# Patient Record
Sex: FEMALE | Race: WHITE | Employment: OTHER | URBAN - METROPOLITAN AREA
[De-identification: names, ages, dates, MRNs, and addresses within clinical notes are randomized per-mention and may not be internally consistent; named-entity substitution may affect disease eponyms.]

---

## 2018-07-20 ENCOUNTER — APPOINTMENT (OUTPATIENT)
Dept: LAB | Facility: HOSPITAL | Age: 73
End: 2018-07-20
Attending: FAMILY MEDICINE
Payer: MEDICARE

## 2018-07-20 ENCOUNTER — TRANSCRIBE ORDERS (OUTPATIENT)
Dept: ADMINISTRATIVE | Facility: HOSPITAL | Age: 73
End: 2018-07-20

## 2018-07-20 DIAGNOSIS — D64.9 ANEMIA, UNSPECIFIED TYPE: ICD-10-CM

## 2018-07-20 DIAGNOSIS — E78.5 HYPERLIPIDEMIA, UNSPECIFIED HYPERLIPIDEMIA TYPE: ICD-10-CM

## 2018-07-20 DIAGNOSIS — D64.9 ANEMIA, UNSPECIFIED TYPE: Primary | ICD-10-CM

## 2018-07-20 LAB
CHOLEST SERPL-MCNC: 148 MG/DL (ref 0–200)
ERYTHROCYTE [DISTWIDTH] IN BLOOD BY AUTOMATED COUNT: 14.6 % (ref 11.5–14.5)
FERRITIN SERPL-MCNC: 38 NG/ML (ref 8–388)
FOLATE SERPL-MCNC: >20 NG/ML (ref 3.1–17.5)
HCT VFR BLD AUTO: 38.4 % (ref 34.8–46.1)
HDLC SERPL-MCNC: 37 MG/DL (ref 40–60)
HGB BLD-MCNC: 12.7 G/DL (ref 12–16)
IRON SATN MFR SERPL: 14 %
IRON SERPL-MCNC: 40 UG/DL (ref 50–170)
LDLC SERPL CALC-MCNC: 77 MG/DL (ref 75–193)
MCH RBC QN AUTO: 27.9 PG (ref 26–34)
MCHC RBC AUTO-ENTMCNC: 33 G/DL (ref 31–37)
MCV RBC AUTO: 85 FL (ref 81–99)
NONHDLC SERPL-MCNC: 111 MG/DL
PLATELET # BLD AUTO: 274 THOUSANDS/UL (ref 149–390)
PMV BLD AUTO: 8 FL (ref 8.6–11.7)
RBC # BLD AUTO: 4.55 MILLION/UL (ref 3.9–5.2)
TIBC SERPL-MCNC: 294 UG/DL (ref 250–450)
TRIGL SERPL-MCNC: 168 MG/DL (ref 44–166)
VIT B12 SERPL-MCNC: 1586 PG/ML (ref 100–900)
WBC # BLD AUTO: 8.9 THOUSAND/UL (ref 4.8–10.8)

## 2018-07-20 PROCEDURE — 82607 VITAMIN B-12: CPT

## 2018-07-20 PROCEDURE — 83540 ASSAY OF IRON: CPT

## 2018-07-20 PROCEDURE — 83550 IRON BINDING TEST: CPT

## 2018-07-20 PROCEDURE — 82728 ASSAY OF FERRITIN: CPT

## 2018-07-20 PROCEDURE — 80061 LIPID PANEL: CPT

## 2018-07-20 PROCEDURE — 36415 COLL VENOUS BLD VENIPUNCTURE: CPT

## 2018-07-20 PROCEDURE — 85027 COMPLETE CBC AUTOMATED: CPT

## 2018-07-20 PROCEDURE — 82746 ASSAY OF FOLIC ACID SERUM: CPT

## 2018-08-08 ENCOUNTER — TRANSCRIBE ORDERS (OUTPATIENT)
Dept: LAB | Facility: MEDICAL CENTER | Age: 73
End: 2018-08-08

## 2018-08-08 ENCOUNTER — APPOINTMENT (OUTPATIENT)
Dept: LAB | Facility: MEDICAL CENTER | Age: 73
End: 2018-08-08
Payer: MEDICARE

## 2018-08-08 DIAGNOSIS — R35.0 URINE FREQUENCY: Primary | ICD-10-CM

## 2018-08-08 DIAGNOSIS — M81.0 OSTEOPOROSIS, UNSPECIFIED OSTEOPOROSIS TYPE, UNSPECIFIED PATHOLOGICAL FRACTURE PRESENCE: ICD-10-CM

## 2018-08-08 DIAGNOSIS — E78.5 HYPERLIPIDEMIA, UNSPECIFIED HYPERLIPIDEMIA TYPE: ICD-10-CM

## 2018-08-08 DIAGNOSIS — D64.9 ANEMIA, UNSPECIFIED TYPE: ICD-10-CM

## 2018-08-08 DIAGNOSIS — R35.0 URINE FREQUENCY: ICD-10-CM

## 2018-08-08 LAB
BACTERIA UR QL AUTO: NORMAL /HPF
BILIRUB UR QL STRIP: NEGATIVE
CLARITY UR: CLEAR
COLOR UR: YELLOW
GLUCOSE UR STRIP-MCNC: NEGATIVE MG/DL
HGB UR QL STRIP.AUTO: NEGATIVE
HYALINE CASTS #/AREA URNS LPF: NORMAL /LPF
KETONES UR STRIP-MCNC: NEGATIVE MG/DL
LEUKOCYTE ESTERASE UR QL STRIP: NEGATIVE
NITRITE UR QL STRIP: NEGATIVE
NON-SQ EPI CELLS URNS QL MICRO: NORMAL /HPF
PH UR STRIP.AUTO: 5.5 [PH] (ref 4.5–8)
PROT UR STRIP-MCNC: NEGATIVE MG/DL
RBC #/AREA URNS AUTO: NORMAL /HPF
SP GR UR STRIP.AUTO: 1.01 (ref 1–1.03)
UROBILINOGEN UR QL STRIP.AUTO: 0.2 E.U./DL
WBC #/AREA URNS AUTO: NORMAL /HPF

## 2018-08-08 PROCEDURE — 87086 URINE CULTURE/COLONY COUNT: CPT

## 2018-08-08 PROCEDURE — 81001 URINALYSIS AUTO W/SCOPE: CPT

## 2018-08-10 LAB — BACTERIA UR CULT: NORMAL

## 2018-10-09 ENCOUNTER — APPOINTMENT (OUTPATIENT)
Dept: LAB | Facility: HOSPITAL | Age: 73
End: 2018-10-09
Attending: INTERNAL MEDICINE
Payer: MEDICARE

## 2018-10-09 ENCOUNTER — TRANSCRIBE ORDERS (OUTPATIENT)
Dept: ADMINISTRATIVE | Facility: HOSPITAL | Age: 73
End: 2018-10-09

## 2018-10-09 DIAGNOSIS — D50.9 IRON DEFICIENCY ANEMIA, UNSPECIFIED IRON DEFICIENCY ANEMIA TYPE: Primary | ICD-10-CM

## 2018-10-09 DIAGNOSIS — D50.9 IRON DEFICIENCY ANEMIA, UNSPECIFIED IRON DEFICIENCY ANEMIA TYPE: ICD-10-CM

## 2018-10-09 LAB
BASOPHILS # BLD AUTO: 0 THOUSANDS/ΜL (ref 0–0.1)
BASOPHILS NFR BLD AUTO: 0 % (ref 0–2)
EOSINOPHIL # BLD AUTO: 0.2 THOUSAND/ΜL (ref 0–0.61)
EOSINOPHIL NFR BLD AUTO: 1 % (ref 0–5)
ERYTHROCYTE [DISTWIDTH] IN BLOOD BY AUTOMATED COUNT: 15 % (ref 11.5–14.5)
HCT VFR BLD AUTO: 38.9 % (ref 34.8–46.1)
HGB BLD-MCNC: 12.9 G/DL (ref 12–16)
IRON SATN MFR SERPL: 13 %
IRON SERPL-MCNC: 38 UG/DL (ref 50–170)
LYMPHOCYTES # BLD AUTO: 1.9 THOUSANDS/ΜL (ref 0.6–4.47)
LYMPHOCYTES NFR BLD AUTO: 19 % (ref 21–51)
MCH RBC QN AUTO: 28.8 PG (ref 26–34)
MCHC RBC AUTO-ENTMCNC: 33.1 G/DL (ref 31–37)
MCV RBC AUTO: 87 FL (ref 81–99)
MONOCYTES # BLD AUTO: 0.7 THOUSAND/ΜL (ref 0.17–1.22)
MONOCYTES NFR BLD AUTO: 7 % (ref 2–12)
NEUTROPHILS # BLD AUTO: 7.7 THOUSANDS/ΜL (ref 1.4–6.5)
NEUTS SEG NFR BLD AUTO: 73 % (ref 42–75)
NRBC BLD AUTO-RTO: 0 /100 WBCS
PLATELET # BLD AUTO: 269 THOUSANDS/UL (ref 149–390)
PMV BLD AUTO: 8.3 FL (ref 8.6–11.7)
RBC # BLD AUTO: 4.47 MILLION/UL (ref 3.9–5.2)
TIBC SERPL-MCNC: 292 UG/DL (ref 250–450)
WBC # BLD AUTO: 10.5 THOUSAND/UL (ref 4.8–10.8)

## 2018-10-09 PROCEDURE — 83540 ASSAY OF IRON: CPT

## 2018-10-09 PROCEDURE — 36415 COLL VENOUS BLD VENIPUNCTURE: CPT

## 2018-10-09 PROCEDURE — 85025 COMPLETE CBC W/AUTO DIFF WBC: CPT

## 2018-10-09 PROCEDURE — 83550 IRON BINDING TEST: CPT

## 2018-11-07 ENCOUNTER — TRANSCRIBE ORDERS (OUTPATIENT)
Dept: ADMINISTRATIVE | Facility: HOSPITAL | Age: 73
End: 2018-11-07

## 2018-11-07 ENCOUNTER — APPOINTMENT (OUTPATIENT)
Dept: LAB | Facility: HOSPITAL | Age: 73
End: 2018-11-07

## 2018-11-07 DIAGNOSIS — Z11.1 SCREENING EXAMINATION FOR PULMONARY TUBERCULOSIS: Primary | ICD-10-CM

## 2018-11-07 DIAGNOSIS — Z11.1 SCREENING EXAMINATION FOR PULMONARY TUBERCULOSIS: ICD-10-CM

## 2018-11-07 PROCEDURE — 86480 TB TEST CELL IMMUN MEASURE: CPT

## 2018-11-07 PROCEDURE — 36415 COLL VENOUS BLD VENIPUNCTURE: CPT

## 2018-11-08 LAB
GAMMA INTERFERON BACKGROUND BLD IA-ACNC: 0.03 IU/ML
M TB IFN-G BLD-IMP: NEGATIVE
M TB IFN-G CD4+ BCKGRND COR BLD-ACNC: 0 IU/ML
M TB IFN-G CD4+ BCKGRND COR BLD-ACNC: 0.02 IU/ML
MITOGEN IGNF BCKGRD COR BLD-ACNC: >10 IU/ML

## 2018-12-21 ENCOUNTER — APPOINTMENT (OUTPATIENT)
Dept: LAB | Facility: HOSPITAL | Age: 73
End: 2018-12-21

## 2018-12-21 ENCOUNTER — TRANSCRIBE ORDERS (OUTPATIENT)
Dept: ADMINISTRATIVE | Facility: HOSPITAL | Age: 73
End: 2018-12-21

## 2018-12-21 DIAGNOSIS — Z01.84 IMMUNITY STATUS TESTING: ICD-10-CM

## 2018-12-21 DIAGNOSIS — Z01.84 IMMUNITY STATUS TESTING: Primary | ICD-10-CM

## 2018-12-21 LAB — RUBV IGG SERPL IA-ACNC: >175 IU/ML

## 2018-12-21 PROCEDURE — 86762 RUBELLA ANTIBODY: CPT

## 2018-12-21 PROCEDURE — 36415 COLL VENOUS BLD VENIPUNCTURE: CPT

## 2018-12-21 PROCEDURE — 86787 VARICELLA-ZOSTER ANTIBODY: CPT

## 2018-12-21 PROCEDURE — 86735 MUMPS ANTIBODY: CPT

## 2018-12-21 PROCEDURE — 86765 RUBEOLA ANTIBODY: CPT

## 2018-12-26 LAB
MEV IGG SER QL: NORMAL
MUV IGG SER QL: NORMAL
VZV IGG SER IA-ACNC: NORMAL

## 2019-01-11 ENCOUNTER — TRANSCRIBE ORDERS (OUTPATIENT)
Dept: ADMINISTRATIVE | Facility: HOSPITAL | Age: 74
End: 2019-01-11

## 2019-01-11 ENCOUNTER — APPOINTMENT (OUTPATIENT)
Dept: LAB | Facility: HOSPITAL | Age: 74
End: 2019-01-11
Attending: FAMILY MEDICINE
Payer: MEDICARE

## 2019-01-11 DIAGNOSIS — D64.9 ANEMIA, UNSPECIFIED TYPE: Primary | ICD-10-CM

## 2019-01-11 DIAGNOSIS — D64.9 ANEMIA, UNSPECIFIED TYPE: ICD-10-CM

## 2019-01-11 LAB
ERYTHROCYTE [DISTWIDTH] IN BLOOD BY AUTOMATED COUNT: 14.2 % (ref 11.5–14.5)
FERRITIN SERPL-MCNC: 36 NG/ML (ref 8–388)
HCT VFR BLD AUTO: 38.7 % (ref 34.8–46.1)
HGB BLD-MCNC: 12.3 G/DL (ref 12–16)
IRON SATN MFR SERPL: 13 %
IRON SERPL-MCNC: 39 UG/DL (ref 50–170)
MCH RBC QN AUTO: 27.8 PG (ref 26–34)
MCHC RBC AUTO-ENTMCNC: 31.9 G/DL (ref 31–37)
MCV RBC AUTO: 87 FL (ref 81–99)
PLATELET # BLD AUTO: 290 THOUSANDS/UL (ref 149–390)
PMV BLD AUTO: 8.2 FL (ref 8.6–11.7)
RBC # BLD AUTO: 4.44 MILLION/UL (ref 3.9–5.2)
TIBC SERPL-MCNC: 301 UG/DL (ref 250–450)
WBC # BLD AUTO: 9.1 THOUSAND/UL (ref 4.8–10.8)

## 2019-01-11 PROCEDURE — 85027 COMPLETE CBC AUTOMATED: CPT

## 2019-01-11 PROCEDURE — 36415 COLL VENOUS BLD VENIPUNCTURE: CPT

## 2019-01-11 PROCEDURE — 82728 ASSAY OF FERRITIN: CPT

## 2019-01-11 PROCEDURE — 83540 ASSAY OF IRON: CPT

## 2019-01-11 PROCEDURE — 83550 IRON BINDING TEST: CPT

## 2019-02-11 ENCOUNTER — TRANSCRIBE ORDERS (OUTPATIENT)
Dept: ADMINISTRATIVE | Facility: HOSPITAL | Age: 74
End: 2019-02-11

## 2019-02-11 DIAGNOSIS — Z12.39 SCREENING BREAST EXAMINATION: Primary | ICD-10-CM

## 2019-02-14 ENCOUNTER — HOSPITAL ENCOUNTER (OUTPATIENT)
Dept: MAMMOGRAPHY | Facility: HOSPITAL | Age: 74
Discharge: HOME | End: 2019-02-14
Attending: FAMILY MEDICINE
Payer: MEDICARE

## 2019-02-14 VITALS — WEIGHT: 135 LBS | HEIGHT: 61 IN | BODY MASS INDEX: 25.49 KG/M2

## 2019-02-14 DIAGNOSIS — Z12.39 SCREENING BREAST EXAMINATION: ICD-10-CM

## 2019-02-14 PROCEDURE — 77067 SCR MAMMO BI INCL CAD: CPT

## 2019-02-14 PROCEDURE — 77063 BREAST TOMOSYNTHESIS BI: CPT

## 2019-03-19 ENCOUNTER — OFFICE VISIT (OUTPATIENT)
Dept: CARDIOLOGY CLINIC | Facility: CLINIC | Age: 74
End: 2019-03-19
Payer: MEDICARE

## 2019-03-19 VITALS
HEART RATE: 78 BPM | BODY MASS INDEX: 26.06 KG/M2 | HEIGHT: 61 IN | DIASTOLIC BLOOD PRESSURE: 70 MMHG | SYSTOLIC BLOOD PRESSURE: 122 MMHG | WEIGHT: 138 LBS

## 2019-03-19 DIAGNOSIS — I25.10 CORONARY ARTERY DISEASE INVOLVING NATIVE CORONARY ARTERY OF NATIVE HEART WITHOUT ANGINA PECTORIS: Primary | ICD-10-CM

## 2019-03-19 DIAGNOSIS — E78.5 DYSLIPIDEMIA: ICD-10-CM

## 2019-03-19 PROCEDURE — 99213 OFFICE O/P EST LOW 20 MIN: CPT | Performed by: INTERNAL MEDICINE

## 2019-03-19 PROCEDURE — 93000 ELECTROCARDIOGRAM COMPLETE: CPT | Performed by: INTERNAL MEDICINE

## 2019-03-19 RX ORDER — ATORVASTATIN CALCIUM 20 MG/1
TABLET, FILM COATED ORAL
COMMUNITY
End: 2019-03-19 | Stop reason: CLARIF

## 2019-03-19 RX ORDER — OMEPRAZOLE 20 MG/1
20 CAPSULE, DELAYED RELEASE ORAL DAILY
COMMUNITY
Start: 2019-02-11

## 2019-03-19 RX ORDER — OXYBUTYNIN CHLORIDE 5 MG/1
5 TABLET, EXTENDED RELEASE ORAL
Refills: 0 | COMMUNITY
Start: 2019-02-11

## 2019-03-19 RX ORDER — ATORVASTATIN CALCIUM 40 MG/1
40 TABLET, FILM COATED ORAL DAILY
Qty: 90 TABLET | Refills: 5
Start: 2019-03-19

## 2019-03-19 RX ORDER — UBIDECARENONE 100 MG
CAPSULE ORAL DAILY
COMMUNITY

## 2019-03-19 NOTE — PROGRESS NOTES
Patient ID: Uriel Samaniego is a 74 y o  female  Plan:      Dyslipidemia  Tolerating statin tx  Coronary artery disease involving native coronary artery of native heart without angina pectoris  Moderate disease by cath 2014  No current symptoms  Follow up Plan:  1 year ecg and f/u    HPI:   The patient disease seen in follow-up today regarding CAD and hyperlipidemia  No recent chest pain or chest pressure  She is about to be fitted for sleep apnea device  She is 5 years post heart catheterization revealing 60% lad stenosis and 70% 2nd diagonal vessel stenosis  Most recent or relevant cardiac/vascular testing:    ECG 3/19/2019: NSR  WNL  Past Surgical History:   Procedure Laterality Date    BREAST BIOPSY Right 02/24/2015    BREAST BIOPSY Left     ? year    CARDIAC CATHETERIZATION  02/07/2014    EF 65%, mid LAD 60% stenosis and D2 70% stenosis  Medical management  CMP: No results found for: NA, K, CL, CO2, BUN, CREATININE, GLUCOSE, EGFR    Lipid Profile:   Lab Results   Component Value Date    TRIG 168 (H) 07/20/2018    HDL 37 (L) 07/20/2018         Review of Systems   10  point ROS  was otherwise non pertinent or negative except as per HPI or as below  Gait: WNL  Objective:     /70   Pulse 78   Ht 5' 1" (1 549 m)   Wt 62 6 kg (138 lb)   BMI 26 07 kg/m²     PHYSICAL EXAM:    General:  Normal appearance in no distress  Eyes:  Anicteric  Oral mucosa:  Moist   Neck:  No JVD  Carotid upstrokes are brisk without bruits  No masses  Chest:  Clear to auscultation and percussion  Cardiac:  Normal PMI  Normal S1 and S2  No murmur gallop or rub  Abdomen:  Soft and nontender  No palpable organomegaly or aortic enlargement  Extremities:  No peripheral edema  Musculoskeletal:  Symmetric  Vascular:  Femoral pulses are brisk without bruits  Popliteal pulses are intact bilaterally  Pedal pulses are intact  Neuro:  Grossly symmetric    Psych:  Alert and oriented x3         Current Outpatient Medications:     atorvastatin (LIPITOR) 40 mg tablet, Take 1 tablet (40 mg total) by mouth daily, Disp: 90 tablet, Rfl: 5    Calcium Carb-Cholecalciferol 600-800 MG-UNIT TABS, Take by mouth daily, Disp: , Rfl:     co-enzyme Q-10 100 mg capsule, Take by mouth daily, Disp: , Rfl:     metoprolol tartrate (LOPRESSOR) 25 mg tablet, Take 1/2 tablet by oral route at bedtime  , Disp: , Rfl:     nefazodone (SERZONE) 100 mg tablet, Take 2 tablets by oral route before bed , Disp: , Rfl:     omeprazole (PriLOSEC) 20 mg delayed release capsule, Take 20 mg by mouth daily, Disp: , Rfl:     oxybutynin (DITROPAN-XL) 5 mg 24 hr tablet, Take 5 mg by mouth daily at bedtime, Disp: , Rfl: 0  Allergies   Allergen Reactions    Advil [Ibuprofen]     Aspirin Other (See Comments)     gi bleed    Caspofungin Other (See Comments)    Hydrocodone     Pravastatin GI Intolerance    Tramadol GI Intolerance     Past Medical History:   Diagnosis Date    Benign hypertension     Coronary artery disease     medical management    History of echocardiogram 02/07/2014    EF 55%, mild MR     Mixed hyperlipidemia     Watermelon stomach            Social History     Tobacco Use   Smoking Status Never Smoker   Smokeless Tobacco Never Used

## 2019-05-22 ENCOUNTER — TRANSCRIBE ORDERS (OUTPATIENT)
Dept: ADMINISTRATIVE | Facility: HOSPITAL | Age: 74
End: 2019-05-22

## 2019-05-22 ENCOUNTER — APPOINTMENT (OUTPATIENT)
Dept: LAB | Facility: HOSPITAL | Age: 74
End: 2019-05-22
Attending: FAMILY MEDICINE
Payer: MEDICARE

## 2019-05-22 DIAGNOSIS — E78.5 HYPERLIPIDEMIA, UNSPECIFIED HYPERLIPIDEMIA TYPE: ICD-10-CM

## 2019-05-22 DIAGNOSIS — M81.0 OSTEOPOROSIS, UNSPECIFIED OSTEOPOROSIS TYPE, UNSPECIFIED PATHOLOGICAL FRACTURE PRESENCE: ICD-10-CM

## 2019-05-22 DIAGNOSIS — D64.9 ANEMIA, UNSPECIFIED TYPE: Primary | ICD-10-CM

## 2019-05-22 DIAGNOSIS — R53.83 FATIGUE, UNSPECIFIED TYPE: ICD-10-CM

## 2019-05-22 DIAGNOSIS — D64.9 ANEMIA, UNSPECIFIED TYPE: ICD-10-CM

## 2019-05-22 DIAGNOSIS — I25.10 ASCVD (ARTERIOSCLEROTIC CARDIOVASCULAR DISEASE): ICD-10-CM

## 2019-05-22 LAB
25(OH)D3 SERPL-MCNC: 41 NG/ML (ref 30–100)
ALBUMIN SERPL BCP-MCNC: 4 G/DL (ref 3.5–5.7)
ALP SERPL-CCNC: 55 U/L (ref 55–165)
ALT SERPL W P-5'-P-CCNC: 14 U/L (ref 7–52)
ANION GAP SERPL CALCULATED.3IONS-SCNC: 6 MMOL/L (ref 4–13)
AST SERPL W P-5'-P-CCNC: 14 U/L (ref 13–39)
BILIRUB SERPL-MCNC: 0.4 MG/DL (ref 0.2–1)
BUN SERPL-MCNC: 23 MG/DL (ref 7–25)
CALCIUM SERPL-MCNC: 10 MG/DL (ref 8.6–10.5)
CHLORIDE SERPL-SCNC: 102 MMOL/L (ref 98–107)
CHOLEST SERPL-MCNC: 160 MG/DL (ref 0–200)
CO2 SERPL-SCNC: 30 MMOL/L (ref 21–31)
CREAT SERPL-MCNC: 1.14 MG/DL (ref 0.6–1.2)
ERYTHROCYTE [DISTWIDTH] IN BLOOD BY AUTOMATED COUNT: 14.4 % (ref 11.5–14.5)
FERRITIN SERPL-MCNC: 32 NG/ML (ref 8–388)
GFR SERPL CREATININE-BSD FRML MDRD: 47 ML/MIN/1.73SQ M
GLUCOSE P FAST SERPL-MCNC: 96 MG/DL (ref 65–99)
HCT VFR BLD AUTO: 39.9 % (ref 42–47)
HDLC SERPL-MCNC: 39 MG/DL (ref 40–60)
HGB BLD-MCNC: 13.6 G/DL (ref 12–16)
IRON SATN MFR SERPL: 12 %
IRON SERPL-MCNC: 38 UG/DL (ref 50–170)
LDLC SERPL CALC-MCNC: 77 MG/DL (ref 0–100)
MCH RBC QN AUTO: 28.8 PG (ref 26–34)
MCHC RBC AUTO-ENTMCNC: 34 G/DL (ref 31–37)
MCV RBC AUTO: 85 FL (ref 81–99)
NONHDLC SERPL-MCNC: 121 MG/DL
PLATELET # BLD AUTO: 258 THOUSANDS/UL (ref 149–390)
PMV BLD AUTO: 8.3 FL (ref 8.6–11.7)
POTASSIUM SERPL-SCNC: 4.2 MMOL/L (ref 3.5–5.5)
PROT SERPL-MCNC: 7 G/DL (ref 6.4–8.9)
RBC # BLD AUTO: 4.7 MILLION/UL (ref 3.9–5.2)
SODIUM SERPL-SCNC: 138 MMOL/L (ref 134–143)
T4 FREE SERPL-MCNC: 1.07 NG/DL (ref 0.76–1.46)
TIBC SERPL-MCNC: 311 UG/DL (ref 250–450)
TRIGL SERPL-MCNC: 221 MG/DL (ref 44–166)
TSH SERPL DL<=0.05 MIU/L-ACNC: 3.57 UIU/ML (ref 0.45–5.33)
WBC # BLD AUTO: 10.4 THOUSAND/UL (ref 4.8–10.8)

## 2019-05-22 PROCEDURE — 80053 COMPREHEN METABOLIC PANEL: CPT

## 2019-05-22 PROCEDURE — 83540 ASSAY OF IRON: CPT

## 2019-05-22 PROCEDURE — 80061 LIPID PANEL: CPT

## 2019-05-22 PROCEDURE — 82728 ASSAY OF FERRITIN: CPT

## 2019-05-22 PROCEDURE — 84439 ASSAY OF FREE THYROXINE: CPT

## 2019-05-22 PROCEDURE — 36415 COLL VENOUS BLD VENIPUNCTURE: CPT

## 2019-05-22 PROCEDURE — 82306 VITAMIN D 25 HYDROXY: CPT

## 2019-05-22 PROCEDURE — 83550 IRON BINDING TEST: CPT

## 2019-05-22 PROCEDURE — 85027 COMPLETE CBC AUTOMATED: CPT

## 2019-05-22 PROCEDURE — 84443 ASSAY THYROID STIM HORMONE: CPT

## 2020-01-01 ENCOUNTER — HOSPITAL ENCOUNTER (EMERGENCY)
Facility: HOSPITAL | Age: 75
Discharge: HOME | End: 2020-01-01
Attending: INTERNAL MEDICINE | Admitting: INTERNAL MEDICINE
Payer: MEDICARE

## 2020-01-01 ENCOUNTER — APPOINTMENT (EMERGENCY)
Dept: RADIOLOGY | Facility: HOSPITAL | Age: 75
End: 2020-01-01
Payer: MEDICARE

## 2020-01-01 VITALS
BODY MASS INDEX: 26.07 KG/M2 | TEMPERATURE: 97.8 F | WEIGHT: 138 LBS | OXYGEN SATURATION: 100 % | HEART RATE: 76 BPM | DIASTOLIC BLOOD PRESSURE: 58 MMHG | SYSTOLIC BLOOD PRESSURE: 118 MMHG | RESPIRATION RATE: 18 BRPM

## 2020-01-01 DIAGNOSIS — R53.1 GENERALIZED WEAKNESS: Primary | ICD-10-CM

## 2020-01-01 DIAGNOSIS — N39.0 UTI (URINARY TRACT INFECTION): ICD-10-CM

## 2020-01-01 LAB
ALBUMIN SERPL BCP-MCNC: 4.1 G/DL (ref 3.5–5.7)
ALP SERPL-CCNC: 58 U/L (ref 55–165)
ALT SERPL W P-5'-P-CCNC: 14 U/L (ref 7–52)
ANION GAP SERPL CALCULATED.3IONS-SCNC: 8 MMOL/L (ref 4–13)
AST SERPL W P-5'-P-CCNC: 16 U/L (ref 13–39)
BACTERIA UR QL AUTO: ABNORMAL /HPF
BASOPHILS # BLD AUTO: 0 THOUSANDS/ΜL (ref 0–0.1)
BASOPHILS NFR BLD AUTO: 0 % (ref 0–2)
BILIRUB SERPL-MCNC: 0.4 MG/DL (ref 0.2–1)
BILIRUB UR QL STRIP: NEGATIVE
BUN SERPL-MCNC: 18 MG/DL (ref 7–25)
CALCIUM SERPL-MCNC: 9.9 MG/DL (ref 8.6–10.5)
CHLORIDE SERPL-SCNC: 104 MMOL/L (ref 98–107)
CLARITY UR: CLEAR
CO2 SERPL-SCNC: 26 MMOL/L (ref 21–31)
COLOR UR: ABNORMAL
CREAT SERPL-MCNC: 1.18 MG/DL (ref 0.6–1.2)
EOSINOPHIL # BLD AUTO: 0.1 THOUSAND/ΜL (ref 0–0.61)
EOSINOPHIL NFR BLD AUTO: 1 % (ref 0–5)
ERYTHROCYTE [DISTWIDTH] IN BLOOD BY AUTOMATED COUNT: 14.3 % (ref 11.5–14.5)
GFR SERPL CREATININE-BSD FRML MDRD: 45 ML/MIN/1.73SQ M
GLUCOSE SERPL-MCNC: 96 MG/DL (ref 65–99)
GLUCOSE UR STRIP-MCNC: NEGATIVE MG/DL
HCT VFR BLD AUTO: 43 % (ref 42–47)
HGB BLD-MCNC: 13.9 G/DL (ref 12–16)
HGB UR QL STRIP.AUTO: NEGATIVE
KETONES UR STRIP-MCNC: NEGATIVE MG/DL
LEUKOCYTE ESTERASE UR QL STRIP: ABNORMAL
LYMPHOCYTES # BLD AUTO: 1.4 THOUSANDS/ΜL (ref 0.6–4.47)
LYMPHOCYTES NFR BLD AUTO: 11 % (ref 21–51)
MCH RBC QN AUTO: 27.6 PG (ref 26–34)
MCHC RBC AUTO-ENTMCNC: 32.2 G/DL (ref 31–37)
MCV RBC AUTO: 86 FL (ref 81–99)
MONOCYTES # BLD AUTO: 0.8 THOUSAND/ΜL (ref 0.17–1.22)
MONOCYTES NFR BLD AUTO: 6 % (ref 2–12)
NEUTROPHILS # BLD AUTO: 11.4 THOUSANDS/ΜL (ref 1.4–6.5)
NEUTS SEG NFR BLD AUTO: 83 % (ref 42–75)
NITRITE UR QL STRIP: NEGATIVE
NON-SQ EPI CELLS URNS QL MICRO: ABNORMAL /HPF
PH UR STRIP.AUTO: 7.5 [PH]
PLATELET # BLD AUTO: 282 THOUSANDS/UL (ref 149–390)
PMV BLD AUTO: 7.8 FL (ref 8.6–11.7)
POTASSIUM SERPL-SCNC: 3.8 MMOL/L (ref 3.5–5.5)
PROT SERPL-MCNC: 7.4 G/DL (ref 6.4–8.9)
PROT UR STRIP-MCNC: NEGATIVE MG/DL
RBC # BLD AUTO: 5.02 MILLION/UL (ref 3.9–5.2)
RBC #/AREA URNS AUTO: ABNORMAL /HPF
SODIUM SERPL-SCNC: 138 MMOL/L (ref 134–143)
SP GR UR STRIP.AUTO: 1.01 (ref 1–1.03)
TROPONIN I SERPL-MCNC: <0.03 NG/ML
TSH SERPL DL<=0.05 MIU/L-ACNC: 4.21 UIU/ML (ref 0.45–5.33)
UROBILINOGEN UR QL STRIP.AUTO: 0.2 E.U./DL
WBC # BLD AUTO: 13.7 THOUSAND/UL (ref 4.8–10.8)
WBC #/AREA URNS AUTO: ABNORMAL /HPF

## 2020-01-01 PROCEDURE — 71045 X-RAY EXAM CHEST 1 VIEW: CPT

## 2020-01-01 PROCEDURE — 80053 COMPREHEN METABOLIC PANEL: CPT | Performed by: INTERNAL MEDICINE

## 2020-01-01 PROCEDURE — 84484 ASSAY OF TROPONIN QUANT: CPT | Performed by: INTERNAL MEDICINE

## 2020-01-01 PROCEDURE — 81001 URINALYSIS AUTO W/SCOPE: CPT | Performed by: INTERNAL MEDICINE

## 2020-01-01 PROCEDURE — 99283 EMERGENCY DEPT VISIT LOW MDM: CPT | Performed by: INTERNAL MEDICINE

## 2020-01-01 PROCEDURE — 99285 EMERGENCY DEPT VISIT HI MDM: CPT

## 2020-01-01 PROCEDURE — 84443 ASSAY THYROID STIM HORMONE: CPT | Performed by: INTERNAL MEDICINE

## 2020-01-01 PROCEDURE — 36415 COLL VENOUS BLD VENIPUNCTURE: CPT | Performed by: INTERNAL MEDICINE

## 2020-01-01 PROCEDURE — 85025 COMPLETE CBC W/AUTO DIFF WBC: CPT | Performed by: INTERNAL MEDICINE

## 2020-01-01 RX ORDER — LANOLIN ALCOHOL/MO/W.PET/CERES
1000 CREAM (GRAM) TOPICAL DAILY
COMMUNITY

## 2020-01-01 RX ORDER — TRAZODONE HYDROCHLORIDE 100 MG/1
50 TABLET ORAL
COMMUNITY

## 2020-01-01 RX ORDER — NITROFURANTOIN 25; 75 MG/1; MG/1
100 CAPSULE ORAL 2 TIMES DAILY
Qty: 14 CAPSULE | Refills: 0 | Status: SHIPPED | OUTPATIENT
Start: 2020-01-01 | End: 2020-01-08

## 2020-01-01 RX ORDER — CEFTRIAXONE 1 G/50ML
1000 INJECTION, SOLUTION INTRAVENOUS ONCE
Status: DISCONTINUED | OUTPATIENT
Start: 2020-01-01 | End: 2020-01-01

## 2020-01-01 RX ORDER — NITROFURANTOIN 25; 75 MG/1; MG/1
100 CAPSULE ORAL ONCE
Status: COMPLETED | OUTPATIENT
Start: 2020-01-01 | End: 2020-01-01

## 2020-01-01 RX ADMIN — NITROFURANTOIN MONOHYDRATE/MACROCRYSTALLINE 100 MG: 25; 75 CAPSULE ORAL at 08:25

## 2020-01-01 NOTE — ED PROVIDER NOTES
History  Chief Complaint   Patient presents with    Weakness - Generalized     57-year-old female accompanied by her  presents with just weakness  The patient states that over the past couple of days she has just felt progressively weak worsening last evening about 6:00 a m  While she was just trying to cook something  She states she gets very anxious and fatigued easily  The patient does have a history of coronary artery disease she status post cardiac catheterization in 2014 with 60% occlusion of 1st diagonal and 70% of the 2nd diagonal   She does feel lightheaded and dizzy kind of weak  There has been no slurred speech unilateral weakness numbness tingling  She has no chest pain pressure heaviness tightness  Prior to Admission Medications   Prescriptions Last Dose Informant Patient Reported? Taking? Calcium Carb-Cholecalciferol 600-800 MG-UNIT TABS   Yes No   Sig: Take by mouth daily   atorvastatin (LIPITOR) 40 mg tablet   No No   Sig: Take 1 tablet (40 mg total) by mouth daily   co-enzyme Q-10 100 mg capsule   Yes No   Sig: Take by mouth daily   metoprolol tartrate (LOPRESSOR) 25 mg tablet   Yes No   Sig: Take 1/2 tablet by oral route at bedtime  omeprazole (PriLOSEC) 20 mg delayed release capsule   Yes No   Sig: Take 20 mg by mouth daily   oxybutynin (DITROPAN-XL) 5 mg 24 hr tablet   Yes No   Sig: Take 5 mg by mouth daily at bedtime      Facility-Administered Medications: None       Past Medical History:   Diagnosis Date    Benign hypertension     Coronary artery disease     medical management    History of echocardiogram 02/07/2014    EF 55%, mild MR     Mixed hyperlipidemia     Watermelon stomach        Past Surgical History:   Procedure Laterality Date    BREAST BIOPSY Right 02/24/2015    BREAST BIOPSY Left     ? year    CARDIAC CATHETERIZATION  02/07/2014    EF 65%, mid LAD 60% stenosis and D2 70% stenosis  Medical management      CATARACT EXTRACTION Left        Family History   Problem Relation Age of Onset    Heart disease Mother     Coronary artery disease Sister         history of CABG     I have reviewed and agree with the history as documented  Social History     Tobacco Use    Smoking status: Never Smoker    Smokeless tobacco: Never Used   Substance Use Topics    Alcohol use: Not Currently    Drug use: Not on file        Review of Systems   Constitutional: Positive for fatigue  HENT: Negative  Respiratory: Negative  Cardiovascular: Negative  Gastrointestinal: Negative  Endocrine: Negative  Genitourinary: Negative  Musculoskeletal: Negative  Skin: Negative  Neurological: Positive for dizziness and weakness  Negative for tremors, seizures, syncope, speech difficulty, light-headedness, numbness and headaches  Hematological: Negative  Psychiatric/Behavioral: Negative  Physical Exam  Physical Exam   Constitutional: She is oriented to person, place, and time  She appears well-developed  Patient is a thin 33-year-old female who appears her stated age  HENT:   Head: Normocephalic and atraumatic  Mouth/Throat: Oropharynx is clear and moist    Eyes: Pupils are equal, round, and reactive to light  EOM are normal    Neck: Normal range of motion  Neck supple  Cardiovascular: Normal rate, regular rhythm, normal heart sounds and intact distal pulses  Pulmonary/Chest: Effort normal and breath sounds normal    Abdominal: Soft  Bowel sounds are normal    Musculoskeletal: Normal range of motion  Neurological: She is alert and oriented to person, place, and time  Skin: Skin is warm and dry  Psychiatric: Her behavior is normal  Judgment and thought content normal    Patient anxious  Nursing note and vitals reviewed        Vital Signs  ED Triage Vitals [01/01/20 0639]   Temperature Pulse Respirations Blood Pressure SpO2   97 8 °F (36 6 °C) 82 18 (!) 173/72 100 %      Temp Source Heart Rate Source Patient Position - Orthostatic VS BP Location FiO2 (%)   Temporal Monitor Lying Left arm --      Pain Score       No Pain           Vitals:    01/01/20 0639   BP: (!) 173/72   Pulse: 82   Patient Position - Orthostatic VS: Lying         Visual Acuity      ED Medications  Medications - No data to display    Diagnostic Studies  Results Reviewed     None                 No orders to display              Procedures  ECG 12 Lead Documentation Only  Date/Time: 1/1/2020 6:53 AM  Performed by: Christal Knight MD  Authorized by: Christal Knight MD     Indications / Diagnosis:  Weakness  ECG reviewed by me, the ED Provider: yes    Patient location:  ED  Previous ECG:     Comparison to cardiac monitor: Yes    Interpretation:     Interpretation: abnormal    Rate:     ECG rate:  75    ECG rate assessment: normal    Rhythm:     Rhythm: sinus rhythm    Ectopy:     Ectopy: none    QRS:     QRS axis:  Normal  Conduction:     Conduction: normal    ST segments:     ST segments:  Normal  T waves:     T waves: normal               ED Course                               MDM      Disposition  Final diagnoses:   None     ED Disposition     None      Follow-up Information    None         Patient's Medications   Discharge Prescriptions    No medications on file     No discharge procedures on file      ED Provider  Electronically Signed by           Christal Knight MD  01/01/20 3906       Christal Knight MD  01/05/20 9889

## 2020-01-01 NOTE — ED CARE HANDOFF
Emergency Department Sign Out Note        Sign out and transfer of care from Dr Terrance Talley  See Separate Emergency Department note  The patient, Rad Manning, was evaluated by the previous provider for weakness  Workup Completed:  Labs/UA/chest xray     ED Course / Workup Pending (followup): This is a 13-year-old female presented ED with the complain generalized weakness patient was seen and examined by bedside sitting comfortably in bed answer questions appropriately  Lab UA chest x-ray result discussed with the patient patient is found to have a mild urinary tract infection will start her on a short course of antibiotics recommending to return to the ED if symptom does not improve or worsen  Patient states that she was making right spitting and for got any ingredient became severely anxious which prompted the ED visit  However states that she is feeling much better at this time        HEART Risk Score      Most Recent Value   History  1 Filed at: 01/01/2020 0657   ECG  1 Filed at: 01/01/2020 3601   Age  2 Filed at: 01/01/2020 2803   Risk Factors  2 Filed at: 01/01/2020 0657   Troponin     Heart Score Risk Calculator   History  1 Filed at: 01/01/2020 0657   ECG  1 Filed at: 01/01/2020 8224   Age  2 Filed at: 01/01/2020 7429   Risk Factors  2 Filed at: 01/01/2020 0657   Troponin     HEART Score     HEART Score                               Procedures  MDM    Disposition  Final diagnoses:   Generalized weakness   UTI (urinary tract infection)     Time reflects when diagnosis was documented in both MDM as applicable and the Disposition within this note     Time User Action Codes Description Comment    1/1/2020  8:26 AM Floretta Caprice Add [R53 1] Generalized weakness     1/1/2020  8:26 AM Floretta Caprice Add [N39 0] UTI (urinary tract infection)       ED Disposition     ED Disposition Condition Date/Time Comment    Discharge Stable Wed Jan 1, 2020  8:26 AM Rad Manning discharge to home/self care             Follow-up Information     Follow up With Specialties Details Why Contact Info    Bryce Carrillo MD Family Medicine Schedule an appointment as soon as possible for a visit in 2 days If symptoms worsen Jean-Claude Greendonnie [address] [place] [address]  [phone]          Patient's Medications   Discharge Prescriptions    NITROFURANTOIN (MACROBID) 100 MG CAPSULE    Take 1 capsule (100 mg total) by mouth 2 (two) times a day for 7 days       Start Date: 1/1/2020  End Date: 1/8/2020       Order Dose: 100 mg       Quantity: 14 capsule    Refills: 0     No discharge procedures on file         ED Provider  Electronically Signed by     Yessica Felton MD  01/01/20 8209

## 2020-02-08 ENCOUNTER — TRANSCRIBE ORDERS (OUTPATIENT)
Dept: LAB | Facility: HOSPITAL | Age: 75
End: 2020-02-08

## 2020-02-08 ENCOUNTER — APPOINTMENT (OUTPATIENT)
Dept: LAB | Facility: HOSPITAL | Age: 75
End: 2020-02-08
Attending: FAMILY MEDICINE
Payer: MEDICARE

## 2020-02-08 DIAGNOSIS — E78.5 HYPERLIPIDEMIA, UNSPECIFIED HYPERLIPIDEMIA TYPE: ICD-10-CM

## 2020-02-08 DIAGNOSIS — D64.9 ANEMIA DUE TO UNKNOWN MECHANISM: ICD-10-CM

## 2020-02-08 DIAGNOSIS — Z78.9 D DIMER VALUE NORMAL: Primary | ICD-10-CM

## 2020-02-08 LAB
CHOLEST SERPL-MCNC: 144 MG/DL (ref 0–200)
ERYTHROCYTE [DISTWIDTH] IN BLOOD BY AUTOMATED COUNT: 14.9 % (ref 11.5–14.5)
FERRITIN SERPL-MCNC: 32 NG/ML (ref 8–388)
HCT VFR BLD AUTO: 40.2 % (ref 42–47)
HDLC SERPL-MCNC: 37 MG/DL
HGB BLD-MCNC: 13.2 G/DL (ref 12–16)
IRON SATN MFR SERPL: 13 %
IRON SERPL-MCNC: 41 UG/DL (ref 50–170)
LDLC SERPL CALC-MCNC: 78 MG/DL (ref 0–100)
MCH RBC QN AUTO: 28.3 PG (ref 26–34)
MCHC RBC AUTO-ENTMCNC: 32.8 G/DL (ref 31–37)
MCV RBC AUTO: 86 FL (ref 81–99)
NONHDLC SERPL-MCNC: 107 MG/DL
PLATELET # BLD AUTO: 286 THOUSANDS/UL (ref 149–390)
PMV BLD AUTO: 8.2 FL (ref 8.6–11.7)
RBC # BLD AUTO: 4.67 MILLION/UL (ref 3.9–5.2)
TIBC SERPL-MCNC: 319 UG/DL (ref 250–450)
TRIGL SERPL-MCNC: 143 MG/DL (ref 44–166)
WBC # BLD AUTO: 9.9 THOUSAND/UL (ref 4.8–10.8)

## 2020-02-08 PROCEDURE — 80061 LIPID PANEL: CPT

## 2020-02-08 PROCEDURE — 83550 IRON BINDING TEST: CPT

## 2020-02-08 PROCEDURE — 83540 ASSAY OF IRON: CPT

## 2020-02-08 PROCEDURE — 82728 ASSAY OF FERRITIN: CPT

## 2020-02-08 PROCEDURE — 36415 COLL VENOUS BLD VENIPUNCTURE: CPT

## 2020-02-08 PROCEDURE — 85027 COMPLETE CBC AUTOMATED: CPT

## 2020-02-10 ENCOUNTER — APPOINTMENT (OUTPATIENT)
Dept: LAB | Facility: MEDICAL CENTER | Age: 75
End: 2020-02-10
Payer: MEDICARE

## 2020-02-10 ENCOUNTER — TRANSCRIBE ORDERS (OUTPATIENT)
Dept: LAB | Facility: MEDICAL CENTER | Age: 75
End: 2020-02-10

## 2020-02-10 DIAGNOSIS — R35.0 URINE FREQUENCY: Primary | ICD-10-CM

## 2020-02-10 DIAGNOSIS — R50.9 FEVER, UNSPECIFIED FEVER CAUSE: ICD-10-CM

## 2020-02-10 PROCEDURE — 87086 URINE CULTURE/COLONY COUNT: CPT

## 2020-02-11 LAB — BACTERIA UR CULT: NORMAL

## 2020-04-03 ENCOUNTER — OFFICE VISIT (OUTPATIENT)
Dept: CARDIOLOGY CLINIC | Facility: CLINIC | Age: 75
End: 2020-04-03
Payer: MEDICARE

## 2020-04-03 VITALS
SYSTOLIC BLOOD PRESSURE: 132 MMHG | BODY MASS INDEX: 25.3 KG/M2 | WEIGHT: 134 LBS | HEART RATE: 78 BPM | DIASTOLIC BLOOD PRESSURE: 80 MMHG | HEIGHT: 61 IN

## 2020-04-03 DIAGNOSIS — I25.10 CORONARY ARTERY DISEASE INVOLVING NATIVE CORONARY ARTERY OF NATIVE HEART WITHOUT ANGINA PECTORIS: Primary | ICD-10-CM

## 2020-04-03 DIAGNOSIS — E78.5 DYSLIPIDEMIA: ICD-10-CM

## 2020-04-03 PROCEDURE — 99213 OFFICE O/P EST LOW 20 MIN: CPT | Performed by: INTERNAL MEDICINE

## 2020-07-27 ENCOUNTER — APPOINTMENT (OUTPATIENT)
Dept: LAB | Facility: MEDICAL CENTER | Age: 75
End: 2020-07-27
Payer: MEDICARE

## 2020-07-27 ENCOUNTER — TRANSCRIBE ORDERS (OUTPATIENT)
Dept: LAB | Facility: MEDICAL CENTER | Age: 75
End: 2020-07-27

## 2020-07-27 DIAGNOSIS — E78.5 HYPERLIPIDEMIA, UNSPECIFIED HYPERLIPIDEMIA TYPE: ICD-10-CM

## 2020-07-27 DIAGNOSIS — D64.9 ANEMIA, UNSPECIFIED TYPE: ICD-10-CM

## 2020-07-27 DIAGNOSIS — M81.0 OSTEOPOROSIS, UNSPECIFIED OSTEOPOROSIS TYPE, UNSPECIFIED PATHOLOGICAL FRACTURE PRESENCE: ICD-10-CM

## 2020-07-27 DIAGNOSIS — G47.30 SLEEP APNEA, UNSPECIFIED TYPE: ICD-10-CM

## 2020-07-27 DIAGNOSIS — G47.30 SLEEP APNEA, UNSPECIFIED TYPE: Primary | ICD-10-CM

## 2020-07-27 DIAGNOSIS — I25.10 ASCVD (ARTERIOSCLEROTIC CARDIOVASCULAR DISEASE): ICD-10-CM

## 2020-07-27 LAB
25(OH)D3 SERPL-MCNC: 49 NG/ML (ref 30–100)
ALBUMIN SERPL BCP-MCNC: 3.7 G/DL (ref 3.5–5)
ALP SERPL-CCNC: 76 U/L (ref 46–116)
ALT SERPL W P-5'-P-CCNC: 24 U/L (ref 12–78)
ANION GAP SERPL CALCULATED.3IONS-SCNC: 6 MMOL/L (ref 4–13)
AST SERPL W P-5'-P-CCNC: 16 U/L (ref 5–45)
BILIRUB SERPL-MCNC: 0.53 MG/DL (ref 0.2–1)
BUN SERPL-MCNC: 17 MG/DL (ref 5–25)
CALCIUM ALBUM COR SERPL-MCNC: 10.6 MG/DL (ref 8.3–10.1)
CALCIUM SERPL-MCNC: 10.4 MG/DL (ref 8.3–10.1)
CHLORIDE SERPL-SCNC: 107 MMOL/L (ref 100–108)
CHOLEST SERPL-MCNC: 158 MG/DL (ref 50–200)
CO2 SERPL-SCNC: 26 MMOL/L (ref 21–32)
CREAT SERPL-MCNC: 1.11 MG/DL (ref 0.6–1.3)
ERYTHROCYTE [DISTWIDTH] IN BLOOD BY AUTOMATED COUNT: 13.4 % (ref 11.6–15.1)
FERRITIN SERPL-MCNC: 48 NG/ML (ref 8–388)
GFR SERPL CREATININE-BSD FRML MDRD: 48 ML/MIN/1.73SQ M
GLUCOSE P FAST SERPL-MCNC: 82 MG/DL (ref 65–99)
HCT VFR BLD AUTO: 43.5 % (ref 34.8–46.1)
HDLC SERPL-MCNC: 41 MG/DL
HGB BLD-MCNC: 14 G/DL (ref 11.5–15.4)
IRON SATN MFR SERPL: 15 %
IRON SERPL-MCNC: 46 UG/DL (ref 50–170)
LDLC SERPL CALC-MCNC: 84 MG/DL (ref 0–100)
MCH RBC QN AUTO: 28.7 PG (ref 26.8–34.3)
MCHC RBC AUTO-ENTMCNC: 32.2 G/DL (ref 31.4–37.4)
MCV RBC AUTO: 89 FL (ref 82–98)
NONHDLC SERPL-MCNC: 117 MG/DL
PLATELET # BLD AUTO: 284 THOUSANDS/UL (ref 149–390)
PMV BLD AUTO: 10.5 FL (ref 8.9–12.7)
POTASSIUM SERPL-SCNC: 4.4 MMOL/L (ref 3.5–5.3)
PROT SERPL-MCNC: 7.4 G/DL (ref 6.4–8.2)
RBC # BLD AUTO: 4.88 MILLION/UL (ref 3.81–5.12)
SODIUM SERPL-SCNC: 139 MMOL/L (ref 136–145)
T4 FREE SERPL-MCNC: 1.13 NG/DL (ref 0.76–1.46)
TIBC SERPL-MCNC: 309 UG/DL (ref 250–450)
TRIGL SERPL-MCNC: 163 MG/DL
TSH SERPL DL<=0.05 MIU/L-ACNC: 2.21 UIU/ML (ref 0.36–3.74)
WBC # BLD AUTO: 12.57 THOUSAND/UL (ref 4.31–10.16)

## 2020-07-27 PROCEDURE — 84443 ASSAY THYROID STIM HORMONE: CPT

## 2020-07-27 PROCEDURE — 80061 LIPID PANEL: CPT

## 2020-07-27 PROCEDURE — 83540 ASSAY OF IRON: CPT

## 2020-07-27 PROCEDURE — 80053 COMPREHEN METABOLIC PANEL: CPT

## 2020-07-27 PROCEDURE — 84439 ASSAY OF FREE THYROXINE: CPT

## 2020-07-27 PROCEDURE — 83550 IRON BINDING TEST: CPT

## 2020-07-27 PROCEDURE — 36415 COLL VENOUS BLD VENIPUNCTURE: CPT

## 2020-07-27 PROCEDURE — 85027 COMPLETE CBC AUTOMATED: CPT

## 2020-07-27 PROCEDURE — 82728 ASSAY OF FERRITIN: CPT

## 2020-07-27 PROCEDURE — 82306 VITAMIN D 25 HYDROXY: CPT

## 2020-10-26 ENCOUNTER — LAB (OUTPATIENT)
Dept: LAB | Facility: MEDICAL CENTER | Age: 75
End: 2020-10-26
Payer: MEDICARE

## 2020-10-26 ENCOUNTER — TRANSCRIBE ORDERS (OUTPATIENT)
Dept: LAB | Facility: MEDICAL CENTER | Age: 75
End: 2020-10-26

## 2020-10-26 DIAGNOSIS — R10.9 ABDOMINAL PAIN, UNSPECIFIED ABDOMINAL LOCATION: Primary | ICD-10-CM

## 2020-10-26 DIAGNOSIS — R42 DIZZINESS: ICD-10-CM

## 2020-10-26 PROCEDURE — 87086 URINE CULTURE/COLONY COUNT: CPT

## 2020-10-27 LAB — BACTERIA UR CULT: NORMAL

## 2020-11-03 ENCOUNTER — TRANSCRIBE ORDERS (OUTPATIENT)
Dept: ADMINISTRATIVE | Facility: HOSPITAL | Age: 75
End: 2020-11-03

## 2020-11-03 DIAGNOSIS — R22.0 FACIAL SWELLING: ICD-10-CM

## 2020-11-03 DIAGNOSIS — Z12.31 ENCOUNTER FOR SCREENING MAMMOGRAM FOR MALIGNANT NEOPLASM OF BREAST: ICD-10-CM

## 2020-11-03 DIAGNOSIS — J34.89 SINUS PRESSURE: ICD-10-CM

## 2020-11-03 DIAGNOSIS — E04.1 THYROID NODULE: Primary | ICD-10-CM

## 2020-11-06 ENCOUNTER — LAB (OUTPATIENT)
Dept: LAB | Facility: HOSPITAL | Age: 75
End: 2020-11-06
Attending: FAMILY MEDICINE
Payer: MEDICARE

## 2020-11-06 ENCOUNTER — TRANSCRIBE ORDERS (OUTPATIENT)
Dept: LAB | Facility: HOSPITAL | Age: 75
End: 2020-11-06

## 2020-11-06 DIAGNOSIS — D64.9 ANEMIA, UNSPECIFIED TYPE: Primary | ICD-10-CM

## 2020-11-06 DIAGNOSIS — D64.9 ANEMIA, UNSPECIFIED TYPE: ICD-10-CM

## 2020-11-06 LAB
ANION GAP SERPL CALCULATED.3IONS-SCNC: 7 MMOL/L (ref 4–13)
BUN SERPL-MCNC: 18 MG/DL (ref 7–25)
CALCIUM SERPL-MCNC: 9.8 MG/DL (ref 8.6–10.5)
CHLORIDE SERPL-SCNC: 105 MMOL/L (ref 98–107)
CO2 SERPL-SCNC: 27 MMOL/L (ref 21–31)
CREAT SERPL-MCNC: 1 MG/DL (ref 0.6–1.2)
ERYTHROCYTE [DISTWIDTH] IN BLOOD BY AUTOMATED COUNT: 14.1 % (ref 11.5–14.5)
FERRITIN SERPL-MCNC: 52 NG/ML (ref 8–388)
GFR SERPL CREATININE-BSD FRML MDRD: 54 ML/MIN/1.73SQ M
GLUCOSE P FAST SERPL-MCNC: 86 MG/DL (ref 65–99)
HCT VFR BLD AUTO: 42.4 % (ref 42–47)
HGB BLD-MCNC: 14.4 G/DL (ref 12–16)
IRON SATN MFR SERPL: 23 %
IRON SERPL-MCNC: 70 UG/DL (ref 50–170)
MCH RBC QN AUTO: 29.1 PG (ref 26–34)
MCHC RBC AUTO-ENTMCNC: 34 G/DL (ref 31–37)
MCV RBC AUTO: 86 FL (ref 81–99)
PLATELET # BLD AUTO: 270 THOUSANDS/UL (ref 149–390)
PMV BLD AUTO: 8.4 FL (ref 8.6–11.7)
POTASSIUM SERPL-SCNC: 4.3 MMOL/L (ref 3.5–5.5)
RBC # BLD AUTO: 4.95 MILLION/UL (ref 3.9–5.2)
SODIUM SERPL-SCNC: 139 MMOL/L (ref 134–143)
TIBC SERPL-MCNC: 304 UG/DL (ref 250–450)
WBC # BLD AUTO: 10.1 THOUSAND/UL (ref 4.8–10.8)

## 2020-11-06 PROCEDURE — 85027 COMPLETE CBC AUTOMATED: CPT

## 2020-11-06 PROCEDURE — 83550 IRON BINDING TEST: CPT

## 2020-11-06 PROCEDURE — 83540 ASSAY OF IRON: CPT

## 2020-11-06 PROCEDURE — 80048 BASIC METABOLIC PNL TOTAL CA: CPT

## 2020-11-06 PROCEDURE — 82728 ASSAY OF FERRITIN: CPT

## 2020-11-06 PROCEDURE — 36415 COLL VENOUS BLD VENIPUNCTURE: CPT

## 2020-11-07 ENCOUNTER — HOSPITAL ENCOUNTER (EMERGENCY)
Facility: HOSPITAL | Age: 75
Discharge: HOME | End: 2020-11-07
Attending: INTERNAL MEDICINE | Admitting: INTERNAL MEDICINE
Payer: MEDICARE

## 2020-11-07 ENCOUNTER — APPOINTMENT (EMERGENCY)
Dept: CT IMAGING | Facility: HOSPITAL | Age: 75
End: 2020-11-07
Payer: MEDICARE

## 2020-11-07 ENCOUNTER — HOSPITAL ENCOUNTER (OUTPATIENT)
Dept: CT IMAGING | Facility: HOSPITAL | Age: 75
Discharge: HOME | End: 2020-11-07
Attending: FAMILY MEDICINE
Payer: MEDICARE

## 2020-11-07 ENCOUNTER — APPOINTMENT (EMERGENCY)
Dept: RADIOLOGY | Facility: HOSPITAL | Age: 75
End: 2020-11-07
Payer: MEDICARE

## 2020-11-07 ENCOUNTER — HOSPITAL ENCOUNTER (OUTPATIENT)
Dept: MAMMOGRAPHY | Facility: HOSPITAL | Age: 75
Discharge: HOME | End: 2020-11-07
Attending: FAMILY MEDICINE
Payer: MEDICARE

## 2020-11-07 VITALS — HEIGHT: 61 IN | WEIGHT: 137 LBS | BODY MASS INDEX: 25.86 KG/M2

## 2020-11-07 DIAGNOSIS — J34.89 SINUS PRESSURE: ICD-10-CM

## 2020-11-07 DIAGNOSIS — Z12.31 ENCOUNTER FOR SCREENING MAMMOGRAM FOR MALIGNANT NEOPLASM OF BREAST: ICD-10-CM

## 2020-11-07 DIAGNOSIS — R22.0 FACIAL SWELLING: ICD-10-CM

## 2020-11-07 DIAGNOSIS — R42 DIZZINESS: Primary | ICD-10-CM

## 2020-11-07 LAB
ALBUMIN SERPL BCP-MCNC: 4.1 G/DL (ref 3.5–5.7)
ALP SERPL-CCNC: 57 U/L (ref 55–165)
ALT SERPL W P-5'-P-CCNC: 14 U/L (ref 7–52)
ANION GAP SERPL CALCULATED.3IONS-SCNC: 7 MMOL/L (ref 4–13)
APTT PPP: 33 SECONDS (ref 23–37)
AST SERPL W P-5'-P-CCNC: 17 U/L (ref 13–39)
BASOPHILS # BLD AUTO: 0 THOUSANDS/ΜL (ref 0–0.1)
BASOPHILS NFR BLD AUTO: 0 % (ref 0–2)
BILIRUB SERPL-MCNC: 0.4 MG/DL (ref 0.2–1)
BILIRUB UR QL STRIP: NEGATIVE
BNP SERPL-MCNC: 45 PG/ML (ref 1–100)
BUN SERPL-MCNC: 21 MG/DL (ref 7–25)
CALCIUM SERPL-MCNC: 9.7 MG/DL (ref 8.6–10.5)
CHLORIDE SERPL-SCNC: 104 MMOL/L (ref 98–107)
CLARITY UR: CLEAR
CO2 SERPL-SCNC: 26 MMOL/L (ref 21–31)
COLOR UR: YELLOW
CREAT SERPL-MCNC: 1.02 MG/DL (ref 0.6–1.2)
CRP SERPL HS-MCNC: 1.25 MG/L
D DIMER PPP FEU-MCNC: 0.39 UG/ML FEU
EOSINOPHIL # BLD AUTO: 0.2 THOUSAND/ΜL (ref 0–0.61)
EOSINOPHIL NFR BLD AUTO: 2 % (ref 0–5)
ERYTHROCYTE [DISTWIDTH] IN BLOOD BY AUTOMATED COUNT: 13.7 % (ref 11.5–14.5)
GFR SERPL CREATININE-BSD FRML MDRD: 53 ML/MIN/1.73SQ M
GLUCOSE SERPL-MCNC: 104 MG/DL (ref 65–99)
GLUCOSE UR STRIP-MCNC: NEGATIVE MG/DL
HCT VFR BLD AUTO: 41.6 % (ref 42–47)
HGB BLD-MCNC: 13.9 G/DL (ref 12–16)
HGB UR QL STRIP.AUTO: NEGATIVE
INR PPP: 1.13 (ref 0.84–1.19)
KETONES UR STRIP-MCNC: NEGATIVE MG/DL
LEUKOCYTE ESTERASE UR QL STRIP: NEGATIVE
LYMPHOCYTES # BLD AUTO: 1.8 THOUSANDS/ΜL (ref 0.6–4.47)
LYMPHOCYTES NFR BLD AUTO: 16 % (ref 21–51)
MAGNESIUM SERPL-MCNC: 2.1 MG/DL (ref 1.9–2.7)
MCH RBC QN AUTO: 28.5 PG (ref 26–34)
MCHC RBC AUTO-ENTMCNC: 33.4 G/DL (ref 31–37)
MCV RBC AUTO: 85 FL (ref 81–99)
MONOCYTES # BLD AUTO: 0.8 THOUSAND/ΜL (ref 0.17–1.22)
MONOCYTES NFR BLD AUTO: 7 % (ref 2–12)
NEUTROPHILS # BLD AUTO: 8.4 THOUSANDS/ΜL (ref 1.4–6.5)
NEUTS SEG NFR BLD AUTO: 75 % (ref 42–75)
NITRITE UR QL STRIP: NEGATIVE
PH UR STRIP.AUTO: 6.5 [PH]
PLATELET # BLD AUTO: 258 THOUSANDS/UL (ref 149–390)
PMV BLD AUTO: 8.4 FL (ref 8.6–11.7)
POTASSIUM SERPL-SCNC: 3.8 MMOL/L (ref 3.5–5.5)
PROT SERPL-MCNC: 6.9 G/DL (ref 6.4–8.9)
PROT UR STRIP-MCNC: NEGATIVE MG/DL
PROTHROMBIN TIME: 14.4 SECONDS (ref 11.6–14.5)
RBC # BLD AUTO: 4.88 MILLION/UL (ref 3.9–5.2)
SODIUM SERPL-SCNC: 137 MMOL/L (ref 134–143)
SP GR UR STRIP.AUTO: <=1.005 (ref 1–1.03)
TROPONIN I SERPL-MCNC: <0.03 NG/ML
UROBILINOGEN UR QL STRIP.AUTO: 0.2 E.U./DL
WBC # BLD AUTO: 11.2 THOUSAND/UL (ref 4.8–10.8)

## 2020-11-07 PROCEDURE — 83880 ASSAY OF NATRIURETIC PEPTIDE: CPT | Performed by: PHYSICIAN ASSISTANT

## 2020-11-07 PROCEDURE — 93005 ELECTROCARDIOGRAM TRACING: CPT

## 2020-11-07 PROCEDURE — 85610 PROTHROMBIN TIME: CPT | Performed by: INTERNAL MEDICINE

## 2020-11-07 PROCEDURE — 71046 X-RAY EXAM CHEST 2 VIEWS: CPT

## 2020-11-07 PROCEDURE — 70486 CT MAXILLOFACIAL W/O DYE: CPT

## 2020-11-07 PROCEDURE — 80053 COMPREHEN METABOLIC PANEL: CPT | Performed by: INTERNAL MEDICINE

## 2020-11-07 PROCEDURE — 85730 THROMBOPLASTIN TIME PARTIAL: CPT | Performed by: INTERNAL MEDICINE

## 2020-11-07 PROCEDURE — 99285 EMERGENCY DEPT VISIT HI MDM: CPT

## 2020-11-07 PROCEDURE — 77063 BREAST TOMOSYNTHESIS BI: CPT

## 2020-11-07 PROCEDURE — G1004 CDSM NDSC: HCPCS

## 2020-11-07 PROCEDURE — 84484 ASSAY OF TROPONIN QUANT: CPT | Performed by: INTERNAL MEDICINE

## 2020-11-07 PROCEDURE — 85379 FIBRIN DEGRADATION QUANT: CPT | Performed by: INTERNAL MEDICINE

## 2020-11-07 PROCEDURE — 36415 COLL VENOUS BLD VENIPUNCTURE: CPT | Performed by: INTERNAL MEDICINE

## 2020-11-07 PROCEDURE — 77067 SCR MAMMO BI INCL CAD: CPT

## 2020-11-07 PROCEDURE — 86141 C-REACTIVE PROTEIN HS: CPT | Performed by: INTERNAL MEDICINE

## 2020-11-07 PROCEDURE — 70498 CT ANGIOGRAPHY NECK: CPT

## 2020-11-07 PROCEDURE — 70496 CT ANGIOGRAPHY HEAD: CPT

## 2020-11-07 PROCEDURE — 85025 COMPLETE CBC W/AUTO DIFF WBC: CPT | Performed by: INTERNAL MEDICINE

## 2020-11-07 PROCEDURE — 99285 EMERGENCY DEPT VISIT HI MDM: CPT | Performed by: INTERNAL MEDICINE

## 2020-11-07 PROCEDURE — 96360 HYDRATION IV INFUSION INIT: CPT

## 2020-11-07 PROCEDURE — 83735 ASSAY OF MAGNESIUM: CPT | Performed by: INTERNAL MEDICINE

## 2020-11-07 PROCEDURE — 81003 URINALYSIS AUTO W/O SCOPE: CPT | Performed by: INTERNAL MEDICINE

## 2020-11-07 RX ORDER — MECLIZINE HYDROCHLORIDE 25 MG/1
25 TABLET ORAL 3 TIMES DAILY PRN
Qty: 30 TABLET | Refills: 0 | Status: SHIPPED | OUTPATIENT
Start: 2020-11-07

## 2020-11-07 RX ORDER — MECLIZINE HCL 12.5 MG/1
12.5 TABLET ORAL ONCE
Status: COMPLETED | OUTPATIENT
Start: 2020-11-07 | End: 2020-11-07

## 2020-11-07 RX ADMIN — SODIUM CHLORIDE 1000 ML: 0.9 INJECTION, SOLUTION INTRAVENOUS at 17:00

## 2020-11-07 RX ADMIN — MECLIZINE 12.5 MG: 12.5 TABLET ORAL at 17:31

## 2020-11-07 RX ADMIN — IOHEXOL 85 ML: 350 INJECTION, SOLUTION INTRAVENOUS at 16:55

## 2020-11-09 LAB
ATRIAL RATE: 82 BPM
P AXIS: 55 DEGREES
PR INTERVAL: 164 MS
QRS AXIS: 27 DEGREES
QRSD INTERVAL: 86 MS
QT INTERVAL: 382 MS
QTC INTERVAL: 446 MS
T WAVE AXIS: 50 DEGREES
VENTRICULAR RATE: 82 BPM

## 2020-11-11 VITALS
TEMPERATURE: 99.2 F | RESPIRATION RATE: 20 BRPM | WEIGHT: 137 LBS | HEART RATE: 84 BPM | SYSTOLIC BLOOD PRESSURE: 177 MMHG | OXYGEN SATURATION: 99 % | DIASTOLIC BLOOD PRESSURE: 72 MMHG | BODY MASS INDEX: 25.89 KG/M2

## 2020-11-12 ENCOUNTER — TRANSCRIBE ORDERS (OUTPATIENT)
Dept: ADMINISTRATIVE | Facility: HOSPITAL | Age: 75
End: 2020-11-12

## 2020-11-16 ENCOUNTER — HOSPITAL ENCOUNTER (OUTPATIENT)
Dept: ULTRASOUND IMAGING | Facility: HOSPITAL | Age: 75
Discharge: HOME | End: 2020-11-16
Attending: FAMILY MEDICINE
Payer: MEDICARE

## 2020-11-16 DIAGNOSIS — E04.1 THYROID NODULE: ICD-10-CM

## 2020-11-16 PROCEDURE — 76536 US EXAM OF HEAD AND NECK: CPT

## 2020-12-01 ENCOUNTER — EVALUATION (OUTPATIENT)
Dept: PHYSICAL THERAPY | Facility: CLINIC | Age: 75
End: 2020-12-01
Payer: MEDICARE

## 2020-12-01 DIAGNOSIS — R42 DIZZINESS AND GIDDINESS: ICD-10-CM

## 2020-12-01 DIAGNOSIS — H81.11 BPPV (BENIGN PAROXYSMAL POSITIONAL VERTIGO), RIGHT: Primary | ICD-10-CM

## 2020-12-01 PROCEDURE — 97162 PT EVAL MOD COMPLEX 30 MIN: CPT | Performed by: PHYSICAL THERAPIST

## 2020-12-01 PROCEDURE — 95992 CANALITH REPOSITIONING PROC: CPT | Performed by: PHYSICAL THERAPIST

## 2020-12-02 ENCOUNTER — OFFICE VISIT (OUTPATIENT)
Dept: PHYSICAL THERAPY | Facility: CLINIC | Age: 75
End: 2020-12-02
Payer: MEDICARE

## 2020-12-02 DIAGNOSIS — R42 DIZZINESS AND GIDDINESS: ICD-10-CM

## 2020-12-02 DIAGNOSIS — H81.11 BPPV (BENIGN PAROXYSMAL POSITIONAL VERTIGO), RIGHT: Primary | ICD-10-CM

## 2020-12-02 PROCEDURE — 95992 CANALITH REPOSITIONING PROC: CPT | Performed by: PHYSICAL THERAPIST

## 2020-12-02 PROCEDURE — 97112 NEUROMUSCULAR REEDUCATION: CPT | Performed by: PHYSICAL THERAPIST

## 2020-12-02 PROCEDURE — 97110 THERAPEUTIC EXERCISES: CPT | Performed by: PHYSICAL THERAPIST

## 2020-12-04 ENCOUNTER — HOSPITAL ENCOUNTER (OUTPATIENT)
Dept: ULTRASOUND IMAGING | Facility: HOSPITAL | Age: 75
Discharge: HOME | End: 2020-12-04
Attending: OTOLARYNGOLOGY | Admitting: RADIOLOGY
Payer: MEDICARE

## 2020-12-04 DIAGNOSIS — D44.0 NEOPLASM OF UNCERTAIN BEHAVIOR OF THYROID GLAND: ICD-10-CM

## 2020-12-04 PROCEDURE — 10005 FNA BX W/US GDN 1ST LES: CPT

## 2020-12-04 PROCEDURE — 88173 CYTOPATH EVAL FNA REPORT: CPT | Performed by: PATHOLOGY

## 2020-12-04 RX ORDER — LIDOCAINE HYDROCHLORIDE 10 MG/ML
5 INJECTION, SOLUTION EPIDURAL; INFILTRATION; INTRACAUDAL; PERINEURAL ONCE
Status: COMPLETED | OUTPATIENT
Start: 2020-12-04 | End: 2020-12-04

## 2020-12-04 RX ADMIN — LIDOCAINE HYDROCHLORIDE 5 ML: 10 INJECTION, SOLUTION EPIDURAL; INFILTRATION; INTRACAUDAL; PERINEURAL at 12:43

## 2020-12-09 ENCOUNTER — OFFICE VISIT (OUTPATIENT)
Dept: PHYSICAL THERAPY | Facility: CLINIC | Age: 75
End: 2020-12-09
Payer: MEDICARE

## 2020-12-09 DIAGNOSIS — H81.11 BPPV (BENIGN PAROXYSMAL POSITIONAL VERTIGO), RIGHT: Primary | ICD-10-CM

## 2020-12-09 DIAGNOSIS — R42 DIZZINESS AND GIDDINESS: ICD-10-CM

## 2020-12-09 DIAGNOSIS — H81.12 BPPV (BENIGN PAROXYSMAL POSITIONAL VERTIGO), LEFT: ICD-10-CM

## 2020-12-09 PROCEDURE — 95992 CANALITH REPOSITIONING PROC: CPT | Performed by: PHYSICAL THERAPIST

## 2020-12-09 PROCEDURE — 97110 THERAPEUTIC EXERCISES: CPT | Performed by: PHYSICAL THERAPIST

## 2020-12-11 ENCOUNTER — OFFICE VISIT (OUTPATIENT)
Dept: PHYSICAL THERAPY | Facility: CLINIC | Age: 75
End: 2020-12-11
Payer: MEDICARE

## 2020-12-11 DIAGNOSIS — R42 DIZZINESS AND GIDDINESS: ICD-10-CM

## 2020-12-11 DIAGNOSIS — H81.12 BPPV (BENIGN PAROXYSMAL POSITIONAL VERTIGO), LEFT: Primary | ICD-10-CM

## 2020-12-11 DIAGNOSIS — H81.11 BPPV (BENIGN PAROXYSMAL POSITIONAL VERTIGO), RIGHT: ICD-10-CM

## 2020-12-11 PROCEDURE — 95992 CANALITH REPOSITIONING PROC: CPT | Performed by: PHYSICAL THERAPIST

## 2020-12-11 PROCEDURE — 97110 THERAPEUTIC EXERCISES: CPT | Performed by: PHYSICAL THERAPIST

## 2020-12-11 PROCEDURE — 97112 NEUROMUSCULAR REEDUCATION: CPT | Performed by: PHYSICAL THERAPIST

## 2020-12-14 ENCOUNTER — APPOINTMENT (OUTPATIENT)
Dept: PHYSICAL THERAPY | Facility: CLINIC | Age: 75
End: 2020-12-14
Payer: MEDICARE

## 2020-12-16 ENCOUNTER — OFFICE VISIT (OUTPATIENT)
Dept: PHYSICAL THERAPY | Facility: CLINIC | Age: 75
End: 2020-12-16
Payer: MEDICARE

## 2020-12-16 DIAGNOSIS — H81.11 BPPV (BENIGN PAROXYSMAL POSITIONAL VERTIGO), RIGHT: ICD-10-CM

## 2020-12-16 DIAGNOSIS — R42 DIZZINESS AND GIDDINESS: ICD-10-CM

## 2020-12-16 DIAGNOSIS — H81.12 BPPV (BENIGN PAROXYSMAL POSITIONAL VERTIGO), LEFT: Primary | ICD-10-CM

## 2020-12-16 PROCEDURE — 95992 CANALITH REPOSITIONING PROC: CPT | Performed by: PHYSICAL THERAPIST

## 2020-12-17 ENCOUNTER — APPOINTMENT (OUTPATIENT)
Dept: PHYSICAL THERAPY | Facility: CLINIC | Age: 75
End: 2020-12-17
Payer: MEDICARE

## 2020-12-22 ENCOUNTER — OFFICE VISIT (OUTPATIENT)
Dept: PHYSICAL THERAPY | Facility: CLINIC | Age: 75
End: 2020-12-22
Payer: MEDICARE

## 2020-12-22 DIAGNOSIS — R42 DIZZINESS AND GIDDINESS: ICD-10-CM

## 2020-12-22 DIAGNOSIS — H81.12 BPPV (BENIGN PAROXYSMAL POSITIONAL VERTIGO), LEFT: Primary | ICD-10-CM

## 2020-12-22 DIAGNOSIS — H81.11 BPPV (BENIGN PAROXYSMAL POSITIONAL VERTIGO), RIGHT: ICD-10-CM

## 2020-12-22 PROCEDURE — 95992 CANALITH REPOSITIONING PROC: CPT | Performed by: PHYSICAL THERAPIST

## 2021-01-18 ENCOUNTER — IMMUNIZATIONS (OUTPATIENT)
Dept: FAMILY MEDICINE CLINIC | Facility: HOSPITAL | Age: 76
End: 2021-01-18

## 2021-01-18 DIAGNOSIS — Z23 ENCOUNTER FOR IMMUNIZATION: Primary | ICD-10-CM

## 2021-01-18 PROCEDURE — 91301 SARS-COV-2 / COVID-19 MRNA VACCINE (MODERNA) 100 MCG: CPT

## 2021-01-18 PROCEDURE — 0011A SARS-COV-2 / COVID-19 MRNA VACCINE (MODERNA) 100 MCG: CPT

## 2021-03-01 ENCOUNTER — IMMUNIZATIONS (OUTPATIENT)
Dept: FAMILY MEDICINE CLINIC | Facility: HOSPITAL | Age: 76
End: 2021-03-01

## 2021-03-01 DIAGNOSIS — Z23 ENCOUNTER FOR IMMUNIZATION: Primary | ICD-10-CM

## 2021-03-01 PROCEDURE — 0012A SARS-COV-2 / COVID-19 MRNA VACCINE (MODERNA) 100 MCG: CPT

## 2021-03-01 PROCEDURE — 91301 SARS-COV-2 / COVID-19 MRNA VACCINE (MODERNA) 100 MCG: CPT

## 2021-07-21 ENCOUNTER — APPOINTMENT (OUTPATIENT)
Dept: LAB | Facility: HOSPITAL | Age: 76
End: 2021-07-21
Attending: FAMILY MEDICINE
Payer: MEDICARE

## 2021-07-21 DIAGNOSIS — D64.9 ANEMIA, UNSPECIFIED TYPE: ICD-10-CM

## 2021-07-21 DIAGNOSIS — E04.1 THYROID NODULE: ICD-10-CM

## 2021-07-21 DIAGNOSIS — R51.9 INTRACTABLE HEADACHE, UNSPECIFIED CHRONICITY PATTERN, UNSPECIFIED HEADACHE TYPE: ICD-10-CM

## 2021-07-21 DIAGNOSIS — R53.83 FATIGUE, UNSPECIFIED TYPE: ICD-10-CM

## 2021-07-21 DIAGNOSIS — E78.5 HYPERLIPIDEMIA, UNSPECIFIED HYPERLIPIDEMIA TYPE: ICD-10-CM

## 2021-07-21 DIAGNOSIS — I25.10 ASCVD (ARTERIOSCLEROTIC CARDIOVASCULAR DISEASE): ICD-10-CM

## 2021-07-21 LAB
25(OH)D3 SERPL-MCNC: 67.6 NG/ML (ref 30–100)
ALBUMIN SERPL BCP-MCNC: 3.9 G/DL (ref 3.5–5.7)
ALP SERPL-CCNC: 61 U/L (ref 55–165)
ALT SERPL W P-5'-P-CCNC: 15 U/L (ref 7–52)
ANION GAP SERPL CALCULATED.3IONS-SCNC: 8 MMOL/L (ref 4–13)
AST SERPL W P-5'-P-CCNC: 16 U/L (ref 13–39)
BILIRUB SERPL-MCNC: 0.5 MG/DL (ref 0.2–1)
BUN SERPL-MCNC: 24 MG/DL (ref 7–25)
CALCIUM SERPL-MCNC: 9.3 MG/DL (ref 8.6–10.5)
CHLORIDE SERPL-SCNC: 105 MMOL/L (ref 98–107)
CHOLEST SERPL-MCNC: 135 MG/DL (ref 0–200)
CO2 SERPL-SCNC: 27 MMOL/L (ref 21–31)
CREAT SERPL-MCNC: 1.03 MG/DL (ref 0.6–1.2)
ERYTHROCYTE [DISTWIDTH] IN BLOOD BY AUTOMATED COUNT: 13.7 % (ref 11.5–14.5)
ERYTHROCYTE [SEDIMENTATION RATE] IN BLOOD: 16 MM/HOUR (ref 0–29)
FERRITIN SERPL-MCNC: 39 NG/ML (ref 8–388)
GFR SERPL CREATININE-BSD FRML MDRD: 52 ML/MIN/1.73SQ M
GLUCOSE P FAST SERPL-MCNC: 84 MG/DL (ref 65–99)
HCT VFR BLD AUTO: 40.9 % (ref 42–47)
HDLC SERPL-MCNC: 38 MG/DL
HGB BLD-MCNC: 13.4 G/DL (ref 12–16)
IRON SATN MFR SERPL: 14 %
IRON SERPL-MCNC: 51 UG/DL (ref 50–170)
LDLC SERPL CALC-MCNC: 75 MG/DL (ref 0–100)
MCH RBC QN AUTO: 28.6 PG (ref 26–34)
MCHC RBC AUTO-ENTMCNC: 32.8 G/DL (ref 31–37)
MCV RBC AUTO: 87 FL (ref 81–99)
NONHDLC SERPL-MCNC: 97 MG/DL
PLATELET # BLD AUTO: 261 THOUSANDS/UL (ref 149–390)
PMV BLD AUTO: 8.3 FL (ref 8.6–11.7)
POTASSIUM SERPL-SCNC: 4.3 MMOL/L (ref 3.5–5.5)
PROT SERPL-MCNC: 7 G/DL (ref 6.4–8.9)
RBC # BLD AUTO: 4.7 MILLION/UL (ref 3.9–5.2)
SODIUM SERPL-SCNC: 140 MMOL/L (ref 134–143)
T4 FREE SERPL-MCNC: 1.09 NG/DL (ref 0.76–1.46)
TIBC SERPL-MCNC: 376 UG/DL (ref 250–450)
TRIGL SERPL-MCNC: 112 MG/DL (ref 44–166)
TSH SERPL DL<=0.05 MIU/L-ACNC: 2.74 UIU/ML (ref 0.45–5.33)
WBC # BLD AUTO: 9.8 THOUSAND/UL (ref 4.8–10.8)

## 2021-07-21 PROCEDURE — 80053 COMPREHEN METABOLIC PANEL: CPT

## 2021-07-21 PROCEDURE — 82728 ASSAY OF FERRITIN: CPT

## 2021-07-21 PROCEDURE — 85027 COMPLETE CBC AUTOMATED: CPT

## 2021-07-21 PROCEDURE — 83550 IRON BINDING TEST: CPT

## 2021-07-21 PROCEDURE — 80061 LIPID PANEL: CPT

## 2021-07-21 PROCEDURE — 82306 VITAMIN D 25 HYDROXY: CPT

## 2021-07-21 PROCEDURE — 36415 COLL VENOUS BLD VENIPUNCTURE: CPT

## 2021-07-21 PROCEDURE — 85652 RBC SED RATE AUTOMATED: CPT

## 2021-07-21 PROCEDURE — 83540 ASSAY OF IRON: CPT

## 2021-07-21 PROCEDURE — 84439 ASSAY OF FREE THYROXINE: CPT

## 2021-07-21 PROCEDURE — 84443 ASSAY THYROID STIM HORMONE: CPT

## 2021-07-26 ENCOUNTER — OFFICE VISIT (OUTPATIENT)
Dept: URGENT CARE | Facility: CLINIC | Age: 76
End: 2021-07-26
Payer: MEDICARE

## 2021-07-26 VITALS
OXYGEN SATURATION: 98 % | BODY MASS INDEX: 24.92 KG/M2 | WEIGHT: 132 LBS | TEMPERATURE: 98 F | HEIGHT: 61 IN | DIASTOLIC BLOOD PRESSURE: 80 MMHG | SYSTOLIC BLOOD PRESSURE: 130 MMHG | HEART RATE: 89 BPM | RESPIRATION RATE: 18 BRPM
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
## 2021-07-26 DIAGNOSIS — S70.361A INSECT BITE OF RIGHT THIGH, INITIAL ENCOUNTER: ICD-10-CM

## 2021-07-26 DIAGNOSIS — L03.115 CELLULITIS OF RIGHT LOWER EXTREMITY: Primary | ICD-10-CM

## 2021-07-26 PROCEDURE — 99204 OFFICE O/P NEW MOD 45 MIN: CPT | Performed by: NURSE PRACTITIONER

## 2021-07-26 PROCEDURE — G0463 HOSPITAL OUTPT CLINIC VISIT: HCPCS | Performed by: NURSE PRACTITIONER

## 2021-07-26 RX ORDER — CEPHALEXIN 500 MG/1
500 CAPSULE ORAL EVERY 12 HOURS SCHEDULED
Qty: 14 CAPSULE | Refills: 0 | Status: SHIPPED | OUTPATIENT
Start: 2021-07-26 | End: 2021-08-02

## 2021-07-26 NOTE — PATIENT INSTRUCTIONS
You have been prescribed cephalexin for infection  You have been prescribed hydrocortisone cream for itching and redness  Use as prescribed  You are to follow up with your PCP  Go to the ED if symptoms worsen    Insect Bite or Sting   WHAT YOU NEED TO KNOW:   Most insect bites and stings are not dangerous and go away without treatment  Your symptoms may be mild, or you may develop anaphylaxis  Anaphylaxis is a sudden, life-threatening reaction that needs immediate treatment  Common examples of insects that bite or sting are bees, ticks, mosquitoes, spiders, and ants  Insect bites or stings can lead to diseases such as malaria, West Nile virus, Lyme disease, or [place] Spotted Fever  DISCHARGE INSTRUCTIONS:   Call your local emergency number (911 in the [address] [facility]) for signs or symptoms of anaphylaxis,  such as trouble breathing, swelling in your mouth or throat, or wheezing  You may also have itching, a rash, hives, or feel like you are going to faint  Return to the emergency department if:   · You are stung on your tongue or in your throat  · A white area forms around the bite  · You are sweating badly or have body pain  · You think you were bitten or stung by a poisonous insect  Call your doctor if:   · You have a fever  · The area becomes red, warm, tender, and swollen beyond the area of the bite or sting  · You have questions or concerns about your condition or care  Medicines: You may need any of the following:  · Antihistamines  decrease itching and rash  · Epinephrine  is used to treat severe allergic reactions such as anaphylaxis  · Take your medicine as directed  Contact your healthcare provider if you think your medicine is not helping or if you have side effects  Tell him of her if you are allergic to any medicine  Keep a list of the medicines, vitamins, and herbs you take  Include the amounts, and when and why you take them   Bring the list or the pill bottles to follow-up visits  Carry your medicine list with you in case of an emergency  Steps to take for signs or symptoms of anaphylaxis:   · Immediately  give 1 shot of epinephrine only into the outer thigh muscle  · Leave the shot in place  as directed  Your healthcare provider may recommend you leave it in place for up to 10 seconds before you remove it  This helps make sure all of the epinephrine is delivered  · Call 911 and go to the emergency department,  even if the shot improved symptoms  Do not drive yourself  Bring the used epinephrine shot with you  Safety precautions to take if you are at risk for anaphylaxis:   · Keep 2 shots of epinephrine with you at all times  You may need a second shot, because epinephrine only works for about 20 minutes and symptoms may return  Your healthcare provider can show you and family members how to give the shot  Check the expiration date every month and replace it before it expires  · Create an action plan  Your healthcare provider can help you create a written plan that explains the allergy and an emergency plan to treat a reaction  The plan explains when to give a second epinephrine shot if symptoms return or do not improve after the first  Give copies of the action plan and emergency instructions to family members, work and school staff, and  providers  Show them how to give a shot of epinephrine  · Carry medical alert identification  Wear medical alert jewelry or carry a card that says you have an insect allergy  Ask your healthcare provider where to get these items  If an insect bites or stings you:   · Remove the stinger  Scrape the stinger out with your fingernail, edge of a credit card, or a knife blade  Do not squeeze the wound  Gently wash the area with soap and water  · Remove the tick  Ticks must be removed as soon as possible so you do not get diseases passed through tick bites   Ask your healthcare provider for more information on tick bites and how to remove ticks  Care for a bite or sting wound:   · Elevate (raise) the area above the level of your heart, if possible  Prop the area on pillows to keep it raised comfortably  Elevate the area for 10 to 20 minutes each hour or as directed by your healthcare provider  · Use compresses  Soak a clean washcloth in cold water, wring it out, and put it on the bite or sting  Use the compress for 10 to 20 minutes each hour or as directed by your healthcare provider  After 24 to 48 hours, change to warm compresses  · Apply a paste  Add water to baking soda to make a thick paste  Put the paste on the area for 5 minutes  Rinse gently to remove the paste  Prevent another insect bite or sting:   · Do not wear bright-colored or flower-print clothing when you plan to spend time outdoors  Do not use hairspray, perfumes, or aftershave  · Do not leave food out  · Empty any standing water and wash container with soap and water every 2 days  · Put screens on all open windows and doors  · Put insect repellent that contains DEET on skin that is showing when you go outside  Put insect repellent at the top of your boots, bottom of pant legs, and sleeve cuffs  Wear long sleeves, pants, and shoes  · Use citronella candles outdoors to help keep mosquitoes away  Put a tick and flea collar on pets  Follow up with your doctor as directed:  Write down your questions so you remember to ask them during your visits  © Copyright [employer] 2021 Information is for End User's use only and may not be sold, redistributed or otherwise used for commercial purposes  All illustrations and images included in CareNotes® are the copyrighted property of [employer]  or [facility] Branden Girard   The above information is an  only  It is not intended as medical advice for individual conditions or treatments   Talk to your doctor, nurse or pharmacist before following any medical regimen to see if it is safe and effective for you  Cellulitis   WHAT YOU NEED TO KNOW:   Cellulitis is a skin infection caused by bacteria  Cellulitis is common and can become severe  Cellulitis usually appears on the lower legs  It can also appear on the arms, face, and other areas  Cellulitis develops when bacteria enter a crack or break in your skin, such as a scratch, bite, or cut  DISCHARGE INSTRUCTIONS:   Return to the emergency department if:   · Your wound gets larger and more painful  · You feel a crackling under your skin when you touch it  · You have purple dots or bumps on your skin, or you see bleeding under your skin  · You see red streaks coming from the infected area  Call your doctor if:   · The red, warm, swollen area gets larger  · Your fever or pain does not go away or gets worse  · The area does not get smaller after 3 days of antibiotics  · You have questions or concerns about your condition or care  Medicines: You should start to see improvement in 3 days  If cellulitis is not treated, the infection can spread through your body and become life-threatening  You may need any of the following medicines:  · Antibiotics  help treat the bacterial infection  · Acetaminophen  decreases pain and fever  It is available without a doctor's order  Ask how much to take and how often to take it  Follow directions  Read the labels of all other medicines you are using to see if they also contain acetaminophen, or ask your doctor or pharmacist  Acetaminophen can cause liver damage if not taken correctly  Do not use more than 4 grams (4,000 milligrams) total of acetaminophen in one day  · NSAIDs , such as ibuprofen, help decrease swelling, pain, and fever  This medicine is available with or without a doctor's order  NSAIDs can cause stomach bleeding or kidney problems in certain people  If you take blood thinner medicine, always ask your healthcare provider if NSAIDs are safe for you  Always read the medicine label and follow directions  · Take your medicine as directed  Contact your healthcare provider if you think your medicine is not helping or if you have side effects  Tell him or her if you are allergic to any medicine  Keep a list of the medicines, vitamins, and herbs you take  Include the amounts, and when and why you take them  Bring the list or the pill bottles to follow-up visits  Carry your medicine list with you in case of an emergency  Self-care:   · Wash the area with soap and water every day  Gently pat dry  Use bandages if directed by your healthcare provider  · Elevate the area above the level of your heart  as often as you can  This will help decrease swelling and pain  Prop the area on pillows or blankets to keep it elevated comfortably  · Place a cool, damp cloth on the area  Use clean cloths and clean water  You can do this as often as you need to  Cool, damp cloths may help decrease pain  · Apply cream or ointment as directed  These help protect the area  Most over-the-counter products, such as petroleum jelly, are good to use  Ask your healthcare provider about specific creams or ointments you should use  Prevent cellulitis:   · Do not scratch bug bites or areas of injury  You increase your risk for cellulitis by scratching these areas  · Do not share personal items, such as towels, clothing, and razors  · Clean exercise equipment  with germ-killing  before and after you use it  · Treat athlete's foot  This can help prevent the spread of a bacterial skin infection  · Wash your hands often  Use soap and water  Wash your hands after you use the bathroom, change a child's diapers, or sneeze  Wash your hands before you prepare or eat food  Use lotion to prevent dry, cracked skin  Follow up with your doctor within 3 days, or as directed:  He or she will check if your cellulitis is getting better   Write down your questions so you remember to ask them during your visits  © Copyright [employer] 2021 Information is for End User's use only and may not be sold, redistributed or otherwise used for commercial purposes  All illustrations and images included in CareNotes® are the copyrighted property of [employer]  or [employer]  The above information is an  only  It is not intended as medical advice for individual conditions or treatments  Talk to your doctor, nurse or pharmacist before following any medical regimen to see if it is safe and effective for you

## 2021-07-26 NOTE — PROGRESS NOTES
[facility]        NAME: Beena Koch is a 64 y o  female  : 1945    MRN: [id number]  DATE: 2021  TIME: 11:39 AM    Assessment and Plan   Cellulitis of right lower extremity [L03 115]  1  Cellulitis of right lower extremity  cephalexin (KEFLEX) 500 mg capsule    hydrocortisone 2 5 % cream   2  Insect bite of right thigh, initial encounter  cephalexin (KEFLEX) 500 mg capsule    hydrocortisone 2 5 % cream         Patient Instructions       Follow up with PCP in 3-5 days  Proceed to  ER if symptoms worsen  You have been prescribed cephalexin for infection  You have been prescribed hydrocortisone cream for itching and redness  Use as prescribed  You are to follow up with your PCP  Go to the ED if symptoms worsen      Chief Complaint     Chief Complaint   Patient presents with   Michelle Marleny 81     several bug bites on the back of both legs for 2 days         History of Present Illness       This is a 64year old female who states was out picking mint tea leaves and noted 3 but bits on the back of her right thigh  She states they are very itchy  She has used OTC products w/o relief  Denies fevers  Insect Bite  Associated symptoms include a rash  Review of Systems   Review of Systems   Constitutional: Negative  HENT: Negative  Eyes: Negative  Respiratory: Negative  Cardiovascular: Negative  Gastrointestinal: Negative  Endocrine: Negative  Genitourinary: Negative  Musculoskeletal: Negative  Skin: Positive for rash and wound  Allergic/Immunologic: Negative  Neurological: Negative  Hematological: Negative  Psychiatric/Behavioral: Negative            Current Medications       Current Outpatient Medications:     Ascorbic Acid (vitamin C) 100 MG tablet, Take 100 mg by mouth daily, Disp: , Rfl:     atorvastatin (LIPITOR) 40 mg tablet, Take 1 tablet (40 mg total) by mouth daily, Disp: 90 tablet, Rfl: 5    Calcium Carb-Cholecalciferol 600-800 MG-UNIT TABS, Take by mouth daily, Disp: , Rfl:     co-enzyme Q-10 100 mg capsule, Take by mouth daily, Disp: , Rfl:     ferrous sulfate 325 (65 Fe) mg tablet, 1 pill daily, Disp: , Rfl:     meclizine (ANTIVERT) 25 mg tablet, Take 1 tablet (25 mg total) by mouth 3 (three) times a day as needed for dizziness, Disp: 30 tablet, Rfl: 0    metoprolol tartrate (LOPRESSOR) 25 mg tablet, Take 1/2 tablet by oral route at bedtime  , Disp: , Rfl:     mometasone (NASONEX) 50 mcg/act nasal spray, 2 sprays daily, Disp: , Rfl:     omeprazole (PriLOSEC) 20 mg delayed release capsule, Take 20 mg by mouth daily, Disp: , Rfl:     oxybutynin (DITROPAN-XL) 5 mg 24 hr tablet, Take 5 mg by mouth daily at bedtime, Disp: , Rfl: 0    traZODone (DESYREL) 100 mg tablet, Take 50 mg by mouth daily at bedtime, Disp: , Rfl:     vitamin B-12 (VITAMIN B-12) 1,000 mcg tablet, Take 1,000 mcg by mouth daily, Disp: , Rfl:     cephalexin (KEFLEX) 500 mg capsule, Take 1 capsule (500 mg total) by mouth every 12 (twelve) hours for 7 days, Disp: 14 capsule, Rfl: 0    hydrocortisone 2 5 % cream, Apply topically 2 (two) times a day, Disp: 20 g, Rfl: 0    Current Allergies     Allergies as of 07/26/2021 - Reviewed 07/26/2021   Allergen Reaction Noted    Advil [ibuprofen]  02/14/2019    Aspirin Other (See Comments) 02/07/2014    Caspofungin Other (See Comments) 02/07/2014    Hydrocodone  02/14/2019    Pravastatin GI Intolerance 02/07/2014    Tramadol GI Intolerance 02/07/2014            The following portions of the patient's history were reviewed and updated as appropriate: allergies, current medications, past family history, past medical history, past social history, past surgical history and problem list      Past Medical History:   Diagnosis Date    Asthma     Benign hypertension     [facility]     Coronary artery disease     medical management    [facility]     Epilepsy ([facility]     History of echocardiogram 02/07/2014    EF 55%, mild MR     Mixed hyperlipidemia     DAVID on CPAP     Watermelon stomach        Past Surgical History:   Procedure Laterality Date    BREAST BIOPSY Right 02/24/2015    BREAST BIOPSY Left     ? year    CARDIAC CATHETERIZATION  02/07/2014    EF 65%, mid LAD 60% stenosis and D2 70% stenosis  Medical management   CATARACT EXTRACTION Left     US GUIDED THYROID BIOPSY  12/4/2020       Family History   Problem Relation Age of Onset    Heart disease Mother     Coronary artery disease Sister         history of CABG    No Known Problems Father     No Known Problems Daughter     No Known Problems Sister     No Known Problems Sister     No Known Problems Sister     No Known Problems Daughter          Medications have been verified  Objective   /80   Pulse 89   Temp 98 °F (36 7 °C) (Temporal)   Resp 18   Ht 5' 1" (1 549 m)   Wt 59 9 kg (132 lb)   SpO2 98%   BMI 24 94 kg/m²   No LMP recorded  Patient is postmenopausal        Physical Exam     Physical Exam  Vitals and nursing note reviewed  Constitutional:       General: She is not in acute distress  Appearance: Normal appearance  She is normal weight  She is not ill-appearing, toxic-appearing or diaphoretic  HENT:      Head: Normocephalic and atraumatic  Eyes:      Extraocular Movements: Extraocular movements intact  Cardiovascular:      Rate and Rhythm: Normal rate  Pulmonary:      Effort: Pulmonary effort is normal    Musculoskeletal:         General: Normal range of motion  Cervical back: Normal range of motion  Skin:     General: Skin is warm  Capillary Refill: Capillary refill takes less than 2 seconds  Findings: Erythema present  Neurological:      General: No focal deficit present  Mental Status: She is alert  Psychiatric:         Mood and Affect: Mood normal          Behavior: Behavior normal          Thought Content:  Thought content normal  Judgment: Judgment normal

## 2021-09-22 ENCOUNTER — HOSPITAL ENCOUNTER (OUTPATIENT)
Dept: NUCLEAR MEDICINE | Facility: HOSPITAL | Age: 76
Discharge: HOME | End: 2021-09-22
Attending: FAMILY MEDICINE

## 2021-09-22 DIAGNOSIS — M87.08: ICD-10-CM

## 2021-11-15 ENCOUNTER — IMMUNIZATIONS (OUTPATIENT)
Dept: FAMILY MEDICINE CLINIC | Facility: HOSPITAL | Age: 76
End: 2021-11-15

## 2021-11-15 DIAGNOSIS — Z23 ENCOUNTER FOR IMMUNIZATION: Primary | ICD-10-CM

## 2021-11-15 PROCEDURE — 91306 COVID-19 MODERNA VACC 0.25 ML BOOSTER: CPT

## 2021-11-15 PROCEDURE — 0013A COVID-19 MODERNA VACC 0.25 ML BOOSTER: CPT

## 2021-12-13 ENCOUNTER — HOSPITAL ENCOUNTER (OUTPATIENT)
Dept: ULTRASOUND IMAGING | Facility: HOSPITAL | Age: 76
Discharge: HOME | End: 2021-12-13
Payer: MEDICARE

## 2021-12-13 DIAGNOSIS — D44.0 NEOPLASM OF UNCERTAIN BEHAVIOR OF THYROID GLAND: ICD-10-CM

## 2021-12-13 PROCEDURE — 76536 US EXAM OF HEAD AND NECK: CPT

## 2022-05-04 ENCOUNTER — HOSPITAL ENCOUNTER (OUTPATIENT)
Dept: MAMMOGRAPHY | Facility: HOSPITAL | Age: 77
Discharge: HOME | End: 2022-05-04
Attending: FAMILY MEDICINE
Payer: MEDICARE

## 2022-05-04 VITALS — WEIGHT: 118 LBS | BODY MASS INDEX: 22.28 KG/M2 | HEIGHT: 61 IN

## 2022-05-04 DIAGNOSIS — Z12.31 SCREENING MAMMOGRAM FOR HIGH-RISK PATIENT: ICD-10-CM

## 2022-05-04 PROCEDURE — 77063 BREAST TOMOSYNTHESIS BI: CPT

## 2022-05-04 PROCEDURE — 77067 SCR MAMMO BI INCL CAD: CPT

## 2022-08-02 ENCOUNTER — APPOINTMENT (OUTPATIENT)
Dept: LAB | Facility: HOSPITAL | Age: 77
End: 2022-08-02
Attending: FAMILY MEDICINE
Payer: MEDICARE

## 2022-08-02 DIAGNOSIS — E55.9 VITAMIN D DEFICIENCY: ICD-10-CM

## 2022-08-02 DIAGNOSIS — D64.9 ANEMIA, UNSPECIFIED TYPE: ICD-10-CM

## 2022-08-02 DIAGNOSIS — I25.10 ATHEROSCLEROSIS OF NATIVE CORONARY ARTERY, UNSPECIFIED WHETHER ANGINA PRESENT, UNSPECIFIED WHETHER NATIVE OR TRANSPLANTED HEART: ICD-10-CM

## 2022-08-02 DIAGNOSIS — E78.5 HYPERLIPIDEMIA, UNSPECIFIED HYPERLIPIDEMIA TYPE: ICD-10-CM

## 2022-08-02 LAB
25(OH)D3 SERPL-MCNC: 63.1 NG/ML (ref 30–100)
ALBUMIN SERPL BCP-MCNC: 4 G/DL (ref 3.5–5)
ALP SERPL-CCNC: 68 U/L (ref 34–104)
ALT SERPL W P-5'-P-CCNC: 52 U/L (ref 7–52)
ANION GAP SERPL CALCULATED.3IONS-SCNC: 5 MMOL/L (ref 4–13)
AST SERPL W P-5'-P-CCNC: 41 U/L (ref 13–39)
BASOPHILS # BLD AUTO: 0.03 THOUSANDS/ΜL (ref 0–0.1)
BASOPHILS NFR BLD AUTO: 0 % (ref 0–1)
BILIRUB SERPL-MCNC: 0.6 MG/DL (ref 0.2–1)
BUN SERPL-MCNC: 20 MG/DL (ref 5–25)
CALCIUM SERPL-MCNC: 9.3 MG/DL (ref 8.4–10.2)
CHLORIDE SERPL-SCNC: 105 MMOL/L (ref 96–108)
CHOLEST SERPL-MCNC: 131 MG/DL
CO2 SERPL-SCNC: 30 MMOL/L (ref 21–32)
CREAT SERPL-MCNC: 1.04 MG/DL (ref 0.6–1.3)
EOSINOPHIL # BLD AUTO: 0.13 THOUSAND/ΜL (ref 0–0.61)
EOSINOPHIL NFR BLD AUTO: 1 % (ref 0–6)
ERYTHROCYTE [DISTWIDTH] IN BLOOD BY AUTOMATED COUNT: 13.3 % (ref 11.6–15.1)
FERRITIN SERPL-MCNC: 36 NG/ML (ref 8–388)
GFR SERPL CREATININE-BSD FRML MDRD: 51 ML/MIN/1.73SQ M
GLUCOSE P FAST SERPL-MCNC: 83 MG/DL (ref 65–99)
HCT VFR BLD AUTO: 41.4 % (ref 34.8–46.1)
HDLC SERPL-MCNC: 37 MG/DL
HGB BLD-MCNC: 12.9 G/DL (ref 11.5–15.4)
IMM GRANULOCYTES # BLD AUTO: 0.04 THOUSAND/UL (ref 0–0.2)
IMM GRANULOCYTES NFR BLD AUTO: 0 % (ref 0–2)
IRON SATN MFR SERPL: 14 % (ref 15–50)
IRON SERPL-MCNC: 48 UG/DL (ref 50–170)
LDLC SERPL CALC-MCNC: 77 MG/DL (ref 0–100)
LYMPHOCYTES # BLD AUTO: 1.58 THOUSANDS/ΜL (ref 0.6–4.47)
LYMPHOCYTES NFR BLD AUTO: 17 % (ref 14–44)
MCH RBC QN AUTO: 28.5 PG (ref 26.8–34.3)
MCHC RBC AUTO-ENTMCNC: 31.2 G/DL (ref 31.4–37.4)
MCV RBC AUTO: 91 FL (ref 82–98)
MONOCYTES # BLD AUTO: 0.62 THOUSAND/ΜL (ref 0.17–1.22)
MONOCYTES NFR BLD AUTO: 7 % (ref 4–12)
NEUTROPHILS # BLD AUTO: 7.06 THOUSANDS/ΜL (ref 1.85–7.62)
NEUTS SEG NFR BLD AUTO: 75 % (ref 43–75)
NONHDLC SERPL-MCNC: 94 MG/DL
NRBC BLD AUTO-RTO: 0 /100 WBCS
PLATELET # BLD AUTO: 247 THOUSANDS/UL (ref 149–390)
PMV BLD AUTO: 10.5 FL (ref 8.9–12.7)
POTASSIUM SERPL-SCNC: 4.4 MMOL/L (ref 3.5–5.3)
PROT SERPL-MCNC: 6.9 G/DL (ref 6.4–8.4)
RBC # BLD AUTO: 4.53 MILLION/UL (ref 3.81–5.12)
SODIUM SERPL-SCNC: 140 MMOL/L (ref 135–147)
T4 FREE SERPL-MCNC: 1.23 NG/DL (ref 0.76–1.46)
TIBC SERPL-MCNC: 336 UG/DL (ref 250–450)
TRIGL SERPL-MCNC: 85 MG/DL
TSH SERPL DL<=0.05 MIU/L-ACNC: 2.23 UIU/ML (ref 0.45–4.5)
WBC # BLD AUTO: 9.46 THOUSAND/UL (ref 4.31–10.16)

## 2022-08-02 PROCEDURE — 82306 VITAMIN D 25 HYDROXY: CPT

## 2022-08-02 PROCEDURE — 83540 ASSAY OF IRON: CPT

## 2022-08-02 PROCEDURE — 80061 LIPID PANEL: CPT

## 2022-08-02 PROCEDURE — 80053 COMPREHEN METABOLIC PANEL: CPT

## 2022-08-02 PROCEDURE — 36415 COLL VENOUS BLD VENIPUNCTURE: CPT

## 2022-08-02 PROCEDURE — 85025 COMPLETE CBC W/AUTO DIFF WBC: CPT

## 2022-08-02 PROCEDURE — 83550 IRON BINDING TEST: CPT

## 2022-08-02 PROCEDURE — 84439 ASSAY OF FREE THYROXINE: CPT

## 2022-08-02 PROCEDURE — 84443 ASSAY THYROID STIM HORMONE: CPT

## 2022-08-02 PROCEDURE — 82728 ASSAY OF FERRITIN: CPT

## 2022-10-03 ENCOUNTER — APPOINTMENT (OUTPATIENT)
Dept: LAB | Facility: MEDICAL CENTER | Age: 77
End: 2022-10-03
Payer: MEDICARE

## 2022-10-03 DIAGNOSIS — D64.9 ANEMIA, UNSPECIFIED TYPE: ICD-10-CM

## 2022-10-03 LAB
BASOPHILS # BLD AUTO: 0.04 THOUSANDS/ΜL (ref 0–0.1)
BASOPHILS NFR BLD AUTO: 0 % (ref 0–1)
EOSINOPHIL # BLD AUTO: 0.1 THOUSAND/ΜL (ref 0–0.61)
EOSINOPHIL NFR BLD AUTO: 1 % (ref 0–6)
ERYTHROCYTE [DISTWIDTH] IN BLOOD BY AUTOMATED COUNT: 13.5 % (ref 11.6–15.1)
FERRITIN SERPL-MCNC: 24 NG/ML (ref 8–388)
HCT VFR BLD AUTO: 39.8 % (ref 34.8–46.1)
HGB BLD-MCNC: 12.1 G/DL (ref 11.5–15.4)
IMM GRANULOCYTES # BLD AUTO: 0.03 THOUSAND/UL (ref 0–0.2)
IMM GRANULOCYTES NFR BLD AUTO: 0 % (ref 0–2)
IRON SATN MFR SERPL: 11 % (ref 15–50)
IRON SERPL-MCNC: 37 UG/DL (ref 50–170)
LYMPHOCYTES # BLD AUTO: 1.53 THOUSANDS/ΜL (ref 0.6–4.47)
LYMPHOCYTES NFR BLD AUTO: 15 % (ref 14–44)
MCH RBC QN AUTO: 28.1 PG (ref 26.8–34.3)
MCHC RBC AUTO-ENTMCNC: 30.4 G/DL (ref 31.4–37.4)
MCV RBC AUTO: 92 FL (ref 82–98)
MONOCYTES # BLD AUTO: 0.7 THOUSAND/ΜL (ref 0.17–1.22)
MONOCYTES NFR BLD AUTO: 7 % (ref 4–12)
NEUTROPHILS # BLD AUTO: 7.64 THOUSANDS/ΜL (ref 1.85–7.62)
NEUTS SEG NFR BLD AUTO: 77 % (ref 43–75)
NRBC BLD AUTO-RTO: 0 /100 WBCS
PLATELET # BLD AUTO: 238 THOUSANDS/UL (ref 149–390)
PMV BLD AUTO: 11.5 FL (ref 8.9–12.7)
RBC # BLD AUTO: 4.31 MILLION/UL (ref 3.81–5.12)
TIBC SERPL-MCNC: 322 UG/DL (ref 250–450)
WBC # BLD AUTO: 10.04 THOUSAND/UL (ref 4.31–10.16)

## 2022-10-03 PROCEDURE — 36415 COLL VENOUS BLD VENIPUNCTURE: CPT

## 2022-10-03 PROCEDURE — 83550 IRON BINDING TEST: CPT

## 2022-10-03 PROCEDURE — 85025 COMPLETE CBC W/AUTO DIFF WBC: CPT

## 2022-10-03 PROCEDURE — 83540 ASSAY OF IRON: CPT

## 2022-10-03 PROCEDURE — 82728 ASSAY OF FERRITIN: CPT

## 2023-01-09 ENCOUNTER — APPOINTMENT (OUTPATIENT)
Dept: LAB | Facility: HOSPITAL | Age: 78
End: 2023-01-09

## 2023-01-09 DIAGNOSIS — D64.9 ANEMIA, UNSPECIFIED TYPE: ICD-10-CM

## 2023-01-09 LAB
BASOPHILS # BLD AUTO: 0.04 THOUSANDS/ÂΜL (ref 0–0.1)
BASOPHILS NFR BLD AUTO: 0 % (ref 0–1)
EOSINOPHIL # BLD AUTO: 0.11 THOUSAND/ÂΜL (ref 0–0.61)
EOSINOPHIL NFR BLD AUTO: 1 % (ref 0–6)
ERYTHROCYTE [DISTWIDTH] IN BLOOD BY AUTOMATED COUNT: 14 % (ref 11.6–15.1)
FERRITIN SERPL-MCNC: 43 NG/ML (ref 8–388)
HCT VFR BLD AUTO: 42.3 % (ref 34.8–46.1)
HGB BLD-MCNC: 13.4 G/DL (ref 11.5–15.4)
IMM GRANULOCYTES # BLD AUTO: 0.05 THOUSAND/UL (ref 0–0.2)
IMM GRANULOCYTES NFR BLD AUTO: 0 % (ref 0–2)
IRON SATN MFR SERPL: 20 % (ref 15–50)
IRON SERPL-MCNC: 64 UG/DL (ref 50–170)
LYMPHOCYTES # BLD AUTO: 1.73 THOUSANDS/ÂΜL (ref 0.6–4.47)
LYMPHOCYTES NFR BLD AUTO: 15 % (ref 14–44)
MCH RBC QN AUTO: 28.3 PG (ref 26.8–34.3)
MCHC RBC AUTO-ENTMCNC: 31.7 G/DL (ref 31.4–37.4)
MCV RBC AUTO: 89 FL (ref 82–98)
MONOCYTES # BLD AUTO: 0.49 THOUSAND/ÂΜL (ref 0.17–1.22)
MONOCYTES NFR BLD AUTO: 4 % (ref 4–12)
NEUTROPHILS # BLD AUTO: 8.79 THOUSANDS/ÂΜL (ref 1.85–7.62)
NEUTS SEG NFR BLD AUTO: 80 % (ref 43–75)
NRBC BLD AUTO-RTO: 0 /100 WBCS
PLATELET # BLD AUTO: 255 THOUSANDS/UL (ref 149–390)
PMV BLD AUTO: 10 FL (ref 8.9–12.7)
RBC # BLD AUTO: 4.73 MILLION/UL (ref 3.81–5.12)
TIBC SERPL-MCNC: 313 UG/DL (ref 250–450)
WBC # BLD AUTO: 11.21 THOUSAND/UL (ref 4.31–10.16)

## 2023-04-24 ENCOUNTER — EVALUATION (OUTPATIENT)
Dept: PHYSICAL THERAPY | Facility: CLINIC | Age: 78
End: 2023-04-24

## 2023-04-24 DIAGNOSIS — G89.29 ACUTE EXACERBATION OF CHRONIC LOW BACK PAIN: Primary | ICD-10-CM

## 2023-04-24 DIAGNOSIS — M54.50 ACUTE EXACERBATION OF CHRONIC LOW BACK PAIN: Primary | ICD-10-CM

## 2023-04-24 DIAGNOSIS — R29.898 WEAKNESS OF LEFT HIP: ICD-10-CM

## 2023-04-24 DIAGNOSIS — M25.552 LEFT HIP PAIN: ICD-10-CM

## 2023-04-24 NOTE — PROGRESS NOTES
PT Evaluation     Today's date: 2023  Patient name: Fina Cook  : 1945  MRN: [id number]  Referring provider: Refugio Gibbs MD  Dx:   Encounter Diagnosis     ICD-10-CM    1  Acute exacerbation of chronic low back pain  M54 50 Ambulatory referral to PT spine    G89 29       2  Left hip pain  M25 552       3  Weakness of left hip  R29 89                      Assessment  Assessment details: Fina Cook is a pleasant 76 y o  female who presents with acute exacerbation of chronic LBP  Signs/sx consistent with acute L/S stenosis affecting L LE  Overall sx are resolving/improving  She demonstrates pain and ttp in region of insertion of L gluteal mms into L GT  She demonstrates reduced L hip strength with discomfort in same region  She demonstrates hypomobility of L hip and L/S  Overall she has poor posture with flexed/rounded posture; n/t in L leg increased with slouched sit; n/t resolved with sitting erect however increase in L LBP noted ; will monitor  Increased thoracic kyphosis noted  The patient's greatest concerns are the pain she is experiencing, worry over not knowing what's wrong and future ill health (and wanting to prevent it)  No further referral appears necessary at this time based upon examination results  Primary movement impairment diagnosis of lumbar anterior derangement resulting in pathoanatomical symptoms of Acute exacerbation of chronic low back pain  (primary encounter diagnosis) and limiting her ability to lift, perform household chores, squat to  objects from the floor and turn to look over shoulder      Impairments include:  1) repeated extension - addressing with mobs and repeated flexion mobility exercises  2) poor lifting mechanics - addressing with functional retraining  3) poor lumbopelvic movement coordination - addressing with neuromotor retraining  4) poor walking tolerance - addressing with progressive conditioning exercises &body weight supported "treadmill training  5) poor hip mobility and reduced hip strength    Etiologic factors include repetitive poor body mechanics, poor lower extremity strength and poor hip mobility  Impairments: abnormal coordination, abnormal gait, abnormal muscle firing, abnormal muscle tone, abnormal or restricted ROM, abnormal movement, activity intolerance, difficulty understanding, impaired physical strength, lacks appropriate home exercise program and pain with function    Symptom irritability: lowUnderstanding of Dx/Px/POC: good   Prognosis: good  Prognosis details: Positive prognostic indicators include positive attitude toward recovery, good understanding of diagnosis and treatment plan options and absence of observed red flags  Negative prognostic indicators include chronicity of symptoms  Goals  STGs to be achieved in 4-6 weeks:    1  Pt will report reduced L hip pain levels \"at worst\" by at least 2 points (0-10 scale) in order to allow improved tolerance for functional mobility and reduced reliance on medication or modalities for pain relief  2  Pt will demonstrate improved AROM of L hip IR/ER by at least 5-10* in order to reduce pt difficulty with functional mobility and transfers  3  Pt will demonstrate improved proximal hip and core strength by at least 1/2-1 MMT in order to improve lumbo-pelvic stability to reduce pain and improve function  5  Pt will report overall improved tolerance for sustained mobility/activity by at least 25-50% since [facility] with overall reduced pain levels and reduced fear avoidance  LTGs to be achieved in 8-12 weeks:    Patient will be independent with home exercise program    Patient will be able to manage symptoms independently  Patient will be able to stand without limitation due to pain  Patient will be able to walk without limitation due to pain  Patient will be able to squat to  objects from the floor without limitation due to pain      Plan  Plan details: " "Prognosis above is given PT services 2x/week tapering to 1x/week over the next 2 months and home program adherence  Patient would benefit from: skilled physical therapy  Planned modality interventions: thermotherapy: hydrocollator packs  Planned therapy interventions: abdominal trunk stabilization, activity modification, joint mobilization, manual therapy, motor coordination training, neuromuscular re-education, patient education, self care, therapeutic activities, therapeutic exercise, graded activity, home exercise program, behavior modification, gait training, transfer training and postural training  Frequency: 2x week  Duration in visits: 8  Duration in weeks: 4  Plan of Care beginning date: 4/24/2023  Plan of Care expiration date: 5/24/2023  Treatment plan discussed with: patient        Subjective Evaluation    History of Present Illness  Mechanism of injury: Onset of pain 2-3 weeks ago; feels it started when she was sitting in her chair with her one leg crossed under her other; woke up the next day with pain in L upper hip/posterior thigh; mild tingling into L leg per pt intermittently  Pain was worse with standing/walking/WB on LLE; had to walk on her tip toes initially  Struggled to walk/perform ADLs etc  F/u with PCP; no initial tx per pt  Notes sx seemed worse a few days later, so she went to the ER ;see note for details  Per ER note 312 \"76year-old female presenting for left SI/hip pain  Symptoms since yesterday  Occurred after standing up from chair  Burning in nature  No trauma to the back  No T or L-spine tenderness  Has tenderness in the SI area  Believe symptoms are secondary to sciatica  Given age will obtain x-ray of left hip to rule out bony abnormality  Will treat with Tylenol, Lidoderm patch  Will give prescription for muscle relaxer  Will place referral for comprehensive spine program\"    PT IE 4/24: Pt notes overall her sx are improving   Pain 1* posterior lateral L hip/upper " "thigh only  No back pain  Some pain in L glute  No sx into L knee/leg  No n/t as of late per pt  Intermittent pain  Intermittent sharp brief pains per pt  Seems to come on with activity  Better with rest  Pain lasts just a few seconds; does not linger like it used to  Seems to occur when she \"forgets\" about the hip and moves too quickly  Also can have the pain if she \"lifts too heavy\"  Sometimes pain with twisting/moving the wrong way  Sometimes pain with sitting too long; relieved with changing position  Denies stiffness  No LE buckling/giving way  No night pain  + weight loss; f/u with MD  NO hx CA  No change in bowel/bladder  No new sx/complaints  RTD upcoming; having more testing this week per pt for her PCP  No trauma/falls  Quality of life: good    Pain  Current pain ratin  At best pain ratin  At worst pain ratin  Location: L glute/hip   Quality: sharp (lasts a few seconds )  Alleviating factors: Tylenol prn  Progression: improved    Social Support  Lives with: spouse    Employment status: not working    Diagnostic Tests  Abnormal x-ray: degenerative changes  calcific tendinitis L hip  Treatments  Previous treatment: medication  Current treatment: physical therapy  Patient Goals  Patient goals for therapy: decreased pain, increased motion and increased strength          Objective     Postural Observations  Seated posture: poor  Standing posture: fair    Additional Postural Observation Details  Seated:  Mod-severe increase in thoracic kyphosis and PPT  Forward head/rounded shoulders    Standing:  Flexed/rounded posture/crouched   Reduced lumbar lordosis; increased thoracic kyphosis    Increased \"tingling\" L lower leg with sitting slouched on tx table unsupported; resolved sx in LE however discomfort L L/S/hip with sitting with erect posture       Tenderness     Lumbar Spine  No tenderness in the spinous process  Right Hip   No tenderness in the PSIS       Additional Tenderness Details  NO ttp " "over L/S or posterior sacrum at IE    Ttp just posterior to GT of the L hip into proximal gluteal region    No ttp ITB, thigh, leg etc  Will monitor       Neurological Testing     Sensation     Lumbar   Left   Intact: light touch    Right   Intact: light touch    Reflexes   Left   Patellar (L4): brisk (3+)  Achilles (S1): normal (2+)  Clonus sign: negative    Right   Patellar (L4): brisk (3+)  Achilles (S1): normal (2+)  Clonus sign: negative    Active Range of Motion     Lumbar   Flexion: Active lumbar flexion: pain L L/S approaching end range  with pain Restriction level: moderate  Extension: Active lumbar extension: Pain L L/S mid to end range  with pain Restriction level: moderate  Left lateral flexion:  Restriction level: moderate  Right lateral flexion:  Restriction level: moderate  Left rotation:  Restriction level: moderate  Right rotation: Active right lumbar rotation: Pain L L/S at end range    with pain Restriction level: moderate    Additional Active Range of Motion Details  Local discomfort L L/S with AROM in standing as noted  No radicular pain/sx  No pain/sx after AROM assessment       L hip flexion AROM /AAROM WFL ; no pain/sx ; stiffness at end range- mild-mod limitation into end range   L hip IR/ER AAROM/PROM severely limited; no pain/sx, \"tight\" only per pt; stiffness at end ranges IR/ER L hip  L hip ABD/ER - moderate limitation/stiffness with discomfort L L/S at end range   L knee ROM WFL   Will cont to assess       Strength/Myotome Testing     Left Hip   Planes of Motion   Flexion: 4  Abduction: 4 (discomfort L hip/L/S -min)  Adduction: 4    Right Hip   Planes of Motion   Flexion: 4-  Abduction: 4  Adduction: 4    Left Knee   Flexion: 4  Extension: 4- (min discomfort L lateral hip/thigh )    Right Knee   Flexion: 4  Extension: 4-    Left Ankle/Foot   Dorsiflexion: 4  Plantar flexion: 4    Right Ankle/Foot   Dorsiflexion: 4-  Plantar flexion: 4    Muscle Activation   Patient able to activate " "left transverse abdominals and right transverse abdominals  Additional Muscle Activation Details  Poor core activation/stabilization with bed mobility/transfers  Core weakness noted with supine to sit transfer; utilizes rolling to side with push/pulling from UEs to come to sitting with difficulty   Will cont to assess       Tests     Lumbar     Left   Negative crossed SLR, passive SLR and slump test      Right   Negative crossed SLR, passive SLR and slump test      Left Hip   Positive JESSEE  Additional Tests Details  Will cont to assess upcoming               Precautions: HTN, ? Low BP per pt, ? Low blood sugar per pt, CAD, asthma, COPD, ? Weight loss- is f/u with MD  * vertigo       Re-eval Date: 5/24    Date 4/24/2023        Visit Count 1       FOTO 4/24/2023        Pain In See IE       Pain Out See IE           Manuals 4/24/2023        Gentle stretch L hip with gentle end range stretch to tolerance all planes, Improve IR/ER mobility L hip , L glute/piriformis stretching                                 Neuro Re-Ed        TA training         TA with BKFO      TA with heel slides        TA with B ER    TA with MTP/LTP          Reviewed seated postural awareness with cues/feedback for upright trunk and neutral LS, issued scap depression/retractions for HEP for postural correction in sitting                                Ther Ex        Nustep - L hip AAROM and conditioning         L piriformis stretch      L SLRs flex, abd 4x20\" supine with pillows        Hip abd/add supine      Clamshells         Step ups      Mini squats         Side stepping           Bridges                         Ther Activity                        Gait Training                        Modalities                              4/24/2023  - HEP was issued and reviewed this date for above noted exercises  Pt demonstrated understanding without incident and without questions/concerns  Will continue to update upcoming            "

## 2023-04-24 NOTE — LETTER
2023    [place] [address]    Patient: Halima Lopez  YOB: 1945   Date of Visit: 2023      Dear Dr Murry Yazmin:    One of your patients, Halima Lopez, was referred to me by the [facility] program   Please review the attached evaluation summary from [address] recent visit  Please verify that you agree with the plan of care by signing the attached document and sending it back to our office  If you have any questions or concerns, please don't hesitate to call  Sincerely,    Tanvi Rowland, PT      Primary Care Provider:      I certify that I have read the below Plan of Care and certify the need for these services furnished under this plan of treatment while under my care  Alisson Weaver MD  [address] [address]  Via Fax: [phone]           PT Evaluation     Today's date: 2023  Patient name: Halima Lopez  : 1945  MRN: [id number]  Referring provider: Christian Theodore MD  Dx:   Encounter Diagnosis     ICD-10-CM    1  Acute exacerbation of chronic low back pain  M54 50 Ambulatory referral to PT spine    G89 29       2  Left hip pain  M25 552       3  Weakness of left hip  R29 898                      Assessment  Assessment details: Halima Lopez is a pleasant 76 y o  female who presents with acute exacerbation of chronic LBP  Signs/sx consistent with acute L/S stenosis affecting L LE  Overall sx are resolving/improving  She demonstrates pain and ttp in region of insertion of L gluteal mms into L GT  She demonstrates reduced L hip strength with discomfort in same region  She demonstrates hypomobility of L hip and L/S  Overall she has poor posture with flexed/rounded posture; n/t in L leg increased with slouched sit; n/t resolved with sitting erect however increase in L LBP noted ; will monitor  Increased thoracic kyphosis noted   The "patient's greatest concerns are the pain she is experiencing, worry over not knowing what's wrong and future ill health (and wanting to prevent it)  No further referral appears necessary at this time based upon examination results  Primary movement impairment diagnosis of lumbar anterior derangement resulting in pathoanatomical symptoms of Acute exacerbation of chronic low back pain  (primary encounter diagnosis) and limiting her ability to lift, perform household chores, squat to  objects from the floor and turn to look over shoulder  Impairments include:  1) repeated extension - addressing with mobs and repeated flexion mobility exercises  2) poor lifting mechanics - addressing with functional retraining  3) poor lumbopelvic movement coordination - addressing with neuromotor retraining  4) poor walking tolerance - addressing with progressive conditioning exercises &body weight supported treadmill training  5) poor hip mobility and reduced hip strength    Etiologic factors include repetitive poor body mechanics, poor lower extremity strength and poor hip mobility  Impairments: abnormal coordination, abnormal gait, abnormal muscle firing, abnormal muscle tone, abnormal or restricted ROM, abnormal movement, activity intolerance, difficulty understanding, impaired physical strength, lacks appropriate home exercise program and pain with function    Symptom irritability: lowUnderstanding of Dx/Px/POC: good   Prognosis: good  Prognosis details: Positive prognostic indicators include positive attitude toward recovery, good understanding of diagnosis and treatment plan options and absence of observed red flags  Negative prognostic indicators include chronicity of symptoms  Goals  STGs to be achieved in 4-6 weeks:    1   Pt will report reduced L hip pain levels \"at worst\" by at least 2 points (0-10 scale) in order to allow improved tolerance for functional mobility and reduced reliance on medication " or modalities for pain relief  2  Pt will demonstrate improved AROM of L hip IR/ER by at least 5-10* in order to reduce pt difficulty with functional mobility and transfers  3  Pt will demonstrate improved proximal hip and core strength by at least 1/2-1 MMT in order to improve lumbo-pelvic stability to reduce pain and improve function  5  Pt will report overall improved tolerance for sustained mobility/activity by at least 25-50% since [facility] with overall reduced pain levels and reduced fear avoidance  LTGs to be achieved in 8-12 weeks:    Patient will be independent with home exercise program    Patient will be able to manage symptoms independently  Patient will be able to stand without limitation due to pain  Patient will be able to walk without limitation due to pain  Patient will be able to squat to  objects from the floor without limitation due to pain  Plan  Plan details: Prognosis above is given PT services 2x/week tapering to 1x/week over the next 2 months and home program adherence    Patient would benefit from: skilled physical therapy  Planned modality interventions: thermotherapy: hydrocollator packs  Planned therapy interventions: abdominal trunk stabilization, activity modification, joint mobilization, manual therapy, motor coordination training, neuromuscular re-education, patient education, self care, therapeutic activities, therapeutic exercise, graded activity, home exercise program, behavior modification, gait training, transfer training and postural training  Frequency: 2x week  Duration in visits: 8  Duration in weeks: 4  Plan of Care beginning date: 4/24/2023  Plan of Care expiration date: 5/24/2023  Treatment plan discussed with: patient        Subjective Evaluation    History of Present Illness  Mechanism of injury: Onset of pain 2-3 weeks ago; feels it started when she was sitting in her chair with her one leg crossed under her other; woke up the next day with pain in "L upper hip/posterior thigh; mild tingling into L leg per pt intermittently  Pain was worse with standing/walking/WB on LLE; had to walk on her tip toes initially  Struggled to walk/perform ADLs etc  F/u with PCP; no initial tx per pt  Notes sx seemed worse a few days later, so she went to the ER ;see note for details  Per ER note 312 \"76year-old female presenting for left SI/hip pain  Symptoms since yesterday  Occurred after standing up from chair  Burning in nature  No trauma to the back  No T or L-spine tenderness  Has tenderness in the SI area  Believe symptoms are secondary to sciatica  Given age will obtain x-ray of left hip to rule out bony abnormality  Will treat with Tylenol, Lidoderm patch  Will give prescription for muscle relaxer  Will place referral for comprehensive spine program\"    PT IE : Pt notes overall her sx are improving  Pain 1* posterior lateral L hip/upper thigh only  No back pain  Some pain in L glute  No sx into L knee/leg  No n/t as of late per pt  Intermittent pain  Intermittent sharp brief pains per pt  Seems to come on with activity  Better with rest  Pain lasts just a few seconds; does not linger like it used to  Seems to occur when she \"forgets\" about the hip and moves too quickly  Also can have the pain if she \"lifts too heavy\"  Sometimes pain with twisting/moving the wrong way  Sometimes pain with sitting too long; relieved with changing position  Denies stiffness  No LE buckling/giving way  No night pain  + weight loss; f/u with MD  NO hx CA  No change in bowel/bladder  No new sx/complaints  RTD upcoming; having more testing this week per pt for her PCP  No trauma/falls  Quality of life: good    Pain  Current pain ratin  At best pain ratin  At worst pain ratin  Location: L glute/hip   Quality: sharp (lasts a few seconds )  Alleviating factors: Tylenol prn    Progression: improved    Social Support  Lives with: spouse    Employment status: not " "working    Diagnostic Tests  Abnormal x-ray: degenerative changes  calcific tendinitis L hip  Treatments  Previous treatment: medication  Current treatment: physical therapy  Patient Goals  Patient goals for therapy: decreased pain, increased motion and increased strength          Objective     Postural Observations  Seated posture: poor  Standing posture: fair    Additional Postural Observation Details  Seated:  Mod-severe increase in thoracic kyphosis and PPT  Forward head/rounded shoulders    Standing:  Flexed/rounded posture/crouched   Reduced lumbar lordosis; increased thoracic kyphosis    Increased \"tingling\" L lower leg with sitting slouched on tx table unsupported; resolved sx in LE however discomfort L L/S/hip with sitting with erect posture       Tenderness     Lumbar Spine  No tenderness in the spinous process  Right Hip   No tenderness in the PSIS  Additional Tenderness Details  NO ttp over L/S or posterior sacrum at IE    Ttp just posterior to GT of the L hip into proximal gluteal region    No ttp ITB, thigh, leg etc  Will monitor       Neurological Testing     Sensation     Lumbar   Left   Intact: light touch    Right   Intact: light touch    Reflexes   Left   Patellar (L4): brisk (3+)  Achilles (S1): normal (2+)  Clonus sign: negative    Right   Patellar (L4): brisk (3+)  Achilles (S1): normal (2+)  Clonus sign: negative    Active Range of Motion     Lumbar   Flexion: Active lumbar flexion: pain L L/S approaching end range  with pain Restriction level: moderate  Extension: Active lumbar extension: Pain L L/S mid to end range  with pain Restriction level: moderate  Left lateral flexion:  Restriction level: moderate  Right lateral flexion:  Restriction level: moderate  Left rotation:  Restriction level: moderate  Right rotation: Active right lumbar rotation: Pain L L/S at end range    with pain Restriction level: moderate    Additional Active Range of Motion Details  Local discomfort L L/S " "with AROM in standing as noted  No radicular pain/sx  No pain/sx after AROM assessment       L hip flexion AROM /AAROM WFL ; no pain/sx ; stiffness at end range- mild-mod limitation into end range   L hip IR/ER AAROM/PROM severely limited; no pain/sx, \"tight\" only per pt; stiffness at end ranges IR/ER L hip  L hip ABD/ER - moderate limitation/stiffness with discomfort L L/S at end range   L knee ROM WFL   Will cont to assess       Strength/Myotome Testing     Left Hip   Planes of Motion   Flexion: 4  Abduction: 4 (discomfort L hip/L/S -min)  Adduction: 4    Right Hip   Planes of Motion   Flexion: 4-  Abduction: 4  Adduction: 4    Left Knee   Flexion: 4  Extension: 4- (min discomfort L lateral hip/thigh )    Right Knee   Flexion: 4  Extension: 4-    Left Ankle/Foot   Dorsiflexion: 4  Plantar flexion: 4    Right Ankle/Foot   Dorsiflexion: 4-  Plantar flexion: 4    Muscle Activation   Patient able to activate left transverse abdominals and right transverse abdominals  Additional Muscle Activation Details  Poor core activation/stabilization with bed mobility/transfers  Core weakness noted with supine to sit transfer; utilizes rolling to side with push/pulling from UEs to come to sitting with difficulty   Will cont to assess       Tests     Lumbar     Left   Negative crossed SLR, passive SLR and slump test      Right   Negative crossed SLR, passive SLR and slump test      Left Hip   Positive JESSEE  Additional Tests Details  Will cont to assess upcoming              Precautions: HTN, ? Low BP per pt, ? Low blood sugar per pt, CAD, asthma, COPD, ?  Weight loss- is f/u with MD  * vertigo       Re-eval Date: 5/24    Date 4/24/2023        Visit Count 1       FOTO 4/24/2023        Pain In See IE       Pain Out See IE           Manuals 4/24/2023        Gentle stretch L hip with gentle end range stretch to tolerance all planes, Improve IR/ER mobility L hip , L glute/piriformis stretching                               " "  Neuro Re-Ed        TA training         TA with BKFO      TA with heel slides        TA with B ER    TA with MTP/LTP          Reviewed seated postural awareness with cues/feedback for upright trunk and neutral LS, issued scap depression/retractions for HEP for postural correction in sitting                                Ther Ex        Nustep - L hip AAROM and conditioning         L piriformis stretch      L SLRs flex, abd 4x20\" supine with pillows        Hip abd/add supine      Clamshells         Step ups      Mini squats         Side stepping           Bridges                         Ther Activity                        Gait Training                        Modalities                              4/24/2023  - HEP was issued and reviewed this date for above noted exercises  Pt demonstrated understanding without incident and without questions/concerns  Will continue to update upcoming                 "

## 2023-04-25 ENCOUNTER — HOSPITAL ENCOUNTER (OUTPATIENT)
Dept: ULTRASOUND IMAGING | Facility: HOSPITAL | Age: 78
Discharge: HOME | End: 2023-04-25
Attending: FAMILY MEDICINE

## 2023-04-25 DIAGNOSIS — R31.9 HEMATURIA, UNSPECIFIED TYPE: ICD-10-CM

## 2023-04-26 ENCOUNTER — APPOINTMENT (OUTPATIENT)
Dept: PHYSICAL THERAPY | Facility: CLINIC | Age: 78
End: 2023-04-26

## 2023-05-03 ENCOUNTER — OFFICE VISIT (OUTPATIENT)
Dept: PHYSICAL THERAPY | Facility: CLINIC | Age: 78
End: 2023-05-03

## 2023-05-03 DIAGNOSIS — M25.552 LEFT HIP PAIN: ICD-10-CM

## 2023-05-03 DIAGNOSIS — G89.29 ACUTE EXACERBATION OF CHRONIC LOW BACK PAIN: Primary | ICD-10-CM

## 2023-05-03 DIAGNOSIS — R29.898 WEAKNESS OF LEFT HIP: ICD-10-CM

## 2023-05-03 DIAGNOSIS — M54.50 ACUTE EXACERBATION OF CHRONIC LOW BACK PAIN: Primary | ICD-10-CM

## 2023-05-03 NOTE — PROGRESS NOTES
"Daily Note     Today's date: 5/3/2023  Patient name: Ana Gomez  : 1945  MRN: [id number]  Referring provider: Andry Guido, PT  Dx:   Encounter Diagnosis     ICD-10-CM    1  Acute exacerbation of chronic low back pain  M54 50     G89 29       2  Left hip pain  M25 552       3  Weakness of left hip  R29 898                        Subjective: Pt notes no new sx/complaints  Feels her back is getting better  Doing more at home  Did a lot more the other day and her L hip/Low back was a little sore; better now  Objective: See treatment diary below      Assessment: Tolerated treatment well  Initiated tx without incident  Tolerated well  Noted \"stretching\" L hip/L/S with self ROm/stretching; not painful per pt  Noted fatigue L hip with SLR L > R with R SLR; not painful per pt  No change in sx after tx  Cont with flexed/rounded posture  Cont with core and hip/thigh strength deficits and mm atrophy  Hypomobility note L hip/L/S  No complaints after session  Patient demonstrated fatigue post treatment and would benefit from continued PT to address ROM and strength deficits impacting sx and function  Plan: Continue per plan of care  Progress treatment as tolerated  Precautions: HTN, ? Low BP per pt, ? Low blood sugar per pt, CAD, asthma, COPD, ?  Weight loss- is f/u with MD  * vertigo       Re-eval Date:     Date 2023  5/3      Visit Count 1 2      FOTO 2023        Pain In See IE 0/10      Pain Out See IE 0/10          Manuals 2023  5/3      Gentle stretch L hip with gentle end range stretch to tolerance all planes, Improve IR/ER mobility L hip , L glute/piriformis stretching   NV                              Neuro Re-Ed        TA training         TA with BKFO      TA with heel slides        TA with B ER    TA with MTP/LTP          Reviewed seated postural awareness with cues/feedback for upright trunk and neutral LS, issued scap depression/retractions for HEP for postural " "correction in sitting  Reviewed                                 Ther Ex        Nustep - L hip AAROM and conditioning   L2 10'         L piriformis stretch      L SLRs flex, abd 4x20\" supine with pillows  4x20\" supine with pillows       Flex supine  R/L 10-15x ea 0#cues       Hip abd/add supine      Clamshells   Ball/green  20x ea 5\" cues       Step ups      Mini squats         Side stepping           Bridges           LTR  4x20\" ea R/L        SKTC   4x20\" ea R/L       Ther Activity                        Gait Training                        Modalities                              4/24/2023  - HEP was issued and reviewed this date for above noted exercises  Pt demonstrated understanding without incident and without questions/concerns  Will continue to update upcoming              "

## 2023-05-05 ENCOUNTER — TELEPHONE (OUTPATIENT)
Dept: PHYSICAL THERAPY | Facility: OTHER | Age: 78
End: 2023-05-05

## 2023-05-08 ENCOUNTER — OFFICE VISIT (OUTPATIENT)
Dept: PHYSICAL THERAPY | Facility: CLINIC | Age: 78
End: 2023-05-08

## 2023-05-08 DIAGNOSIS — M25.552 LEFT HIP PAIN: ICD-10-CM

## 2023-05-08 DIAGNOSIS — G89.29 ACUTE EXACERBATION OF CHRONIC LOW BACK PAIN: Primary | ICD-10-CM

## 2023-05-08 DIAGNOSIS — M54.50 ACUTE LEFT-SIDED LOW BACK PAIN WITHOUT SCIATICA: ICD-10-CM

## 2023-05-08 DIAGNOSIS — R29.898 WEAKNESS OF LEFT HIP: ICD-10-CM

## 2023-05-08 DIAGNOSIS — M54.50 ACUTE EXACERBATION OF CHRONIC LOW BACK PAIN: Primary | ICD-10-CM

## 2023-05-08 NOTE — PROGRESS NOTES
Daily Note     Today's date: 2023  Patient name: Edy Yoder  : 1945  MRN: [id number]  Referring provider: Shawna Dukes, PT  Dx:   Encounter Diagnosis     ICD-10-CM    1  Acute exacerbation of chronic low back pain  M54 50     G89 29       2  Left hip pain  M25 552       3  Weakness of left hip  R29 898       4  Acute left-sided low back pain without sciatica  M54 50                      Subjective: Pt notes no new sx/complaints  Feels she is doing better overall since [facility] and even better since [facility] with PT  Notes she feels the pain patches are making her tired, using them less  Going on walks again at home but not walking as much/as far yet  No complaints after first PT session  Would like copies of her exercises to do at home       Objective: See treatment diary below      Assessment: Tolerated treatment well  NO c/o concordant pain or sx with or following session  Stiffness of L hip noted with MT; reduced ROM into L hip IR and abd; no pain/sx, just tightness per pt  L HS tightness noted with mT  L hip IR and HS flexibility/ROM improved with MT  Fatigued quickly with SLRs, hip abd with band, and TA activation; demos general mm weakness/fatigue  Cont with flexed/rounded posture  Patient demonstrated fatigue post treatment and would benefit from continued PT to address cont'd ROM/flexibility and mm strength/endurance deficits contributing to ongoing pain/sx  Plan: Continue per plan of care  Progress treatment as tolerated  Precautions: HTN, ? Low BP per pt, ? Low blood sugar per pt, CAD, asthma, COPD, ?  Weight loss- is f/u with MD  * vertigo       Re-eval Date:     Date 2023  5/3 5/8     Visit Count 1 2 3     FOTO 2023        Pain In See IE 0/10 0/10     Pain Out See IE 0/10 0/10         Manuals 2023  5/3 5/8     Gentle stretch L hip with gentle end range stretch to tolerance all planes, Improve IR/ER mobility L hip , L glute/piriformis stretching   NV 15' pt "supine tolerance   , L hip IR/ER with end range stretch, L hip add stretch, L piriformis and HS stretch with end range hold, gentle LAD L hip  As davon                              Neuro Re-Ed        TA training    2x10/5\" cues/feedback      TA with BKFO      TA with heel slides        TA with B ER    TA with MTP/LTP          Reviewed seated postural awareness with cues/feedback for upright trunk and neutral LS, issued scap depression/retractions for HEP for postural correction in sitting  Reviewed                                 Ther Ex        Nustep - L hip AAROM and conditioning   L2 10'    L2 10'        L piriformis stretch      L SLRs flex, abd 4x20\" supine with pillows  4x20\" supine with pillows       Flex supine  R/L 10-15x ea 0#cues  4x20\" supine with pillows       Flex supine  R/L 2x10 ea 0#cues      Hip abd/add supine      Clamshells   Ball/green  20x ea 5\" cues  Ball/green  20x ea 5\" cues      Step ups      Mini squats         Side stepping           Bridges           LTR  4x20\" ea R/L LTR  5x20\" ea R/L       SKTC   4x20\" ea R/L  SKTC   5x20\" ea R/L      Ther Activity                        Gait Training                        Modalities                              5/82023  - HEP was issued and reviewed this date for above noted exercises  Issued green TB for HEP  Pt demonstrated understanding without incident and without questions/concerns  Will continue to update upcoming                "

## 2023-05-10 ENCOUNTER — OFFICE VISIT (OUTPATIENT)
Dept: PHYSICAL THERAPY | Facility: CLINIC | Age: 78
End: 2023-05-10

## 2023-05-10 DIAGNOSIS — M25.552 LEFT HIP PAIN: ICD-10-CM

## 2023-05-10 DIAGNOSIS — M54.50 ACUTE EXACERBATION OF CHRONIC LOW BACK PAIN: Primary | ICD-10-CM

## 2023-05-10 DIAGNOSIS — G89.29 ACUTE EXACERBATION OF CHRONIC LOW BACK PAIN: Primary | ICD-10-CM

## 2023-05-10 DIAGNOSIS — R29.898 WEAKNESS OF LEFT HIP: ICD-10-CM

## 2023-05-10 DIAGNOSIS — M54.50 ACUTE LEFT-SIDED LOW BACK PAIN WITHOUT SCIATICA: ICD-10-CM

## 2023-05-10 NOTE — PROGRESS NOTES
Daily Note     Today's date: 5/10/2023  Patient name: Nina Silvestre  : 1945  MRN: [id number]  Referring provider: Juliana Sullivan, PT  Dx:   Encounter Diagnosis     ICD-10-CM    1  Acute exacerbation of chronic low back pain  M54 50     G89 29       2  Left hip pain  M25 552       3  Weakness of left hip  R29 898       4  Acute left-sided low back pain without sciatica  M54 50                      Subjective: Pt notes no new sx/complaints  No complaints after last tx  Notes she continues to be more active at home  Notes mild intermittent discomfort L lowe back region ; comes/goes  Overall feeling much better  Notes her pain levels are returning to baseline  Objective: See treatment diary below      Assessment: Tolerated treatment well  Added standing/WB exercises this date without incident; tolerated well without pain/sx  No concordant pain/sx with or following tx  She continues with postural dysfynction with flexed/rounded posture; encouraged trunk extension with standing exercises this date; tolerated well  Cont with generalized weakness B hips/LEs and trunk/core; challenged with exercises with mm fatigue only  Improved L hip mobility with PROM/MT; no pain/sx  No sx after session  Pt appears to be returning to baseline LOF; discussed potential d/c to HEP next week; pt in agreement  No questions/concerns after session  Patient demonstrated fatigue post treatment and would benefit from continued PT      Plan: Continue per plan of care  Progress treatment as tolerated  Precautions: HTN, ? Low BP per pt, ? Low blood sugar per pt, CAD, asthma, COPD, ?  Weight loss- is f/u with MD  * vertigo       Re-eval Date:     Date 2023  5/3 5/8 5/10    Visit Count 1 2 3 4    FOTO 2023        Pain In See IE 0/10 0/10 0/10    Pain Out See IE 0/10 0/10 0/10        Manuals 2023  5/3 5/8 5/10    Gentle stretch L hip with gentle end range stretch to tolerance all planes, Improve IR/ER mobility "L hip , L glute/piriformis stretching   NV 15' pt supine tolerance   , L hip IR/ER with end range stretch, L hip add stretch, L piriformis and HS stretch with end range hold, gentle LAD L hip  As davon  10' pt supine tolerance   , L hip IR/ER with end range stretch, L hip add stretch, L piriformis and HS stretch with end range hold, gentle LAD L hip  As davon                             Neuro Re-Ed        TA training    2x10/5\" cues/feedback  2x10/5\" cues/feedback     TA with BKFO      TA with heel slides        TA with B ER    TA with MTP/LTP          Reviewed seated postural awareness with cues/feedback for upright trunk and neutral LS, issued scap depression/retractions for HEP for postural correction in sitting  Reviewed                                 Ther Ex        Nustep - L hip AAROM and conditioning   L2 10'    L2 10'    L3 10'     L piriformis stretch      L SLRs flex, abd 4x20\" supine with pillows  4x20\" supine with pillows       Flex supine  R/L 10-15x ea 0#cues  4x20\" supine with pillows       Flex supine  R/L 2x10 ea 0#cues  4x20\" supine with pillows       Flex supine  R/L 2x10 ea 0#cues     Hip abd/add supine      Clamshells   Ball/green  20x ea 5\" cues  Ball/green  20x ea 5\" cues  Ball/green  30x ea 5\" cues     Step ups      Mini squats     Bottom step 2x10 ea R/L    2x10/5\" cues     Hrs (trunk/back ext)   2x10     Side stepping           Bridges           LTR  4x20\" ea R/L LTR  5x20\" ea R/L LTR  5x20\" ea R/L      SKTC   4x20\" ea R/L  SKTC   5x20\" ea R/L  SKTC   5x20\" ea R/L     Ther Activity                        Gait Training                        Modalities                              5/82023  - HEP was issued and reviewed this date for above noted exercises  Issued green TB for HEP  Pt demonstrated understanding without incident and without questions/concerns  Will continue to update upcoming                  "

## 2023-05-15 ENCOUNTER — OFFICE VISIT (OUTPATIENT)
Dept: PHYSICAL THERAPY | Facility: CLINIC | Age: 78
End: 2023-05-15

## 2023-05-15 DIAGNOSIS — M25.552 LEFT HIP PAIN: ICD-10-CM

## 2023-05-15 DIAGNOSIS — R29.898 WEAKNESS OF LEFT HIP: ICD-10-CM

## 2023-05-15 DIAGNOSIS — G89.29 ACUTE EXACERBATION OF CHRONIC LOW BACK PAIN: Primary | ICD-10-CM

## 2023-05-15 DIAGNOSIS — M54.50 ACUTE EXACERBATION OF CHRONIC LOW BACK PAIN: Primary | ICD-10-CM

## 2023-05-15 NOTE — PROGRESS NOTES
"Daily Note     Today's date: 5/15/2023  Patient name: Alexa Hargrove  : 1945  MRN: [id number]  Referring provider: Ghada Jerez, PT  Dx:   Encounter Diagnosis     ICD-10-CM    1  Acute exacerbation of chronic low back pain  M54 50     G89 29       2  Left hip pain  M25 552       3  Weakness of left hip  R29 [id number]                      Subjective: Pt notes no new sx/complaints  Overall feels her sx have improved  No complaints with exercise program to date  Intermittent LBP but only if she \"does too much\" and goes away quickly with rest  Complaint with HEP without questions/concerns  Objective: See treatment diary below      Assessment: Tolerated treatment well  Pt appears to note resolution of concordant pain/sx  She notes compliance with HEP  Notes return to PLOF with ADLs/IADls without limitations due to her back  Walking more with her  without pain/sx  FOTO score demonstrates significant improvement  Improved B LE strength and improved AROM of hips/L/S with gait /mobility without pain/sx  Pt in agreement to trial transition to HEP  HEP updated and reviewed  Pt noted understanding with no questions/concerns after session  Will trial d/c to HEP  Pt to contact PT with any questions/concerns  Plan: Continue per plan of care  Progress treatment as tolerated  Precautions: HTN, ? Low BP per pt, ? Low blood sugar per pt, CAD, asthma, COPD, ?  Weight loss- is f/u with MD  * vertigo       Re-eval Date:     Date 2023  5/3 5/8 5/10 5/15   Visit Count 1 2 3 4 5   FOTO 2023        Pain In See IE 0/10 0/10 0/10 0/10   Pain Out See IE 0/10 0/10 0/10 0/10       Manuals 2023  5/3 5/8 5/10 5/15   Gentle stretch L hip with gentle end range stretch to tolerance all planes, Improve IR/ER mobility L hip , L glute/piriformis stretching   NV 15' pt supine tolerance   , L hip IR/ER with end range stretch, L hip add stretch, L piriformis and HS stretch with end range hold, gentle LAD L " "hip  As davon  10' pt supine tolerance   , L hip IR/ER with end range stretch, L hip add stretch, L piriformis and HS stretch with end range hold, gentle LAD L hip  As davon                             Neuro Re-Ed        TA training    2x10/5\" cues/feedback  2x10/5\" cues/feedback     TA with BKFO      TA with heel slides        TA with B ER    TA with MTP/LTP          Reviewed seated postural awareness with cues/feedback for upright trunk and neutral LS, issued scap depression/retractions for HEP for postural correction in sitting  Reviewed                                 Ther Ex        Nustep - L hip AAROM and conditioning   L2 10'    L2 10'    L3 10'  L 3 12'    L piriformis stretch      L SLRs flex, abd 4x20\" supine with pillows  4x20\" supine with pillows       Flex supine  R/L 10-15x ea 0#cues  4x20\" supine with pillows       Flex supine  R/L 2x10 ea 0#cues  4x20\" supine with pillows       Flex supine  R/L 2x10 ea 0#cues  4x20\" supine with pillows       Flex supine  R/L 2x10 ea 0#cues   Hip abd/add supine      Clamshells   Ball/green  20x ea 5\" cues  Ball/green  20x ea 5\" cues  Ball/green  30x ea 5\" cues  Ball/green  30x ea 5\" cues   Step ups      Mini squats     Bottom step 2x10 ea R/L    2x10/5\" cues     Hrs (trunk/back ext)   2x10  Bottom step 2x10 ea R/L    2x10/5\" cues     Hrs (trunk/back ext)   3x10    Side stepping           Bridges           LTR  4x20\" ea R/L LTR  5x20\" ea R/L LTR  5x20\" ea R/L LTR  5x20\" ea R/L     SKTC   4x20\" ea R/L  SKTC   5x20\" ea R/L  SKTC   5x20\" ea R/L  SKTC   5x20\" ea R/L   Ther Activity                        Gait Training                        Modalities                              5/15  - HEP was issued and reviewed this date for above noted exercises  Issued green TB for HEP  Pt demonstrated understanding without incident and without questions/concerns  Will continue to update upcoming                    "

## 2023-05-17 ENCOUNTER — APPOINTMENT (OUTPATIENT)
Dept: PHYSICAL THERAPY | Facility: CLINIC | Age: 78
End: 2023-05-17
Payer: MEDICARE

## 2023-05-23 ENCOUNTER — OFFICE VISIT (OUTPATIENT)
Dept: OBGYN CLINIC | Facility: CLINIC | Age: 78
End: 2023-05-23

## 2023-05-23 VITALS
BODY MASS INDEX: 22.51 KG/M2 | WEIGHT: 119.2 LBS | DIASTOLIC BLOOD PRESSURE: 74 MMHG | HEIGHT: 61 IN | SYSTOLIC BLOOD PRESSURE: 126 MMHG

## 2023-05-23 DIAGNOSIS — N95.0 POSTMENOPAUSAL BLEEDING: Primary | ICD-10-CM

## 2023-05-23 DIAGNOSIS — R93.89 THICKENED ENDOMETRIUM: ICD-10-CM

## 2023-05-23 NOTE — PROGRESS NOTES
"[facility] [address] [place] [address]    Assessment/Plan:  Bruno Fields is a [de-identified] y o  [id number] who presents with postmenopausal bleeding and thickened endometrium on ultrasound  Postmenopausal bleeding  - We reviewed her US findings in detail which showed 7 mm endometrial lining  She notes a gyn surgical history significant for unilateral oophorectomy in her 25s  - We reviewed the differential diagnosis of benign endometrial polyp, hyperplasia, or malignancy  - EMB performed in the office today  Diagnoses and all orders for this visit:    Postmenopausal bleeding  -     Tissue Exam    Thickened endometrium          Subjective:   Bruno Fields is a [de-identified] y o   female  CC: postmenopausal bleeding    HPI: Oly Cui presents with several months of light infrequent postmenopausal bleeding  Pelvic US ordered by PCP shows thickened endometrium  ROS: Review of Systems   Constitutional: Negative for chills and fever  Respiratory: Negative for cough and shortness of breath  Cardiovascular: Negative for chest pain and leg swelling  Gastrointestinal: Negative for abdominal pain, nausea and vomiting  Genitourinary: Negative for dysuria, frequency and urgency  Neurological: Negative for dizziness, light-headedness and headaches  PFSH: The following portions of the patient's history were reviewed and updated as appropriate: allergies, current medications, past family history, past medical history, obstetric history, gynecologic history, past social history, past surgical history and problem list        Objective:  /74   Ht 5' 1\" (1 549 m)   Wt 54 1 kg (119 lb 3 2 oz)   BMI 22 52 kg/m²      Physical Exam  Constitutional:       Appearance: Normal appearance  Cardiovascular:      Rate and Rhythm: Normal rate  Pulmonary:      Effort: Pulmonary effort is normal    Genitourinary:     Comments: Normal external female genitalia   Normal Bartholin's and " "[place]'s glands  Normal urethral meatus and no evidence of urethral discharge or masses  No visible vulvar lesions  Bladder without fullness mass or tenderness  Vagina without lesion or discharge  No vaginal bleeding  No significant pelvic organ prolapse noted  Cervix grossly normal appearing without visible lesions  Bimanual exam reveals mobile uterus of expected size  Anus without fissure of lesion  Neurological:      Mental Status: She is alert  Endometrial biopsy    Date/Time: 5/23/2023 1:20 PM  Performed by: Jarek Hicks MD  Authorized by: Cedric Hicks MD   Universal Protocol:  Consent: Written consent obtained  Risks and benefits: risks, benefits and alternatives were discussed  Consent given by: patient  Time out: Immediately prior to procedure a \"time out\" was called to verify the correct patient, procedure, equipment, support staff and site/side marked as required    Timeout called at: 5/23/2023 1:20 PM   Patient understanding: patient states understanding of the procedure being performed  Patient consent: the patient's understanding of the procedure matches consent given  Procedure consent: procedure consent matches procedure scheduled  Relevant documents: relevant documents present and verified  Test results: test results available and properly labeled  Site marked: the operative site was marked  Required items: required blood products, implants, devices, and special equipment available  Patient identity confirmed: verbally with patient      Indication:     Indications: Post-menopausal bleeding      Chronicity of post-menopausal bleeding:  New    Progression of post-menopausal bleeding:  Unchanged  Procedure:     Procedure: endometrial biopsy with Pipelle      A bivalve speculum was placed in the vagina: yes      Cervix cleaned and prepped: yes      A paracervical block was performed: yes      An intracervical block was performed: yes      The cervix was dilated: yes      " Uterus sounded: yes      Uterus sound depth (cm):  5    Specimen collected: specimen collected and sent to pathology    Findings:     Uterus size:  Non-gravid    Cervix: normal      Adnexa: normal          Anthony Ramos MD  [address] [place] [address]  5/23/2023 4:30 PM

## 2023-05-23 NOTE — ASSESSMENT & PLAN NOTE
- We reviewed her US findings in detail which showed 7 mm endometrial lining  She notes a gyn surgical history significant for unilateral oophorectomy in her 25s  - We reviewed the differential diagnosis of benign endometrial polyp, hyperplasia, or malignancy  - EMB performed in the office today

## 2023-06-01 ENCOUNTER — TELEPHONE (OUTPATIENT)
Dept: OBGYN CLINIC | Facility: MEDICAL CENTER | Age: 78
End: 2023-06-01

## 2023-06-01 DIAGNOSIS — N85.02 EIN (ENDOMETRIAL INTRAEPITHELIAL NEOPLASIA): Primary | ICD-10-CM

## 2023-06-01 PROCEDURE — 88341 IMHCHEM/IMCYTCHM EA ADD ANTB: CPT | Performed by: PATHOLOGY

## 2023-06-01 PROCEDURE — 88305 TISSUE EXAM BY PATHOLOGIST: CPT | Performed by: PATHOLOGY

## 2023-06-01 PROCEDURE — 88342 IMHCHEM/IMCYTCHM 1ST ANTB: CPT | Performed by: PATHOLOGY

## 2023-06-01 NOTE — TELEPHONE ENCOUNTER
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

## 2023-06-02 ENCOUNTER — TELEPHONE (OUTPATIENT)
Dept: HEMATOLOGY ONCOLOGY | Facility: CLINIC | Age: 78
End: 2023-06-02

## 2023-06-02 NOTE — TELEPHONE ENCOUNTER
I called Anthony in response to a referral that was received for patient to establish care with Gynecologic Oncology  Outreach was made to complete patient's intake questionnaire   I left a voicemail explaining the reason for my call and advised patient to call [facility] at [phone]  Another attempt will be made to contact patient

## 2023-06-05 ENCOUNTER — TELEPHONE (OUTPATIENT)
Dept: HEMATOLOGY ONCOLOGY | Facility: CLINIC | Age: 78
End: 2023-06-05

## 2023-06-05 NOTE — TELEPHONE ENCOUNTER
I called Anthony in response to a referral that was received for patient to establish care with Gynecologic Oncology  Outreach was made to complete patient's intake questionnaire   Patient's intake questionnaire was reviewed and complete  Patient's intake has been sent to the team for clinical review

## 2023-06-06 ENCOUNTER — TELEPHONE (OUTPATIENT)
Dept: GYNECOLOGIC ONCOLOGY | Facility: CLINIC | Age: 78
End: 2023-06-06

## 2023-06-06 NOTE — TELEPHONE ENCOUNTER
Appointment Change  Cancel, Reschedule, Change to Virtual      Who are you speaking with? Patient   If it is not the patient, are they listed on an active communication consent form? N/A   Which provider is the appointment scheduled with? Dr Flash Serrato   When is the appointment scheduled? Please list date and time  06/27/2023 @1:15PM    At which location is the appointment scheduled to take place? hospitals   Was the appointment rescheduled or changed from an in person visit to a virtual visit? If so, please list the details of the change  Yes, 06/15/2023 @2:45PM    What is the reason for the appointment change? Patient was offered a sooner appointment    Was [facility]AR transport scheduled for this visit? No   Does STAR transport need to be scheduled for the new visit (if applicable) No   Does the patient need an infusion appointment rescheduled? No   Does the patient have an infusion appointment scheduled? If so, when? No   Is the patient undergoing chemotherapy? No   Was the no-show policy reviewed for appointments being changed with less then 24 hours of notice?  No

## 2023-06-12 ENCOUNTER — TELEPHONE (OUTPATIENT)
Dept: HEMATOLOGY ONCOLOGY | Facility: CLINIC | Age: 78
End: 2023-06-12

## 2023-06-12 NOTE — TELEPHONE ENCOUNTER
Appointment Confirmation   Who are you speaking with? Patient   If it is not the patient, are they listed on an active communication consent form? N/A   Which provider is the appointment scheduled with? Dr Alexus Dunn   When is the appointment scheduled? Please list date and time 6/15/23 2:45PM   At which location is the appointment scheduled to take place? [place]   Did caller verbalize understanding of appointment details?  Yes

## 2023-06-15 ENCOUNTER — CONSULT (OUTPATIENT)
Dept: GYNECOLOGIC ONCOLOGY | Facility: CLINIC | Age: 78
End: 2023-06-15
Payer: MEDICARE

## 2023-06-15 VITALS
HEIGHT: 61 IN | BODY MASS INDEX: 22.43 KG/M2 | WEIGHT: 118.8 LBS | DIASTOLIC BLOOD PRESSURE: 68 MMHG | SYSTOLIC BLOOD PRESSURE: 140 MMHG | TEMPERATURE: 98.5 F | OXYGEN SATURATION: 99 % | HEART RATE: 87 BPM

## 2023-06-15 DIAGNOSIS — R79.9 ABNORMAL FINDING OF BLOOD CHEMISTRY, UNSPECIFIED: ICD-10-CM

## 2023-06-15 DIAGNOSIS — N85.02 EIN (ENDOMETRIAL INTRAEPITHELIAL NEOPLASIA): Primary | ICD-10-CM

## 2023-06-15 PROBLEM — K55.20 ANGIODYSPLASIA OF GASTROINTESTINAL TRACT: Status: ACTIVE | Noted: 2023-06-15

## 2023-06-15 PROBLEM — I25.10 CORONARY ARTERY DISEASE INVOLVING NATIVE CORONARY ARTERY OF NATIVE HEART WITHOUT ANGINA PECTORIS: Status: RESOLVED | Noted: 2019-03-19 | Resolved: 2023-06-15

## 2023-06-15 PROCEDURE — 99205 OFFICE O/P NEW HI 60 MIN: CPT | Performed by: OBSTETRICS & GYNECOLOGY

## 2023-06-15 RX ORDER — CEFAZOLIN SODIUM 1 G/50ML
1000 SOLUTION INTRAVENOUS ONCE
OUTPATIENT
Start: 2023-06-15 | End: 2023-06-15

## 2023-06-15 RX ORDER — ACETAMINOPHEN 325 MG/1
975 TABLET ORAL ONCE
OUTPATIENT
Start: 2023-06-15 | End: 2023-06-15

## 2023-06-15 RX ORDER — MEGESTROL ACETATE 40 MG/1
40 TABLET ORAL 2 TIMES DAILY
Qty: 120 TABLET | Refills: 1 | Status: SHIPPED | OUTPATIENT
Start: 2023-06-15 | End: 2023-06-22 | Stop reason: SDUPTHER

## 2023-06-15 RX ORDER — HEPARIN SODIUM 5000 [USP'U]/ML
5000 INJECTION, SOLUTION INTRAVENOUS; SUBCUTANEOUS
OUTPATIENT
Start: 2023-06-16 | End: 2023-06-17

## 2023-06-15 RX ORDER — GABAPENTIN 100 MG/1
100 CAPSULE ORAL ONCE
OUTPATIENT
Start: 2023-06-15 | End: 2023-06-15

## 2023-06-15 NOTE — ASSESSMENT & PLAN NOTE
I discussed with patient implications of this diagnosis  I explained a greater than 40% risk of a coexisting typically low-grade early stage endometrial cancer  I have recommended and patient has agreed to proceed with robotic hysterectomy, bilateral salpingo-oophorectomy, pelvic sentinel lymph node biopsies versus lymphadenectomy and all other indicated procedures  Patient has good exercise tolerance (>4 METS) and no additional cardiovascular work-up is indicated at this time  Reoperative testing as per procedure pass and instructions per ERAS  As patient has had some reportedly excessive bleeding in the past, I will send in a prescription for Megace 40 mg tablets and instruct her to take 80 mg p o  twice daily if/as needed for bleeding control until surgery  I discussed with patient indication, risks, benefits and alternatives of surgical exploration  We discussed possibility of bleeding requiring blood transfusion, life-threatening infections requiring additional procedures, injuries to surrounding organs such as bladder, ureters, gastrointestinal tract and or neurovascular structures  Additionally, we discussed general risks associated to stress of surgery such as venous thromboembolism, acute myocardial events and stroke  All questions answered to patient's satisfaction  Consents for blood transfusions if those are deemed necessary during the course of care  She understands risks include but are not limited to hypersensitivity reactions and infection with blood-borne pathogens  She agrees and wants to proceed  Informed consent form signed

## 2023-06-15 NOTE — LETTER
Nina 15, 2023     [address] [place], [address] [address] [place] [address]    Patient: Pooja aMldonado   YOB: 1945   Date of Visit: 6/15/2023       Dear Dr Maryan Coe: Thank you for referring Deborah Waddington to me for evaluation  Below are my notes for this consultation  If you have questions, please do not hesitate to call me  I look forward to following your patient along with you  Sincerely,        Misha Wheatley MD        CC: No Recipients    Misha Wheatley MD  6/15/2023  3:35 PM  Incomplete  Assessment/Plan:    Problem List Items Addressed This Visit          Genitourinary    EIN (endometrial intraepithelial neoplasia) - Primary     I discussed with patient implications of this diagnosis  I explained a greater than 40% risk of a coexisting typically low-grade early stage endometrial cancer  I have recommended and patient has agreed to proceed with robotic hysterectomy, bilateral salpingo-oophorectomy, pelvic sentinel lymph node biopsies versus lymphadenectomy and all other indicated procedures  Patient has good exercise tolerance (>4 METS) and no additional cardiovascular work-up is indicated at this time  Reoperative testing as per procedure pass and instructions per ERAS  As patient has had some reportedly excessive bleeding in the past, I will send in a prescription for Megace 40 mg tablets and instruct her to take 80 mg p o  twice daily if/as needed for bleeding control until surgery  I discussed with patient indication, risks, benefits and alternatives of surgical exploration  We discussed possibility of bleeding requiring blood transfusion, life-threatening infections requiring additional procedures, injuries to surrounding organs such as bladder, ureters, gastrointestinal tract and or neurovascular structures    Additionally, we discussed general risks associated to stress of surgery such as venous thromboembolism, acute myocardial events and stroke  All questions answered to patient's satisfaction  Consents for blood transfusions if those are deemed necessary during the course of care  She understands risks include but are not limited to hypersensitivity reactions and infection with blood-borne pathogens  She agrees and wants to proceed  Informed consent form signed  Relevant Orders    Case request operating room: Robotic hysterectomy, bilateral salpingo-oophorectomy, pelvic sentinel lymph node biopsies versus lymphadenectomy and all other indicated procedures  , DISSECTION LYMPH NODES ABDOMEN PELVIS,LAPAROSCOPIC WITH ROBOTICS, L  (Completed)    CBC and differential    EKG 12 lead    Type and screen    HEMOGLOBIN A1C W/ EAG ESTIMATION     Other Visit Diagnoses       Abnormal finding of blood chemistry, unspecified                CHIEF COMPLAINT:   Consultation management for newly diagnosed endometrial intraepithelial neoplasia  Patient ID: Richard Serrato is a [de-identified] y o  female  HPI  68-year-old postmenopausal female with idiopathic/positional benign vertigo, hypertension, hyperlipidemia, GERD and iron deficiency anemia  She has remote history of angiodysplasia of the foregut  No recent bleeding  Recent episodes of postmenopausal bleeding that regard endometrial biopsy which confirmed the presence of endometrial epithelial neoplasia  Patient reports prior history of ovarian cystectomy versus oophorectomy for benign indication and early adulthood  She was referred for consultation and management recommendations  Mammogram and colonoscopy reportedly up-to-date  Review of Systems  As per HPI  12 point review of systems is otherwise unremarkable    Current Outpatient Medications   Medication Sig Dispense Refill   • Ascorbic Acid (vitamin C) 100 MG tablet Take 500 mg by mouth 2 (two) times a day     • atorvastatin (LIPITOR) 40 mg tablet Take 1 tablet (40 mg total) by mouth daily 90 tablet 5   • Calcium Carb-Cholecalciferol 600-800 MG-UNIT TABS Take by mouth daily     • co-enzyme Q-10 100 mg capsule Take by mouth daily     • ferrous sulfate 325 (65 Fe) mg tablet 1 pill daily     • hydrocortisone 2 5 % cream Apply topically 2 (two) times a day 20 g 0   • Iron Heme Polypeptide (Proferrin ES) 12 MG TABS one tablet b i d      • meclizine (ANTIVERT) 25 mg tablet Take 1 tablet (25 mg total) by mouth 3 (three) times a day as needed for dizziness 30 tablet 0   • methocarbamol (ROBAXIN) 500 mg tablet Take 1 tablet (500 mg total) by mouth 3 (three) times a day as needed for muscle spasms 20 tablet 0   • metoprolol tartrate (LOPRESSOR) 25 mg tablet Take 1/2 tablet by oral route at bedtime  • mometasone (NASONEX) 50 mcg/act nasal spray 2 sprays daily     • omeprazole (PriLOSEC) 20 mg delayed release capsule Take 20 mg by mouth daily     • oxybutynin (DITROPAN-XL) 5 mg 24 hr tablet Take 5 mg by mouth daily at bedtime  0   • traZODone (DESYREL) 100 mg tablet Take 25 mg by mouth daily at bedtime     • vitamin B-12 (VITAMIN B-12) 1,000 mcg tablet Take 1,000 mcg by mouth daily     • lidocaine (Lidoderm) 5 % Apply 1 patch topically over 12 hours daily for 7 days Remove & Discard patch within 12 hours or as directed by MD Brothers patch 0     No current facility-administered medications for this visit         Allergies   Allergen Reactions   • Advil [Ibuprofen]    • Aspirin Other (See Comments)     gi bleed   • Caspofungin Other (See Comments)   • Hydrocodone    • Pravastatin GI Intolerance   • Tramadol GI Intolerance       Past Medical History:   Diagnosis Date   • Asthma    • Benign hypertension    • COPD (chronic obstructive pulmonary disease) (HCC)    • Coronary artery disease     medical management   • Diabetes ([facility] [address] )    • Epilepsy ([facility] [address] )    • History of echocardiogram 02/07/2014    EF 55%, mild MR    • Mixed hyperlipidemia    • DAVID on CPAP    • Watermelon stomach        Past Surgical History:   Procedure Laterality Date   • BREAST BIOPSY Right 02/24/2015 "  • BREAST BIOPSY Left     ? year   • CARDIAC CATHETERIZATION  2014    EF 65%, mid LAD 60% stenosis and D2 70% stenosis  Medical management  • CATARACT EXTRACTION Left    • US GUIDED THYROID BIOPSY  2020       OB History          2    Para   2    Term   2            AB        Living             SAB        IAB        Ectopic        Multiple        Live Births   2                 Family History   Problem Relation Age of Onset   • Heart disease Mother    • Coronary artery disease Sister         history of CABG   • No Known Problems Father    • No Known Problems Daughter    • No Known Problems Sister    • No Known Problems Sister    • No Known Problems Sister    • No Known Problems Daughter        The following portions of the patient's history were reviewed and updated as appropriate: allergies, current medications, past family history, past medical history, past social history, past surgical history and problem list       Objective:    Blood pressure 140/68, pulse 87, temperature 98 5 °F (36 9 °C), temperature source Temporal, height 5' 1\" (1 549 m), weight 53 9 kg (118 lb 12 8 oz), SpO2 99 %  Body mass index is 22 45 kg/m²  Physical Exam  Vitals reviewed  Exam conducted with a chaperone present  Constitutional:       General: She is not in acute distress  Appearance: She is not ill-appearing or toxic-appearing  Eyes:      General: No scleral icterus  Right eye: No discharge  Left eye: No discharge  Conjunctiva/sclera: Conjunctivae normal    Cardiovascular:      Rate and Rhythm: Normal rate and regular rhythm  Heart sounds: Normal heart sounds  No murmur heard  Pulmonary:      Effort: Pulmonary effort is normal  No respiratory distress  Breath sounds: Normal breath sounds  No wheezing  Abdominal:      General: There is no distension  Palpations: Abdomen is soft  There is no mass  Tenderness: There is no abdominal tenderness   There is no " "guarding  Hernia: No hernia is present  Comments: Line infraumbilical scar well-healed  Musculoskeletal:      Right lower leg: No edema  Left lower leg: No edema  Neurological:      Mental Status: She is alert  Component    Case Report   Surgical Pathology Report                         Case: [id number]                                    Authorizing Provider:  Rosa John MD    Collected:           05/23/2023 1426               Ordering Location:     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      Electronically signed by Luba Staley MD on 6/1/2023 at 10:31 AM   Note    Immunohistochemistry was performed on block A1 with adequate controls  Cells are positive for PAX 8 and patchy positive for p16  P53 shows wildtype staining pattern  Intradepartmental consultation in agreement (AM)  Dr Sarai John notified by Dr Estephania Han via [facility] on 6/1/202 at 10:25am             Additional Information    All reported additional testing was performed with appropriately reactive controls  These tests were developed and their performance characteristics determined by [facility] or appropriate performing facility, though some tests may be performed on tissues which have not been validated for performance characteristics (such as staining performed on alcohol exposed cell blocks and decalcified tissues)    Results should be interpreted with " "caution and in the context of the patients’ clinical condition  These tests may not be cleared or approved by the [place]  Food and Drug Administration, though the FDA has determined that such clearance or approval is not necessary  These tests are used for clinical purposes and they should not be regarded as investigational or for research  This laboratory has been approved by CLIA 88, designated as a high-complexity laboratory and is qualified to perform these tests   Interpretation performed at [facility], [address] [place] [address], [address]      Gross Description    A  The specimen is received in formalin, labeled with the patient's name and hospital number, and is designated \" endometrium\"  The specimen consists of multiple tan to brown to colorless mucinous, hemorrhagic and possible soft tissue fragments measuring in aggregate 2 x 1 8 x 0 2 cm  Entirely submitted  One cassette  In an embedding bag  Note: The estimated total formalin fixation time based upon information provided by the submitting clinician and the standard processing schedule is under 72 hours       Loren Trejo MD, [place], [place]  6/15/2023  3:35 PM        "

## 2023-06-15 NOTE — PROGRESS NOTES
Assessment/Plan:    Problem List Items Addressed This Visit        Genitourinary    EIN (endometrial intraepithelial neoplasia) - Primary     I discussed with patient implications of this diagnosis  I explained a greater than 40% risk of a coexisting typically low-grade early stage endometrial cancer  I have recommended and patient has agreed to proceed with robotic hysterectomy, bilateral salpingo-oophorectomy, pelvic sentinel lymph node biopsies versus lymphadenectomy and all other indicated procedures  Patient has good exercise tolerance (>4 METS) and no additional cardiovascular work-up is indicated at this time  Reoperative testing as per procedure pass and instructions per ERAS  As patient has had some reportedly excessive bleeding in the past, I will send in a prescription for Megace 40 mg tablets and instruct her to take 80 mg p o  twice daily if/as needed for bleeding control until surgery  I discussed with patient indication, risks, benefits and alternatives of surgical exploration  We discussed possibility of bleeding requiring blood transfusion, life-threatening infections requiring additional procedures, injuries to surrounding organs such as bladder, ureters, gastrointestinal tract and or neurovascular structures  Additionally, we discussed general risks associated to stress of surgery such as venous thromboembolism, acute myocardial events and stroke  All questions answered to patient's satisfaction  Consents for blood transfusions if those are deemed necessary during the course of care  She understands risks include but are not limited to hypersensitivity reactions and infection with blood-borne pathogens  She agrees and wants to proceed  Informed consent form signed              Relevant Medications    megestrol (MEGACE) 40 mg tablet    Other Relevant Orders    Case request operating room: Robotic hysterectomy, bilateral salpingo-oophorectomy, pelvic sentinel lymph node biopsies versus lymphadenectomy and all other indicated procedures  , DISSECTION LYMPH NODES ABDOMEN PELVIS,LAPAROSCOPIC WITH ROBOTICS, L  (Completed)    CBC and differential    EKG 12 lead    Type and screen    HEMOGLOBIN A1C W/ EAG ESTIMATION   Other Visit Diagnoses     Abnormal finding of blood chemistry, unspecified              CHIEF COMPLAINT:   Consultation management for newly diagnosed endometrial intraepithelial neoplasia  Patient ID: Suresh Miller is a [de-identified] y o  female  HPI  78-year-old postmenopausal female with idiopathic/positional benign vertigo, hypertension, hyperlipidemia, GERD and iron deficiency anemia  She has remote history of angiodysplasia of the foregut  No recent bleeding  Recent episodes of postmenopausal bleeding that regard endometrial biopsy which confirmed the presence of endometrial epithelial neoplasia  Patient reports prior history of ovarian cystectomy versus oophorectomy for benign indication and early adulthood  She was referred for consultation and management recommendations  Mammogram and colonoscopy reportedly up-to-date  Review of Systems  As per HPI  12 point review of systems is otherwise unremarkable    Current Outpatient Medications   Medication Sig Dispense Refill   • Ascorbic Acid (vitamin C) 100 MG tablet Take 500 mg by mouth 2 (two) times a day     • atorvastatin (LIPITOR) 40 mg tablet Take 1 tablet (40 mg total) by mouth daily 90 tablet 5   • Calcium Carb-Cholecalciferol 600-800 MG-UNIT TABS Take by mouth daily     • co-enzyme Q-10 100 mg capsule Take by mouth daily     • ferrous sulfate 325 (65 Fe) mg tablet 1 pill daily     • hydrocortisone 2 5 % cream Apply topically 2 (two) times a day 20 g 0   • Iron Heme Polypeptide (Proferrin ES) 12 MG TABS one tablet b i d      • meclizine (ANTIVERT) 25 mg tablet Take 1 tablet (25 mg total) by mouth 3 (three) times a day as needed for dizziness 30 tablet 0   • megestrol (MEGACE) 40 mg tablet Take 1 tablet (40 mg total) by mouth 2 (two) times a day Take 2 tablets by mouth twice a day if/as needed for vaginal bleeding until surgery  120 tablet 1   • methocarbamol (ROBAXIN) 500 mg tablet Take 1 tablet (500 mg total) by mouth 3 (three) times a day as needed for muscle spasms 20 tablet 0   • metoprolol tartrate (LOPRESSOR) 25 mg tablet Take 1/2 tablet by oral route at bedtime  • mometasone (NASONEX) 50 mcg/act nasal spray 2 sprays daily     • omeprazole (PriLOSEC) 20 mg delayed release capsule Take 20 mg by mouth daily     • oxybutynin (DITROPAN-XL) 5 mg 24 hr tablet Take 5 mg by mouth daily at bedtime  0   • traZODone (DESYREL) 100 mg tablet Take 25 mg by mouth daily at bedtime     • vitamin B-12 (VITAMIN B-12) 1,000 mcg tablet Take 1,000 mcg by mouth daily     • lidocaine (Lidoderm) 5 % Apply 1 patch topically over 12 hours daily for 7 days Remove & Discard patch within 12 hours or as directed by MD 7 patch 0     No current facility-administered medications for this visit  Allergies   Allergen Reactions   • Advil [Ibuprofen]    • Aspirin Other (See Comments)     gi bleed   • Caspofungin Other (See Comments)   • Hydrocodone    • Pravastatin GI Intolerance   • Tramadol GI Intolerance       Past Medical History:   Diagnosis Date   • Asthma    • Benign hypertension    • COPD (chronic obstructive pulmonary disease) (HCC)    • Coronary artery disease     medical management   • Diabetes ([facility] [address] )    • Epilepsy ([facility] [address] )    • History of echocardiogram 2014    EF 55%, mild MR    • Mixed hyperlipidemia    • DAVID on CPAP    • Watermelon stomach        Past Surgical History:   Procedure Laterality Date   • BREAST BIOPSY Right 2015   • BREAST BIOPSY Left     ? year   • CARDIAC CATHETERIZATION  2014    EF 65%, mid LAD 60% stenosis and D2 70% stenosis  Medical management     • CATARACT EXTRACTION Left    • US GUIDED THYROID BIOPSY  2020       OB History        2    Para   2    Term   2            AB "   Living           SAB        IAB        Ectopic        Multiple        Live Births   2                 Family History   Problem Relation Age of Onset   • Heart disease Mother    • Coronary artery disease Sister         history of CABG   • No Known Problems Father    • No Known Problems Daughter    • No Known Problems Sister    • No Known Problems Sister    • No Known Problems Sister    • No Known Problems Daughter        The following portions of the patient's history were reviewed and updated as appropriate: allergies, current medications, past family history, past medical history, past social history, past surgical history and problem list       Objective:    Blood pressure 140/68, pulse 87, temperature 98 5 °F (36 9 °C), temperature source Temporal, height 5' 1\" (1 549 m), weight 53 9 kg (118 lb 12 8 oz), SpO2 99 %  Body mass index is 22 45 kg/m²  Physical Exam  Vitals reviewed  Exam conducted with a chaperone present  Constitutional:       General: She is not in acute distress  Appearance: She is not ill-appearing or toxic-appearing  Eyes:      General: No scleral icterus  Right eye: No discharge  Left eye: No discharge  Conjunctiva/sclera: Conjunctivae normal    Cardiovascular:      Rate and Rhythm: Normal rate and regular rhythm  Heart sounds: Normal heart sounds  No murmur heard  Pulmonary:      Effort: Pulmonary effort is normal  No respiratory distress  Breath sounds: Normal breath sounds  No wheezing  Abdominal:      General: There is no distension  Palpations: Abdomen is soft  There is no mass  Tenderness: There is no abdominal tenderness  There is no guarding  Hernia: No hernia is present  Comments: Line infraumbilical scar well-healed  Musculoskeletal:      Right lower leg: No edema  Left lower leg: No edema  Neurological:      Mental Status: She is alert         Component    Case Report   Surgical Pathology Report               " "          Case: [id number]                                    Authorizing Provider: Castro Orellana MD    Collected:           05/23/2023 1426               Ordering Location:     [facility]      Received:            05/23/2023 1426                                      [facility]                                                          Pathologist:           Lara Vu MD                                                            Specimen:    Endometrium, EMB                                                                           Final Diagnosis   A  Uterine contents, \"Endometrium,\" Endometrial curettage:  - Atypical endometrial hyperplasia / Endometrial intraepithelial neoplasia bordering on endometrial adenocarcinoma      Electronically signed by Lara Vu MD on 6/1/2023 at 10:31 AM   Note    Immunohistochemistry was performed on block A1 with adequate controls  Cells are positive for PAX 8 and patchy positive for p16  P53 shows wildtype staining pattern      Intradepartmental consultation in agreement (AM)  Dr Luis Angel Orellana notified by Dr Arcelia Arciniega via [address] [place] [address] on 6/1/202 at 10:25am             Additional Information    All reported additional testing was performed with appropriately reactive controls   These tests were developed and their performance characteristics determined by [facility] or appropriate performing facility, though some tests may be performed on tissues which have not been validated for performance characteristics (such as staining performed on alcohol exposed cell blocks and decalcified tissues)   Results should be interpreted with caution and in the context of the patients’ clinical condition  These tests may not be cleared or approved by the [place]  Food and Drug Administration, though the FDA has determined that such clearance or approval is not necessary   These tests are used for clinical purposes and they should not be regarded as " "investigational or for research  This laboratory has been approved by CLIA 88, designated as a high-complexity laboratory and is qualified to perform these tests   Interpretation performed at [facility], [address] [place] [address], [facility]      Gross Description    A  The specimen is received in formalin, labeled with the patient's name and hospital number, and is designated \" endometrium\"  The specimen consists of multiple tan to brown to colorless mucinous, hemorrhagic and possible soft tissue fragments measuring in aggregate 2 x 1 8 x 0 2 cm  Entirely submitted  One cassette    In an embedding bag      Note: The estimated total formalin fixation time based upon information provided by the submitting clinician and the standard processing schedule is under 72 hours      [facility] MD, [facility]  6/15/2023  3:38 PM        "

## 2023-06-22 ENCOUNTER — TELEPHONE (OUTPATIENT)
Dept: HEMATOLOGY ONCOLOGY | Facility: CLINIC | Age: 78
End: 2023-06-22

## 2023-06-22 DIAGNOSIS — N85.02 EIN (ENDOMETRIAL INTRAEPITHELIAL NEOPLASIA): ICD-10-CM

## 2023-06-22 RX ORDER — MEGESTROL ACETATE 40 MG/1
TABLET ORAL
Qty: 120 TABLET | Refills: 1 | Status: SHIPPED | OUTPATIENT
Start: 2023-06-22 | End: 2023-08-29

## 2023-06-22 NOTE — TELEPHONE ENCOUNTER
Patient Call    Who are you speaking with? Pharmacy    If it is not the patient, are they listed on an active communication consent form? N/A   What is the reason for this call? Renu Dang calling in needing clarification on the patient's megestrol  Does this require a call back? Yes   If a call back is required, please list [facility] call back number [phone]    If a call back is required, advise that a message will be forwarded to their care team and someone will return their call as soon as possible  Did you relay this information to the patient?  Yes

## 2023-06-26 ENCOUNTER — TELEPHONE (OUTPATIENT)
Dept: HEMATOLOGY ONCOLOGY | Facility: CLINIC | Age: 78
End: 2023-06-26

## 2023-06-26 NOTE — TELEPHONE ENCOUNTER
Spoke with Anselmo ambrose and her   She has not yet started on Megace as she is not having any vaginal bleeding  I informed her that per Dr Lexi Weinberg note that she need only take Megace if she is having bleeding  If she should start to have heavy bleeding again she can then start the Megace  Also informed that she would not need to take Megace post operatively

## 2023-06-26 NOTE — TELEPHONE ENCOUNTER
Patient Call    Who are you speaking with? Patient    If it is not the patient, are they listed on an active communication consent form? N/A   What is the reason for this call? Patient calling in wanting to know if she is to take the megestrol before or after her surgery  The patient states she has not had any bleeding in 2 weeks  Does this require a call back? Yes   If a call back is required, please list best call back number [phone]    If a call back is required, advise that a message will be forwarded to their care team and someone will return their call as soon as possible  Did you relay this information to the patient?  Yes

## 2023-06-29 DIAGNOSIS — Z01.89 ENCOUNTER FOR GERIATRIC ASSESSMENT: Primary | ICD-10-CM

## 2023-07-05 ENCOUNTER — HOSPITAL ENCOUNTER (OUTPATIENT)
Dept: MAMMOGRAPHY | Facility: HOSPITAL | Age: 78
Discharge: HOME | End: 2023-07-05
Attending: FAMILY MEDICINE
Payer: MEDICARE

## 2023-07-05 VITALS — BODY MASS INDEX: 21.71 KG/M2 | HEIGHT: 61 IN | WEIGHT: 115 LBS

## 2023-07-05 DIAGNOSIS — Z12.31 ENCOUNTER FOR SCREENING MAMMOGRAM FOR MALIGNANT NEOPLASM OF BREAST: ICD-10-CM

## 2023-07-05 PROCEDURE — 77067 SCR MAMMO BI INCL CAD: CPT

## 2023-07-05 PROCEDURE — 77063 BREAST TOMOSYNTHESIS BI: CPT

## 2023-07-06 ENCOUNTER — TELEMEDICINE (OUTPATIENT)
Dept: ANESTHESIOLOGY | Facility: CLINIC | Age: 78
End: 2023-07-06
Payer: MEDICARE

## 2023-07-06 DIAGNOSIS — N85.02 EIN (ENDOMETRIAL INTRAEPITHELIAL NEOPLASIA): ICD-10-CM

## 2023-07-06 DIAGNOSIS — I25.10 CORONARY ARTERIOSCLEROSIS: ICD-10-CM

## 2023-07-06 DIAGNOSIS — G47.33 OBSTRUCTIVE SLEEP APNEA SYNDROME: ICD-10-CM

## 2023-07-06 DIAGNOSIS — E78.5 DYSLIPIDEMIA: ICD-10-CM

## 2023-07-06 DIAGNOSIS — Z01.89 ENCOUNTER FOR GERIATRIC ASSESSMENT: Primary | ICD-10-CM

## 2023-07-06 DIAGNOSIS — F41.1 ANXIETY STATE: ICD-10-CM

## 2023-07-06 PROCEDURE — 99442 PR PHYS/QHP TELEPHONE EVALUATION 11-20 MIN: CPT | Performed by: NURSE PRACTITIONER

## 2023-07-06 NOTE — PROGRESS NOTES
[facility])  CONSULT: GERIATRIC SURGERY  TELEMEDICINE Brief Visit    This Visit is being completed by telephone. The Patient is located at Home and in the following state in which I hold an active license [place]    The patient was identified by name and date of birth. Tiffanie Anders was informed that this is a telemedicine visit and that the visit is being conducted through Telephone. My office door was closed. No one else was in the room. She acknowledged consent and understanding of privacy and security of the video platform. The patient has agreed to participate and understands they can discontinue the visit at any time. Patient is aware this is a billable service. Assessment/Plan:  · 13-year-old female referred to [facility] for presurgery geriatric screening secondary to advanced age. · Consult concerns: age and receiving anesthesia  · She is scheduled on    Case: [id number] Date/Time: 07/31/23 1030   Procedure: ROBOTIC HYSTERECTOMY, BILATERAL SALPINGO-OOPHORECTOMY, PELVIC SENTINEL NODE BIOPSIES VS. LYMPHADENECTOMY, AND ALL INDICATED PROCEDURES (Abdomen)   Anesthesia type: General   Diagnosis: EIN (endometrial intraepithelial neoplasia) [N85.02]   Pre-op diagnosis: EIN (endometrial intraepithelial neoplasia) [N85.02]   Location: [facility] OR ROOM  / [address]   Surgeons: Lashonda Castaneda MD       [address] [place] [address] in the past      Problem List Items Addressed This Visit        Respiratory    Obstructive sleep apnea syndrome  · Stable  · Diagnosed years ago  · Wears CPAP  · Does sleep apnea affect YOUR everyday life? Yes. Cleaning the equipment and tubing is a hassle.        Cardiovascular and Mediastinum    Coronary arteriosclerosis  · Stable  · Denies chest pain  · Has chronic shortness of breath secondary to anxiety  · Will have EKG done- pending   · will have PATs done- Pending   · Await results for any further needs  · METS 6 Genitourinary    EIN (endometrial intraepithelial neoplasia)  · Seen for surgical optimization  · Seen for geriatric assessment  · Needs to complete PAT's  · Needs to complete EKG  · Await results for further needs  · Will have surgical nurse phone call on 7/10/2023       Other    Dyslipidemia  · Stable  · Denies chest pain, METS 6  · Will have PAT's/EKG - PENDING         Encounter for geriatric assessment - Primary  · Recommend inpatient geriatric consult after surgery due to advanced age  · This consult should not hold up discharge  · Date of Surgery: 7/31   Geriatric Assessment:   · Falls (last 6 months): 0   · Roger Total Score: 20   · PHQ- 9 Depression Scale: 1   · Nutrition Assessment Score: 13   · METS: 6.61   3 meals a day:   Breakfast: eggs toast coffee   Lunch: sandwich salad    Dinner: chicken pork beef fish   Veggies   Potatoes   Snacks: peanut butter yogurt cottage cheese   Sleep: 6-8 hours  CPAP   Health goals:  -What are your overall health goals? Feel better over all, walk more   -What brings you strength? Friends family   -What activities are important to you?   Reading, crocheting, knitting     High risk for post-op delirium RT age, inpatient surgery  · Inpatient geriatric consult ordered for post-op   · Delirium precautions on admit    High risk for post- op pneumonia RT age, inpatient surgery  · Non smoker     High fall risk RT age, inpatient surgery  · Fall precautions on admit  · Standard PT eval after surgery for safe discharge    Pre- frail RT age, inpatient surgery  · Inpatient geriatric consult ordered for postop  · SHOULD NOT HOLD UP DISCHARGE   · Nutritional supplements  · On admission aspiration precautions, skin precautions, delirium precautions, fall precautions    Be your BEST pathway   Breath, eat, sleep/stress relief, and tracking exercise   P        Anxiety state  · Hx   · STABLE   · causes SOB   · Anxiety surrounding health and surgery        Recent Visits  No visits were found meeting these conditions. Showing recent visits within past 7 days and meeting all other requirements  Today's Visits  Date Type Provider Dept   07/06/23 [address] [facility]   Showing today's visits and meeting all other requirements  Future Appointments  No visits were found meeting these conditions. Showing future appointments within next 150 days and meeting all other requirements     ROS   Denies fevers and denies chills  Denies congestion and denies sore throat  Denies chest pain  Denies palpitations  Denies abdominal pain  Denies nausea, vomiting, and diarrhea  Denies any issues with their skin. .. example NO open wounds or sores  Denies rashes  Denies difficulty urinating  Denies any issues with their urine. .. example a dark color or an odor  Denies dizziness  Denies headaches  Denies confusion and denies hallucinations    Admits to being in well health today  ADMITS  Chronic shortness of breath ( 2.2 anxiety)  Admits to vertigo >1 year       Physical Assessment   • We did not connect via video  • Patient was alert and orientated times 3  • Mood appropriate  • Thought appropriate    • I heard no respiratory distress over the phone today     SUBJECTIVE   23-year-old female referred [facility] for presurgery geriatric screening secondary to advanced age. I met patient over the telephone. She was accompanied by her daughter Keo Sandoval and her . I did offer a live or video visit. Patient request to do the appointment over the phone. Patient and family were pleasant and a pleasure to care for her. I did not assess any immediate cognitive concerns. Geriatric concerns for frailty due to advanced age, having surgery, and receiving anesthesia. I did recommend inpatient geriatric consult due to her age of 78. This consult should not hold up discharge. She lives at home with her . She is independent with all ADLs. She is active. METS is 6.   Denies chest pain. Has a chronic shortness of breath that gets worse with anxiety. She will be having a EKG and some PAT's done await results for further needs. Diagnosis of endometrial Endo epithelial neoplasia is electing to undergo a hysterectomy seen today for surgical optimization and geriatric assessment  Went over presurgery insurance card drink  Visit Time  Total Visit Duration: 20 minutes  Daughter Muna Santiago on: 7/6   Date of Surgery: 7/31   Geriatric Assessment:   · Falls (last 6 months): 0   · Roger Total Score: 20   · PHQ- 9 Depression Scale: 1   · Nutrition Assessment Score: 13   · METS: 6.61   3 meals a day:   Breakfast: eggs toast coffee   Lunch: sandwich salad    Dinner: chicken pork beef fish   Veggies   Potatoes   Snacks: peanut butter yogurt cottage cheese   Sleep: 6-8 hours  CPAP   Health goals:  -What are your overall health goals? Feel better over all, walk more   -What brings you strength? Friends family   -What activities are important to you? Reading, crocheting, knitting     Patient & I talked about B.E.S.T. Surgery  Breathing exercises :   Patient was encouraged to begin lung exercises today. Eating/nutrition :   Encouraged patient to increase oral protein intake prior to surgery. This can be accomplished by consuming chicken, fish, tuna fish, cottage cheese, cheese, eggs, [place] yogurt, and protein shakes as needed. Sleep/Stress management :   Patient was encouraged to rest their body prior to surgery. Encouraged attempting to get 8 hours of sleep at night. Training exercises  Patient was encouraged to remain active as possible.

## 2023-07-08 ENCOUNTER — OFFICE VISIT (OUTPATIENT)
Dept: LAB | Facility: HOSPITAL | Age: 78
End: 2023-07-08
Payer: MEDICARE

## 2023-07-08 ENCOUNTER — APPOINTMENT (OUTPATIENT)
Dept: LAB | Facility: HOSPITAL | Age: 78
End: 2023-07-08
Payer: MEDICARE

## 2023-07-08 DIAGNOSIS — N85.02 EIN (ENDOMETRIAL INTRAEPITHELIAL NEOPLASIA): ICD-10-CM

## 2023-07-08 LAB
ATRIAL RATE: 75 BPM
BASOPHILS # BLD AUTO: 0.04 THOUSANDS/ÂΜL (ref 0–0.1)
BASOPHILS NFR BLD AUTO: 0 % (ref 0–1)
EOSINOPHIL # BLD AUTO: 0.13 THOUSAND/ÂΜL (ref 0–0.61)
EOSINOPHIL NFR BLD AUTO: 1 % (ref 0–6)
ERYTHROCYTE [DISTWIDTH] IN BLOOD BY AUTOMATED COUNT: 13.4 % (ref 11.6–15.1)
HCT VFR BLD AUTO: 39 % (ref 34.8–46.1)
HGB BLD-MCNC: 11.9 G/DL (ref 11.5–15.4)
IMM GRANULOCYTES # BLD AUTO: 0.04 THOUSAND/UL (ref 0–0.2)
IMM GRANULOCYTES NFR BLD AUTO: 0 % (ref 0–2)
LYMPHOCYTES # BLD AUTO: 1.71 THOUSANDS/ÂΜL (ref 0.6–4.47)
LYMPHOCYTES NFR BLD AUTO: 18 % (ref 14–44)
MCH RBC QN AUTO: 27.2 PG (ref 26.8–34.3)
MCHC RBC AUTO-ENTMCNC: 30.5 G/DL (ref 31.4–37.4)
MCV RBC AUTO: 89 FL (ref 82–98)
MONOCYTES # BLD AUTO: 0.7 THOUSAND/ÂΜL (ref 0.17–1.22)
MONOCYTES NFR BLD AUTO: 7 % (ref 4–12)
NEUTROPHILS # BLD AUTO: 7.06 THOUSANDS/ÂΜL (ref 1.85–7.62)
NEUTS SEG NFR BLD AUTO: 74 % (ref 43–75)
NRBC BLD AUTO-RTO: 0 /100 WBCS
P AXIS: 53 DEGREES
PLATELET # BLD AUTO: 234 THOUSANDS/UL (ref 149–390)
PMV BLD AUTO: 10.5 FL (ref 8.9–12.7)
PR INTERVAL: 164 MS
QRS AXIS: 23 DEGREES
QRSD INTERVAL: 88 MS
QT INTERVAL: 384 MS
QTC INTERVAL: 428 MS
RBC # BLD AUTO: 4.37 MILLION/UL (ref 3.81–5.12)
T WAVE AXIS: 38 DEGREES
VENTRICULAR RATE: 75 BPM
WBC # BLD AUTO: 9.68 THOUSAND/UL (ref 4.31–10.16)

## 2023-07-08 PROCEDURE — 86850 RBC ANTIBODY SCREEN: CPT | Performed by: OBSTETRICS & GYNECOLOGY

## 2023-07-08 PROCEDURE — 86901 BLOOD TYPING SEROLOGIC RH(D): CPT | Performed by: OBSTETRICS & GYNECOLOGY

## 2023-07-08 PROCEDURE — 85025 COMPLETE CBC W/AUTO DIFF WBC: CPT

## 2023-07-08 PROCEDURE — 86900 BLOOD TYPING SEROLOGIC ABO: CPT | Performed by: OBSTETRICS & GYNECOLOGY

## 2023-07-08 PROCEDURE — 93010 ELECTROCARDIOGRAM REPORT: CPT | Performed by: INTERNAL MEDICINE

## 2023-07-08 PROCEDURE — 93005 ELECTROCARDIOGRAM TRACING: CPT

## 2023-07-08 PROCEDURE — 36415 COLL VENOUS BLD VENIPUNCTURE: CPT

## 2023-07-08 PROCEDURE — 83036 HEMOGLOBIN GLYCOSYLATED A1C: CPT

## 2023-07-09 ENCOUNTER — LAB REQUISITION (OUTPATIENT)
Dept: LAB | Facility: HOSPITAL | Age: 78
End: 2023-07-09
Payer: MEDICARE

## 2023-07-09 DIAGNOSIS — N85.02 ENDOMETRIAL INTRAEPITHELIAL NEOPLASIA (EIN): ICD-10-CM

## 2023-07-09 LAB
ABO GROUP BLD: NORMAL
BLD GP AB SCN SERPL QL: NEGATIVE
RH BLD: NEGATIVE
SPECIMEN EXPIRATION DATE: NORMAL

## 2023-07-10 ENCOUNTER — ANESTHESIA EVENT (OUTPATIENT)
Dept: PERIOP | Facility: HOSPITAL | Age: 78
End: 2023-07-10
Payer: MEDICARE

## 2023-07-10 RX ORDER — CHOLECALCIFEROL (VITAMIN D3) 1250 MCG
CAPSULE ORAL
COMMUNITY

## 2023-07-10 RX ORDER — ALPRAZOLAM 0.25 MG/1
TABLET ORAL
COMMUNITY

## 2023-07-10 NOTE — PRE-PROCEDURE INSTRUCTIONS
Pre-Surgery Instructions:   Medication Instructions   • ALPRAZolam (XANAX) 0.25 mg tablet Uses PRN- OK to take day of surgery   • Ascorbic Acid (vitamin C) 100 MG tablet Stop taking 7 days prior to surgery. • atorvastatin (LIPITOR) 40 mg tablet Take night before surgery   • Cholecalciferol (Vitamin D3) 1.25 MG (63719 UT) CAPS Stop taking 7 days prior to surgery. • co-enzyme Q-10 100 mg capsule Stop taking 7 days prior to surgery. • Iron Heme Polypeptide (Proferrin ES) 12 MG TABS Stop taking 7 days prior to surgery. • meclizine (ANTIVERT) 25 mg tablet Uses PRN- OK to take day of surgery   • metoprolol tartrate (LOPRESSOR) 25 mg tablet Take night before surgery   • mometasone (NASONEX) 50 mcg/act nasal spray Uses PRN- OK to take day of surgery   • omeprazole (PriLOSEC) 20 mg delayed release capsule Take day of surgery. • traZODone (DESYREL) 100 mg tablet Take night before surgery   • vitamin B-12 (VITAMIN B-12) 1,000 mcg tablet Stop taking 7 days prior to surgery. Pt verbalizes understanding of the following:    - Bathing instructions, has chg, neck down, no genitals  - No lotions, powders, sprays, deodorant, jewelry or make-up  - No shaving within 24hrs    - DO NOT EAT OR DRINK ANYTHING after midnight on the evening before your procedure including coffee, tea, gum or hard candy.    - ONLY SIPS OF WATER with your medications are allowed on the morning of your procedure.   - Avoid OTC non-directed Vit/ Suppl/ Herbals 7 days prior to surgery to ensure no drug interactions with perioperative surgical/ anesthetic meds  - Avoid NSAIDs 3 days prior  - Avoid ASA containing products 5 days prior  - Continue statin medication up through and including the day of surgery  - Bring a list of meds you take at home with your last dose noted      - Arrange for someone to drive you home after the procedure & stay with you until the next morning  - Bring insurance cards & photo id    - Bring a container for your dentures  - Leave all valuables such as credit cards, money & jewelry at home    - Notify surgeon if you develop any cold symptoms, change in your health history or develop a rash/ open wounds     - Did the surgeon's office give you any other special instructions? ERAS  - Did surgeon require any clearances?   None

## 2023-07-11 LAB
EST. AVERAGE GLUCOSE BLD GHB EST-MCNC: 97 MG/DL
HBA1C MFR BLD: 5 %

## 2023-07-27 ENCOUNTER — TELEPHONE (OUTPATIENT)
Dept: HEMATOLOGY ONCOLOGY | Facility: CLINIC | Age: 78
End: 2023-07-27

## 2023-07-27 NOTE — TELEPHONE ENCOUNTER
Return call placed to patient. Confirmed she should stop any blood thinning medication, and that Dr. Benjamin Deluca wants her to take metoprolol day of her surgery.

## 2023-07-27 NOTE — TELEPHONE ENCOUNTER
Patient Call    Who are you speaking with? Child    If it is not the patient, are they listed on an active communication consent form? Yes   What is the reason for this call? Pt wants to confirm what meds she should and shouldn't take prior to surgery. Does this require a call back? Yes   If a call back is required, please list best call back number [phone]   If a call back is required, advise that a message will be forwarded to their care team and someone will return their call as soon as possible. Did you relay this information to the patient?  Yes

## 2023-07-31 ENCOUNTER — HOSPITAL ENCOUNTER (OUTPATIENT)
Facility: HOSPITAL | Age: 78
Setting detail: OUTPATIENT SURGERY
Discharge: HOME | End: 2023-07-31
Attending: OBSTETRICS & GYNECOLOGY | Admitting: OBSTETRICS & GYNECOLOGY
Payer: MEDICARE

## 2023-07-31 ENCOUNTER — ANESTHESIA (OUTPATIENT)
Dept: PERIOP | Facility: HOSPITAL | Age: 78
End: 2023-07-31
Payer: MEDICARE

## 2023-07-31 VITALS
OXYGEN SATURATION: 99 % | SYSTOLIC BLOOD PRESSURE: 164 MMHG | RESPIRATION RATE: 16 BRPM | DIASTOLIC BLOOD PRESSURE: 76 MMHG | HEIGHT: 61 IN | HEART RATE: 61 BPM | WEIGHT: 119.05 LBS | TEMPERATURE: 97.5 F | BODY MASS INDEX: 22.48 KG/M2

## 2023-07-31 DIAGNOSIS — N28.9 RENAL INSUFFICIENCY: Primary | ICD-10-CM

## 2023-07-31 DIAGNOSIS — N85.02 EIN (ENDOMETRIAL INTRAEPITHELIAL NEOPLASIA): ICD-10-CM

## 2023-07-31 PROBLEM — Z90.721 S/P HYSTERECTOMY WITH OOPHORECTOMY: Status: ACTIVE | Noted: 2023-07-31

## 2023-07-31 PROBLEM — Z90.710 S/P HYSTERECTOMY WITH OOPHORECTOMY: Status: ACTIVE | Noted: 2023-07-31

## 2023-07-31 LAB
ABO GROUP BLD: NORMAL
ANION GAP SERPL CALCULATED.3IONS-SCNC: 6 MMOL/L
APTT PPP: 31 SECONDS (ref 23–37)
BUN SERPL-MCNC: 21 MG/DL (ref 5–25)
CALCIUM SERPL-MCNC: 9.3 MG/DL (ref 8.4–10.2)
CHLORIDE SERPL-SCNC: 105 MMOL/L (ref 96–108)
CO2 SERPL-SCNC: 27 MMOL/L (ref 21–32)
CREAT SERPL-MCNC: 1.07 MG/DL (ref 0.6–1.3)
GFR SERPL CREATININE-BSD FRML MDRD: 49 ML/MIN/1.73SQ M
GLUCOSE P FAST SERPL-MCNC: 113 MG/DL (ref 65–99)
GLUCOSE SERPL-MCNC: 108 MG/DL (ref 65–140)
GLUCOSE SERPL-MCNC: 113 MG/DL (ref 65–140)
INR PPP: 1.03 (ref 0.84–1.19)
POTASSIUM SERPL-SCNC: 3.4 MMOL/L (ref 3.5–5.3)
PROTHROMBIN TIME: 13.5 SECONDS (ref 11.6–14.5)
RH BLD: NEGATIVE
SODIUM SERPL-SCNC: 138 MMOL/L (ref 135–147)

## 2023-07-31 PROCEDURE — 80048 BASIC METABOLIC PNL TOTAL CA: CPT | Performed by: OBSTETRICS & GYNECOLOGY

## 2023-07-31 PROCEDURE — 85610 PROTHROMBIN TIME: CPT | Performed by: OBSTETRICS & GYNECOLOGY

## 2023-07-31 PROCEDURE — 38570 LAPAROSCOPY LYMPH NODE BIOP: CPT | Performed by: OBSTETRICS & GYNECOLOGY

## 2023-07-31 PROCEDURE — 88307 TISSUE EXAM BY PATHOLOGIST: CPT | Performed by: PATHOLOGY

## 2023-07-31 PROCEDURE — NC001 PR NO CHARGE: Performed by: PHYSICIAN ASSISTANT

## 2023-07-31 PROCEDURE — 85730 THROMBOPLASTIN TIME PARTIAL: CPT | Performed by: OBSTETRICS & GYNECOLOGY

## 2023-07-31 PROCEDURE — S2900 ROBOTIC SURGICAL SYSTEM: HCPCS | Performed by: OBSTETRICS & GYNECOLOGY

## 2023-07-31 PROCEDURE — 88112 CYTOPATH CELL ENHANCE TECH: CPT | Performed by: PATHOLOGY

## 2023-07-31 PROCEDURE — 88309 TISSUE EXAM BY PATHOLOGIST: CPT | Performed by: PATHOLOGY

## 2023-07-31 PROCEDURE — 88342 IMHCHEM/IMCYTCHM 1ST ANTB: CPT | Performed by: PATHOLOGY

## 2023-07-31 PROCEDURE — 88305 TISSUE EXAM BY PATHOLOGIST: CPT | Performed by: PATHOLOGY

## 2023-07-31 PROCEDURE — 88341 IMHCHEM/IMCYTCHM EA ADD ANTB: CPT | Performed by: PATHOLOGY

## 2023-07-31 PROCEDURE — NC001 PR NO CHARGE: Performed by: OBSTETRICS & GYNECOLOGY

## 2023-07-31 PROCEDURE — 58571 TLH W/T/O 250 G OR LESS: CPT | Performed by: OBSTETRICS & GYNECOLOGY

## 2023-07-31 PROCEDURE — 38900 IO MAP OF SENT LYMPH NODE: CPT | Performed by: OBSTETRICS & GYNECOLOGY

## 2023-07-31 PROCEDURE — 82948 REAGENT STRIP/BLOOD GLUCOSE: CPT

## 2023-07-31 RX ORDER — ACETAMINOPHEN 325 MG/1
975 TABLET ORAL ONCE
Status: COMPLETED | OUTPATIENT
Start: 2023-07-31 | End: 2023-07-31

## 2023-07-31 RX ORDER — MAGNESIUM HYDROXIDE 1200 MG/15ML
LIQUID ORAL AS NEEDED
Status: DISCONTINUED | OUTPATIENT
Start: 2023-07-31 | End: 2023-07-31 | Stop reason: HOSPADM

## 2023-07-31 RX ORDER — CEFAZOLIN SODIUM 1 G/50ML
1000 SOLUTION INTRAVENOUS ONCE
Status: COMPLETED | OUTPATIENT
Start: 2023-07-31 | End: 2023-07-31

## 2023-07-31 RX ORDER — ONDANSETRON 2 MG/ML
4 INJECTION INTRAMUSCULAR; INTRAVENOUS ONCE AS NEEDED
Status: DISCONTINUED | OUTPATIENT
Start: 2023-07-31 | End: 2023-07-31 | Stop reason: HOSPADM

## 2023-07-31 RX ORDER — DEXAMETHASONE SODIUM PHOSPHATE 10 MG/ML
INJECTION, SOLUTION INTRAMUSCULAR; INTRAVENOUS AS NEEDED
Status: DISCONTINUED | OUTPATIENT
Start: 2023-07-31 | End: 2023-07-31

## 2023-07-31 RX ORDER — INDOCYANINE GREEN AND WATER 25 MG
KIT INJECTION AS NEEDED
Status: DISCONTINUED | OUTPATIENT
Start: 2023-07-31 | End: 2023-07-31 | Stop reason: HOSPADM

## 2023-07-31 RX ORDER — ROCURONIUM BROMIDE 10 MG/ML
INJECTION, SOLUTION INTRAVENOUS AS NEEDED
Status: DISCONTINUED | OUTPATIENT
Start: 2023-07-31 | End: 2023-07-31

## 2023-07-31 RX ORDER — SODIUM CHLORIDE 9 MG/ML
125 INJECTION, SOLUTION INTRAVENOUS CONTINUOUS
Status: DISCONTINUED | OUTPATIENT
Start: 2023-07-31 | End: 2023-07-31 | Stop reason: HOSPADM

## 2023-07-31 RX ORDER — ONDANSETRON 2 MG/ML
INJECTION INTRAMUSCULAR; INTRAVENOUS AS NEEDED
Status: DISCONTINUED | OUTPATIENT
Start: 2023-07-31 | End: 2023-07-31

## 2023-07-31 RX ORDER — FENTANYL CITRATE/PF 50 MCG/ML
25 SYRINGE (ML) INJECTION
Status: DISCONTINUED | OUTPATIENT
Start: 2023-07-31 | End: 2023-07-31 | Stop reason: HOSPADM

## 2023-07-31 RX ORDER — HEPARIN SODIUM 5000 [USP'U]/ML
5000 INJECTION, SOLUTION INTRAVENOUS; SUBCUTANEOUS
Status: COMPLETED | OUTPATIENT
Start: 2023-07-31 | End: 2023-07-31

## 2023-07-31 RX ORDER — OXYCODONE HYDROCHLORIDE 5 MG/1
5 TABLET ORAL EVERY 6 HOURS PRN
Status: DISCONTINUED | OUTPATIENT
Start: 2023-07-31 | End: 2023-07-31 | Stop reason: HOSPADM

## 2023-07-31 RX ORDER — FENTANYL CITRATE 50 UG/ML
INJECTION, SOLUTION INTRAMUSCULAR; INTRAVENOUS AS NEEDED
Status: DISCONTINUED | OUTPATIENT
Start: 2023-07-31 | End: 2023-07-31

## 2023-07-31 RX ORDER — PROPOFOL 10 MG/ML
INJECTION, EMULSION INTRAVENOUS AS NEEDED
Status: DISCONTINUED | OUTPATIENT
Start: 2023-07-31 | End: 2023-07-31

## 2023-07-31 RX ORDER — BUPIVACAINE HYDROCHLORIDE 2.5 MG/ML
INJECTION, SOLUTION EPIDURAL; INFILTRATION; INTRACAUDAL AS NEEDED
Status: DISCONTINUED | OUTPATIENT
Start: 2023-07-31 | End: 2023-07-31 | Stop reason: HOSPADM

## 2023-07-31 RX ORDER — SODIUM CHLORIDE 9 MG/ML
INJECTION, SOLUTION INTRAVENOUS CONTINUOUS PRN
Status: DISCONTINUED | OUTPATIENT
Start: 2023-07-31 | End: 2023-07-31

## 2023-07-31 RX ORDER — METOPROLOL TARTRATE 5 MG/5ML
INJECTION INTRAVENOUS AS NEEDED
Status: DISCONTINUED | OUTPATIENT
Start: 2023-07-31 | End: 2023-07-31

## 2023-07-31 RX ORDER — GABAPENTIN 100 MG/1
100 CAPSULE ORAL ONCE
Status: COMPLETED | OUTPATIENT
Start: 2023-07-31 | End: 2023-07-31

## 2023-07-31 RX ADMIN — SUGAMMADEX 110 MG: 100 INJECTION, SOLUTION INTRAVENOUS at 11:25

## 2023-07-31 RX ADMIN — FENTANYL CITRATE 50 MCG: 50 INJECTION INTRAMUSCULAR; INTRAVENOUS at 10:21

## 2023-07-31 RX ADMIN — PROPOFOL 100 MG: 10 INJECTION, EMULSION INTRAVENOUS at 09:48

## 2023-07-31 RX ADMIN — ONDANSETRON 4 MG: 2 INJECTION INTRAMUSCULAR; INTRAVENOUS at 11:25

## 2023-07-31 RX ADMIN — FENTANYL CITRATE 25 MCG: 50 INJECTION INTRAMUSCULAR; INTRAVENOUS at 09:48

## 2023-07-31 RX ADMIN — ACETAMINOPHEN 325MG 975 MG: 325 TABLET ORAL at 08:27

## 2023-07-31 RX ADMIN — OXYCODONE HYDROCHLORIDE 5 MG: 5 TABLET ORAL at 13:23

## 2023-07-31 RX ADMIN — ROCURONIUM BROMIDE 10 MG: 10 INJECTION, SOLUTION INTRAVENOUS at 10:07

## 2023-07-31 RX ADMIN — FENTANYL CITRATE 25 MCG: 50 INJECTION INTRAMUSCULAR; INTRAVENOUS at 11:11

## 2023-07-31 RX ADMIN — SODIUM CHLORIDE 125 ML/HR: 0.9 INJECTION, SOLUTION INTRAVENOUS at 12:31

## 2023-07-31 RX ADMIN — METOROPROLOL TARTRATE 2.5 MG: 5 INJECTION, SOLUTION INTRAVENOUS at 10:26

## 2023-07-31 RX ADMIN — SODIUM CHLORIDE: 0.9 INJECTION, SOLUTION INTRAVENOUS at 10:00

## 2023-07-31 RX ADMIN — FENTANYL CITRATE 25 MCG: 50 INJECTION INTRAMUSCULAR; INTRAVENOUS at 10:13

## 2023-07-31 RX ADMIN — DEXAMETHASONE SODIUM PHOSPHATE 5 MG: 10 INJECTION INTRAMUSCULAR; INTRAVENOUS at 10:10

## 2023-07-31 RX ADMIN — SODIUM CHLORIDE 125 ML/HR: 0.9 INJECTION, SOLUTION INTRAVENOUS at 08:45

## 2023-07-31 RX ADMIN — CEFAZOLIN SODIUM 1000 MG: 1 SOLUTION INTRAVENOUS at 09:39

## 2023-07-31 RX ADMIN — HEPARIN SODIUM 5000 UNITS: 5000 INJECTION INTRAVENOUS; SUBCUTANEOUS at 08:27

## 2023-07-31 RX ADMIN — ROCURONIUM BROMIDE 30 MG: 10 INJECTION, SOLUTION INTRAVENOUS at 09:48

## 2023-07-31 RX ADMIN — GABAPENTIN 100 MG: 100 CAPSULE ORAL at 08:27

## 2023-07-31 NOTE — ANESTHESIA PREPROCEDURE EVALUATION
Procedure:  ROBOTHYSTERECTOMY, BILATERAL SALPINGO-OOPHORECTOMY, PELVIC SENTINEL NODE BIOPSIES VS. LYMPHADENECTOMY, (Abdomen)    Relevant Problems   ANESTHESIA (within normal limits)      CARDIO   (+) Coronary arteriosclerosis      ENDO (within normal limits)      GI/HEPATIC (within normal limits)      /RENAL (within normal limits)      GYN (within normal limits)      HEMATOLOGY   (+) Anemia      MUSCULOSKELETAL (within normal limits)      NEURO/PSYCH   (+) Anxiety state      PULMONARY   (+) Obstructive sleep apnea syndrome        Physical Exam    Airway    Mallampati score: III  TM Distance: <3 FB  Neck ROM: limited     Dental   upper dentures and lower dentures,     Cardiovascular  Rhythm: regular, Rate: normal, Cardiovascular exam normal    Pulmonary  Pulmonary exam normal Breath sounds clear to auscultation,     Other Findings        Anesthesia Plan  ASA Score- 2     Anesthesia Type- general with ASA Monitors. Additional Monitors:   Airway Plan: ETT. Plan Factors-Exercise tolerance (METS): >4 METS. Chart reviewed. Patient summary reviewed. Patient is not a current smoker. Induction- intravenous. Postoperative Plan- Plan for postoperative opioid use. Informed Consent- Anesthetic plan and risks discussed with patient.

## 2023-07-31 NOTE — ANESTHESIA POSTPROCEDURE EVALUATION
Post-Op Assessment Note    CV Status:  Stable    Pain management: adequate     Mental Status:  Alert and awake   Hydration Status:  Euvolemic   PONV Controlled:  Controlled   Airway Patency:  Patent      Post Op Vitals Reviewed: Yes      Staff: Anesthesiologist, CRNA         No notable events documented.     BP      Temp      Pulse     Resp      SpO2      /76   Pulse 61   Temp 97.5 °F (36.4 °C) (Temporal)   Resp 16   Ht 5' 1" (1.549 m)   Wt 54 kg (119 lb 0.8 oz)   SpO2 99%   BMI 22.49 kg/m²

## 2023-07-31 NOTE — DISCHARGE INSTR - AVS FIRST PAGE
Robotic hysterectomy, bilateral salpingo-oophorectomy and sentinel lymph node biopsies, cystoscopy. Discharge Instructions    WHAT YOU NEED TO KNOW:   Today we removed your uterus, cervix, bilateral fallopian tubes and ovaries and samples of lymph nodes from the pelvis. At the completion of the procedure we placed the camera in the bladder and there was no evidence of injuries or other abnormalities. Your procedures were uncomplicated. DISCHARGE INSTRUCTIONS:     Apply ice pack to abdominal incisions to manage discomfort/pain during the first 24 to 48 hours after the surgery. Pain should be mild and well controlled with Tylenol/Advil as indicated below. If your pain is not controlled contact Dr. Lawson Dale directly on his cell phone at [phone]. For other questions, problems or concerns call [phone] and Dr. Renny Armendariz or physician on-call will return your call immediately. Contact your doctor at the number above if:   You have a fever over 101o. You have nausea or are vomiting that does not improve after a light meal.   Your pain is getting worse, even after you take medicine. You feel pain or burning when you urinate, or you have trouble urinating. You have pus or a foul-smelling odor coming from your vagina and/or wound. Your wound is red, swollen, or draining pus. You see new or an increased amount of bright red blood coming from your vagina or your incisions. You have questions or concerns about your condition or care. Seek care immediately:   Your arm or leg feels warm, tender, and painful. It may look swollen and red. You have increasing abdominal or pelvic pain. You have heavy vaginal bleeding that fills 1 or more sanitary pads in 1 hour. Call 911 for any of the following: You feel lightheaded, short of breath, and have chest pain. You cough up blood. Medicines: You may need any of the following:  Prescription medicine will not be given for this procedure. Resume your previous medications as directed. Extra-strength Tylenol: This medications over-the-counter. Take 2 tablets every 6 hours as needed for mild-to-moderate pain. Ibuprofen/Advil/Motrin 200 mg tablets: This medication is over-the-counter. Take 3 tablets every 8 hours as needed for moderate to severe pain. Take this medication with food. The drink plenty of fluids while using this medication to prevent kidney injury. Metamucil or MiraLax: This product is over-the-counter. Distal 1 large tbsp in a large glass of water. Use once or twice a day for 1-2 weeks prevent and or treat constipation. Take your medicines as directed. Contact your healthcare provider if you think your medicine is not helping or if you have side effects. Tell him or her if you are allergic to any medicine. Keep a list of the medicines, vitamins, and herbs you take. Include the amounts, and when and why you take them. Bring the list or the pill bottles to follow-up visits. Carry your medicine list with you in case of an emergency. Activity:   Rest as needed. Get up and move around as directed to help prevent blood clots. Start with short walks and slowly increase the distance every day. Limit the number of times you climb stairs to 2 times each day for the first week. Plan most of your daily activities on one level of your home. Do not lift objects heavier than 10 pounds until seen in the office for your follow-up appointment. Do not strain during bowel movements. High-fiber foods and extra liquids can help you prevent constipation. Examples of high-fiber foods are fruit and bran. Prune juice and water are good liquids to drink. Do not have sex, use tampons, or douche and do not do tub baths/swimming until cleared by your physician at your postoperative appointment. You may shower as soon as the day after surgery. Do not go into pools or hot tubs until cleared by your doctor.       Ask when it is safe for you to drive. It is generally safe to drive as soon as your legs are strong, you are off pain medicines and you feel like you will have the appropriate reflexes to step on the breaks in case of an emergency. Ask when you may return to work and to other regular activities. Wound care: Care for your abdominal incisions as directed. Carefully wash around the wound with soap and water. If you have Hibiclens or medicated soap that you were instructed to use before surgery, you may use that to wash with for up to 2 days after surgery. If not, any mild non-scented, non-abrasive soap is safe. It is okay to let the soap and water run over your incision. Do not scrub your incision. Dry the area and put on new, clean bandages as directed. Change your bandages when they get wet or dirty. If you have strips of medical tape, let them fall off on their own. It may take 7 to 14 days for them to fall off. Check your incision every day for redness, swelling, or pus. Deep breathing: Take deep breaths and cough 10 times each hour. This will decrease your risk for a lung infection. Take a deep breath and hold it for as long as you can. Let the air out and then cough strongly. Deep breaths help open your airway. You may be given an incentive spirometer to help you take deep breaths. Put the plastic piece in your mouth and take a slow, deep breath, then let the air out and cough. Repeat these steps 10 times every hour. Get support: This surgery may be life-changing for you and your family. You will no longer be able to get pregnant. Sudden changes in the levels of your hormones may occur and cause mood swings and depression. You may feel angry, sad, or frightened, or cry frequently and unexpectedly. These feelings are normal. Talk to your healthcare provider about where you can get support. You can also ask if hormone replacement medicine is right for you.    Follow up with your healthcare provider or gynecologist as directed: You may need to return to have stitches removed, and for other tests. Write down your questions so you remember to ask them during your visits. © 2017 [facility] Information is for End User's use only and may not be sold, redistributed or otherwise used for commercial purposes. All illustrations and images included in CareNotes® are the copyrighted property of [employer], [employer] or [employer]. The above information is an  only. It is not intended as medical advice for individual conditions or treatments. Talk to your doctor, nurse or pharmacist before following any medical regimen to see if it is safe and effective for you.

## 2023-07-31 NOTE — OP NOTE
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 BILATERAL SALPINGO-OOPHORECTOMY. PELVIC SENTINEL NODE BIOPSIES  CYSTOSCOPY    Specimen(s):  ID Type Source Tests Collected by Time Destination   1 : Pelvic washings Washing Pelvic Washing NON-GYNECOLOGIC CYTOLOGY Enriqueta Benítez MD 2023 1038    2 : Bilateral pelvic sentinel lymph nodes Tissue Lymph Node, [place] TISSUE EXAM Enriqueta Benítez MD 2023 1047    3 : Uterus, cervix,  bilateral ovaries and fallopian tubes Tissue Uterus w/Bilateral Ovaries and Fallopian Tubes TISSUE EXAM Enriqueta Benítez MD 2023 1105        Estimated Blood Loss:   Minimal    Drains:  NG/OG/Enteral Tube Orogastric 18 Fr (Active)   Number of days: 0       Urethral Catheter Double-lumen;Non-latex 16 Fr. (Active)   Number of days: 0       Anesthesia Type:   General    Operative Indications:  EIN (endometrial intraepithelial neoplasia) [N85.02]    Operative Findings:  #1. Approximately 6 to 8 weeks size uterus. No evidence of extrauterine disease. #2.  Streak/atrophic bilateral ovaries, no adnexal lesions. #3.  After ICG cervical injection right and left external iliac and small right distal common iliac sentinel lymph nodes were identified and excised. #4.  Cystoscopy demonstrated normal bladder mucosa and bilateral blood and ureteral urine jets.     Complications:   None    Procedure and Technique:  The patient was taken to the operating room where general endotracheal anesthesia was induced without complications. The patient received antibiotic prophylaxis per hospital protocol. Sequential compression devices were applied to the lower extremities and activated prior to induction of anesthesia. A single dose of preoperative heparin was administered. The patient was placed in the dorsolithotomy position in [address] [place] [address] and her lower abdomen, perineum and vagina were prepped and draped in the usual sterile fashion. Under direct visualization the uterus was sounded to approximately 7 cm. The endocervix was dilated. The cervix was injected with a total of 4 mL of ICG for sentinel lymph node mapping. A Me-Mover uterine manipulator was secured in place with a stitch of 0 Vicryl. Narvaez catheter was placed and the intravaginal occluder balloon was inflated. Attention was turned to the abdomen. All port sites were infiltrated with bupivacaine at the beginning of completion of the procedure. An 8 mm skin incision was made approximately 5 cm above the umbilicus near the midline. Then, using a 5 mm Endopath Excel trocar and the 5 mm laparoscope, the peritoneal cavity was entered under directvisualization. Good intraperitoneal location was confirmed and pneumoperitoneum was created to maximal pressure 15 mm of mercury. A total of four 8 mm robotic trocars were placed (2 on the left, 1 in the midline replacing the 5 mm entry port and 1 on the right) and the 5 mm entry trocar was reinserted in the right flank for assistant's use. The patient was placed in steep Trendelenburg and the [employer] system was docked. The above-mentioned findings were noted. We also noted a pinpoint fundal inherent perforation likely cause during dilation of the uterus. This was hemostatic and there was no significant extrusion of intrauterine contents. This was inherent to atrophy and small nature of the uterus. At this point the peritoneum lateral to the ovarian vessels was divided on both sides.   The pararectal and paravesical spaces were developed. [place] lymph nodes were identified with firefly camera and excised. Those were temporarily placed in a 5 mm Endo Catch bag which was later retrieved through the vagina. The round ligaments were cauterized and divided bilaterally and the bladder was mobilized anteriorly without difficulty. The ureters were clearly identified transperitoneally. The ovarian vessels were skeletonized,cauterized and divided bilaterally. The fallopian tubes and ovaries were mobilized without difficulties. The uterine vessels were skeletonized cauterized and divided bilaterally and then the cardinal ligaments were serially cauterized and divided . As a Caroline manipulator had to be placed without a cup due to narrow introitus, at this point manipulator was removed and a small EEA sizer was placed in the vagina to delineate the fornices. A circumferential colpotomy was made. The specimen and the Endo Catch bag, containing sentinel lymph nodes were removed through the vagina. The vaginal cuff was closed using a running stitch of 2-0 PDO Stratafix. Airtight closure an excellent hemostasis was obtained. Copious irrigation was used and excellent hemostasis was confirmed at low intraperitoneal pressures. At this point the robot was undocked. Pneumoperitoneum was completely released with the assistance of Valsalva maneuvers. All trocars were removed and the skin was closed using a subcuticular stitch of 4-0 Monocryl and Exofin was applied to all incisions. The Narvaez was removed. Using the [place]hat, the bladder was retrograde filled with approximately 150 mL of NS. We examined the bladder then using the 5 mm laparoscope. The above mentioned findings were noted. The Narvaez catheter was then used to drain the bladder and the catheter was then removed. The patient tolerated the procedure well. Sponge, lap, needle and instrument counts were reported as correct x2.   At the time of this dictation the patient remains stable in the operating room awaiting extubation.        I was present for the entire procedure    Patient Disposition:  PACU     Mona Wilhelm MD, [place], TING  7/31/2023  11:30 AM

## 2023-07-31 NOTE — H&P
H&P Exam - Gynecologic Oncology   Sachin Graham 78 y.o. female MRN: [id number]  Unit/Bed#: OR [place] Encounter: [id number]    Assessment/Plan     1. EIN: Here for planned robotic hysterectomy, BSO, sentinel LN biopsies and all indicated procedures. The patient was counseled regarding indications, risks, benefits and alternatives to surgical management. We discussed risks including but not limited to bleeding and potential need for blood transfusions, infection, pain, injury to surrounding organs such as bladder, intestines, ureters and neurovascular structures. Patient understands potential risks associated with stress of surgery and general anesthesia including but not limited to acute cardiac events, venous thromboembolism, etc.  Patient consents for blood transfusions if those are deemed necessary during the course of care. She understands risks include but are not limited to hypersensitivity reactions and infection with blood-borne pathogens. Patient verbalizes understanding, agrees and wants to proceed. Informed consent form signed. All questions answered to patient's satisfaction. Consents signed in the office and confirmed verbally today. History of Present Illness   HX and PE limited by: -  HPI:  Sachin Graham is a 78 y.o. female with idiopathic/positional benign vertigo, hypertension, hyperlipidemia, GERD and iron deficiency anemia. She has remote history of angiodysplasia of the foregut. No recent bleeding. Recent episodes of postmenopausal bleeding that regard endometrial biopsy which confirmed the presence of endometrial epithelial neoplasia. Patient reports prior history of ovarian cystectomy versus oophorectomy for benign indication and early adulthood. Here for definitive surgery. Review of Systems   All other systems reviewed and are negative.       Historical Information     Past Medical History:   Diagnosis Date   • Anxiety    • Arthritis    • Benign hypertension    • Chronic pain disorder     back   • COPD (chronic obstructive pulmonary disease) ([facility])    • Coronary artery disease     medical management   • CPAP (continuous positive airway pressure) dependence    • Diabetes ([facility])    • Epilepsy ([facility])    • GERD (gastroesophageal reflux disease)    • History of echocardiogram 2014    EF 55%, mild MR.   • History of transfusion    • Hypertension    • Mixed hyperlipidemia    • DAVID on CPAP    • Shortness of breath     triggered by anxiety   • Watermelon stomach    • Wears dentures      Past Surgical History:   Procedure Laterality Date   • BREAST BIOPSY Right 2015   • BREAST BIOPSY Left     ? year   • CARDIAC CATHETERIZATION  2014    EF 65%, mid LAD 60% stenosis and D2 70% stenosis. Medical management. • CATARACT EXTRACTION Left    • COLONOSCOPY     • EGD     • OOPHORECTOMY      not sure which one was removed   • [address]  2020     OB/GYN History: . 2x SVDs.   Family History   Problem Relation Age of Onset   • Heart disease Mother    • Coronary artery disease Sister         history of CABG   • No Known Problems Father    • No Known Problems Daughter    • No Known Problems Sister    • No Known Problems Sister    • No Known Problems Sister    • No Known Problems Daughter      Social History   Social History     Substance and Sexual Activity   Alcohol Use Not Currently     Social History     Substance and Sexual Activity   Drug Use Never     Social History     Tobacco Use   Smoking Status Never   Smokeless Tobacco Never     E-Cigarette/Vaping   • E-Cigarette Use Never User      E-Cigarette/Vaping Substances   • Nicotine No    • THC No    • CBD No    • Flavoring No    • Other No    • Unknown No        Meds/Allergies   all current active meds have been reviewed  Allergies   Allergen Reactions   • Advil [Ibuprofen]    • Aspirin Other (See Comments)     gi bleed   • Caspofungin Other (See Comments)   • Hydrocodone    • Pravastatin GI Intolerance • Tramadol GI Intolerance       Objective   Vitals: Blood pressure 157/70, pulse 90, temperature 97.9 °F (36.6 °C), temperature source Temporal, resp. rate 16, height 5' 1" (1.549 m), weight 54 kg (119 lb 0.8 oz), SpO2 99 %. Intake/Output Summary (Last 24 hours) at 7/31/2023 0906  Last data filed at 7/31/2023 0855  Gross per 24 hour   Intake 100 ml   Output --   Net 100 ml       Invasive Devices:   Peripheral IV 07/31/23 Left Hand (Active)   Site Assessment WDL; Clean;Dry; Intact 07/31/23 0830   Dressing Type Transparent 07/31/23 0830   Line Status Blood return noted; Flushed; Infusing 07/31/23 0830   Dressing Status Clean;Dry; Intact 07/31/23 0830     /70 (BP Location: Left arm)   Pulse 90   Temp 97.9 °F (36.6 °C) (Temporal)   Resp 16   Ht 5' 1" (1.549 m)   Wt 54 kg (119 lb 0.8 oz)   SpO2 99%   BMI 22.49 kg/m²       Physical Exam  Vitals reviewed. Cardiovascular:      Rate and Rhythm: Normal rate and regular rhythm. Pulmonary:      Effort: Pulmonary effort is normal. No respiratory distress. Breath sounds: No stridor. Abdominal:      General: There is no distension. Palpations: Abdomen is soft. There is no mass. Tenderness: There is no abdominal tenderness. There is no guarding. Hernia: No hernia is present. Genitourinary:     Comments: Deferred to OR. Musculoskeletal:      Right lower leg: No edema. Left lower leg: No edema. Neurological:      Mental Status: She is alert. Lab Results: I have personally reviewed pertinent reports. Imaging: I have personally reviewed pertinent reports. EKG, Pathology, and Other Studies: I have personally reviewed pertinent reports.         Jennyfer Jones MD, [place], [address]  7/31/2023  9:10 AM

## 2023-08-04 ENCOUNTER — DOCUMENTATION (OUTPATIENT)
Dept: HEMATOLOGY ONCOLOGY | Facility: CLINIC | Age: 78
End: 2023-08-04

## 2023-08-04 PROCEDURE — 88309 TISSUE EXAM BY PATHOLOGIST: CPT | Performed by: PATHOLOGY

## 2023-08-04 PROCEDURE — 88305 TISSUE EXAM BY PATHOLOGIST: CPT | Performed by: PATHOLOGY

## 2023-08-04 PROCEDURE — 88342 IMHCHEM/IMCYTCHM 1ST ANTB: CPT | Performed by: PATHOLOGY

## 2023-08-04 PROCEDURE — 88112 CYTOPATH CELL ENHANCE TECH: CPT | Performed by: PATHOLOGY

## 2023-08-04 PROCEDURE — 88341 IMHCHEM/IMCYTCHM EA ADD ANTB: CPT | Performed by: PATHOLOGY

## 2023-08-04 PROCEDURE — 88307 TISSUE EXAM BY PATHOLOGIST: CPT | Performed by: PATHOLOGY

## 2023-08-04 NOTE — PROGRESS NOTES
In-basket message received from Dr. Mary Patton to add patient to [facility] on 8/14/2023. Chart reviewed and prep completed.

## 2023-08-14 ENCOUNTER — DOCUMENTATION (OUTPATIENT)
Dept: GYNECOLOGIC ONCOLOGY | Facility: CLINIC | Age: 78
End: 2023-08-14

## 2023-08-14 NOTE — PROGRESS NOTES
Multidisciplinary Gynecologic Oncology Tumor Case Review       Physician Recommended Plan     Radha Min is a 78 y.o. 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 The group recommended to consider treatment with vaginal brachytherapy. Follow-up appointment with Dr Gilmar Peace on 8/29/2023. NCCN guidelines were available for review. The final treatment plan will be left to the discretion of the patient and the treating physician. DISCLAIMERS:    TO THE TREATING PHYSICIAN:  This conference is a meeting of clinicians from various specialty areas who evaluate and discuss patients for whom a multidisciplinary treatment approach is being considered. Please note that the above opinion was a consensus of the conference attendees and is intended only to assist in quality care of the discussed patient. The responsibility for follow up on the input given during the conference, along with any final decisions regarding plan of care, is that of the patient and the patient's provider. Accordingly, appointments have only been recommended based on this information and have NOT been scheduled unless otherwise noted. TO THE PATIENT:  This summary is a brief record of major aspects of your cancer treatment. You may choose to share a copy with any of your doctors or nurses. However, this is not a detailed or comprehensive record of your care.

## 2023-08-29 ENCOUNTER — OFFICE VISIT (OUTPATIENT)
Dept: GYNECOLOGIC ONCOLOGY | Facility: CLINIC | Age: 78
End: 2023-08-29
Payer: MEDICARE

## 2023-08-29 VITALS
HEIGHT: 61 IN | DIASTOLIC BLOOD PRESSURE: 72 MMHG | TEMPERATURE: 97.1 F | HEART RATE: 89 BPM | WEIGHT: 118.8 LBS | BODY MASS INDEX: 22.43 KG/M2 | OXYGEN SATURATION: 99 % | SYSTOLIC BLOOD PRESSURE: 160 MMHG

## 2023-08-29 DIAGNOSIS — Z90.710 S/P HYSTERECTOMY WITH OOPHORECTOMY: ICD-10-CM

## 2023-08-29 DIAGNOSIS — Z09 POSTOP CHECK: Primary | ICD-10-CM

## 2023-08-29 DIAGNOSIS — C54.1 ENDOMETRIAL CANCER (HCC): ICD-10-CM

## 2023-08-29 DIAGNOSIS — Z90.721 S/P HYSTERECTOMY WITH OOPHORECTOMY: ICD-10-CM

## 2023-08-29 PROBLEM — N85.02 EIN (ENDOMETRIAL INTRAEPITHELIAL NEOPLASIA): Status: RESOLVED | Noted: 2023-05-23 | Resolved: 2023-08-29

## 2023-08-29 PROCEDURE — 99024 POSTOP FOLLOW-UP VISIT: CPT | Performed by: NURSE PRACTITIONER

## 2023-08-29 PROCEDURE — 99214 OFFICE O/P EST MOD 30 MIN: CPT | Performed by: NURSE PRACTITIONER

## 2023-08-29 RX ORDER — TRAZODONE HYDROCHLORIDE 50 MG/1
50 TABLET ORAL
COMMUNITY
Start: 2023-06-29

## 2023-08-29 NOTE — PROGRESS NOTES
Assessment/Plan:    Problem List Items Addressed This Visit        Genitourinary    Endometrial cancer [facility])     49-year-old with a history of biopsy-proven EIN, now s/p TLH/BSO on 7/31/23. Pathology was consistent with stage IB grade 1 endometrial adenocarcinoma, with 83% myometrial invasion. She is recovering well from surgery and has no concerns. Her vaginal cuff is intact. Her surgical trocar sites are well-healed. Her PS is 1. Patient's pathology results were reviewed in-depth with the patient, her , and her daughter. Discussed that tumor board conference recommendation was adjuvant treatment with vaginal brachytherapy. She is interested in moving forward with this. Referral placed. Patient will RTO in 3 months for first surveillance visit. She will call the office in the interim with any new concerns or questions. She was asked to maintain pelvic rest for an additional 2 weeks. Relevant Orders    Ambulatory referral to Radiation Oncology       Other    S/P hysterectomy with oophorectomy    Postop check - Primary         CHIEF COMPLAINT: Postop check, pathology review       Problem:  Cancer Staging   No matching staging information was found for the patient. Previous therapy:  7/31/21: TLH/BSO. Pathology consistent with stage IB1 endometrial adenocarcinoma. Patient ID: Manish Diaz is a 78 y.o. female     This is a 78 y.o. female who presents to the office for post-operative evaluation. She is recovering uneventfully from surgery and is without acute complaints. She has been afebrile. She denies nausea or vomiting. Her appetite is appropriate. She reports normal bowel and bladder function. She denies vaginal bleeding or discharge. She is without abdominal or pelvic pain. She is ambulatory. 30 minutes were spent in the care of the patient and her family.  Greater than 50% of the visit was dedication to review of patient's pathology report, discussion of treatment planning, and counseling in regards to surveillance. The following portions of the patient's history were reviewed and updated as appropriate: allergies, current medications, past family history, past medical history, past social history, past surgical history and problem list.    Review of Systems   Constitutional: Negative for chills, fatigue, fever and unexpected weight change. Respiratory: Negative for cough, chest tightness, shortness of breath and wheezing. Cardiovascular: Negative for chest pain, palpitations and leg swelling. Gastrointestinal: Negative for abdominal pain, diarrhea, nausea and vomiting. Genitourinary: Negative for difficulty urinating, dysuria, frequency, hematuria, pelvic pain, urgency, vaginal bleeding and vaginal discharge. Skin: Negative for color change, pallor and rash. Neurological: Negative for dizziness, weakness, light-headedness and numbness. Psychiatric/Behavioral: Negative. Current Outpatient Medications:   •  ALPRAZolam (XANAX) 0.25 mg tablet, Take by mouth daily at bedtime as needed for anxiety, Disp: , Rfl:   •  Ascorbic Acid (vitamin C) 100 MG tablet, Take 500 mg by mouth 2 (two) times a day, Disp: , Rfl:   •  atorvastatin (LIPITOR) 40 mg tablet, Take 1 tablet (40 mg total) by mouth daily, Disp: 90 tablet, Rfl: 5  •  Cholecalciferol (Vitamin D3) 1.25 MG (43235 UT) CAPS, Take by mouth, Disp: , Rfl:   •  co-enzyme Q-10 100 mg capsule, Take by mouth daily, Disp: , Rfl:   •  Iron Heme Polypeptide (Proferrin ES) 12 MG TABS, one tablet b.i.d., Disp: , Rfl:   •  meclizine (ANTIVERT) 25 mg tablet, Take 1 tablet (25 mg total) by mouth 3 (three) times a day as needed for dizziness, Disp: 30 tablet, Rfl: 0  •  metoprolol tartrate (LOPRESSOR) 25 mg tablet, Take 1/2 tablet by oral route at bedtime. , Disp: , Rfl:   •  mometasone (NASONEX) 50 mcg/act nasal spray, 2 sprays into each nostril if needed, Disp: , Rfl:   •  omeprazole (PriLOSEC) 20 mg delayed release capsule, Take 20 mg by mouth daily, Disp: , Rfl:   •  traZODone (DESYREL) 50 mg tablet, Take 50 mg by mouth daily at bedtime 1/2 tablet, Disp: , Rfl:   •  vitamin B-12 (VITAMIN B-12) 1,000 mcg tablet, Take 1,000 mcg by mouth daily, Disp: , Rfl:     Objective:    Blood pressure 160/72, pulse 89, temperature (!) 97.1 °F (36.2 °C), temperature source Temporal, height 5' 1" (1.549 m), weight 53.9 kg (118 lb 12.8 oz), SpO2 99 %. Body mass index is 22.45 kg/m². Body surface area is 1.51 meters squared. Physical Exam  Vitals reviewed. Exam conducted with a chaperone present. Constitutional:       General: She is not in acute distress. Appearance: Normal appearance. HENT:      Head: Normocephalic and atraumatic. Mouth/Throat:      Mouth: Mucous membranes are moist.   Abdominal:      Palpations: Abdomen is soft. There is no mass. Tenderness: There is no abdominal tenderness. Genitourinary:     Comments: The external female genitalia is normal. The bartholin's, uretheral and skenes glands are normal. The urethral meatus is normal (midline with no lesions). Anus without fissure or lesion. Speculum exam reveals a grossly normal vagina. Vagina is intact, without dehiscense. No masses, lesions, discharge or bleeding. No significant cystocele or rectocele noted. Bimanual exam notes a surgical absent cervix, uterus and adnexal structures. No masses or fullness. Bladder is without fullness, mass or tenderness. Skin:     General: Skin is warm and dry. Comments: Surgical trocar sites are well healed. Neurological:      Mental Status: She is alert and oriented to person, place, and time. Psychiatric:         Mood and Affect: Mood normal.         Behavior: Behavior normal.         Thought Content:  Thought content normal.         Judgment: Judgment normal.         Pathology:  Case Report   Surgical Pathology Report                         Case: [id number]                                    Authorizing Provider: Hilario Bunch MD      Collected:           07/31/2023 1047               Ordering Location:     [address] [place] [address]        Received:            07/31/2023 1359                                      [facility]                                                      Pathologist:           Vijaya Dickens MD                                                            Specimens:   A) - Lymph Node, [place], Bilateral pelvic sentinel lymph nodes                                     B) - Uterus w/Bilateral Ovaries and Fallopian Tubes, Uterus, cervix,  bilateral                      ovaries and fallopian tubes                                                                Addendum   MISMATCH REPAIR (MMR)     Interpretation: No loss of nuclear expression of MMR proteins: Low probability of MSI-H     Results:  Antibody            Clone                 Description                                  Results  MLH1                 M1                     Mismatch repair protein               Intact nuclear expression              MS2                   [id number]        Mismatch repair protein               Intact nuclear expression  MSH6                40                      Mismatch repair protein               Intact nuclear expression  PMS2                 [id number]           Mismatch repair protein               Intact nuclear expression     Comment: background non-neoplastic tissue and/or internal control with intact nuclear expression. A positive control for each antibody has been reviewed and accepted.      These immunohistochemical tests were performed at [facility] in [facility] and interpreted by Eileen Hogan. An electronic copy of this report will be kept on file in the Medical Records Department at [address] [place] [address]. The final diagnosis remains unchanged.      Addendum electronically signed by Vijaya Dickens MD on 8/7/2023 at 10:12 AM   Final Diagnosis   A.  Lymph node, "Bilateral pelvic sentinel lymph nodes," Resection:  - Two lymph nodes, negative for carcinoma, supported by AE1/AE3 (0/2)     A. 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   -- Myometrium:   - Leiomyomata   -- Cervix:   - Atrophic cervical squamous and glandular mucosa without evidence of dysplasia  - Mild cervicitis  -- Serosa:   - No pathologic abnormality  -- Ovary, Left               - Benign ovary, negative for carcinoma  -- Ovary, Right               - Benign ovary, negative for carcinoma  -- Fallopian tube, Left  - Complete cross section of fallopian tube with no diagnostic abnormality  - Negative for malignancy  -- Fallopian tube, Right               - Complete cross section of fallopian tube with no diagnostic abnormality               - Negative for malignancy      Electronically signed by Thao Ceballos MD on 8/4/2023 at  8:50 AM   Note    BEST BLOCK FOR ADDITIONAL TESTING, IF INDICATED: B10     Immunohistochemistry was performed on block B10 with adequate controls. Cells are positive for PAX 8 and patchy positive for p16. P53 shows wildtype staining pattern. Napsin-A is negative. MMR studies will be performed and results issued in an addendum     See also [id number]     Intradepartmental consultation in agreement ([facility], AM).       Additional Information    All reported additional testing was performed with appropriately reactive controls.  These tests were developed and their performance characteristics determined by [facility] or appropriate performing facility, though some tests may be performed on tissues which have not been validated for performance characteristics (such as staining performed on alcohol exposed cell blocks and decalcified tissues).  Results should be interpreted with caution and in the context of the patients’ clinical condition. These tests may not be cleared or approved by the U.S. Food and Drug Administration, though the FDA has determined that such clearance or approval is not necessary. These tests are used for clinical purposes and they should not be regarded as investigational or for research. This laboratory has been approved by [place] 88, designated as a high-complexity laboratory and is qualified to perform these tests.   .Interpretation performed at [facility], [address] [place] [address]      Synoptic Checklist   ENDOMETRIUM  8th Edition - Protocol posted: 6/22/2022  ENDOMETRIUM: HYSTERECTOMY - All Specimens  SPECIMEN   Procedure  Total hysterectomy and bilateral salpingo-oophorectomy    Hysterectomy Type  Laparoscopic, robotic-assisted    Specimen Integrity  Intact    TUMOR   Tumor Site  Endometrium    Tumor Size  Greatest Dimension (Centimeters): 2.6 cm   Histologic Type  Endometrioid carcinoma, NOS    Histologic Grade  FIGO grade 1    Two-Tier Grading System  Low grade (encompassing FIGO 1 and 2)    Myometrial Invasion  Present    Depth of Myometrial Invasion  10 mm   Myometrial Thickness  12 mm   Percentage of Myometrial Invasion  83 %   Adenomyosis  Not identified    Uterine Serosa Involvement  Not identified    Lower Uterine Segment Involvement  Not identified    Cervical Stromal Involvement  Not identified    Other Tissue / Organ Involvement  Not applicable    Peritoneal / Ascitic Fluid  Malignant cells not identified    Lymphovascular Invasion (LVI)  Not identified    MARGINS   Margin Status  All margins negative for invasive carcinoma    REGIONAL LYMPH NODES   Regional Lymph Node Status  All regional lymph nodes negative for tumor cells    Lymph Nodes Examined     Total Number of Pelvic Nodes Examined  2    Number of Pelvic [place] Nodes Examined  2    Total Number of Para-aortic Nodes Examined  0    PATHOLOGIC STAGE CLASSIFICATION (pTNM, AJCC 8th Edition)   Reporting of pT, pN, and (when applicable) pM categories is based on information available to the pathologist at the time the report is issued. As per the AJCC (Chapter 1, 8th Ed.) it is the managing physician's responsibility to establish the final pathologic stage based upon all pertinent information, including but potentially not limited to this pathology report. pT Category  pT1b    Regional Lymph Nodes Modifier  (sn)    pN Category  pN0    FIGO STAGE   FIGO Stage  IB    ADDITIONAL FINDINGS   Additional Findings  Atypical hyperplasia / endometrial intraepithelial neoplasia (EIN)    . Gross Description    A. The specimen is received in formalin, labeled with the patient's name and hospital number, and is designated " bilateral pelvic sentinel lymph nodes". It consists of 3 fragments of adipose tissue ranging from 1.6 cm to 2.8 cm with 2 underlying tan-pink potential lymph nodes measuring 0.7 x 0.5 x 0.3 cm and 1.5 x 0.8 x 0.4 cm. Entirely submitted as follows:     A1: 1 potential lymph node, intact  A2: 1 potential lymph node, bisected  A3: Remainder of adipose tissue  B. The specimen is received in formalin, labeled with the patient's name and hospital number, and is designated " uterus, cervix, bilateral ovaries and fallopian tubes". It consists of a 51.8 g total hysterectomy with attached left and right fallopian tubes and left ovary. No right ovary is grossly appreciated. The uterus measures 6.3 cm from fundus to cervix, 4.5 cm from cornu to cornu, and 2.7 cm from anterior to posterior. The serosa is tan-pink, smooth and glistening. The 2.6 x 2.1 cm tan focally hemorrhagic glistening ectocervix displays a 0.5 x 0.3 cm ovoid os.   The anterior soft tissue and serosa is previously inked blue and the posterior soft tissue and serosa is previously inked black. Bivalving reveals a 2.0 cm in length tan-pink corrugated endocervical canal with an ill-defined squamocolumnar junction and multiple 0.1 cm- 0.5 cm translucent cysts. The 3.0 x 2.6 cm endometrial cavity displays a 2.6 x 2.6 cm tan lush lesion that measures 1.0 cm to the right parametrial margin and  3.0 cm to the cervical cuff margin. It does not grossly involve the lower uterine segment. Sectioning reveals the lesion occupies 0.9 cm of a 1.2 cm overall thickness. The tan-pink myometrium displays 3 0.9 cm well-circumscribed tan-white whorled intramural nodules. The right fimbriated fallopian tube measures 4.2 x 0.5 cm and the left fimbriated fallopian tube measures 5.5 x 0.4 cm. The serosal surfaces and tan-pink and sectioning reveals tan stellate lumens. The left ovary measures 2.3 x 1.6 x 1.3 cm. The outer surface is tan and smooth and sectioning reveals tan-white cut surfaces. There is 2.5 x 2.0 x 0.5 cm of attached  right adnexal soft tissue. Digital images are acquired.  Representative section are submitted as follows:      B1: Right anterior parametrial margin, en face  B2: Left anterior parametrial margin, en face  B3: Right posterior parametrial margin, en face  B4: Left posterior parametrial margin, en face  B5: Anterior ecto-endocervix to lower uterine segment (Red ink designates upper aspect, not a margin)  B6: Posterior ecto-endocervix to lower uterine segment (Red ink designates upper aspect, not a margin)  B7-B9: Anterior endomyometrium with mass, full-thickness  B10-B12: Posterior endomyometrium with mass to include deepest extent in B10  B13: Intramural nodules   B14: Right fallopian tube and the fimbriated end, bisected  B15-B16: Right adnexal soft tissue, entirely submitted  B17: Left fallopian tube and the fimbriated end, bisected  B18: Left ovary     Case Report   Non-gynecologic Cytology                          Case: [id number]                                 Authorizing Provider: Trisha Weir MD      Collected:           07/31/2023 1038               Ordering Location:     [facility]        Received:            07/31/2023 [address] [place] [address] Operating Room                                                      Pathologist:           Thao Ceballos MD                                                            Specimens:   A) - Pelvic Washing, Pelvic washings                                                                 B) - Pelvic Washing, Cell Block                                                            Final Diagnosis   A.-B. Pelvic Washing (Thin-prep and cell block preparations):    Negative for malignancy. Rare benign mesothelial cells and histiocytes.   Few lymphocytes.     Satisfactory for evaluation.      Electronically signed by Thao Ceballos MD on 8/4/2023 at  8:50 AM

## 2023-08-29 NOTE — ASSESSMENT & PLAN NOTE
64-year-old with a history of biopsy-proven EIN, now s/p TLH/BSO on 7/31/23. Pathology was consistent with stage IB grade 1 endometrial adenocarcinoma, with 83% myometrial invasion. She is recovering well from surgery and has no concerns. Her vaginal cuff is intact. Her surgical trocar sites are well-healed. Her PS is 1. Patient's pathology results were reviewed in-depth with the patient, her , and her daughter. Discussed that tumor board conference recommendation was adjuvant treatment with vaginal brachytherapy. She is interested in moving forward with this. Referral placed. Patient will RTO in 3 months for first surveillance visit. She will call the office in the interim with any new concerns or questions. She was asked to maintain pelvic rest for an additional 2 weeks.

## 2023-08-29 NOTE — PATIENT INSTRUCTIONS
1. Return to the office in 3 months for continued surveillance.    2. Contact the office in the interim if you develop any any new or concerning symptoms, including vaginal bleeding, discharge, pain, etc.

## 2023-08-31 ENCOUNTER — TELEPHONE (OUTPATIENT)
Dept: ADMINISTRATIVE | Facility: OTHER | Age: 78
End: 2023-08-31

## 2023-08-31 NOTE — TELEPHONE ENCOUNTER
Upon review of the In Basket request we were able to locate, review, and update the patient chart as requested for Medicare AW. Any additional questions or concerns should be emailed to the Practice Liaisons via the appropriate education email address, please do not reply via In Basket.     Thank you  Iris Rosenberg

## 2023-08-31 NOTE — TELEPHONE ENCOUNTER
----- Message from Jeanette Luque MA sent at 8/31/2023  9:23 AM EDT -----  Regarding: AWV  08/31/23 9:23 AM    Shu, our patient Constantino Montiel has had Medicare AWV completed/performed. Please assist in updating the patient chart by pulling the document from the Media Tab. The date of service is 04/11/2023.      Thank you,  WILLIE Tucker PG [facility]

## 2023-09-08 ENCOUNTER — RADIATION ONCOLOGY CONSULT (OUTPATIENT)
Dept: RADIATION ONCOLOGY | Facility: CLINIC | Age: 78
End: 2023-09-08
Attending: INTERNAL MEDICINE
Payer: MEDICARE

## 2023-09-08 VITALS
HEIGHT: 61 IN | OXYGEN SATURATION: 100 % | WEIGHT: 117.28 LBS | SYSTOLIC BLOOD PRESSURE: 127 MMHG | BODY MASS INDEX: 22.14 KG/M2 | RESPIRATION RATE: 16 BRPM | HEART RATE: 85 BPM | TEMPERATURE: 99.2 F | DIASTOLIC BLOOD PRESSURE: 73 MMHG

## 2023-09-08 DIAGNOSIS — C54.1 ENDOMETRIAL CANCER (HCC): Primary | ICD-10-CM

## 2023-09-08 DIAGNOSIS — C54.1 ENDOMETRIAL CANCER (HCC): ICD-10-CM

## 2023-09-08 PROCEDURE — 99205 OFFICE O/P NEW HI 60 MIN: CPT | Performed by: INTERNAL MEDICINE

## 2023-09-08 PROCEDURE — 99211 OFF/OP EST MAY X REQ PHY/QHP: CPT | Performed by: INTERNAL MEDICINE

## 2023-09-08 NOTE — PROGRESS NOTES
Cruz Denney 1945 is a 78 y.o. female Presents for discussion of  vaginal brachytherapy for recently diagnosed FIGO Stage IB endometrial cancer. Referred by Dr. Eula Cool. Presented to gynecology for h/o postmenopausal bleeding and thickened endometrium on pelvic US. EMB completed with results showing atypical endometrial hyperplasia/endometria; intraepithelial neoplasia bordering on endometrial carcinoma.     6/15/23  Dr. Eula Cool  Recommend robotic hysterectomy /BSO with pelvic sentinel node biopsy    7/31/23  Tissue Exam  ENDOMETRIUM  8th Edition - Protocol posted: 6/22/2022  ENDOMETRIUM: HYSTERECTOMY - All Specimens  SPECIMEN   Procedure  Total hysterectomy and bilateral salpingo-oophorectomy    Hysterectomy Type  Laparoscopic, robotic-assisted    Specimen Integrity  Intact    TUMOR   Tumor Site  Endometrium    Tumor Size  Greatest Dimension (Centimeters): 2.6 cm   Histologic Type  Endometrioid carcinoma, NOS    Histologic Grade  FIGO grade 1    Two-Tier Grading System  Low grade (encompassing FIGO 1 and 2)    Myometrial Invasion  Present    Depth of Myometrial Invasion  10 mm   Myometrial Thickness  12 mm   Percentage of Myometrial Invasion  83 %   Adenomyosis  Not identified    Uterine Serosa Involvement  Not identified    Lower Uterine Segment Involvement  Not identified    Cervical Stromal Involvement  Not identified    Other Tissue / Organ Involvement  Not applicable    Peritoneal / Ascitic Fluid  Malignant cells not identified    Lymphovascular Invasion (LVI)  Not identified    MARGINS   Margin Status  All margins negative for invasive carcinoma    REGIONAL LYMPH NODES   Regional Lymph Node Status  All regional lymph nodes negative for tumor cells    Lymph Nodes Examined     Total Number of Pelvic Nodes Examined  2    Number of Pelvic [place] Nodes Examined  2    Total Number of Para-aortic Nodes Examined  0    PATHOLOGIC STAGE CLASSIFICATION (pTNM, AJCC 8th Edition)   Reporting of pT, pN, and (when applicable) pM categories is based on information available to the pathologist at the time the report is issued. As per the AJCC (Chapter 1, 8th Ed.) it is the managing physician's responsibility to establish the final pathologic stage based upon all pertinent information, including but potentially not limited to this pathology report. pT Category  pT1b    Regional Lymph Nodes Modifier  (sn)    pN Category  pN0    FIGO STAGE   FIGO Stage  IB    ADDITIONAL FINDINGS   Additional Findings  Atypical hyperplasia / endometrial intraepithelial neoplasia (EIN)    . 23  Multidisciplinary Gynecologic Oncology Tumor Case Review  The group recommended to consider treatment with vaginal brachytherapy. 29  [facility] Gyn/Onc  Pathology result and tumor board recommendations discussed in detail. Referred to radiation oncology. Will f/u in 3 months for surveillance visit. Upcomin23  Dr. Ajay Kim      Oncology History   Endometrial cancer [facility])   2023 -  Cancer Staged    Staging form: Corpus Uteri - Carcinoma, AJCC 8th Edition  - Clinical stage from 2023: FIGO Stage IB (cT1b, cN0(sn), cM0) - Signed by Aruna Holland on 2023  Stage prefix: Initial diagnosis  Method of lymph node assessment: [place] lymph node biopsy  Histologic grade (G): G1  Histologic grading system: 3 grade system       2023 Initial Diagnosis    Endometrial cancer ([address])         Review of Systems:  Review of Systems   Constitutional: Positive for appetite change (decreased appetite), fatigue and unexpected weight change (slow fluctuations in weight). Negative for chills and fever. HENT: Positive for hearing loss. Has seasonal allergies and runny noses/sinus pressure at times   Eyes: Negative. H/o bilateral cataract surgery, uses reading glasses   Respiratory: Positive for cough (occasional dry cough) and shortness of breath (with exertion).          Has CPAP for DAVID Cardiovascular: Negative. Negative for chest pain and leg swelling. Gastrointestinal: Positive for abdominal distention. Negative for abdominal pain, constipation, diarrhea, nausea and vomiting. Feels bloated   Endocrine: Positive for cold intolerance ("always cold"). Genitourinary: Positive for vaginal bleeding (slight after surgery but has resolved). Negative for difficulty urinating, dysuria, hematuria, pelvic pain and vaginal discharge. Musculoskeletal: Positive for arthralgias (Generalized, especially shoulders). Skin: Positive for wound (incisions healing after surgery). Reports healing well after surgery, still has some surgical glue. Allergic/Immunologic: Positive for environmental allergies. Neurological: Positive for dizziness and weakness (generalized with fatigue). Negative for light-headedness, numbness and headaches. H/o vertigo   Hematological: Does not bruise/bleed easily. Very sensitive stomach, has to be careful with medication because of bleeding in the stomach "watermelon stomach"   Psychiatric/Behavioral: Negative for sleep disturbance. The patient is nervous/anxious. Clinical Trial: no    OB/GYN History:  The patient underwent menarche at 13 years  Menopause Status Post  No LMP recorded. Patient is postmenopausal.  Menopause at 48 years. Menopause Reason NATURAL  Hormone replacement therapy: no.  Years used n/a   3   Para 2   Age at first delivery being 19 years.    Nursing: no.   Birth control pills: no.  Years used n/a    Pregnancy test needed:  no    ONCOTYPE/MAMMOPRINT results n/a    PFT n/a    Prior Radiation no    Teaching NCI packet reviewed and given    MST completed    Implantable Devices (Port, Pacemaker, pain stimulator) no    Hip Replacement no      [unfilled]  Health Maintenance   Topic Date Due   • Pneumococcal Vaccine: 65+ Years (1 - PCV) Never done   • Osteoporosis Screening  Never done   • Urinary Incontinence Screening  Never done   • COVID-19 Vaccine (3 - Moderna risk series) 12/29/2022   • PT PLAN OF CARE  06/03/2023   • Influenza Vaccine (1) 09/01/2023   • Medicare Annual Wellness Visit (AWV)  04/11/2024   • Fall Risk  04/24/2024   • BMI: Adult  08/29/2024   • Depression Screening  09/08/2024   • HIB Vaccine  Aged Out   • IPV Vaccine  Aged Out   • Hepatitis A Vaccine  Aged Out   • Meningococcal ACWY Vaccine  Aged Out   • HPV Vaccine  Aged Out     Past Medical History:   Diagnosis Date   • Anxiety    • Arthritis    • Benign hypertension    • Chronic pain disorder     back   • COPD (chronic obstructive pulmonary disease) ([address])    • Coronary artery disease     medical management   • CPAP (continuous positive airway pressure) dependence    • Diabetes ([address])    • Endometrial adenocarcinoma (HCC)    • Epilepsy ([address])    • GERD (gastroesophageal reflux disease)    • History of echocardiogram 02/07/2014    EF 55%, mild MR.   • History of transfusion    • Hypertension    • Mixed hyperlipidemia    • DAVID on CPAP    • Shortness of breath     triggered by anxiety   • Watermelon stomach    • Wears dentures      Past Surgical History:   Procedure Laterality Date   • BREAST BIOPSY Right 02/24/2015   • BREAST BIOPSY Left     ? year   • CARDIAC CATHETERIZATION  02/07/2014    EF 65%, mid LAD 60% stenosis and D2 70% stenosis. Medical management.    • CATARACT EXTRACTION Bilateral    • COLONOSCOPY     • CYSTOSCOPY N/A 07/31/2023    Procedure: Eulis Gitelman;  Surgeon: Lizzy Montana MD;  Location: [facility];  Service: Gynecology Oncology   • EGD     • JOINT REPLACEMENT Right     RIGHT ELBOW   • OOPHORECTOMY      not sure which one was removed   • [place] LAPS TOTAL HYSTERECT 250 GM/< W/RMVL TUBE/OVARY N/A 07/31/2023    Procedure: ROBOTHYSTERECTOMY, BILATERAL SALPINGO-OOPHORECTOMY, PELVIC SENTINEL NODE BIOPSIES;  Surgeon: Lizzy Montana MD;  Location: [facility];  Service: Gynecology Oncology   • ROTATOR CUFF REPAIR Right    • US GUIDED THYROID BIOPSY  12/04/2020     Family History   Problem Relation Age of Onset   • Heart disease Mother    • Coronary artery disease Sister         history of CABG   • No Known Problems Father    • No Known Problems Daughter    • No Known Problems Sister    • No Known Problems Sister    • No Known Problems Sister    • No Known Problems Daughter      Social History     Tobacco Use   • Smoking status: Never   • Smokeless tobacco: Never   Vaping Use   • Vaping Use: Never used   Substance Use Topics   • Alcohol use: Not Currently   • Drug use: Never        Current Outpatient Medications:   •  Ascorbic Acid (vitamin C) 100 MG tablet, Take 500 mg by mouth 2 (two) times a day With rosehips, Disp: , Rfl:   •  atorvastatin (LIPITOR) 40 mg tablet, Take 1 tablet (40 mg total) by mouth daily, Disp: 90 tablet, Rfl: 5  •  Cholecalciferol (Vitamin D3) 1.25 MG (51593 UT) CAPS, Take 1,000 Units by mouth daily, Disp: , Rfl:   •  co-enzyme Q-10 100 mg capsule, Take by mouth daily, Disp: , Rfl:   •  Iron Heme Polypeptide (Proferrin ES) 12 MG TABS, one tablet b.i.d., Disp: , Rfl:   •  meclizine (ANTIVERT) 25 mg tablet, Take 1 tablet (25 mg total) by mouth 3 (three) times a day as needed for dizziness, Disp: 30 tablet, Rfl: 0  •  metoprolol tartrate (LOPRESSOR) 25 mg tablet, Take 1/2 tablet by oral route at bedtime. , Disp: , Rfl:   •  mometasone (NASONEX) 50 mcg/act nasal spray, 2 sprays into each nostril if needed, Disp: , Rfl:   •  omeprazole (PriLOSEC) 20 mg delayed release capsule, Take 20 mg by mouth daily, Disp: , Rfl:   •  traZODone (DESYREL) 50 mg tablet, Take 50 mg by mouth daily at bedtime 1/2 tablet, Disp: , Rfl:   •  vitamin B-12 (VITAMIN B-12) 1,000 mcg tablet, Take 1,000 mcg by mouth daily, Disp: , Rfl:   •  ALPRAZolam (XANAX) 0.25 mg tablet, Take by mouth daily at bedtime as needed for anxiety (Patient not taking: Reported on 9/8/2023), Disp: , Rfl:   Allergies   Allergen Reactions   • Advil [Ibuprofen]    • Aspirin Other (See Comments)     gi bleed   • Caspofungin Other (See Comments)   • Hydrocodone    • Pravastatin GI Intolerance   • Tramadol GI Intolerance      Vitals:    09/08/23 1220   BP: 127/73   BP Location: Left arm   Patient Position: Sitting   Cuff Size: Standard   Pulse: 85   Resp: 16   Temp: 99.2 °F (37.3 °C)   TempSrc: Temporal   SpO2: 100%   Weight: 53.2 kg (117 lb 4.6 oz)   Height: 5' 1" (1.549 m)     Pain Score: 0-No pain

## 2023-09-08 NOTE — LETTER
2023     Dixie Kirby, [address] [facility]    Patient: Cruz Denney   YOB: 1945   Date of Visit: 2023       Dear Dr. Eula Cool,    Thank you for referring Nancy Eid to me for evaluation. Below are my notes for this consultation. If you have questions, please do not hesitate to call me. I look forward to following your patient along with you. Sincerely,        Rachael Mukherjee MD        CC: No Recipients    Rachael Mukherjee MD  2023  3:42 PM  Sign when Signing Visit  Consultation - Radiation Oncology      Patient Name: Cruz Denney MMS:[id number] : 1945  Encounter: [id number]  Referring Provider: DOTTIE Machado    ASSESSMENT  Cancer Staging   Endometrial cancer [facility])  Staging form: Corpus Uteri - Carcinoma, AJCC 8th Edition  - Clinical stage from 2023: FIGO Stage IB (cT1b, cN0(sn), cM0) - Signed by Leda Mercado on 2023  Stage prefix: Initial diagnosis  Method of lymph node assessment: [facility] lymph node biopsy  Histologic grade (G): G1  Histologic grading system: 3 grade system    PLAN  Cruz Denney is a 78 y.o. female with recently diagnosed FIGO 1B (pT1bN0) endometrial adenocarcinoma, status post KIARA/BSO on 2023 notable for 2.6 cm of FIGO grade 1 endometrial adenocarcinoma, with 83% myometrial invasion, no lymphovascular invasion, lower uterine segment involvement, or cervical stromal involvement, with negative margins and 0 out of 2 sentinel nodes involved. Her case was reviewed at multidisciplinary tumor board and she was offered radiation consultation to discuss vaginal brachytherapy. We discussed treatment options which include brachytherapy to 21 Gy in 3 fractions to the vaginal cuff (preferred) vs. continued surveillance. We discussed the indications, logistics and timing of vaginal cylinder brachytherapy. Side effects were discussed.  These include but are not limited to fatigue, vaginal discomfort, irritation, or spotting, urinary frequency/urgency, dysuria, and changes in bowel habits, vaginal stenosis, low risk of perforation, fistulas/adhesions, and secondary malignancy. The patient agreed to proceed with radiation therapy, and informed consent was signed. CT simulation to be scheduled at [facility]. Thank you for the opportunity to participate in the care of this patient. Shahida Armstrong MD  Department of [address] [place] [address]    No orders of the defined types were placed in this encounter. 1. Endometrial cancer [facility])  Ambulatory referral to Radiation Oncology          Total Time Spent  56 minutes spent reviewing EMR in preparation for visit, with the patient, coordination with other providers, review of pathology, and documentation. CHIEF COMPLAINT  Chief Complaint   Patient presents with   • Consult     Radiation Oncology       History of Present Illness  Tatyana Fields 1945 is a 78 y.o. female Presents for discussion of  vaginal brachytherapy for recently diagnosed FIGO Stage IB endometrial cancer. Referred by Dr. Matthias Alexander. Presented to gynecology for h/o postmenopausal bleeding and thickened endometrium on pelvic US. EMB completed with results showing atypical endometrial hyperplasia/endometria; intraepithelial neoplasia bordering on endometrial carcinoma.      6/15/23  Dr. Matthias Alexander  Recommend robotic hysterectomy /BSO with pelvic sentinel node biopsy     7/31/23  Tissue Exam  ENDOMETRIUM  8th Edition - Protocol posted: 6/22/2022  ENDOMETRIUM: HYSTERECTOMY - All Specimens       SPECIMEN   Procedure   Total hysterectomy and bilateral salpingo-oophorectomy    Hysterectomy Type   Laparoscopic, robotic-assisted    Specimen Integrity   Intact    TUMOR   Tumor Site   Endometrium    Tumor Size   Greatest Dimension (Centimeters): 2.6 cm   Histologic Type   Endometrioid carcinoma, NOS    Histologic Grade   FIGO grade 1    Two-Tier Grading System   Low grade (encompassing FIGO 1 and 2)    Myometrial Invasion   Present    Depth of Myometrial Invasion   10 mm   Myometrial Thickness   12 mm   Percentage of Myometrial Invasion   83 %   Adenomyosis   Not identified    Uterine Serosa Involvement   Not identified    Lower Uterine Segment Involvement   Not identified    Cervical Stromal Involvement   Not identified    Other Tissue / Organ Involvement   Not applicable    Peritoneal / Ascitic Fluid   Malignant cells not identified    Lymphovascular Invasion (LVI)   Not identified    MARGINS   Margin Status   All margins negative for invasive carcinoma    REGIONAL LYMPH NODES   Regional Lymph Node Status   All regional lymph nodes negative for tumor cells    Lymph Nodes Examined       Total Number of Pelvic Nodes Examined   2    Number of Pelvic [place] Nodes Examined   2    Total Number of Para-aortic Nodes Examined   0    PATHOLOGIC STAGE CLASSIFICATION (pTNM, AJCC 8th Edition)   Reporting of pT, pN, and (when applicable) pM categories is based on information available to the pathologist at the time the report is issued. As per the AJCC (Chapter 1, 8th Ed.) it is the managing physician's responsibility to establish the final pathologic stage based upon all pertinent information, including but potentially not limited to this pathology report. pT Category   pT1b    Regional Lymph Nodes Modifier   (sn)    pN Category   pN0    FIGO STAGE   FIGO Stage   IB    ADDITIONAL FINDINGS   Additional Findings   Atypical hyperplasia / endometrial intraepithelial neoplasia (EIN)    . 23  Multidisciplinary Gynecologic Oncology Tumor Case Review  The group recommended to consider treatment with vaginal brachytherapy. 29  [facility] Gyn/Onc  Pathology result and tumor board recommendations discussed in detail. Referred to radiation oncology. Will f/u in 3 months for surveillance visit.      Upcomin23   Nilsa    Today, the patient reports healing well after her surgery. She notes some abdominal distention but otherwise feels well. Oncology History   Endometrial cancer ([address])   7/31/2023 -  Cancer Staged    Staging form: Corpus Uteri - Carcinoma, AJCC 8th Edition  - Clinical stage from 7/31/2023: FIGO Stage IB (cT1b, cN0(sn), cM0) - Signed by Jair Pierson on 8/29/2023  Stage prefix: Initial diagnosis  Method of lymph node assessment: [place] lymph node biopsy  Histologic grade (G): G1  Histologic grading system: 3 grade system       8/29/2023 Initial Diagnosis    Endometrial cancer ([address])       Historical Information   Past Medical History:   Diagnosis Date   • Anxiety    • Arthritis    • Benign hypertension    • Chronic pain disorder     back   • COPD (chronic obstructive pulmonary disease) (HCC)    • Coronary artery disease     medical management   • CPAP (continuous positive airway pressure) dependence    • Diabetes ([address])    • Endometrial adenocarcinoma (HCC)    • Epilepsy ([address])    • GERD (gastroesophageal reflux disease)    • History of echocardiogram 02/07/2014    EF 55%, mild MR.   • History of transfusion    • Hypertension    • Mixed hyperlipidemia    • DAVID on CPAP    • Shortness of breath     triggered by anxiety   • Watermelon stomach    • Wears dentures      Past Surgical History:   Procedure Laterality Date   • BREAST BIOPSY Right 02/24/2015   • BREAST BIOPSY Left     ? year   • CARDIAC CATHETERIZATION  02/07/2014    EF 65%, mid LAD 60% stenosis and D2 70% stenosis. Medical management.    • CATARACT EXTRACTION Bilateral    • COLONOSCOPY     • CYSTOSCOPY N/A 07/31/2023    Procedure: Isa Medrano;  Surgeon: Bienvenido Lofton MD;  Location: [facility];  Service: Gynecology Oncology   • EGD     • JOINT REPLACEMENT Right     RIGHT ELBOW   • OOPHORECTOMY      not sure which one was removed   • [place] LAPS TOTAL HYSTERECT 250 GM/< W/RMVL TUBE/OVARY N/A 07/31/2023    Procedure: Giovanni Mantilla BILATERAL SALPINGO-OOPHORECTOMY, PELVIC SENTINEL NODE BIOPSIES;  Surgeon: Tal Ansari MD;  Location[facility];  Service: Gynecology Oncology   • ROTATOR CUFF REPAIR Right    • US GUIDED THYROID BIOPSY  12/04/2020     Family History   Problem Relation Age of Onset   • Heart disease Mother    • Coronary artery disease Sister         history of CABG   • No Known Problems Father    • No Known Problems Daughter    • No Known Problems Sister    • No Known Problems Sister    • No Known Problems Sister    • No Known Problems Daughter      Social History   Social History     Substance and Sexual Activity   Alcohol Use Not Currently     Social History     Substance and Sexual Activity   Drug Use Never     Social History     Tobacco Use   Smoking Status Never   Smokeless Tobacco Never     Meds/Allergies     Current Outpatient Medications:   •  Ascorbic Acid (vitamin C) 100 MG tablet, Take 500 mg by mouth 2 (two) times a day With rosehips, Disp: , Rfl:   •  atorvastatin (LIPITOR) 40 mg tablet, Take 1 tablet (40 mg total) by mouth daily, Disp: 90 tablet, Rfl: 5  •  Cholecalciferol (Vitamin D3) 1.25 MG (70188 UT) CAPS, Take 1,000 Units by mouth daily, Disp: , Rfl:   •  co-enzyme Q-10 100 mg capsule, Take by mouth daily, Disp: , Rfl:   •  Iron Heme Polypeptide (Proferrin ES) 12 MG TABS, one tablet b.i.d., Disp: , Rfl:   •  meclizine (ANTIVERT) 25 mg tablet, Take 1 tablet (25 mg total) by mouth 3 (three) times a day as needed for dizziness, Disp: 30 tablet, Rfl: 0  •  metoprolol tartrate (LOPRESSOR) 25 mg tablet, Take 1/2 tablet by oral route at bedtime. , Disp: , Rfl:   •  mometasone (NASONEX) 50 mcg/act nasal spray, 2 sprays into each nostril if needed, Disp: , Rfl:   •  omeprazole (PriLOSEC) 20 mg delayed release capsule, Take 20 mg by mouth daily, Disp: , Rfl:   •  traZODone (DESYREL) 50 mg tablet, Take 50 mg by mouth daily at bedtime 1/2 tablet, Disp: , Rfl:   •  vitamin B-12 (VITAMIN B-12) 1,000 mcg tablet, Take 1,000 mcg by mouth daily, Disp: , Rfl:   •  ALPRAZolam (XANAX) 0.25 mg tablet, Take by mouth daily at bedtime as needed for anxiety (Patient not taking: Reported on 2023), Disp: , Rfl:   Allergies   Allergen Reactions   • Advil [Ibuprofen]    • Aspirin Other (See Comments)     gi bleed   • Caspofungin Other (See Comments)   • Hydrocodone    • Pravastatin GI Intolerance   • Tramadol GI Intolerance      Pathology:  See above. Review of Systems  Refer to nursing note    OBJECTIVE:   /73 (BP Location: Left arm, Patient Position: Sitting, Cuff Size: Standard)   Pulse 85   Temp 99.2 °F (37.3 °C) (Temporal)   Resp 16   Ht 5' 1" (1.549 m)   Wt 53.2 kg (117 lb 4.6 oz)   SpO2 100%   BMI 22.16 kg/m²   Pain Assessment:  0  Performance Status: ECO - Asymptomatic    Physical Exam  General Appearance:  Alert, cooperative, no distress, appears stated age  Cardiovascular:  Extremities warm and well perfused  Lungs: Respirations unlabored, no cyanosis, able to speak in complete sentences without dyspnea. Gyn: Will be performed at time of CT simulation. Skin: No generalized rash or dermatitis  Neurologic: ANOx3, speech and cognition intact. Portions of the record may have been created with voice recognition software. Occasional wrong word or "sound a like" substitutions may have occurred due to the inherent limitations of voice recognition software. Read the chart carefully and recognize, using context, where substitutions have occurred.

## 2023-09-08 NOTE — PROGRESS NOTES
Consultation - Radiation Oncology      Patient Name: Cristela Matthew VCA:[id number] : 1945  Encounter: [id number]  Referring Provider: DOTTIE Jacome    ASSESSMENT  Cancer Staging   Endometrial cancer [facility])  Staging form: Corpus Uteri - Carcinoma, AJCC 8th Edition  - Clinical stage from 2023: FIGO Stage IB (cT1b, cN0(sn), cM0) - Signed by Jair Pierson on 2023  Stage prefix: Initial diagnosis  Method of lymph node assessment: [place] lymph node biopsy  Histologic grade (G): G1  Histologic grading system: 3 grade system    PLAN  Cristela Matthew is a 78 y.o. female with recently diagnosed FIGO 1B (pT1bN0) endometrial adenocarcinoma, status post KIARA/BSO on 2023 notable for 2.6 cm of FIGO grade 1 endometrial adenocarcinoma, with 83% myometrial invasion, no lymphovascular invasion, lower uterine segment involvement, or cervical stromal involvement, with negative margins and 0 out of 2 sentinel nodes involved. Her case was reviewed at multidisciplinary tumor board and she was offered radiation consultation to discuss vaginal brachytherapy. We discussed treatment options which include brachytherapy to 21 Gy in 3 fractions to the vaginal cuff (preferred) vs. continued surveillance. We discussed the indications, logistics and timing of vaginal cylinder brachytherapy. Side effects were discussed. These include but are not limited to fatigue, vaginal discomfort, irritation, or spotting, urinary frequency/urgency, dysuria, and changes in bowel habits, vaginal stenosis, low risk of perforation, fistulas/adhesions, and secondary malignancy. The patient agreed to proceed with radiation therapy, and informed consent was signed. CT simulation to be scheduled at [facility]. Thank you for the opportunity to participate in the care of this patient.     Miesha Ansari MD  Department of [address] [place] [address]    No orders of the defined types were placed in this encounter. 1. Endometrial cancer [facility])  Ambulatory referral to Radiation Oncology          Total Time Spent  56 minutes spent reviewing EMR in preparation for visit, with the patient, coordination with other providers, review of pathology, and documentation. CHIEF COMPLAINT  Chief Complaint   Patient presents with   • Consult     Radiation Oncology       History of Present Illness  Cristela Matthew 1945 is a 78 y.o. female Presents for discussion of  vaginal brachytherapy for recently diagnosed FIGO Stage IB endometrial cancer. Referred by Dr. Lamberto Berger.       Presented to gynecology for h/o postmenopausal bleeding and thickened endometrium on pelvic US.   EMB completed with results showing atypical endometrial hyperplasia/endometria; intraepithelial neoplasia bordering on endometrial carcinoma.     6/15/23  Dr. Nhi Cheema robotic hysterectomy /BSO with pelvic sentinel node biopsy     7/31/23  Tissue Exam  ENDOMETRIUM  8th Edition - Protocol posted: 6/22/2022  ENDOMETRIUM: HYSTERECTOMY - All Specimens       SPECIMEN   Procedure   Total hysterectomy and bilateral salpingo-oophorectomy    Hysterectomy Type   Laparoscopic, robotic-assisted    Specimen Integrity   Intact    TUMOR   Tumor Site   Endometrium    Tumor Size   Greatest Dimension (Centimeters): 2.6 cm   Histologic Type   Endometrioid carcinoma, NOS    Histologic Grade   FIGO grade 1    Two-Tier Grading System   Low grade (encompassing FIGO 1 and 2)    Myometrial Invasion   Present    Depth of Myometrial Invasion   10 mm   Myometrial Thickness   12 mm   Percentage of Myometrial Invasion   83 %   Adenomyosis   Not identified    Uterine Serosa Involvement   Not identified    Lower Uterine Segment Involvement   Not identified    Cervical Stromal Involvement   Not identified    Other Tissue / Organ Involvement   Not applicable    Peritoneal / Ascitic Fluid   Malignant cells not identified    Lymphovascular Invasion (LVI)   Not identified    MARGINS   Margin Status   All margins negative for invasive carcinoma    REGIONAL LYMPH NODES   Regional Lymph Node Status   All regional lymph nodes negative for tumor cells    Lymph Nodes Examined       Total Number of Pelvic Nodes Examined   2    Number of Pelvic [place] Nodes Examined   2    Total Number of Para-aortic Nodes Examined   0    PATHOLOGIC STAGE CLASSIFICATION (pTNM, AJCC 8th Edition)   Reporting of pT, pN, and (when applicable) pM categories is based on information available to the pathologist at the time the report is issued. As per the AJCC (Chapter 1, 8th Ed.) it is the managing physician's responsibility to establish the final pathologic stage based upon all pertinent information, including but potentially not limited to this pathology report. pT Category   pT1b    Regional Lymph Nodes Modifier   (sn)    pN Category   pN0    FIGO STAGE   FIGO Stage   IB    ADDITIONAL FINDINGS   Additional Findings   Atypical hyperplasia / endometrial intraepithelial neoplasia (EIN)    .       23  Multidisciplinary Gynecologic Oncology Tumor Case Review  The group recommended to consider treatment with vaginal brachytherapy.     29  Jasbir, Gyn/Onc  Pathology result and tumor board recommendations discussed in detail. Referred to radiation oncology. Will f/u in 3 months for surveillance visit.     Upcomin23  Dr. Liss Ibrahim    Today, the patient reports healing well after her surgery. She notes some abdominal distention but otherwise feels well.     Oncology History   Endometrial cancer ([address])   2023 -  Cancer Staged    Staging form: Corpus Uteri - Carcinoma, AJCC 8th Edition  - Clinical stage from 2023: FIGO Stage IB (cT1b, cN0(sn), cM0) - Signed by Javid Cristina on 2023  Stage prefix: Initial diagnosis  Method of lymph node assessment: [place] lymph node biopsy  Histologic grade (G): G1  Histologic grading system: 3 grade system       2023 Initial Diagnosis    Endometrial cancer ([address])       Historical Information   Past Medical History:   Diagnosis Date   • Anxiety    • Arthritis    • Benign hypertension    • Chronic pain disorder     back   • COPD (chronic obstructive pulmonary disease) (HCC)    • Coronary artery disease     medical management   • CPAP (continuous positive airway pressure) dependence    • Diabetes (HCC)    • Endometrial adenocarcinoma (HCC)    • Epilepsy ([address])    • GERD (gastroesophageal reflux disease)    • History of echocardiogram 02/07/2014    EF 55%, mild MR.   • History of transfusion    • Hypertension    • Mixed hyperlipidemia    • DAVID on CPAP    • Shortness of breath     triggered by anxiety   • Watermelon stomach    • Wears dentures      Past Surgical History:   Procedure Laterality Date   • BREAST BIOPSY Right 02/24/2015   • BREAST BIOPSY Left     ? year   • CARDIAC CATHETERIZATION  02/07/2014    EF 65%, mid LAD 60% stenosis and D2 70% stenosis. Medical management.    • CATARACT EXTRACTION Bilateral    • COLONOSCOPY     • CYSTOSCOPY N/A 07/31/2023    Procedure: Donal Zamorano;  Surgeon: Parul Kaur MD;  Location: [facility];  Service: Gynecology Oncology   • EGD     • JOINT REPLACEMENT Right     RIGHT ELBOW   • OOPHORECTOMY      not sure which one was removed   • AR LAPS TOTAL HYSTERECT 250 GM/< W/RMVL TUBE/OVARY N/A 07/31/2023    Procedure: ROBOTHYSTERECTOMY, BILATERAL SALPINGO-OOPHORECTOMY, PELVIC SENTINEL NODE BIOPSIES;  Surgeon: Parul Kaur MD;  Location: [facility];  Service: Gynecology Oncology   • ROTATOR CUFF REPAIR Right    • US GUIDED THYROID BIOPSY  12/04/2020     Family History   Problem Relation Age of Onset   • Heart disease Mother    • Coronary artery disease Sister         history of CABG   • No Known Problems Father    • No Known Problems Daughter    • No Known Problems Sister    • No Known Problems Sister    • No Known Problems Sister    • No Known Problems Daughter      Social History   Social History Substance and Sexual Activity   Alcohol Use Not Currently     Social History     Substance and Sexual Activity   Drug Use Never     Social History     Tobacco Use   Smoking Status Never   Smokeless Tobacco Never     Meds/Allergies     Current Outpatient Medications:   •  Ascorbic Acid (vitamin C) 100 MG tablet, Take 500 mg by mouth 2 (two) times a day With rosehips, Disp: , Rfl:   •  atorvastatin (LIPITOR) 40 mg tablet, Take 1 tablet (40 mg total) by mouth daily, Disp: 90 tablet, Rfl: 5  •  Cholecalciferol (Vitamin D3) 1.25 MG (33914 UT) CAPS, Take 1,000 Units by mouth daily, Disp: , Rfl:   •  co-enzyme Q-10 100 mg capsule, Take by mouth daily, Disp: , Rfl:   •  Iron Heme Polypeptide (Proferrin ES) 12 MG TABS, one tablet b.i.d., Disp: , Rfl:   •  meclizine (ANTIVERT) 25 mg tablet, Take 1 tablet (25 mg total) by mouth 3 (three) times a day as needed for dizziness, Disp: 30 tablet, Rfl: 0  •  metoprolol tartrate (LOPRESSOR) 25 mg tablet, Take 1/2 tablet by oral route at bedtime. , Disp: , Rfl:   •  mometasone (NASONEX) 50 mcg/act nasal spray, 2 sprays into each nostril if needed, Disp: , Rfl:   •  omeprazole (PriLOSEC) 20 mg delayed release capsule, Take 20 mg by mouth daily, Disp: , Rfl:   •  traZODone (DESYREL) 50 mg tablet, Take 50 mg by mouth daily at bedtime 1/2 tablet, Disp: , Rfl:   •  vitamin B-12 (VITAMIN B-12) 1,000 mcg tablet, Take 1,000 mcg by mouth daily, Disp: , Rfl:   •  ALPRAZolam (XANAX) 0.25 mg tablet, Take by mouth daily at bedtime as needed for anxiety (Patient not taking: Reported on 9/8/2023), Disp: , Rfl:   Allergies   Allergen Reactions   • Advil [Ibuprofen]    • Aspirin Other (See Comments)     gi bleed   • Caspofungin Other (See Comments)   • Hydrocodone    • Pravastatin GI Intolerance   • Tramadol GI Intolerance       Pathology:  See above.     Review of Systems  Refer to nursing note    OBJECTIVE:   /73 (BP Location: Left arm, Patient Position: Sitting, Cuff Size: Standard)   Pulse 85   Temp 99.2 °F (37.3 °C) (Temporal)   Resp 16   Ht 5' 1" (1.549 m)   Wt 53.2 kg (117 lb 4.6 oz)   SpO2 100%   BMI 22.16 kg/m²   Pain Assessment:  0  Performance Status: ECO - Asymptomatic    Physical Exam  General Appearance:  Alert, cooperative, no distress, appears stated age  Cardiovascular:  Extremities warm and well perfused  Lungs: Respirations unlabored, no cyanosis, able to speak in complete sentences without dyspnea. Gyn: Will be performed at time of CT simulation. Skin: No generalized rash or dermatitis  Neurologic: ANOx3, speech and cognition intact. Portions of the record may have been created with voice recognition software. Occasional wrong word or "sound a like" substitutions may have occurred due to the inherent limitations of voice recognition software. Read the chart carefully and recognize, using context, where substitutions have occurred.

## 2023-09-11 PROCEDURE — 77263 THER RADIOLOGY TX PLNG CPLX: CPT | Performed by: INTERNAL MEDICINE

## 2023-09-18 ENCOUNTER — APPOINTMENT (OUTPATIENT)
Dept: RADIATION ONCOLOGY | Facility: HOSPITAL | Age: 78
End: 2023-09-18
Attending: INTERNAL MEDICINE
Payer: MEDICARE

## 2023-09-18 PROCEDURE — 77290 THER RAD SIMULAJ FIELD CPLX: CPT | Performed by: INTERNAL MEDICINE

## 2023-09-18 PROCEDURE — 77470 SPECIAL RADIATION TREATMENT: CPT | Performed by: INTERNAL MEDICINE

## 2023-09-18 PROCEDURE — 77332 RADIATION TREATMENT AID(S): CPT | Performed by: INTERNAL MEDICINE

## 2023-09-22 ENCOUNTER — OFFICE VISIT (OUTPATIENT)
Dept: FAMILY MEDICINE CLINIC | Facility: CLINIC | Age: 78
End: 2023-09-22
Payer: MEDICARE

## 2023-09-22 VITALS
BODY MASS INDEX: 22.09 KG/M2 | TEMPERATURE: 97.6 F | HEIGHT: 61 IN | HEART RATE: 77 BPM | WEIGHT: 117 LBS | DIASTOLIC BLOOD PRESSURE: 78 MMHG | OXYGEN SATURATION: 99 % | SYSTOLIC BLOOD PRESSURE: 138 MMHG

## 2023-09-22 DIAGNOSIS — J06.9 VIRAL UPPER RESPIRATORY TRACT INFECTION: Primary | ICD-10-CM

## 2023-09-22 LAB
SARS-COV-2 AG UPPER RESP QL IA: NEGATIVE
VALID CONTROL: NORMAL

## 2023-09-22 PROCEDURE — 87811 SARS-COV-2 COVID19 W/OPTIC: CPT | Performed by: NURSE PRACTITIONER

## 2023-09-22 PROCEDURE — 99213 OFFICE O/P EST LOW 20 MIN: CPT | Performed by: NURSE PRACTITIONER

## 2023-09-22 NOTE — PROGRESS NOTES
Name: Cathy Sample      : 1945      MRN: [id number]  Encounter Provider: DOTTIE Arriaza  Encounter Date: 2023   Encounter department: [address] PRIMARY CARE    Assessment & Plan     1. Viral upper respiratory tract infection  Comments:  sx tx, f/u prn  Orders:  -     POCT Rapid Covid Ag           Subjective      Here for day 5 of mild URI symptoms. With . Also a runny drippy nose and a little dry cough. No chest pain no new shortness of breath no fever day 1 and 2 she did have chills, is about to undergo radiation for endometrial cancer. She has a routine appointment with Dr. Maciej Alicia the day after that begins and she is wondering if she can move the appointment in case she does not feel up to coming. No nausea vomiting or diarrhea. Mild chronic fatigue. Review of Systems    Current Outpatient Medications on File Prior to Visit   Medication Sig   • Ascorbic Acid (vitamin C) 100 MG tablet Take 500 mg by mouth 2 (two) times a day With rosehips   • atorvastatin (LIPITOR) 40 mg tablet Take 1 tablet (40 mg total) by mouth daily   • Cholecalciferol (Vitamin D3) 1.25 MG (81858 UT) CAPS Take 1,000 Units by mouth daily   • co-enzyme Q-10 100 mg capsule Take by mouth daily   • Iron Heme Polypeptide (Proferrin ES) 12 MG TABS one tablet b.i.d.   • metoprolol tartrate (LOPRESSOR) 25 mg tablet Take 1/2 tablet by oral route at bedtime.    • omeprazole (PriLOSEC) 20 mg delayed release capsule Take 20 mg by mouth daily   • traZODone (DESYREL) 50 mg tablet Take 50 mg by mouth daily at bedtime 1/2 tablet   • vitamin B-12 (VITAMIN B-12) 1,000 mcg tablet Take 1,000 mcg by mouth daily   • ALPRAZolam (XANAX) 0.25 mg tablet Take by mouth daily at bedtime as needed for anxiety (Patient not taking: Reported on 2023)   • meclizine (ANTIVERT) 25 mg tablet Take 1 tablet (25 mg total) by mouth 3 (three) times a day as needed for dizziness (Patient not taking: Reported on 2023)   • mometasone (NASONEX) 50 mcg/act nasal spray 2 sprays into each nostril if needed (Patient not taking: Reported on 9/22/2023)       Objective     /78 (BP Location: Left arm, Patient Position: Sitting, Cuff Size: Standard)   Pulse 77   Temp 97.6 °F (36.4 °C)   Ht 5' 0.5" (1.537 m)   Wt 53.1 kg (117 lb)   SpO2 99%   BMI 22.47 kg/m²     Physical Exam  Vitals and nursing note reviewed. Constitutional:       General: She is not in acute distress. Appearance: Normal appearance. She is not toxic-appearing. HENT:      Head: Normocephalic. Nose: Congestion (mild) present. No rhinorrhea. Eyes:      Conjunctiva/sclera: Conjunctivae normal.   Cardiovascular:      Rate and Rhythm: Normal rate and regular rhythm. Heart sounds: Normal heart sounds. Pulmonary:      Effort: Pulmonary effort is normal.      Breath sounds: Normal breath sounds. Skin:     General: Skin is warm and dry. Neurological:      General: No focal deficit present. Mental Status: She is alert and oriented to person, place, and time.    Psychiatric:         Mood and Affect: Mood normal.       DOTTIE Bosch

## 2023-09-28 PROBLEM — M87.180 DRUG-INDUCED OSTEONECROSIS OF JAW (HCC): Status: ACTIVE | Noted: 2018-08-08

## 2023-09-28 PROBLEM — G43.909 MIGRAINE: Status: ACTIVE | Noted: 2017-12-13

## 2023-09-28 PROBLEM — E04.1 THYROID NODULE: Status: ACTIVE | Noted: 2021-01-21

## 2023-09-29 ENCOUNTER — OFFICE VISIT (OUTPATIENT)
Dept: FAMILY MEDICINE CLINIC | Facility: CLINIC | Age: 78
End: 2023-09-29
Payer: MEDICARE

## 2023-09-29 VITALS
BODY MASS INDEX: 22.51 KG/M2 | DIASTOLIC BLOOD PRESSURE: 66 MMHG | OXYGEN SATURATION: 99 % | TEMPERATURE: 98.8 F | HEART RATE: 86 BPM | SYSTOLIC BLOOD PRESSURE: 138 MMHG | HEIGHT: 61 IN | WEIGHT: 119.2 LBS

## 2023-09-29 DIAGNOSIS — E55.9 VITAMIN D DEFICIENCY: ICD-10-CM

## 2023-09-29 DIAGNOSIS — E04.1 THYROID NODULE: ICD-10-CM

## 2023-09-29 DIAGNOSIS — D50.0 IRON DEFICIENCY ANEMIA DUE TO CHRONIC BLOOD LOSS: ICD-10-CM

## 2023-09-29 DIAGNOSIS — C54.1 ENDOMETRIAL CANCER (HCC): Primary | ICD-10-CM

## 2023-09-29 DIAGNOSIS — E78.2 MIXED HYPERLIPIDEMIA: ICD-10-CM

## 2023-09-29 PROBLEM — E78.5 DYSLIPIDEMIA: Status: RESOLVED | Noted: 2019-03-19 | Resolved: 2023-09-29

## 2023-09-29 PROBLEM — Z90.79 HISTORY OF TOTAL ABDOMINAL HYSTERECTOMY AND BILATERAL SALPINGO-OOPHORECTOMY: Status: ACTIVE | Noted: 2023-07-31

## 2023-09-29 PROBLEM — Z90.722 HISTORY OF TOTAL ABDOMINAL HYSTERECTOMY AND BILATERAL SALPINGO-OOPHORECTOMY: Status: ACTIVE | Noted: 2023-07-31

## 2023-09-29 PROCEDURE — 77316 BRACHYTX ISODOSE PLAN SIMPLE: CPT | Performed by: INTERNAL MEDICINE

## 2023-09-29 PROCEDURE — 99214 OFFICE O/P EST MOD 30 MIN: CPT | Performed by: FAMILY MEDICINE

## 2023-09-29 NOTE — PROGRESS NOTES
Name: Jana Garcia      : 1945      MRN: [id number]  Encounter Provider: Zaida Diza MD  Encounter Date: 2023   Encounter department: [address] [place] [address]     1. Endometrial cancer [facility])  Comments:  Did great with surgery and now to undergo radiation treatment fully without complication. 2. Iron deficiency anemia due to chronic blood loss  Comments:  Last labs good therefore I instructed  to decrease Provera to once daily and will recheck labs prior to next visit in 6 months. Orders:  -     CBC and differential; Future; Expected date: 2024  -     Iron; Future; Expected date: 2024    3. Mixed hyperlipidemia  Comments:  As above  Orders:  -     Lipid panel; Future; Expected date: 2024  -     Comprehensive metabolic panel; Future; Expected date: 2024    4. Vitamin D deficiency  -     Vitamin D 25 hydroxy; Future; Expected date: 2024    5. Thyroid nodule  -     TSH, 3rd generation with Free T4 reflex; Future; Expected date: 2024  -     Vitamin D 25 hydroxy; Future; Expected date: 2024           Subjective      Recheck for office visit here 1 week ago due to cold symptoms now markedly improved after taking Claritin. There was slight nasal congestion but cough resolved. COVID test at the time was negative    Otherwise is doing quite well status post KIARA/BSO in July due to newly diagnosed endometrial CA. She is now scheduled to undergo intravaginal radiation treatments weekly x3 then will continue to be monitored closely by GYN. She recouped quickly from her surgery not even requiring an overnight stay within being done robotically. He denies any recent vaginal discharge or bleeding and really has no pain whatsoever. She does complain of always being cold which is nothing new for her although questionably was worse around the time of her cold symptoms although she denied any sweats or known fever at the time. She is also wondering if she should continue taking her Pro Kailee twice daily. She is having no problems taking this medication. Review of Systems    Current Outpatient Medications on File Prior to Visit   Medication Sig   • Ascorbic Acid (vitamin C) 100 MG tablet Take 500 mg by mouth 2 (two) times a day With rosehips   • atorvastatin (LIPITOR) 40 mg tablet Take 1 tablet (40 mg total) by mouth daily   • Cholecalciferol (Vitamin D3) 1.25 MG (80323 UT) CAPS Take 1,000 Units by mouth daily   • co-enzyme Q-10 100 mg capsule Take by mouth daily   • Iron Heme Polypeptide (Proferrin ES) 12 MG TABS one tablet b.i.d.   • metoprolol tartrate (LOPRESSOR) 25 mg tablet Take 1/2 tablet by oral route at bedtime. • omeprazole (PriLOSEC) 20 mg delayed release capsule Take 20 mg by mouth daily   • traZODone (DESYREL) 50 mg tablet Take 50 mg by mouth daily at bedtime 1/2 tablet   • vitamin B-12 (VITAMIN B-12) 1,000 mcg tablet Take 1,000 mcg by mouth daily   • [DISCONTINUED] ALPRAZolam (XANAX) 0.25 mg tablet Take by mouth daily at bedtime as needed for anxiety (Patient not taking: Reported on 9/8/2023)   • [DISCONTINUED] meclizine (ANTIVERT) 25 mg tablet Take 1 tablet (25 mg total) by mouth 3 (three) times a day as needed for dizziness (Patient not taking: Reported on 9/22/2023)   • [DISCONTINUED] mometasone (NASONEX) 50 mcg/act nasal spray 2 sprays into each nostril if needed (Patient not taking: Reported on 9/22/2023)       Objective     /66 (BP Location: Left arm, Patient Position: Sitting, Cuff Size: Adult)   Pulse 86   Temp 98.8 °F (37.1 °C) (Tympanic)   Ht 5' 1" (1.549 m)   Wt 54.1 kg (119 lb 3.2 oz)   SpO2 99%   BMI 22.52 kg/m²     Physical Exam  Constitutional:       Appearance: Normal appearance. Neck:      Vascular: No carotid bruit. Cardiovascular:      Rate and Rhythm: Normal rate and regular rhythm. Pulses: Normal pulses. Heart sounds: Normal heart sounds. No murmur heard.   Pulmonary: Effort: Pulmonary effort is normal.      Breath sounds: Normal breath sounds. Abdominal:      Palpations: Abdomen is soft. There is no mass. Tenderness: There is no abdominal tenderness. Comments: Lower abdominal surgical puncture wounds completely healed and without tenderness to palpation, swelling or other abnormalities. Musculoskeletal:      Right lower leg: No edema. Left lower leg: No edema. Lymphadenopathy:      Cervical: No cervical adenopathy. Skin:     General: Skin is warm and dry. Neurological:      General: No focal deficit present. Mental Status: She is oriented to person, place, and time.    Psychiatric:         Mood and Affect: Mood normal.         Behavior: Behavior normal.       Zaida Diaz MD

## 2023-10-02 ENCOUNTER — APPOINTMENT (OUTPATIENT)
Dept: RADIATION ONCOLOGY | Facility: HOSPITAL | Age: 78
End: 2023-10-02
Attending: INTERNAL MEDICINE
Payer: MEDICARE

## 2023-10-02 PROCEDURE — 77280 THER RAD SIMULAJ FIELD SMPL: CPT | Performed by: INTERNAL MEDICINE

## 2023-10-02 PROCEDURE — 57156 INS VAG BRACHYTX DEVICE: CPT | Performed by: INTERNAL MEDICINE

## 2023-10-02 PROCEDURE — 77770 HDR RDNCL NTRSTL/ICAV BRCHTX: CPT | Performed by: INTERNAL MEDICINE

## 2023-10-02 PROCEDURE — C1717 BRACHYTX, NON-STR,HDR IR-192: HCPCS | Performed by: INTERNAL MEDICINE

## 2023-10-09 ENCOUNTER — APPOINTMENT (OUTPATIENT)
Dept: RADIATION ONCOLOGY | Facility: HOSPITAL | Age: 78
End: 2023-10-09
Attending: INTERNAL MEDICINE
Payer: MEDICARE

## 2023-10-09 PROCEDURE — 77770 HDR RDNCL NTRSTL/ICAV BRCHTX: CPT | Performed by: INTERNAL MEDICINE

## 2023-10-09 PROCEDURE — 57156 INS VAG BRACHYTX DEVICE: CPT | Performed by: INTERNAL MEDICINE

## 2023-10-09 PROCEDURE — 77280 THER RAD SIMULAJ FIELD SMPL: CPT | Performed by: INTERNAL MEDICINE

## 2023-10-09 PROCEDURE — C1717 BRACHYTX, NON-STR,HDR IR-192: HCPCS | Performed by: INTERNAL MEDICINE

## 2023-10-12 ENCOUNTER — IMMUNIZATIONS (OUTPATIENT)
Dept: FAMILY MEDICINE CLINIC | Facility: CLINIC | Age: 78
End: 2023-10-12
Payer: MEDICARE

## 2023-10-12 DIAGNOSIS — Z23 ENCOUNTER FOR IMMUNIZATION: Primary | ICD-10-CM

## 2023-10-12 PROCEDURE — G0008 ADMIN INFLUENZA VIRUS VAC: HCPCS

## 2023-10-12 PROCEDURE — 90662 IIV NO PRSV INCREASED AG IM: CPT

## 2023-10-16 ENCOUNTER — APPOINTMENT (OUTPATIENT)
Dept: RADIATION ONCOLOGY | Facility: HOSPITAL | Age: 78
End: 2023-10-16
Attending: INTERNAL MEDICINE
Payer: MEDICARE

## 2023-10-16 PROCEDURE — 77770 HDR RDNCL NTRSTL/ICAV BRCHTX: CPT | Performed by: INTERNAL MEDICINE

## 2023-10-16 PROCEDURE — 77280 THER RAD SIMULAJ FIELD SMPL: CPT | Performed by: INTERNAL MEDICINE

## 2023-10-16 PROCEDURE — 57156 INS VAG BRACHYTX DEVICE: CPT | Performed by: INTERNAL MEDICINE

## 2023-10-16 PROCEDURE — C1717 BRACHYTX, NON-STR,HDR IR-192: HCPCS | Performed by: INTERNAL MEDICINE

## 2023-10-24 DIAGNOSIS — K21.9 GASTROESOPHAGEAL REFLUX DISEASE, UNSPECIFIED WHETHER ESOPHAGITIS PRESENT: Primary | ICD-10-CM

## 2023-10-25 RX ORDER — OMEPRAZOLE 20 MG/1
20 CAPSULE, DELAYED RELEASE ORAL DAILY
Qty: 90 CAPSULE | Refills: 1 | Status: SHIPPED | OUTPATIENT
Start: 2023-10-25

## 2023-11-22 DIAGNOSIS — I25.10 CORONARY ARTERY DISEASE INVOLVING NATIVE CORONARY ARTERY OF NATIVE HEART WITHOUT ANGINA PECTORIS: ICD-10-CM

## 2023-11-22 DIAGNOSIS — E78.5 DYSLIPIDEMIA: ICD-10-CM

## 2023-11-22 RX ORDER — ATORVASTATIN CALCIUM 40 MG/1
40 TABLET, FILM COATED ORAL DAILY
Qty: 90 TABLET | Refills: 1 | Status: SHIPPED | OUTPATIENT
Start: 2023-11-22

## 2023-11-28 ENCOUNTER — OFFICE VISIT (OUTPATIENT)
Dept: GYNECOLOGIC ONCOLOGY | Facility: CLINIC | Age: 78
End: 2023-11-28
Payer: MEDICARE

## 2023-11-28 VITALS
WEIGHT: 117.2 LBS | RESPIRATION RATE: 16 BRPM | TEMPERATURE: 97.8 F | HEIGHT: 61 IN | DIASTOLIC BLOOD PRESSURE: 68 MMHG | SYSTOLIC BLOOD PRESSURE: 130 MMHG | HEART RATE: 92 BPM | OXYGEN SATURATION: 100 % | BODY MASS INDEX: 22.13 KG/M2

## 2023-11-28 DIAGNOSIS — C54.1 ENDOMETRIAL CANCER (HCC): Primary | ICD-10-CM

## 2023-11-28 PROCEDURE — 99212 OFFICE O/P EST SF 10 MIN: CPT | Performed by: OBSTETRICS & GYNECOLOGY

## 2023-11-28 NOTE — LETTER
November 28, 2023     [address] [facility] [place] Con[place]  [address] [facility]    Patient: Erika Dee   YOB: 1945   Date of Visit: 11/28/2023       Dear Dr. Domenic Conteh: Thank you for referring Shalini Oliver to me for evaluation. Below are my notes for this consultation. If you have questions, please do not hesitate to call me. I look forward to following your patient along with you. Sincerely,        MD Cinthya        CC: MD Cinthya Gtz MD  11/28/2023  2:34 PM  Sign when Signing Visit  Assessment/Plan:    Problem List Items Addressed This Visit          Genitourinary    Endometrial cancer [facility]) - Primary     Patient completed vaginal brachytherapy and tolerated well. Vaginal cuff is healthy and patient has no evidence of disease. Can resume all activities of daily living including vaginal intercourse. No restrictions. She will return to the office in 3 months for routine surveillance visit. She will contact me if she has any vaginal bleeding, drainage, discharge or new symptoms. CHIEF COMPLAINT:   Surveillance/follow-up for endometrial cancer. Problem:  Cancer Staging   Endometrial cancer [facility])  Staging form: Corpus Uteri - Carcinoma, AJCC 8th Edition  - Clinical stage from 7/31/2023: FIGO Stage IB (cT1b, cN0(sn), cM0) - Signed by Cory Simeon on 8/29/2023        Previous therapy:  Oncology History Overview Note   H/O FIGO Stage 1B endometrial cancer S/P KIARA/BSO on 7/31/2023 notable for 2.6 cm of FIGO grade 1 endometrial adenocarcinoma, with 83% myometrial invasion, no lymphovascular invasion, lower uterine segment involvement, or cervical stromal involvement, with negative margins and 0 out of 2 sentinel nodes involved. Her case was reviewed at multidisciplinary tumor board and she was offered radiation consultation to discuss vaginal brachytherapy.      She completed HDR vaginal brachytherapy on 10/26/23. Today's visit is an EOT in person follow-up.    11/28/23  Dr. Liss Ibrahim    Upcoming:       Endometrial cancer [facility])   7/31/2023 -  Cancer Staged    Staging form: Corpus Uteri - Carcinoma, AJCC 8th Edition  - Clinical stage from 7/31/2023: FIGO Stage IB (cT1b, cN0(sn), cM0) - Signed by Javid Cristina on 8/29/2023  Stage prefix: Initial diagnosis  Method of lymph node assessment: [place] lymph node biopsy  Histologic grade (G): G1  Histologic grading system: 3 grade system       8/29/2023 Initial Diagnosis    Endometrial cancer ([address])     10/2/2023 - 10/16/2023 Radiation      Plan ID Energy Fractions Dose per Fraction (cGy) Dose Correction (cGy) Total Dose Delivered (cGy) Elapsed Days   Cyl 2.5 cm Ir 192 3 / 3 700 0 2,100 14      Treatment dates:  C1 HDR: 10/2/2023 - 10/16/2023               Patient ID: Adriano Amato is a 78 y.o. female  HPI  Stage Ib intermediate risk endometrial cancer. Completed vaginal brachytherapy in October 16. Presents for follow-up. Denies vaginal bleeding, drainage or discharge. Normal bowel and bladder function. She has no complaints. The following portions of the patient's history were reviewed and updated as appropriate: allergies, current medications, past family history, past medical history, past social history, past surgical history, and problem list.    Review of Systems  Per HPI. Upon review of systems is otherwise noncontributory.   Current Outpatient Medications   Medication Sig Dispense Refill   • Ascorbic Acid (vitamin C) 100 MG tablet Take 500 mg by mouth 2 (two) times a day With rosehips     • atorvastatin (LIPITOR) 40 mg tablet Take 1 tablet (40 mg total) by mouth daily 90 tablet 1   • Cholecalciferol (Vitamin D3) 1.25 MG (76662 UT) CAPS Take 1,000 Units by mouth daily     • co-enzyme Q-10 100 mg capsule Take by mouth daily     • Iron Heme Polypeptide (Proferrin ES) 12 MG TABS one tablet b.i.d.     • metoprolol tartrate (LOPRESSOR) 25 mg tablet Take 1/2 tablet by oral route at bedtime. • omeprazole (PriLOSEC) 20 mg delayed release capsule Take 1 capsule (20 mg total) by mouth daily 90 capsule 1   • traZODone (DESYREL) 50 mg tablet Take 50 mg by mouth daily at bedtime 1/2 tablet     • vitamin B-12 (VITAMIN B-12) 1,000 mcg tablet Take 1,000 mcg by mouth daily       No current facility-administered medications for this visit. Objective:    Blood pressure 130/68, pulse 92, temperature 97.8 °F (36.6 °C), temperature source Temporal, resp. rate 16, height 5' 1" (1.549 m), weight 53.2 kg (117 lb 3.2 oz), SpO2 100 %. Body mass index is 22.14 kg/m². Body surface area is 1.51 meters squared. Physical Exam  Vitals reviewed. Genitourinary:     Comments: Normal external female genitalia. Normal Bartholin's and Schertz's glands. Normal urethral meatus and no evidence of urethral discharge or masses. Bladder without fullness mass or tenderness. Vagina without lesion or discharge. No significant pelvic organ prolapse noted. Cervix and uterus are surgically absent. Bimanual exam demonstrates no evidence of pelvic masses. Anus without fissure of lesion.       Meliza Pulido [place], [address] [facility], [address]  11/28/2023  2:34 PM

## 2023-11-28 NOTE — PROGRESS NOTES
Assessment/Plan:    Problem List Items Addressed This Visit          Genitourinary    Endometrial cancer ([address]) - Primary     Patient completed vaginal brachytherapy and tolerated well. Vaginal cuff is healthy and patient has no evidence of disease. Can resume all activities of daily living including vaginal intercourse. No restrictions. She will return to the office in 3 months for routine surveillance visit. She will contact me if she has any vaginal bleeding, drainage, discharge or new symptoms. CHIEF COMPLAINT:   Surveillance/follow-up for endometrial cancer. Problem:  Cancer Staging   Endometrial cancer [facility])  Staging form: Corpus Uteri - Carcinoma, AJCC 8th Edition  - Clinical stage from 7/31/2023: FIGO Stage IB (cT1b, cN0(sn), cM0) - Signed by Leda Mercado on 8/29/2023        Previous therapy:  Oncology History Overview Note   H/O FIGO Stage 1B endometrial cancer S/P KIARA/BSO on 7/31/2023 notable for 2.6 cm of FIGO grade 1 endometrial adenocarcinoma, with 83% myometrial invasion, no lymphovascular invasion, lower uterine segment involvement, or cervical stromal involvement, with negative margins and 0 out of 2 sentinel nodes involved. Her case was reviewed at multidisciplinary tumor board and she was offered radiation consultation to discuss vaginal brachytherapy. She completed HDR vaginal brachytherapy on 10/26/23.   Today's visit is an EOT in person follow-up.    11/28/23  Dr. Eula Cool    Upcoming:       Endometrial cancer [facility])   7/31/2023 -  Cancer Staged    Staging form: Corpus Uteri - Carcinoma, AJCC 8th Edition  - Clinical stage from 7/31/2023: FIGO Stage IB (cT1b, cN0(sn), cM0) - Signed by Leda Mercado on 8/29/2023  Stage prefix: Initial diagnosis  Method of lymph node assessment: [place] lymph node biopsy  Histologic grade (G): G1  Histologic grading system: 3 grade system       8/29/2023 Initial Diagnosis    Endometrial cancer ([address])     10/2/2023 - 10/16/2023 Radiation      Plan ID Energy Fractions Dose per Fraction (cGy) Dose Correction (cGy) Total Dose Delivered (cGy) Elapsed Days   Cyl 2.5 cm Ir 192 3 / 3 700 0 2,100 14      Treatment dates:  C1 HDR: 10/2/2023 - 10/16/2023               Patient ID: Kyree Spence is a 78 y.o. female  HPI  Stage Ib intermediate risk endometrial cancer. Completed vaginal brachytherapy in October 16. Presents for follow-up. Denies vaginal bleeding, drainage or discharge. Normal bowel and bladder function. She has no complaints. The following portions of the patient's history were reviewed and updated as appropriate: allergies, current medications, past family history, past medical history, past social history, past surgical history, and problem list.    Review of Systems  Per HPI. Upon review of systems is otherwise noncontributory. Current Outpatient Medications   Medication Sig Dispense Refill    Ascorbic Acid (vitamin C) 100 MG tablet Take 500 mg by mouth 2 (two) times a day With rosehips      atorvastatin (LIPITOR) 40 mg tablet Take 1 tablet (40 mg total) by mouth daily 90 tablet 1    Cholecalciferol (Vitamin D3) 1.25 MG (46315 UT) CAPS Take 1,000 Units by mouth daily      co-enzyme Q-10 100 mg capsule Take by mouth daily      Iron Heme Polypeptide (Proferrin ES) 12 MG TABS one tablet b.i.d.      metoprolol tartrate (LOPRESSOR) 25 mg tablet Take 1/2 tablet by oral route at bedtime. omeprazole (PriLOSEC) 20 mg delayed release capsule Take 1 capsule (20 mg total) by mouth daily 90 capsule 1    traZODone (DESYREL) 50 mg tablet Take 50 mg by mouth daily at bedtime 1/2 tablet      vitamin B-12 (VITAMIN B-12) 1,000 mcg tablet Take 1,000 mcg by mouth daily       No current facility-administered medications for this visit. Objective:    Blood pressure 130/68, pulse 92, temperature 97.8 °F (36.6 °C), temperature source Temporal, resp.  rate 16, height 5' 1" (1.549 m), weight 53.2 kg (117 lb 3.2 oz), SpO2 100 %. Body mass index is 22.14 kg/m². Body surface area is 1.51 meters squared. Physical Exam  Vitals reviewed. Genitourinary:     Comments: Normal external female genitalia. Normal Bartholin's and Haysi's glands. Normal urethral meatus and no evidence of urethral discharge or masses. Bladder without fullness mass or tenderness. Vagina without lesion or discharge. No significant pelvic organ prolapse noted. Cervix and uterus are surgically absent. Bimanual exam demonstrates no evidence of pelvic masses. Anus without fissure of lesion.       Jaclyn Morocho MD, [facility]  11/28/2023  2:34 PM

## 2023-11-28 NOTE — ASSESSMENT & PLAN NOTE
Patient completed vaginal brachytherapy and tolerated well. Vaginal cuff is healthy and patient has no evidence of disease. Can resume all activities of daily living including vaginal intercourse. No restrictions. She will return to the office in 3 months for routine surveillance visit. She will contact me if she has any vaginal bleeding, drainage, discharge or new symptoms.

## 2023-11-28 NOTE — PATIENT INSTRUCTIONS
.  Return to the office in 3 months. Call if you have any vaginal bleeding, drainage, discharge or new symptoms.

## 2023-12-06 ENCOUNTER — CLINICAL SUPPORT (OUTPATIENT)
Dept: RADIATION ONCOLOGY | Facility: CLINIC | Age: 78
End: 2023-12-06
Attending: INTERNAL MEDICINE
Payer: MEDICARE

## 2023-12-06 ENCOUNTER — RADIATION ONCOLOGY FOLLOW-UP (OUTPATIENT)
Dept: RADIATION ONCOLOGY | Facility: CLINIC | Age: 78
End: 2023-12-06
Payer: MEDICARE

## 2023-12-06 VITALS
HEIGHT: 61 IN | BODY MASS INDEX: 22.09 KG/M2 | TEMPERATURE: 97.1 F | HEART RATE: 80 BPM | OXYGEN SATURATION: 100 % | SYSTOLIC BLOOD PRESSURE: 121 MMHG | RESPIRATION RATE: 16 BRPM | WEIGHT: 117 LBS | DIASTOLIC BLOOD PRESSURE: 84 MMHG

## 2023-12-06 DIAGNOSIS — C54.1 ENDOMETRIAL CANCER (HCC): Primary | ICD-10-CM

## 2023-12-06 PROCEDURE — 99211 OFF/OP EST MAY X REQ PHY/QHP: CPT | Performed by: INTERNAL MEDICINE

## 2023-12-06 PROCEDURE — 99024 POSTOP FOLLOW-UP VISIT: CPT | Performed by: INTERNAL MEDICINE

## 2023-12-06 NOTE — PROGRESS NOTES
Follow-up - Radiation Oncology   Erika Dee 1945 78 y.o. female [id number]    Cancer Staging   Endometrial cancer [facility])  Staging form: Corpus Uteri - Carcinoma, AJCC 8th Edition  - Clinical stage from 7/31/2023: FIGO Stage IB (cT1b, cN0(sn), cM0) - Signed by Cory Simeon on 8/29/2023  Stage prefix: Initial diagnosis  Method of lymph node assessment: [place] lymph node biopsy  Histologic grade (G): G1  Histologic grading system: 3 grade system    Assessment/Plan:  Erika Dee is a 78 y.o. female with recently diagnosed FIGO 1B (pT1bN0) endometrial adenocarcinoma, status post KIARA/BSO on 7/31/2023 notable for 2.6 cm of FIGO grade 1 endometrial adenocarcinoma, with 83% myometrial invasion, no lymphovascular invasion, lower uterine segment involvement, or cervical stromal involvement, with negative margins and 0 out of 2 sentinel nodes involved. Her case was reviewed at multidisciplinary tumor board and she underwent vaginal brachytherapy completing on  10/26/23. She presents for routine 1-1.5 month end of treatment follow-up visit. She has recovered from usual expected side effects of therapy (if any). She notes fatigue. Her recent pelvic exam with gynonc showed HATTIE. We discussed continued surveillance. I reviewed vaginal dilator use if not sexually active, and provided supplies/instructions. 3/5/24  Dr. Richie Noguera  RTC in 6 months. Garry Wilkins MD  Department of [address] [place] [address]    Total Time Spent  12 minutes spent reviewing EMR in preparation for visit, with the patient, coordination with other providers, and documentation. No orders of the defined types were placed in this encounter. History of Present Illness   Interval History:  Erika Dee 1945 is a 78y. o. female  H/O FIGO Stage 1B endometrial cancer S/P KIARA/BSO on 7/31/2023 notable for 2.6 cm of FIGO grade 1 endometrial adenocarcinoma, with 83% myometrial invasion, no lymphovascular invasion, lower uterine segment involvement, or cervical stromal involvement, with negative margins and 0 out of 2 sentinel nodes involved. Her case was reviewed at multidisciplinary tumor board and she was offered radiation consultation to discuss vaginal brachytherapy. She completed HDR vaginal brachytherapy on 10/26/23. Today's visit is an EOT in person follow-up.     11/28/23  Dr. Johnathan Beyer  Vaginal cuff without evidence of disease. Upcoming:  3/5/24  Dr. Cheng Sham well. No significant complaints.     Historical Information   Oncology History   Endometrial cancer ([address])   7/31/2023 -  Cancer Staged    Staging form: Corpus Uteri - Carcinoma, AJCC 8th Edition  - Clinical stage from 7/31/2023: FIGO Stage IB (cT1b, cN0(sn), cM0) - Signed by Author Fabio on 8/29/2023  Stage prefix: Initial diagnosis  Method of lymph node assessment: [place] lymph node biopsy  Histologic grade (G): G1  Histologic grading system: 3 grade system       8/29/2023 Initial Diagnosis    Endometrial cancer ([address])     10/2/2023 - 10/16/2023 Radiation      Plan ID Energy Fractions Dose per Fraction (cGy) Dose Correction (cGy) Total Dose Delivered (cGy) Elapsed Days   Cyl 2.5 cm Ir 192 3 / 3 700 0 2,100 14      Treatment dates:  C1 HDR: 10/2/2023 - 10/16/2023           Past Medical History:   Diagnosis Date   • Anxiety    • Arthritis    • Benign hypertension    • Chronic pain disorder     back   • COPD (chronic obstructive pulmonary disease) (HCC)    • Coronary artery disease     medical management   • CPAP (continuous positive airway pressure) dependence    • Diabetes ([address])    • Endometrial adenocarcinoma (HCC)    • Epilepsy ([address])    • GERD (gastroesophageal reflux disease)    • History of echocardiogram 02/07/2014    EF 55%, mild MR.   • History of transfusion    • Hypertension    • Mixed hyperlipidemia    • DAVID on CPAP    • Shortness of breath     triggered by anxiety   • Watermelon stomach    • Wears dentures      Past Surgical History:   Procedure Laterality Date   • BREAST BIOPSY Right 02/24/2015   • BREAST BIOPSY Left     ? year   • CARDIAC CATHETERIZATION  02/07/2014    EF 65%, mid LAD 60% stenosis and D2 70% stenosis. Medical management. • CATARACT EXTRACTION Bilateral    • COLONOSCOPY     • CYSTOSCOPY N/A 07/31/2023    Procedure: Kamaljit Newton;  Surgeon: Chayito Encarnacion MD;  [facility]: [facility];  Service: Gynecology Oncology   • EGD     • JOINT REPLACEMENT Right     RIGHT ELBOW   • OOPHORECTOMY      not sure which one was removed   • VA LAPS TOTAL HYSTERECT 250 GM/< W/RMVL TUBE/OVARY N/A 07/31/2023    Procedure: ROBOTHYSTERECTOMY, BILATERAL SALPINGO-OOPHORECTOMY, PELVIC SENTINEL NODE BIOPSIES;  Surgeon: Chayito Encarnacion MD;  Location: [facility];  Service: Gynecology Oncology   • ROTATOR CUFF REPAIR Right    • US GUIDED THYROID BIOPSY  12/04/2020     Social History   Social History     Substance and Sexual Activity   Alcohol Use Not Currently     Social History     Substance and Sexual Activity   Drug Use Never     Social History     Tobacco Use   Smoking Status Never   • Passive exposure: Never   Smokeless Tobacco Never       Meds/Allergies     Current Outpatient Medications:   •  Ascorbic Acid (vitamin C) 100 MG tablet, Take 500 mg by mouth 2 (two) times a day With rosehips, Disp: , Rfl:   •  atorvastatin (LIPITOR) 40 mg tablet, Take 1 tablet (40 mg total) by mouth daily, Disp: 90 tablet, Rfl: 1  •  Cholecalciferol (Vitamin D3) 1.25 MG (84983 UT) CAPS, Take 1,000 Units by mouth daily, Disp: , Rfl:   •  co-enzyme Q-10 100 mg capsule, Take by mouth daily, Disp: , Rfl:   •  Iron Heme Polypeptide (Proferrin ES) 12 MG TABS, one tablet b.i.d., Disp: , Rfl:   •  metoprolol tartrate (LOPRESSOR) 25 mg tablet, Take 1/2 tablet by oral route at bedtime. , Disp: , Rfl:   •  omeprazole (PriLOSEC) 20 mg delayed release capsule, Take 1 capsule (20 mg total) by mouth daily, Disp: 90 capsule, Rfl: 1  • traZODone (DESYREL) 50 mg tablet, Take 50 mg by mouth daily at bedtime 1/2 tablet, Disp: , Rfl:   •  vitamin B-12 (VITAMIN B-12) 1,000 mcg tablet, Take 1,000 mcg by mouth daily, Disp: , Rfl:   Allergies   Allergen Reactions   • Advil [Ibuprofen]    • Aspirin Other (See Comments)     gi bleed   • Caspofungin Other (See Comments)   • Hydrocodone    • Pravastatin GI Intolerance   • Tramadol GI Intolerance       Review of Systems:  Refer to nursing note    OBJECTIVE:   /84 (BP Location: Left arm, Patient Position: Sitting, Cuff Size: Standard)   Pulse 80   Temp (!) 97.1 °F (36.2 °C) (Temporal)   Resp 16   Ht 5' 1" (1.549 m)   Wt 53.1 kg (117 lb)   SpO2 100%   BMI 22.11 kg/m²     Physical Exam:   General Appearance:  Alert, cooperative, no distress, appears stated age  Lungs: Respirations unlabored, no cyanosis, able to speak in complete sentences without dyspnea. Abdomen: Non-distended  Gyn: Deferred given recent exam.  Skin: No generalized rash or dermatitis  Neurologic: ANOx3, speech and cognition intact. Portions of the record may have been created with voice recognition software. Occasional wrong word or "sound a like" substitutions may have occurred due to the inherent limitations of voice recognition software. Read the chart carefully and recognize, using context, where substitutions have occurred.

## 2023-12-06 NOTE — PROGRESS NOTES
Merna Clayton 1945 is a 78 y.o. female  H/O FIGO Stage 1B endometrial cancer S/P KIARA/BSO on 7/31/2023 notable for 2.6 cm of FIGO grade 1 endometrial adenocarcinoma, with 83% myometrial invasion, no lymphovascular invasion, lower uterine segment involvement, or cervical stromal involvement, with negative margins and 0 out of 2 sentinel nodes involved. Her case was reviewed at multidisciplinary tumor board and she was offered radiation consultation to discuss vaginal brachytherapy. She completed HDR vaginal brachytherapy on 10/26/23. Today's visit is an EOT in person follow-up.    11/28/23  Dr. Truman Guzman  Vaginal cuff health without evidence of disease. Upcoming:  3/5/24  Dr. Truman Guzman      Follow up visit     Oncology History   Endometrial cancer [facility])   7/31/2023 -  Cancer Staged    Staging form: Corpus Uteri - Carcinoma, AJCC 8th Edition  - Clinical stage from 7/31/2023: FIGO Stage IB (cT1b, cN0(sn), cM0) - Signed by Omar Moulton on 8/29/2023  Stage prefix: Initial diagnosis  Method of lymph node assessment: [place] lymph node biopsy  Histologic grade (G): G1  Histologic grading system: 3 grade system       8/29/2023 Initial Diagnosis    Endometrial cancer ([address])     10/2/2023 - 10/16/2023 Radiation      Plan ID Energy Fractions Dose per Fraction (cGy) Dose Correction (cGy) Total Dose Delivered (cGy) Elapsed Days   Cyl 2.5 cm Ir 192 3 / 3 700 0 2,100 14      Treatment dates:  C1 HDR: 10/2/2023 - 10/16/2023             Review of Systems:  Review of Systems   Constitutional: Negative. HENT: Negative. Eyes: Negative. Respiratory: Negative. Cardiovascular: Negative. Gastrointestinal: Negative. Endocrine: Negative. Genitourinary:  Positive for frequency and urgency. Musculoskeletal:  Positive for arthralgias and back pain. Skin: Negative. Allergic/Immunologic: Negative. Neurological:  Positive for dizziness. Has vertigo   Hematological: Negative. Psychiatric/Behavioral: Negative. Clinical Trial: no        Health Maintenance   Topic Date Due    Pneumococcal Vaccine: 65+ Years (1 - PCV) Never done    Osteoporosis Screening  Never done    COVID-19 Vaccine (3 - Moderna risk series) 12/29/2022    PT PLAN OF CARE  06/03/2023    ONC Colorectal Surgery Screening  05/28/2024    Endometrial Survivorship Visit  08/24/2024    ONC Weight Management Consult  09/07/2024    ONC Mammogram  09/21/2024    ONC DXA Scan  10/19/2024    Medicare Annual Wellness Visit (AWV)  04/11/2024    Fall Risk  04/24/2024    Depression Screening  09/08/2024    Urinary Incontinence Screening  09/22/2024    BMI: Adult  11/28/2024    Influenza Vaccine  Completed    HIB Vaccine  Aged Out    IPV Vaccine  Aged Out    Hepatitis A Vaccine  Aged Out    Meningococcal ACWY Vaccine  Aged Out    HPV Vaccine  Aged Out     Patient Active Problem List   Diagnosis    Angiodysplasia of gastrointestinal tract    Obstructive sleep apnea syndrome    Anemia    Anxiety state    Coronary arteriosclerosis    Gastric antral vascular ectasia    History of total abdominal hysterectomy and bilateral salpingo-oophorectomy    Endometrial cancer (HCC)    Drug-induced osteonecrosis of jaw (HCC)    Hyperlipidemia    Migraine    Osteoporosis    Thyroid nodule     Past Medical History:   Diagnosis Date    Anxiety     Arthritis     Benign hypertension     Chronic pain disorder     back    COPD (chronic obstructive pulmonary disease) (HCC)     Coronary artery disease     medical management    CPAP (continuous positive airway pressure) dependence     Diabetes ([address])     Endometrial adenocarcinoma (HCC)     Epilepsy ([address])     GERD (gastroesophageal reflux disease)     History of echocardiogram 02/07/2014    EF 55%, mild MR.     History of transfusion     Hypertension     Mixed hyperlipidemia     DAVID on CPAP     Shortness of breath     triggered by anxiety    Watermelon stomach     Wears dentures      Past Surgical History: Procedure Laterality Date    BREAST BIOPSY Right 02/24/2015    BREAST BIOPSY Left     ? year    CARDIAC CATHETERIZATION  02/07/2014    EF 65%, mid LAD 60% stenosis and D2 70% stenosis. Medical management.     CATARACT EXTRACTION Bilateral     COLONOSCOPY      CYSTOSCOPY N/A 07/31/2023    Procedure: CYSTOSCOPY;  Surgeon: Harry Akhtar MD;  [facility];  Service: Gynecology Oncology    EGD      JOINT REPLACEMENT Right     RIGHT ELBOW    OOPHORECTOMY      not sure which one was removed    VT LAPS TOTAL HYSTERECT 250 GM/< W/RMVL TUBE/OVARY N/A 07/31/2023    Procedure: ROBOTHYSTERECTOMY, BILATERAL SALPINGO-OOPHORECTOMY, PELVIC SENTINEL NODE BIOPSIES;  Surgeon: Harry Akhtar MD;  [facility];  Service: Gynecology Oncology    ROTATOR CUFF REPAIR Right     US GUIDED THYROID BIOPSY  12/04/2020     Family History   Problem Relation Age of Onset    Heart disease Mother     Coronary artery disease Sister         history of CABG    No Known Problems Father     No Known Problems Daughter     No Known Problems Sister     No Known Problems Sister     No Known Problems Sister     No Known Problems Daughter      Social History     Socioeconomic History    Marital status: /Civil Union     Spouse name: Not on file    Number of children: Not on file    Years of education: Not on file    Highest education level: Not on file   Occupational History    Not on file   Tobacco Use    Smoking status: Never     Passive exposure: Never    Smokeless tobacco: Never   Vaping Use    Vaping Use: Never used   Substance and Sexual Activity    Alcohol use: Not Currently    Drug use: Never    Sexual activity: Yes     Partners: Male     Birth control/protection: Post-menopausal   Other Topics Concern    Not on file   Social History Narrative    Not on file     Social Determinants of Health     Financial Resource Strain: Not on file   Food Insecurity: Not on file   Transportation Needs: Not on file   Physical Activity: Not on file   Stress: Not on file   Social Connections: Not on file   Intimate Partner Violence: Not on file   Housing Stability: Not on file       Current Outpatient Medications:     Ascorbic Acid (vitamin C) 100 MG tablet, Take 500 mg by mouth 2 (two) times a day With rosehips, Disp: , Rfl:     atorvastatin (LIPITOR) 40 mg tablet, Take 1 tablet (40 mg total) by mouth daily, Disp: 90 tablet, Rfl: 1    Cholecalciferol (Vitamin D3) 1.25 MG (97676 UT) CAPS, Take 1,000 Units by mouth daily, Disp: , Rfl:     co-enzyme Q-10 100 mg capsule, Take by mouth daily, Disp: , Rfl:     Iron Heme Polypeptide (Proferrin ES) 12 MG TABS, one tablet b.i.d., Disp: , Rfl:     metoprolol tartrate (LOPRESSOR) 25 mg tablet, Take 1/2 tablet by oral route at bedtime. , Disp: , Rfl:     omeprazole (PriLOSEC) 20 mg delayed release capsule, Take 1 capsule (20 mg total) by mouth daily, Disp: 90 capsule, Rfl: 1    traZODone (DESYREL) 50 mg tablet, Take 50 mg by mouth daily at bedtime 1/2 tablet, Disp: , Rfl:     vitamin B-12 (VITAMIN B-12) 1,000 mcg tablet, Take 1,000 mcg by mouth daily, Disp: , Rfl:   Allergies   Allergen Reactions    Advil [Ibuprofen]     Aspirin Other (See Comments)     gi bleed    Caspofungin Other (See Comments)    Hydrocodone     Pravastatin GI Intolerance    Tramadol GI Intolerance     There were no vitals filed for this visit.

## 2023-12-28 DIAGNOSIS — I25.10 CORONARY ARTERY DISEASE INVOLVING NATIVE CORONARY ARTERY OF NATIVE HEART WITHOUT ANGINA PECTORIS: Primary | ICD-10-CM

## 2023-12-29 ENCOUNTER — OFFICE VISIT (OUTPATIENT)
Dept: FAMILY MEDICINE CLINIC | Facility: CLINIC | Age: 78
End: 2023-12-29
Payer: MEDICARE

## 2023-12-29 VITALS
BODY MASS INDEX: 21.34 KG/M2 | HEIGHT: 61 IN | DIASTOLIC BLOOD PRESSURE: 80 MMHG | TEMPERATURE: 99.4 F | SYSTOLIC BLOOD PRESSURE: 122 MMHG | WEIGHT: 113 LBS | HEART RATE: 94 BPM | OXYGEN SATURATION: 99 %

## 2023-12-29 DIAGNOSIS — N18.30 STAGE 3 CHRONIC KIDNEY DISEASE, UNSPECIFIED WHETHER STAGE 3A OR 3B CKD (HCC): ICD-10-CM

## 2023-12-29 DIAGNOSIS — U07.1 COVID: Primary | ICD-10-CM

## 2023-12-29 DIAGNOSIS — R50.9 FEVER, UNSPECIFIED FEVER CAUSE: ICD-10-CM

## 2023-12-29 LAB
SARS-COV-2 AG UPPER RESP QL IA: POSITIVE
SL AMB POCT RAPID FLU A: NEGATIVE
SL AMB POCT RAPID FLU B: NEGATIVE
VALID CONTROL: ABNORMAL

## 2023-12-29 PROCEDURE — 99213 OFFICE O/P EST LOW 20 MIN: CPT | Performed by: FAMILY MEDICINE

## 2023-12-29 PROCEDURE — 87811 SARS-COV-2 COVID19 W/OPTIC: CPT | Performed by: FAMILY MEDICINE

## 2023-12-29 PROCEDURE — 87804 INFLUENZA ASSAY W/OPTIC: CPT | Performed by: FAMILY MEDICINE

## 2023-12-29 RX ORDER — NIRMATRELVIR AND RITONAVIR 150-100 MG
2 KIT ORAL 2 TIMES DAILY
Qty: 20 TABLET | Refills: 0 | Status: SHIPPED | OUTPATIENT
Start: 2023-12-29 | End: 2024-01-03

## 2023-12-29 NOTE — PROGRESS NOTES
Name: Anthony Slater      : 1945      MRN: [id number]  Encounter Provider: Akosua Schwab DO  Encounter Date: 2023   Encounter department: [facility] PRIMARY CARE    Assessment & Plan     1. COVID  Comments:  Quarantine 5d, mask additional 5d, renal dose paxlovid. Push clear fluids, rest, call if symptoms persist or worsen.  Orders:  -     nirmatrelvir & ritonavir (Paxlovid, 150/100,) tablet therapy pack; Take 2 tablets by mouth 2 (two) times a day for 5 days Take 1 nirmatrelvir tablet + 1 ritonavir tablet together per dose    2. Fever, unspecified fever cause  -     POCT Rapid Covid Ag  -     POCT rapid flu A and B    3. Stage 3 chronic kidney disease, unspecified whether stage 3a or 3b CKD (HCC)           Subjective      URI   This is a new problem. The current episode started in the past 7 days. The problem has been gradually worsening. The maximum temperature recorded prior to her arrival was 100.4 - 100.9 F. Associated symptoms include congestion, coughing, headaches, rhinorrhea, sinus pain and a sore throat. Pertinent negatives include no abdominal pain, chest pain, diarrhea, dysuria, ear pain, joint swelling, nausea, plugged ear sensation, rash, sneezing, vomiting or wheezing. The treatment provided mild relief.     Review of Systems   HENT:  Positive for congestion, rhinorrhea, sinus pain and sore throat. Negative for ear pain and sneezing.    Respiratory:  Positive for cough. Negative for chest tightness, shortness of breath and wheezing.    Cardiovascular:  Negative for chest pain.   Gastrointestinal:  Negative for abdominal pain, diarrhea, nausea and vomiting.   Genitourinary:  Negative for dysuria.   Skin:  Negative for rash.   Neurological:  Positive for headaches.       Current Outpatient Medications on File Prior to Visit   Medication Sig   • Ascorbic Acid (vitamin C) 100 MG tablet Take 500 mg by mouth 2 (two) times a day With rosehips   • atorvastatin  "(LIPITOR) 40 mg tablet Take 1 tablet (40 mg total) by mouth daily   • Cholecalciferol (Vitamin D3) 1.25 MG (02226 UT) CAPS Take 1,000 Units by mouth daily   • co-enzyme Q-10 100 mg capsule Take by mouth daily   • Iron Heme Polypeptide (Proferrin ES) 12 MG TABS one tablet b.i.d.   • metoprolol tartrate (LOPRESSOR) 25 mg tablet Take 0.5 tablets (12.5 mg total) by mouth daily at bedtime   • omeprazole (PriLOSEC) 20 mg delayed release capsule Take 1 capsule (20 mg total) by mouth daily   • traZODone (DESYREL) 50 mg tablet Take 50 mg by mouth daily at bedtime 1/2 tablet   • vitamin B-12 (VITAMIN B-12) 1,000 mcg tablet Take 1,000 mcg by mouth daily       Objective     /80 (BP Location: Left arm, Patient Position: Sitting, Cuff Size: Standard)   Pulse 94   Temp 99.4 °F (37.4 °C) (Tympanic)   Ht 5' 1\" (1.549 m)   Wt 51.3 kg (113 lb)   SpO2 99%   BMI 21.35 kg/m²     Physical Exam  Vitals and nursing note reviewed.   Constitutional:       General: She is not in acute distress.     Appearance: Normal appearance. She is not ill-appearing.   HENT:      Head: Normocephalic.      Right Ear: Tympanic membrane, ear canal and external ear normal.      Left Ear: Tympanic membrane, ear canal and external ear normal.      Nose: Nose normal.      Mouth/Throat:      Mouth: Mucous membranes are moist.      Pharynx: Oropharynx is clear.   Eyes:      Pupils: Pupils are equal, round, and reactive to light.   Cardiovascular:      Rate and Rhythm: Normal rate and regular rhythm.      Pulses: Normal pulses.      Heart sounds: Normal heart sounds. No murmur heard.  Pulmonary:      Effort: Pulmonary effort is normal. No respiratory distress.      Breath sounds: Normal breath sounds. No wheezing or rhonchi.   Musculoskeletal:      Cervical back: Normal range of motion and neck supple.   Lymphadenopathy:      Cervical: No cervical adenopathy.   Skin:     General: Skin is warm.      Capillary Refill: Capillary refill takes less than 2 " seconds.   Neurological:      General: No focal deficit present.      Mental Status: She is alert.   Psychiatric:         Mood and Affect: Mood normal.         Thought Content: Thought content normal.         Judgment: Judgment normal.       Akosua Schwab, DO

## 2023-12-30 ENCOUNTER — APPOINTMENT (EMERGENCY)
Dept: CT IMAGING | Facility: HOSPITAL | Age: 78
End: 2023-12-30
Payer: MEDICARE

## 2023-12-30 ENCOUNTER — APPOINTMENT (EMERGENCY)
Dept: RADIOLOGY | Facility: HOSPITAL | Age: 78
End: 2023-12-30
Payer: MEDICARE

## 2023-12-30 ENCOUNTER — HOSPITAL ENCOUNTER (EMERGENCY)
Facility: HOSPITAL | Age: 78
End: 2023-12-30
Attending: EMERGENCY MEDICINE
Payer: MEDICARE

## 2023-12-30 ENCOUNTER — HOSPITAL ENCOUNTER (INPATIENT)
Facility: HOSPITAL | Age: 78
LOS: 1 days | Discharge: HOME | DRG: 814 | End: 2023-12-31
Attending: SURGERY | Admitting: SURGERY
Payer: MEDICARE

## 2023-12-30 VITALS
RESPIRATION RATE: 20 BRPM | DIASTOLIC BLOOD PRESSURE: 58 MMHG | SYSTOLIC BLOOD PRESSURE: 118 MMHG | TEMPERATURE: 97.7 F | HEART RATE: 81 BPM | OXYGEN SATURATION: 96 %

## 2023-12-30 DIAGNOSIS — S36.039A LACERATION OF SPLEEN, INITIAL ENCOUNTER: ICD-10-CM

## 2023-12-30 DIAGNOSIS — S00.81XA ABRASION OF FACE, INITIAL ENCOUNTER: ICD-10-CM
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
## 2023-12-30 DIAGNOSIS — U07.1 COVID-19: ICD-10-CM

## 2023-12-30 DIAGNOSIS — R55 SYNCOPE: ICD-10-CM

## 2023-12-30 LAB
2HR DELTA HS TROPONIN: 1 NG/L
ABO GROUP BLD: NORMAL
ALBUMIN SERPL BCP-MCNC: 3.8 G/DL (ref 3.5–5)
ALP SERPL-CCNC: 63 U/L (ref 34–104)
ALT SERPL W P-5'-P-CCNC: 23 U/L (ref 7–52)
ANION GAP SERPL CALCULATED.3IONS-SCNC: 7 MMOL/L
AST SERPL W P-5'-P-CCNC: 31 U/L (ref 13–39)
BASOPHILS # BLD AUTO: 0.02 THOUSANDS/ÂΜL (ref 0–0.1)
BASOPHILS NFR BLD AUTO: 0 % (ref 0–1)
BILIRUB SERPL-MCNC: 0.57 MG/DL (ref 0.2–1)
BLD GP AB SCN SERPL QL: NEGATIVE
BUN SERPL-MCNC: 22 MG/DL (ref 5–25)
CALCIUM SERPL-MCNC: 8.9 MG/DL (ref 8.4–10.2)
CARDIAC TROPONIN I PNL SERPL HS: 5 NG/L
CARDIAC TROPONIN I PNL SERPL HS: 6 NG/L
CHLORIDE SERPL-SCNC: 102 MMOL/L (ref 96–108)
CO2 SERPL-SCNC: 26 MMOL/L (ref 21–32)
CREAT SERPL-MCNC: 1.14 MG/DL (ref 0.6–1.3)
EOSINOPHIL # BLD AUTO: 0.01 THOUSAND/ÂΜL (ref 0–0.61)
EOSINOPHIL NFR BLD AUTO: 0 % (ref 0–6)
ERYTHROCYTE [DISTWIDTH] IN BLOOD BY AUTOMATED COUNT: 15.5 % (ref 11.6–15.1)
ERYTHROCYTE [DISTWIDTH] IN BLOOD BY AUTOMATED COUNT: 15.5 % (ref 11.6–15.1)
GFR SERPL CREATININE-BSD FRML MDRD: 45 ML/MIN/1.73SQ M
GLUCOSE SERPL-MCNC: 97 MG/DL (ref 65–140)
HCT VFR BLD AUTO: 33.1 % (ref 34.8–46.1)
HCT VFR BLD AUTO: 35.8 % (ref 34.8–46.1)
HGB BLD-MCNC: 10.4 G/DL (ref 11.5–15.4)
HGB BLD-MCNC: 11.1 G/DL (ref 11.5–15.4)
IMM GRANULOCYTES # BLD AUTO: 0.04 THOUSAND/UL (ref 0–0.2)
IMM GRANULOCYTES NFR BLD AUTO: 1 % (ref 0–2)
LYMPHOCYTES # BLD AUTO: 0.97 THOUSANDS/ÂΜL (ref 0.6–4.47)
LYMPHOCYTES NFR BLD AUTO: 12 % (ref 14–44)
MAGNESIUM SERPL-MCNC: 2 MG/DL (ref 1.9–2.7)
MCH RBC QN AUTO: 25.8 PG (ref 26.8–34.3)
MCH RBC QN AUTO: 26.3 PG (ref 26.8–34.3)
MCHC RBC AUTO-ENTMCNC: 31 G/DL (ref 31.4–37.4)
MCHC RBC AUTO-ENTMCNC: 31.4 G/DL (ref 31.4–37.4)
MCV RBC AUTO: 82 FL (ref 82–98)
MCV RBC AUTO: 85 FL (ref 82–98)
MONOCYTES # BLD AUTO: 1.06 THOUSAND/ÂΜL (ref 0.17–1.22)
MONOCYTES NFR BLD AUTO: 13 % (ref 4–12)
NEUTROPHILS # BLD AUTO: 6.23 THOUSANDS/ÂΜL (ref 1.85–7.62)
NEUTS SEG NFR BLD AUTO: 74 % (ref 43–75)
NRBC BLD AUTO-RTO: 0 /100 WBCS
PLATELET # BLD AUTO: 182 THOUSANDS/UL (ref 149–390)
PLATELET # BLD AUTO: 185 THOUSANDS/UL (ref 149–390)
PMV BLD AUTO: 10.6 FL (ref 8.9–12.7)
PMV BLD AUTO: 10.8 FL (ref 8.9–12.7)
POTASSIUM SERPL-SCNC: 3.7 MMOL/L (ref 3.5–5.3)
PROT SERPL-MCNC: 6.9 G/DL (ref 6.4–8.4)
RBC # BLD AUTO: 4.03 MILLION/UL (ref 3.81–5.12)
RBC # BLD AUTO: 4.22 MILLION/UL (ref 3.81–5.12)
RH BLD: NEGATIVE
SODIUM SERPL-SCNC: 135 MMOL/L (ref 135–147)
SPECIMEN EXPIRATION DATE: NORMAL
WBC # BLD AUTO: 8.33 THOUSAND/UL (ref 4.31–10.16)
WBC # BLD AUTO: 8.51 THOUSAND/UL (ref 4.31–10.16)

## 2023-12-30 PROCEDURE — 86850 RBC ANTIBODY SCREEN: CPT | Performed by: SURGERY

## 2023-12-30 PROCEDURE — 73030 X-RAY EXAM OF SHOULDER: CPT

## 2023-12-30 PROCEDURE — 74177 CT ABD & PELVIS W/CONTRAST: CPT

## 2023-12-30 PROCEDURE — G1004 CDSM NDSC: HCPCS

## 2023-12-30 PROCEDURE — 83735 ASSAY OF MAGNESIUM: CPT | Performed by: EMERGENCY MEDICINE

## 2023-12-30 PROCEDURE — 99223 1ST HOSP IP/OBS HIGH 75: CPT | Performed by: SURGERY

## 2023-12-30 PROCEDURE — XW033E5 INTRODUCTION OF REMDESIVIR ANTI-INFECTIVE INTO PERIPHERAL VEIN, PERCUTANEOUS APPROACH, NEW TECHNOLOGY GROUP 5: ICD-10-PCS | Performed by: SURGERY

## 2023-12-30 PROCEDURE — 84484 ASSAY OF TROPONIN QUANT: CPT | Performed by: EMERGENCY MEDICINE

## 2023-12-30 PROCEDURE — 73060 X-RAY EXAM OF HUMERUS: CPT

## 2023-12-30 PROCEDURE — 80053 COMPREHEN METABOLIC PANEL: CPT | Performed by: EMERGENCY MEDICINE

## 2023-12-30 PROCEDURE — 36415 COLL VENOUS BLD VENIPUNCTURE: CPT | Performed by: EMERGENCY MEDICINE

## 2023-12-30 PROCEDURE — 71260 CT THORAX DX C+: CPT

## 2023-12-30 PROCEDURE — 85025 COMPLETE CBC W/AUTO DIFF WBC: CPT | Performed by: EMERGENCY MEDICINE

## 2023-12-30 PROCEDURE — 86900 BLOOD TYPING SEROLOGIC ABO: CPT | Performed by: SURGERY

## 2023-12-30 PROCEDURE — 70450 CT HEAD/BRAIN W/O DYE: CPT

## 2023-12-30 PROCEDURE — 93005 ELECTROCARDIOGRAM TRACING: CPT

## 2023-12-30 PROCEDURE — 99291 CRITICAL CARE FIRST HOUR: CPT | Performed by: EMERGENCY MEDICINE

## 2023-12-30 PROCEDURE — 70486 CT MAXILLOFACIAL W/O DYE: CPT

## 2023-12-30 PROCEDURE — 99285 EMERGENCY DEPT VISIT HI MDM: CPT

## 2023-12-30 PROCEDURE — 86901 BLOOD TYPING SEROLOGIC RH(D): CPT | Performed by: SURGERY

## 2023-12-30 PROCEDURE — 85027 COMPLETE CBC AUTOMATED: CPT

## 2023-12-30 PROCEDURE — 72125 CT NECK SPINE W/O DYE: CPT

## 2023-12-30 RX ORDER — ASCORBIC ACID 500 MG
500 TABLET ORAL 2 TIMES DAILY
Status: DISCONTINUED | OUTPATIENT
Start: 2023-12-30 | End: 2023-12-31 | Stop reason: HOSPADM

## 2023-12-30 RX ORDER — ENOXAPARIN SODIUM 100 MG/ML
30 INJECTION SUBCUTANEOUS EVERY 12 HOURS
Status: DISCONTINUED | OUTPATIENT
Start: 2023-12-30 | End: 2023-12-30

## 2023-12-30 RX ORDER — ACETAMINOPHEN 325 MG/1
650 TABLET ORAL ONCE
Status: COMPLETED | OUTPATIENT
Start: 2023-12-30 | End: 2023-12-30

## 2023-12-30 RX ORDER — UBIDECARENONE 30 MG
100 CAPSULE ORAL DAILY
Status: DISCONTINUED | OUTPATIENT
Start: 2023-12-30 | End: 2023-12-31 | Stop reason: HOSPADM

## 2023-12-30 RX ORDER — ATORVASTATIN CALCIUM 40 MG/1
40 TABLET, FILM COATED ORAL DAILY
Status: DISCONTINUED | OUTPATIENT
Start: 2023-12-30 | End: 2023-12-31 | Stop reason: HOSPADM

## 2023-12-30 RX ORDER — ENOXAPARIN SODIUM 100 MG/ML
30 INJECTION SUBCUTANEOUS EVERY 12 HOURS
Status: DISCONTINUED | OUTPATIENT
Start: 2023-12-30 | End: 2023-12-31 | Stop reason: HOSPADM

## 2023-12-30 RX ORDER — TRAZODONE HYDROCHLORIDE 50 MG/1
50 TABLET ORAL
Status: DISCONTINUED | OUTPATIENT
Start: 2023-12-30 | End: 2023-12-31 | Stop reason: HOSPADM

## 2023-12-30 RX ORDER — FERROUS GLUCONATE 324(38)MG
324 TABLET ORAL
Status: DISCONTINUED | OUTPATIENT
Start: 2023-12-30 | End: 2023-12-31 | Stop reason: HOSPADM

## 2023-12-30 RX ORDER — PANTOPRAZOLE SODIUM 20 MG/1
20 TABLET, DELAYED RELEASE ORAL
Status: DISCONTINUED | OUTPATIENT
Start: 2023-12-30 | End: 2023-12-31 | Stop reason: HOSPADM

## 2023-12-30 RX ORDER — ONDANSETRON 2 MG/ML
4 INJECTION INTRAMUSCULAR; INTRAVENOUS EVERY 6 HOURS PRN
Status: DISCONTINUED | OUTPATIENT
Start: 2023-12-30 | End: 2023-12-31 | Stop reason: HOSPADM

## 2023-12-30 RX ORDER — SENNOSIDES 8.6 MG
1 TABLET ORAL DAILY
Status: DISCONTINUED | OUTPATIENT
Start: 2023-12-30 | End: 2023-12-31 | Stop reason: HOSPADM

## 2023-12-30 RX ORDER — OMEGA-3S/DHA/EPA/FISH OIL/D3 300MG-1000
400 CAPSULE ORAL DAILY
Status: DISCONTINUED | OUTPATIENT
Start: 2023-12-30 | End: 2023-12-31 | Stop reason: HOSPADM

## 2023-12-30 RX ORDER — OXYCODONE HYDROCHLORIDE 5 MG/1
5 TABLET ORAL EVERY 4 HOURS PRN
Status: DISCONTINUED | OUTPATIENT
Start: 2023-12-30 | End: 2023-12-31 | Stop reason: HOSPADM

## 2023-12-30 RX ORDER — ACETAMINOPHEN 325 MG/1
975 TABLET ORAL EVERY 8 HOURS SCHEDULED
Status: DISCONTINUED | OUTPATIENT
Start: 2023-12-30 | End: 2023-12-31 | Stop reason: HOSPADM

## 2023-12-30 RX ADMIN — TRAZODONE HYDROCHLORIDE 50 MG: 50 TABLET ORAL at 22:37

## 2023-12-30 RX ADMIN — ATORVASTATIN CALCIUM 40 MG: 40 TABLET, FILM COATED ORAL at 22:37

## 2023-12-30 RX ADMIN — Medication 100 MG: at 11:23

## 2023-12-30 RX ADMIN — Medication 12.5 MG: at 22:37

## 2023-12-30 RX ADMIN — FERROUS GLUCONATE 324 MG: 324 TABLET ORAL at 11:23

## 2023-12-30 RX ADMIN — REMDESIVIR 200 MG: 100 INJECTION, POWDER, LYOPHILIZED, FOR SOLUTION INTRAVENOUS at 11:13

## 2023-12-30 RX ADMIN — ENOXAPARIN SODIUM 30 MG: 30 INJECTION SUBCUTANEOUS at 11:27

## 2023-12-30 RX ADMIN — IOHEXOL 100 ML: 350 INJECTION, SOLUTION INTRAVENOUS at 05:46

## 2023-12-30 RX ADMIN — PANTOPRAZOLE SODIUM 20 MG: 20 TABLET, DELAYED RELEASE ORAL at 11:23

## 2023-12-30 RX ADMIN — OXYCODONE HYDROCHLORIDE AND ACETAMINOPHEN 500 MG: 500 TABLET ORAL at 11:23

## 2023-12-30 RX ADMIN — CHOLECALCIFEROL TAB 10 MCG (400 UNIT) 400 UNITS: 10 TAB at 11:23

## 2023-12-30 RX ADMIN — ACETAMINOPHEN 975 MG: 325 TABLET, FILM COATED ORAL at 22:37

## 2023-12-30 RX ADMIN — ACETAMINOPHEN 975 MG: 325 TABLET, FILM COATED ORAL at 13:23

## 2023-12-30 RX ADMIN — ENOXAPARIN SODIUM 30 MG: 30 INJECTION SUBCUTANEOUS at 22:37

## 2023-12-30 RX ADMIN — ACETAMINOPHEN 650 MG: 325 TABLET ORAL at 05:14

## 2023-12-30 RX ADMIN — CYANOCOBALAMIN TAB 500 MCG 1000 MCG: 500 TAB at 11:22

## 2023-12-30 RX ADMIN — OXYCODONE HYDROCHLORIDE AND ACETAMINOPHEN 500 MG: 500 TABLET ORAL at 22:37

## 2023-12-30 RX ADMIN — SENNOSIDES 8.6 MG: 8.6 TABLET, FILM COATED ORAL at 11:23

## 2023-12-30 NOTE — ED NOTES
Medications requested for 2nd time from pharmacy with tube sent      Elizabeth Mims RN  12/30/23 [id number]

## 2023-12-30 NOTE — PLAN OF CARE
Problem: PAIN - ADULT  Goal: Verbalizes/displays adequate comfort level or baseline comfort level  Description: Interventions:  - Encourage patient to monitor pain and request assistance  - Assess pain using appropriate pain scale  - Administer analgesics based on type and severity of pain and evaluate response  - Implement non-pharmacological measures as appropriate and evaluate response  - Notify physician/advanced practitioner if interventions unsuccessful or patient reports new pain  Outcome: Progressing     Problem: SAFETY ADULT  Goal: Patient will remain free of falls  Description: INTERVENTIONS:  - Educate patient/family on patient safety including physical limitations  - Instruct patient to call for assistance with activity   - Consult OT/PT to assist with strengthening/mobility   - Keep Call bell within reach  - Keep bed low and locked with side rails adjusted as appropriate  - Keep care items and personal belongings within reach  - Initiate and maintain comfort rounds  - Make Fall Risk Sign visible to staff  - Offer Toileting every two Hours, in advance of need  - Initiate/Maintain bed alarm  - Obtain necessary fall risk management equipment  - Apply yellow socks and bracelet for high fall risk patients  - Consider moving patient to room near nurses station  Outcome: Progressing

## 2023-12-30 NOTE — ED CARE HANDOFF
Emergency Department Sign Out Note        Sign out and transfer of care from Dr. Leavitt. See Separate Emergency Department note.     The patient, Anthony Slater, was evaluated by the previous provider for traumatic injury secondary to a fall.  Patient was found to have a small splenic laceration.    Workup Completed:  Patient had an emergency department workup prior to my evaluation which consisted of CBC CMP troponin magnesium level, EKG, as well as CT scan of the chest abdomen pelvis as well as CT of the C-spine, thoracic and lumbar, as well as facial bones and head.      ED Course / Workup Pending (followup):  Patient is currently stable, and awaiting transport to [facility] due to splenic laceration.  There is no significant bleeding noted on imaging.      HEART Risk Score      Flowsheet Row Most Recent Value   Heart Score Risk Calculator    History 0 Filed at: 12/30/2023 0547   ECG 0 Filed at: 12/30/2023 0547   Age 2 Filed at: 12/30/2023 0547   Risk Factors 1 Filed at: 12/30/2023 0547   Troponin 0 Filed at: 12/30/2023 0547   HEART Score 3 Filed at: 12/30/2023 0547                                       Procedures  Medical Decision Making  Amount and/or Complexity of Data Reviewed  Labs: ordered.  Radiology: ordered and independent interpretation performed.    Risk  OTC drugs.  Prescription drug management.            Disposition  Final diagnoses:   Fall, initial encounter   Syncope   COVID-19   Laceration of spleen, initial encounter   Abrasion of face, initial encounter     Time reflects when diagnosis was documented in both MDM as applicable and the Disposition within this note       Time User Action Codes Description Comment    12/30/2023  5:31 AM Nahid Leavitt [W19.XXXA] Fall, initial encounter     12/30/2023  5:31 AM Nahid Leavitt [R55] Syncope     12/30/2023  5:31 AM Nahid Leavitt [U07.1] COVID-19     12/30/2023  6:48 AM Nahid Leavitt [S36.039A] Laceration of spleen,  initial encounter     12/30/2023  7:30 AM Nahid Leavitt Add [S00.81XA] Abrasion of face, initial encounter           ED Disposition       ED Disposition   Transfer to Another Facility-In Network    Condition   --    Date/Time   Sat Dec 30, 2023 0647    Comment   Anthony Slater should be transferred out to [facility].               MD Documentation      Flowsheet Row Most Recent Value   Accepting Physician Adarsh   Accepting Facility Name, City & State  B   Sending MD Leavitt          RN Documentation      Flowsheet Row Most Recent Value   Accepting Facility Name, City & State  [facility]   Bed Assignment [facility] ED   Report Given to Huong          Follow-up Information    None       Discharge Medication List as of 12/30/2023  8:03 AM        CONTINUE these medications which have NOT CHANGED    Details   Ascorbic Acid (vitamin C) 100 MG tablet Take 500 mg by mouth 2 (two) times a day With china, Historical Med      atorvastatin (LIPITOR) 40 mg tablet Take 1 tablet (40 mg total) by mouth daily, Starting Wed 11/22/2023, Normal      Cholecalciferol (Vitamin D3) 1.25 MG (24851 UT) CAPS Take 1,000 Units by mouth daily, Historical Med      co-enzyme Q-10 100 mg capsule Take by mouth daily, Historical Med      Iron Heme Polypeptide (Proferrin ES) 12 MG TABS one tablet b.i.d., Historical Med      metoprolol tartrate (LOPRESSOR) 25 mg tablet Take 0.5 tablets (12.5 mg total) by mouth daily at bedtime, Starting Thu 12/28/2023, Normal      nirmatrelvir & ritonavir (Paxlovid, 150/100,) tablet therapy pack Take 2 tablets by mouth 2 (two) times a day for 5 days Take 1 nirmatrelvir tablet + 1 ritonavir tablet together per dose, Starting Fri 12/29/2023, Until Wed 1/3/2024, Normal      omeprazole (PriLOSEC) 20 mg delayed release capsule Take 1 capsule (20 mg total) by mouth daily, Starting Wed 10/25/2023, Normal      traZODone (DESYREL) 50 mg tablet Take 50 mg by mouth daily at bedtime 1/2 tablet, Starting Thu 6/29/2023, Historical Med       vitamin B-12 (VITAMIN B-12) 1,000 mcg tablet Take 1,000 mcg by mouth daily, Historical Med           No discharge procedures on file.       ED Provider  Electronically Signed by     Hadley Serrano Jr.,   12/30/23 0917

## 2023-12-30 NOTE — CASE MANAGEMENT
Case Management Assessment & Discharge Planning Note    Patient name Anthony Slater  Location [facility]-01 MRN [id number]  : 1945 Date 2023       Current Admission Date: 2023  Current Admission Diagnosis:Fall, initial encounter, Other injury of unspecified body region, initial encounter   Patient Active Problem List    Diagnosis Date Noted    Endometrial cancer (HCC) 2023    History of total abdominal hysterectomy and bilateral salpingo-oophorectomy 2023    Angiodysplasia of gastrointestinal tract 06/15/2023    Thyroid nodule 2021    Drug-induced osteonecrosis of jaw (HCC) 2018    Migraine 2017    Obstructive sleep apnea syndrome 2014    Anxiety state 2014    Coronary arteriosclerosis 2014    Gastric antral vascular ectasia 2014    Hyperlipidemia 2014    Osteoporosis 2014      LOS (days): 0  Geometric Mean LOS (GMLOS) (days):   Days to GMLOS:     OBJECTIVE:    Risk of Unplanned Readmission Score: 13.59         Current admission status: Inpatient       Preferred Pharmacy:   [facility] [address] [place] [address]  [address] [address]  Phone: [phone] Fax: [phone]    Primary Care Provider: Marycarmen Calvillo MD    Primary Insurance: MEDICARE  Secondary Insurance: AARP    ASSESSMENT:  Active Health Care Proxies    There are no active Health Care Proxies on file.                      Patient Information  Admitted from:: Home  Mental Status: Alert  During Assessment patient was accompanied by: Not accompanied during assessment  Assessment information provided by:: Patient  Primary Caregiver: Self  Support Systems: Spouse/significant other, Family members  What city do you live in?: [place]  Home entry access options. Select all that apply.: No steps to enter home  Type of Current Residence: Other (Comment) (one level home)  Living Arrangements: Lives w/  Spouse/significant other  Is patient a ?: No    Activities of Daily Living Prior to Admission  Functional Status: Independent  Completes ADLs independently?: Yes  Ambulates independently?: Yes  Does patient use assisted devices?: No  Does patient currently own DME?: No  Does patient have a history of Outpatient Therapy (PT/OT)?: No  Does the patient have a history of Short-Term Rehab?: No  Does patient have a history of HHC?: No  Does patient currently have HHC?: No         Patient Information Continued  Does patient have prescription coverage?: Yes  Does patient receive dialysis treatments?: No         Means of Transportation  Means of Transport to Appts:: Family transport      Housing Stability: Low Risk  (12/30/2023)    Housing Stability Vital Sign     Unable to Pay for Housing in the Last Year: No     Number of Places Lived in the Last Year: 1     Unstable Housing in the Last Year: No   Food Insecurity: No Food Insecurity (12/30/2023)    Hunger Vital Sign     Worried About Running Out of Food in the Last Year: Never true     Ran Out of Food in the Last Year: Never true   Transportation Needs: No Transportation Needs (12/30/2023)    PRAPARE - Transportation     Lack of Transportation (Medical): No     Lack of Transportation (Non-Medical): No   Utilities: Not At Risk (12/30/2023)    [facility] Utilities     Threatened with loss of utilities: No       DISCHARGE DETAILS:    Discharge planning discussed with:: Pt  Freedom of Choice: Yes     CM contacted family/caregiver?: No- see comments (pt is alert and oriented)             Contacts  Patient Contacts: Yoni Slater  Relationship to Patient:: Family  Contact Method: Phone  Phone Number: [phone]  Reason/Outcome: Emergency Contact                                                                     Additional Comments: CM assessment complete with pt. No additional questions for CM at this time.

## 2023-12-30 NOTE — ED PROVIDER NOTES
"History  Chief Complaint   Patient presents with    Fall     Pt presents post fall at home. Pt does not recall why she fell but has a history of vertigo. Pt denies LOC and BT. Pt notes left shoulder pain, abrasion to the right eye, and right knee pain      HPI      This is a very pleasant, nontoxic-appearing, 78-year-old female presents emergency department status post fall at home this evening.  Patient arrived via EMS.  On further questioning patient stated that she does not recall why she fell and  found her on the floor after he heard a \"loud boom\".  She has an abrasion over the right side of her face and complaining of left shoulder pain.  Denies any chest pain, shortness of breath currently but does not recall any prodrome prior to falling or \"passing out\".  She is not on any blood thinners.    Seen by her PCP today for a 7-day history of a cough, congestion confirmed to be COVID-positive started on Paxlovid.  Prior to Admission Medications   Prescriptions Last Dose Informant Patient Reported? Taking?   Ascorbic Acid (vitamin C) 100 MG tablet  Self Yes No   Sig: Take 500 mg by mouth 2 (two) times a day With rosehips   Cholecalciferol (Vitamin D3) 1.25 MG (00627 UT) CAPS  Self Yes No   Sig: Take 1,000 Units by mouth daily   Iron Heme Polypeptide (Proferrin ES) 12 MG TABS  Self Yes No   Sig: one tablet b.i.d.   atorvastatin (LIPITOR) 40 mg tablet  Self No No   Sig: Take 1 tablet (40 mg total) by mouth daily   co-enzyme Q-10 100 mg capsule  Self Yes No   Sig: Take by mouth daily   metoprolol tartrate (LOPRESSOR) 25 mg tablet   No No   Sig: Take 0.5 tablets (12.5 mg total) by mouth daily at bedtime   nirmatrelvir & ritonavir (Paxlovid, 150/100,) tablet therapy pack   No No   Sig: Take 2 tablets by mouth 2 (two) times a day for 5 days Take 1 nirmatrelvir tablet + 1 ritonavir tablet together per dose   omeprazole (PriLOSEC) 20 mg delayed release capsule  Self No No   Sig: Take 1 capsule (20 mg total) by mouth " daily   traZODone (DESYREL) 50 mg tablet  Self Yes No   Sig: Take 50 mg by mouth daily at bedtime 1/2 tablet   vitamin B-12 (VITAMIN B-12) 1,000 mcg tablet  Self Yes No   Sig: Take 1,000 mcg by mouth daily      Facility-Administered Medications: None       Past Medical History:   Diagnosis Date    Anxiety     Arthritis     Benign hypertension     Chronic pain disorder     back    COPD (chronic obstructive pulmonary disease) (HCC)     Coronary artery disease     medical management    CPAP (continuous positive airway pressure) dependence     Diabetes (HCC)     Endometrial adenocarcinoma (HCC)     Epilepsy (HCC)     GERD (gastroesophageal reflux disease)     History of echocardiogram 02/07/2014    EF 55%, mild MR.    History of transfusion     Hypertension     Mixed hyperlipidemia     DAVID on CPAP     Shortness of breath     triggered by anxiety    Watermelon stomach     Wears dentures        Past Surgical History:   Procedure Laterality Date    BREAST BIOPSY Right 02/24/2015    BREAST BIOPSY Left     ? year    CARDIAC CATHETERIZATION  02/07/2014    EF 65%, mid LAD 60% stenosis and D2 70% stenosis.  Medical management.    CATARACT EXTRACTION Bilateral     COLONOSCOPY      CYSTOSCOPY N/A 07/31/2023    Procedure: CYSTOSCOPY;  Surgeon: Nadir Ash MD;  Location: [facility];  Service: Gynecology Oncology    EGD      JOINT REPLACEMENT Right     RIGHT ELBOW    OOPHORECTOMY      not sure which one was removed    LA LAPS TOTAL HYSTERECT 250 GM/< W/RMVL TUBE/OVARY N/A 07/31/2023    Procedure: ROBOTHYSTERECTOMY, BILATERAL SALPINGO-OOPHORECTOMY, PELVIC SENTINEL NODE BIOPSIES;  Surgeon: Nadir Ash MD;  Location: [facility];  Service: Gynecology Oncology    ROTATOR CUFF REPAIR Right     US GUIDED THYROID BIOPSY  12/04/2020       Family History   Problem Relation Age of Onset    Heart disease Mother     Coronary artery disease Sister         history of CABG    No Known Problems Father     No Known Problems Daughter      No Known Problems Sister     No Known Problems Sister     No Known Problems Sister     No Known Problems Daughter      I have reviewed and agree with the history as documented.    E-Cigarette/Vaping    E-Cigarette Use Never User      E-Cigarette/Vaping Substances    Nicotine No     THC No     CBD No     Flavoring No     Other No     Unknown No      Social History     Tobacco Use    Smoking status: Never     Passive exposure: Never    Smokeless tobacco: Never   Vaping Use    Vaping status: Never Used   Substance Use Topics    Alcohol use: Not Currently    Drug use: Never       Review of Systems   Constitutional:  Positive for chills and fatigue. Negative for diaphoresis, fever and unexpected weight change.   HENT:  Positive for congestion and rhinorrhea. Negative for drooling.    Eyes: Negative.    Respiratory: Negative.  Negative for chest tightness, shortness of breath and stridor.    Cardiovascular: Negative.  Negative for chest pain, palpitations and leg swelling.   Gastrointestinal: Negative.  Negative for abdominal pain, diarrhea and vomiting.   Endocrine: Negative.    Genitourinary: Negative.    Musculoskeletal: Negative.    Skin: Negative.    Allergic/Immunologic: Negative.    Neurological:  Positive for syncope.   Hematological: Negative.    Psychiatric/Behavioral: Negative.         Physical Exam  Physical Exam  Vitals and nursing note reviewed.   Constitutional:       General: She is not in acute distress.     Appearance: Normal appearance. She is normal weight. She is not ill-appearing, toxic-appearing or diaphoretic.   HENT:      Head: Normocephalic.      Comments: Patient maintaining airway maintaining secretions.  No stridor.  No brawniness under the tongue.  Uvula midline without edema.  Phonation normal, trachea midline, no trismus, no tenderness along the sternocleidomastoid muscle group, patient is appropriately masked.  No tenderness along the platysmas muscle group, no injuries noted in the zone  1, 2, 3 of the neck.     Abrasions noted on R sided of face.      Ears appear normal.  External auditory canals patent without erythema or edema bilaterally.  TM grey/flat bilaterally.  Nose normal inspection, no deformity, nares patent bilaterally.  No septal hematoma, No epistaxis.  Mucous membranes moist, pink.  Tongue midline without edema.  Uvula midline without deviation.  Posterior pharynx widely patent.  No posterior erythema.  Tonsils without edema, erythema or purulent exudate.  No tongue or lip swelling present.  No defined dental abscess.  No sublingual or submandibular fullness or swelling.  No trismus.  No drooling or pooling of secretions. No stridor w/o evidence of brawniness under the tongue.      Right Ear: Tympanic membrane, ear canal and external ear normal.      Left Ear: Tympanic membrane, ear canal and external ear normal.      Nose: Nose normal.      Mouth/Throat:      Mouth: Mucous membranes are moist.      Pharynx: Oropharynx is clear.   Eyes:      Extraocular Movements: Extraocular movements intact.      Conjunctiva/sclera: Conjunctivae normal.      Pupils: Pupils are equal, round, and reactive to light.   Cardiovascular:      Rate and Rhythm: Normal rate and regular rhythm.      Pulses: Normal pulses.      Heart sounds: Normal heart sounds.   Pulmonary:      Effort: Pulmonary effort is normal. No respiratory distress.      Breath sounds: Normal breath sounds. No stridor. No wheezing, rhonchi or rales.      Comments: No flail segments noted on exam, equal breath sounds in the posterior and anterior lung fields, no tenderness upon palpation of the anterior and posterior ribcage's / thoracic chest wall.  No bruising, no contusions, no outward signs of trauma or swelling noted.   Chest:      Chest wall: No tenderness.   Abdominal:      General: Abdomen is flat. Bowel sounds are normal.   Musculoskeletal:         General: Normal range of motion.      Cervical back: No tenderness.   Skin:      General: Skin is warm.      Capillary Refill: Capillary refill takes less than 2 seconds.   Neurological:      General: No focal deficit present.      Mental Status: She is alert and oriented to person, place, and time. Mental status is at baseline.   Psychiatric:         Mood and Affect: Mood normal.         Behavior: Behavior normal.         Thought Content: Thought content normal.         Judgment: Judgment normal.         Vital Signs  ED Triage Vitals   Temperature Pulse Respirations Blood Pressure SpO2   12/30/23 0354 12/30/23 0354 12/30/23 0354 12/30/23 0354 12/30/23 0354   97.7 °F (36.5 °C) 78 18 112/56 100 %      Temp Source Heart Rate Source Patient Position - Orthostatic VS BP Location FiO2 (%)   12/30/23 0354 12/30/23 0354 -- 12/30/23 0354 --   Tympanic Monitor  Left arm       Pain Score       12/30/23 0712       5           Vitals:    12/30/23 0354 12/30/23 0656 12/30/23 0700   BP: 112/56 118/57 118/58   Pulse: 78 81 81         Visual Acuity  Visual Acuity      Flowsheet Row Most Recent Value   L Pupil Size (mm) 2   R Pupil Size (mm) 2            ED Medications  Medications   acetaminophen (TYLENOL) tablet 650 mg (650 mg Oral Given 12/30/23 0514)   iohexol (OMNIPAQUE) 350 MG/ML injection (MULTI-DOSE) 100 mL (100 mL Intravenous Given 12/30/23 0546)       Diagnostic Studies  Results Reviewed       Procedure Component Value Units Date/Time    HS Troponin I 2hr [[id number]]  (Normal) Collected: 12/30/23 0658    Lab Status: Final result Specimen: Blood from Arm, Left Updated: 12/30/23 0730     hs TnI 2hr 6 ng/L      Delta 2hr hsTnI 1 ng/L     HS Troponin I 4hr [[id number]]     Lab Status: No result Specimen: Blood     HS Troponin 0hr (reflex protocol) [[id number]]  (Normal) Collected: 12/30/23 0432    Lab Status: Final result Specimen: Blood from Arm, Left Updated: 12/30/23 0503     hs TnI 0hr 5 ng/L     Comprehensive metabolic panel [[id number]] Collected: 12/30/23 0432    Lab Status: Final result Specimen:  Blood from Arm, Left Updated: 12/30/23 0455     Sodium 135 mmol/L      Potassium 3.7 mmol/L      Chloride 102 mmol/L      CO2 26 mmol/L      ANION GAP 7 mmol/L      BUN 22 mg/dL      Creatinine 1.14 mg/dL      Glucose 97 mg/dL      Calcium 8.9 mg/dL      AST 31 U/L      ALT 23 U/L      Alkaline Phosphatase 63 U/L      Total Protein 6.9 g/dL      Albumin 3.8 g/dL      Total Bilirubin 0.57 mg/dL      eGFR 45 ml/min/1.73sq m     Narrative:      [employer] guidelines for Chronic Kidney Disease (CKD):     Stage 1 with normal or high GFR (GFR > 90 mL/min/1.73 square meters)    Stage 2 Mild CKD (GFR = 60-89 mL/min/1.73 square meters)    Stage 3A Moderate CKD (GFR = 45-59 mL/min/1.73 square meters)    Stage 3B Moderate CKD (GFR = 30-44 mL/min/1.73 square meters)    Stage 4 Severe CKD (GFR = 15-29 mL/min/1.73 square meters)    Stage 5 End Stage CKD (GFR <15 mL/min/1.73 square meters)  Note: GFR calculation is accurate only with a steady state creatinine    Magnesium [[id number]]  (Normal) Collected: 12/30/23 0432    Lab Status: Final result Specimen: Blood from Arm, Left Updated: 12/30/23 0455     Magnesium 2.0 mg/dL     CBC and differential [[id number]]  (Abnormal) Collected: 12/30/23 0432    Lab Status: Final result Specimen: Blood from Arm, Left Updated: 12/30/23 0438     WBC 8.33 Thousand/uL      RBC 4.22 Million/uL      Hemoglobin 11.1 g/dL      Hematocrit 35.8 %      MCV 85 fL      MCH 26.3 pg      MCHC 31.0 g/dL      RDW 15.5 %      MPV 10.6 fL      Platelets 182 Thousands/uL      nRBC 0 /100 WBCs      Neutrophils Relative 74 %      Immat GRANS % 1 %      Lymphocytes Relative 12 %      Monocytes Relative 13 %      Eosinophils Relative 0 %      Basophils Relative 0 %      Neutrophils Absolute 6.23 Thousands/µL      Immature Grans Absolute 0.04 Thousand/uL      Lymphocytes Absolute 0.97 Thousands/µL      Monocytes Absolute 1.06 Thousand/µL      Eosinophils Absolute 0.01 Thousand/µL      Basophils  Absolute 0.02 Thousands/µL     UA w Reflex to Microscopic w Reflex to Culture [[id number]]     Lab Status: No result Specimen: Urine                    CT chest abdomen pelvis w contrast   Final Result by Key Sherman MD (12/30 0648)      Chest: No acute findings.      Abdomen and pelvis: Findings most compatible with subcentimeter splenic laceration most compatible with AAST grade 1 injury.      Other nonemergent findings above.                  Workstation performed: [id number]         CT recon only thoracolumbar (No Charge)   Final Result by Key Sherman MD (12/30 0641)      No fracture or traumatic subluxation.               Workstation performed: [id number]         CT head without contrast   Final Result by Key Sherman MD (12/30 0554)      No acute intracranial abnormality.  Nonemergent findings above.         Workstation performed: [id number]         CT spine cervical without contrast   Final Result by Key Sherman MD (12/30 0604)      No cervical spine fracture or traumatic malalignment.            Workstation performed: [id number]         CT facial bones without contrast   Final Result by Key Sherman MD (12/30 0556)      No facial bone fracture identified.            Workstation performed: [id number]         XR shoulder 2+ views LEFT   ED Interpretation by Nahid Leavitt III, DO (12/30 0524)   2 view x-ray of the left shoulder shows no acute fractures or dislocation, severe osteoarthritic changes noted in the humeral head.      Final Result by Key Sherman MD (12/30 0645)   No acute osseous abnormality, left shoulder or left humerus.         Workstation performed: [id number]         XR humerus LEFT   ED Interpretation by Nahid Leavitt III, DO (12/30 0525)   X-rays of the left humerous shows no acute fractures or dislocation      Final Result by Key Sherman MD (12/30 0645)   No acute osseous abnormality, left shoulder or left humerus.         Workstation performed: [id number]                     Procedures  ECG 12 Lead Documentation Only    Date/Time: 12/30/2023 4:35 AM    Performed by: Nahid Leavitt III, DO  Authorized by: Nahid Leavitt III, DO    Indications / Diagnosis:  FALL, SYNCOPE  ECG reviewed by me, the ED Provider: yes    Patient location:  ED  Comments:      I personally reviewed this EKG that was performed the patient December 30, 2023, EKG was completed at 4:31 AM and inter by me at 4:35 AM, normal sinus rhythm low voltage noted throughout the precordial leads, ventricular rate of 78 bpm, remaining portions of the intervals within normal limits.    No diffuse elevations to indicate pericarditis.  No coved ST elevations greater than 2mm with negative T waves in V1-3 to indicate concern for brugada.  No biphasic T waves in V2, V3 to indicate Wellens (critical stenosis of LAD).   No elevation in aVR or deviation when compared to V1 (can be associated with ST depression in I,II, V4-6 when left main occlusion is present).   CriticalCare Time    Date/Time: 12/30/2023 6:50 AM    Performed by: Nahid Leavitt III, DO  Authorized by: Nahid Leavitt III, DO    Critical care provider statement:     Critical care time (minutes):  35    Critical care start time:  12/30/2023 6:50 AM    Critical care end time:  12/30/2023 7:31 AM    Critical care was necessary to treat or prevent imminent or life-threatening deterioration of the following conditions:  Cardiac failure, circulatory failure, CNS failure or compromise, dehydration and trauma    Critical care was time spent personally by me on the following activities:  Obtaining history from patient or surrogate, discussions with consultants, development of treatment plan with patient or surrogate, evaluation of patient's response to treatment, examination of patient, review of old charts, re-evaluation of patient's condition, ordering and review of radiographic studies, ordering and review of laboratory studies and  ordering and performing treatments and interventions  ECG 12 Lead Documentation Only    Date/Time: 12/30/2023 7:05 AM    Performed by: Nahid Leavitt III, DO  Authorized by: Nahid Leavitt III, DO    Indications / Diagnosis:  Syncope  ECG reviewed by me, the ED Provider: yes    Patient location:  ED  Comments:      Personally reviewed this EKG that was performed the patient December 30, 2023, EKG was completed at 7:05 AM, interpreted by me at the same time, normal sinus rhythm with a ventricular rate of 79 bpm, remaining portion intervals within normal limits.    No diffuse elevations to indicate pericarditis.  No coved ST elevations greater than 2mm with negative T waves in V1-3 to indicate concern for brugada.  No biphasic T waves in V2, V3 to indicate Wellens (critical stenosis of LAD).   No elevation in aVR or deviation when compared to V1 (can be associated with ST depression in I,II, V4-6 when left main occlusion is present).            ED Course  ED Course as of 12/30/23 0731   Sat Dec 30, 2023   0408 Patient seen and examined.  Fall versus syncope today, has abrasion over the right side of her head, not on blood thinners.    Brief focused differential diagnosis in this patient is as follows: Arrhythmia versus electrolyte abnormality versus cardiac equivalent vs. UTI.  Other considerations are traumatic injuries to face, neck, head, chest.   0644 Received call from radiology: Grade 1 laceration to spleen, hemodynamically stable.   0647 PACS referral made.   0654 Case reviewed w/ Dr. Altamirano, trauma attending at [facility], will accept pt for transfer.             HEART Risk Score      Flowsheet Row Most Recent Value   Heart Score Risk Calculator    History 0 Filed at: 12/30/2023 0547   ECG 0 Filed at: 12/30/2023 0547   Age 2 Filed at: 12/30/2023 0547   Risk Factors 1 Filed at: 12/30/2023 0547   Troponin 0 Filed at: 12/30/2023 0547   HEART Score 3 Filed at: 12/30/2023 0575       "                    SBIRT 22yo+      Flowsheet Row Most Recent Value   Initial Alcohol Screen: US AUDIT-C     1. How often do you have a drink containing alcohol? 0 Filed at: 12/30/2023 0354   2. How many drinks containing alcohol do you have on a typical day you are drinking?  0 Filed at: 12/30/2023 0354   3a. Male UNDER 65: How often do you have five or more drinks on one occasion? 0 Filed at: 12/30/2023 0354   3b. FEMALE Any Age, or MALE 65+: How often do you have 4 or more drinks on one occassion? 0 Filed at: 12/30/2023 0354   Audit-C Score 0 Filed at: 12/30/2023 0354   MARY ANNE: How many times in the past year have you...    Used an illegal drug or used a prescription medication for non-medical reasons? Never Filed at: 12/30/2023 0354                      Medical Decision Making  Grade 1 splenic laceration with no active extravasation, vital signs are stable, blood pressure is 118/58 and heart rate is 75 at time of transfer to [facility], family updated multiple times, remaining portion of imaging negative, patient is not on blood thinners, patient is reassessed at the time of transfer and the patient is having no left upper quadrant abdominal pain or flank pain.    Portions of the record may have been created with voice recognition software. Occasional wrong word or \"sound a like\" substitutions may have occurred due to the inherent limitations of voice recognition software. Read the chart carefully and recognize, using context, where substitutions have occurred.       Amount and/or Complexity of Data Reviewed  Labs: ordered.  Radiology: ordered and independent interpretation performed.    Risk  OTC drugs.  Prescription drug management.             Disposition  Final diagnoses:   Fall, initial encounter   Syncope   COVID-19   Laceration of spleen, initial encounter   Abrasion of face, initial encounter     Time reflects when diagnosis was documented in both MDM as applicable and the Disposition within " this note       Time User Action Codes Description Comment    12/30/2023  5:31 AM Nahid Leavitt [W19.XXXA] Fall, initial encounter     12/30/2023  5:31 AM Nahid Leavitt [R55] Syncope     12/30/2023  5:31 AM Nahid Leavitt [U07.1] COVID-19     12/30/2023  6:48 AM Nahid Leavitt [S36.039A] Laceration of spleen, initial encounter     12/30/2023  7:30 AM Nahid Leavitt [S00.81XA] Abrasion of face, initial encounter           ED Disposition       ED Disposition   Transfer to Another Facility-In Network    Condition   --    Date/Time   Sat Dec 30, 2023 0647    Comment   Anthony Slater should be transferred out to [facility].               Follow-up Information    None         Patient's Medications   Discharge Prescriptions    No medications on file       No discharge procedures on file.    PDMP Review       None            ED Provider  Electronically Signed by             Nahid Leavitt III, DO  12/30/23 0726

## 2023-12-30 NOTE — H&P
H&P - Trauma   Anthony Slater 78 y.o. female MRN: [id number]  Unit/Bed#: ED 02 Encounter: [id number]    Trauma Alert: Evaluation; trauma team returned call/text yes      Model of Arrival: Transfer from [place]     Trauma Team: Attending lupis, Residents bassam, Fellow danilo, and KATIE hurley  Consultants:     None     Assessment/Plan   Active Problems / Assessment:   Syncopal Fall  Right forehead contusion  Grade 1 splenic laceration  COVID     Plan:   Admit to trauma SD2  CBC now then q6h unless stable  Other labs as ordered  PT/OT  Syncopal workup including orthostatic vitals, ecg, possible echo  Pain control  CM for dispo planning  No operative intervention, observation for splenic lac for now  Hold DVT ppx until hgb stable  Was on paxlovid outpatient -- will give remdesivir since already on medical treatment for covid    History of Present Illness     Chief Complaint: syncopal fall  Mechanism:Fall     HPI:    Anthony Slater is a 78 y.o. female with history of HTN, HLD, who presents following a syncopal fall overnight. She states she remembers getting up to go to the bathroom, does not remember passing out or falling and the next thing she recalls is her  finding her on the floor of the bathroom trying to get her up. She was also diagnosed with covid several days ago and has been on paxlovid, states her symptoms are mild with some congestion and some fever. Denies SOB, denies nausea/vomiting/diarhhea. Denies chest pain or abdominal pain. Denies pain in her extremities or other injuries. States she has some right forehead pain where she struck her head falling. She is not on any blood thinners.    Review of Systems   Constitutional:  Negative for chills and fever.   HENT:  Negative for congestion and sore throat.    Eyes:  Negative for photophobia and visual disturbance.   Respiratory:  Negative for cough, chest tightness and shortness of breath.    Cardiovascular:  Negative for chest pain and palpitations.    Gastrointestinal:  Negative for abdominal distention, abdominal pain, diarrhea, nausea and vomiting.   Endocrine: Negative for polydipsia and polyuria.   Genitourinary:  Negative for dysuria and pelvic pain.   Musculoskeletal:  Negative for back pain and neck pain.   Skin:  Negative for pallor and rash.   Allergic/Immunologic: Negative.    Neurological:  Positive for headaches. Negative for weakness and numbness.   Hematological: Negative.    Psychiatric/Behavioral:  Negative for agitation and confusion.      12-point, complete review of systems was reviewed and negative except as stated above.     Historical Information     Past Medical History:   Diagnosis Date    Anxiety     Arthritis     Benign hypertension     Chronic pain disorder     back    COPD (chronic obstructive pulmonary disease) (HCC)     Coronary artery disease     medical management    CPAP (continuous positive airway pressure) dependence     Diabetes (HCC)     Endometrial adenocarcinoma (HCC)     Epilepsy (HCC)     GERD (gastroesophageal reflux disease)     History of echocardiogram 02/07/2014    EF 55%, mild MR.    History of transfusion     Hypertension     Mixed hyperlipidemia     DAVID on CPAP     Shortness of breath     triggered by anxiety    Watermelon stomach     Wears dentures      Past Surgical History:   Procedure Laterality Date    BREAST BIOPSY Right 02/24/2015    BREAST BIOPSY Left     ? year    CARDIAC CATHETERIZATION  02/07/2014    EF 65%, mid LAD 60% stenosis and D2 70% stenosis.  Medical management.    CATARACT EXTRACTION Bilateral     COLONOSCOPY      CYSTOSCOPY N/A 07/31/2023    Procedure: CYSTOSCOPY;  Surgeon: Nadir Ash MD;  Location: [facility];  Service: Gynecology Oncology    EGD      JOINT REPLACEMENT Right     RIGHT ELBOW    OOPHORECTOMY      not sure which one was removed    CO LAPS TOTAL HYSTERECT 250 GM/< W/RMVL TUBE/OVARY N/A 07/31/2023    Procedure: ROBOTHYSTERECTOMY, BILATERAL SALPINGO-OOPHORECTOMY, PELVIC  SENTINEL NODE BIOPSIES;  Surgeon: Nadir Ash MD;  Location: [facility];  Service: Gynecology Oncology    ROTATOR CUFF REPAIR Right     US GUIDED THYROID BIOPSY  12/04/2020        Social History     Tobacco Use    Smoking status: Never     Passive exposure: Never    Smokeless tobacco: Never   Vaping Use    Vaping status: Never Used   Substance Use Topics    Alcohol use: Not Currently    Drug use: Never     Immunization History   Administered Date(s) Administered    COVID-19 MODERNA VACC 0.25 ML IM BOOSTER 11/15/2021    COVID-19 MODERNA VACC 0.5 ML IM 01/18/2021, 03/01/2021, 11/15/2021    COVID-19 Moderna vac 6-11y or adult booster 50 mcg/0.5 mL 12/01/2022    Influenza Split High Dose Preservative Free IM 10/03/2022    Influenza, seasonal, injectable 11/03/2023    Zoster Vaccine Recombinant 01/11/2023, 03/16/2023     Last Tetanus: n/a  Family History: Non-contributory    1. Before the illness or injury that brought you to the Emergency, did you need someone to help you on a regular basis? 0=No   2. Since the illness or injury that brought you to the Emergency, have you needed more help than usual to take care of yourself? 0=No   3. Have you been hospitalized for one or more nights during the past 6 months (excluding a stay in the Emergency Department)? 0=No   4. In general, do you see well? 0=Yes   5. In general, do you have serious problems with your memory? 0=No   6. Do you take more than three different medications everyday? 1=Yes   TOTAL   1     Did you order a geriatric consult if the score was 2 or greater?: no, score of 1     Meds/Allergies   all current active meds have been reviewed     Allergies   Allergen Reactions    Advil [Ibuprofen]     Aspirin Other (See Comments)     gi bleed    Caspofungin Other (See Comments)    Hydrocodone     Pravastatin GI Intolerance    Tramadol GI Intolerance       Objective   Initial Vitals:   Temperature: 98.4 °F (36.9 °C) (12/30/23 0830)  Pulse: 82 (12/30/23  0830)  Respirations: 18 (12/30/23 0830)  Blood Pressure: 141/63 (12/30/23 0830)    Primary Survey:   Airway:        Status: patent;        Pre-hospital Interventions: none        Hospital Interventions: none  Breathing:        Pre-hospital Interventions: none       Effort: normal       Right breath sounds: normal       Left breath sounds: normal  Circulation:        Rhythm: regular       Rate: regular   Right Pulses Left Pulses    R radial: 2+    R pedal: 2+     L radial: 2+    L pedal: 2+       Disability:        GCS: Eye: 4; Verbal: 5 Motor: 6 Total: 15       Right Pupil: 2 mm;  round;  reactive         Left Pupil:  2 mm;  round;  reactive      R Motor Strength L Motor Strength    R : 5/5  R dorsiflex: 5/5  R plantarflex: 5/5 L : 5/5  L dorsiflex: 5/5  L plantarflex: 5/5        Sensory:  No sensory deficit  Exposure:       Completed: Yes (contusion to right orbital area)      Secondary Survey:  Physical Exam  Constitutional:       General: She is not in acute distress.  HENT:      Head: Normocephalic and atraumatic.      Nose: Nose normal.      Mouth/Throat:      Mouth: Mucous membranes are moist.      Pharynx: Oropharynx is clear.   Eyes:      General: No scleral icterus.     Extraocular Movements: Extraocular movements intact.      Conjunctiva/sclera: Conjunctivae normal.      Pupils: Pupils are equal, round, and reactive to light.   Cardiovascular:      Pulses: Normal pulses.      Heart sounds: Normal heart sounds.   Pulmonary:      Effort: Pulmonary effort is normal. No respiratory distress.   Abdominal:      General: Abdomen is flat. There is no distension.      Palpations: Abdomen is soft.      Tenderness: There is no abdominal tenderness.   Musculoskeletal:         General: No tenderness or deformity. Normal range of motion.      Cervical back: Normal range of motion. No tenderness.   Skin:     General: Skin is warm and dry.      Capillary Refill: Capillary refill takes less than 2 seconds.       Coloration: Skin is not jaundiced or pale.      Findings: Bruising (contusion/ecchymosis to right orbital area) present.   Neurological:      General: No focal deficit present.      Mental Status: She is alert and oriented to person, place, and time.      Sensory: No sensory deficit.      Motor: No weakness.   Psychiatric:         Mood and Affect: Mood normal.         Behavior: Behavior normal.         Invasive Devices       Peripheral Intravenous Line  Duration             Peripheral IV 12/30/23 Left Antecubital <1 day                  Lab Results: I have personally reviewed all pertinent laboratory/test results 12/30/23 and in the preceding 24 hours.  Recent Labs     12/30/23  0432 12/30/23  0658   WBC 8.33  --    HGB 11.1*  --    HCT 35.8  --      --    SODIUM 135  --    K 3.7  --      --    CO2 26  --    BUN 22  --    CREATININE 1.14  --    GLUC 97  --    MG 2.0  --    AST 31  --    ALT 23  --    ALB 3.8  --    TBILI 0.57  --    ALKPHOS 63  --    HSTNI0 5  --    HSTNI2  --  6       Imaging Results: I have personally reviewed pertinent images saved in PACS. CT scan findings (and other pertinent positive findings on images) were discussed with radiology. My interpretation of the images/reports are as follows:  Chest Xray(s): N/A   FAST exam(s): N/A   CT Scan(s): positive for acute findings: grade 1 splenic lac   Additional Xray(s): negative for acute findings     Other Studies: ECG - see interpretations    Code Status: Level 1 - Full Code  Advance Directive and Living Will:      Power of :    POLST:    I have spent 15 minutes with Patient  today in which greater than 50% of this time was spent in counseling/coordination of care regarding Diagnostic results and Impressions.
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
## 2023-12-31 VITALS
SYSTOLIC BLOOD PRESSURE: 117 MMHG | DIASTOLIC BLOOD PRESSURE: 54 MMHG | OXYGEN SATURATION: 99 % | TEMPERATURE: 98.1 F | RESPIRATION RATE: 16 BRPM | HEART RATE: 87 BPM
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
## 2023-12-31 PROBLEM — M25.512 CHRONIC LEFT SHOULDER PAIN: Chronic | Status: ACTIVE | Noted: 2023-12-31

## 2023-12-31 PROBLEM — S00.81XA FACIAL ABRASION, INITIAL ENCOUNTER: Status: ACTIVE | Noted: 2023-12-31

## 2023-12-31 PROBLEM — S36.039A SPLENIC LACERATION, INITIAL ENCOUNTER: Status: ACTIVE | Noted: 2023-12-31

## 2023-12-31 PROBLEM — G89.29 CHRONIC LEFT SHOULDER PAIN: Chronic | Status: ACTIVE | Noted: 2023-12-31

## 2023-12-31 LAB
ANION GAP SERPL CALCULATED.3IONS-SCNC: 7 MMOL/L
ATRIAL RATE: 78 BPM
ATRIAL RATE: 79 BPM
BUN SERPL-MCNC: 27 MG/DL (ref 5–25)
CALCIUM SERPL-MCNC: 8.1 MG/DL (ref 8.4–10.2)
CHLORIDE SERPL-SCNC: 109 MMOL/L (ref 96–108)
CO2 SERPL-SCNC: 23 MMOL/L (ref 21–32)
CREAT SERPL-MCNC: 0.9 MG/DL (ref 0.6–1.3)
ERYTHROCYTE [DISTWIDTH] IN BLOOD BY AUTOMATED COUNT: 15.6 % (ref 11.6–15.1)
GFR SERPL CREATININE-BSD FRML MDRD: 60 ML/MIN/1.73SQ M
GLUCOSE SERPL-MCNC: 95 MG/DL (ref 65–140)
HCT VFR BLD AUTO: 35.1 % (ref 34.8–46.1)
HGB BLD-MCNC: 10.8 G/DL (ref 11.5–15.4)
MCH RBC QN AUTO: 25.5 PG (ref 26.8–34.3)
MCHC RBC AUTO-ENTMCNC: 30.8 G/DL (ref 31.4–37.4)
MCV RBC AUTO: 83 FL (ref 82–98)
P AXIS: 63 DEGREES
P AXIS: 70 DEGREES
PLATELET # BLD AUTO: 196 THOUSANDS/UL (ref 149–390)
PMV BLD AUTO: 11.3 FL (ref 8.9–12.7)
POTASSIUM SERPL-SCNC: 4.1 MMOL/L (ref 3.5–5.3)
PR INTERVAL: 166 MS
PR INTERVAL: 168 MS
QRS AXIS: 55 DEGREES
QRS AXIS: 58 DEGREES
QRSD INTERVAL: 78 MS
QRSD INTERVAL: 80 MS
QT INTERVAL: 388 MS
QT INTERVAL: 394 MS
QTC INTERVAL: 442 MS
QTC INTERVAL: 451 MS
RBC # BLD AUTO: 4.23 MILLION/UL (ref 3.81–5.12)
SODIUM SERPL-SCNC: 139 MMOL/L (ref 135–147)
T WAVE AXIS: 39 DEGREES
T WAVE AXIS: 51 DEGREES
VENTRICULAR RATE: 78 BPM
VENTRICULAR RATE: 79 BPM
WBC # BLD AUTO: 5.72 THOUSAND/UL (ref 4.31–10.16)

## 2023-12-31 PROCEDURE — NC001 PR NO CHARGE: Performed by: PHYSICIAN ASSISTANT

## 2023-12-31 PROCEDURE — 80048 BASIC METABOLIC PNL TOTAL CA: CPT

## 2023-12-31 PROCEDURE — 99238 HOSP IP/OBS DSCHRG MGMT 30/<: CPT | Performed by: PHYSICIAN ASSISTANT

## 2023-12-31 PROCEDURE — 97163 PT EVAL HIGH COMPLEX 45 MIN: CPT

## 2023-12-31 PROCEDURE — 85027 COMPLETE CBC AUTOMATED: CPT

## 2023-12-31 PROCEDURE — 97166 OT EVAL MOD COMPLEX 45 MIN: CPT

## 2023-12-31 RX ORDER — OXYCODONE HYDROCHLORIDE 5 MG/1
2.5-5 TABLET ORAL EVERY 4 HOURS PRN
Qty: 20 TABLET | Refills: 0 | Status: SHIPPED | OUTPATIENT
Start: 2023-12-31 | End: 2024-01-03

## 2023-12-31 RX ORDER — SENNOSIDES 8.6 MG
8.6 TABLET ORAL DAILY
Qty: 5 TABLET | Refills: 0 | Status: SHIPPED | OUTPATIENT
Start: 2024-01-01 | End: 2024-01-06

## 2023-12-31 RX ORDER — ACETAMINOPHEN 325 MG/1
650 TABLET ORAL EVERY 4 HOURS PRN
Start: 2023-12-31

## 2023-12-31 RX ADMIN — SENNOSIDES 8.6 MG: 8.6 TABLET, FILM COATED ORAL at 08:31

## 2023-12-31 RX ADMIN — ACETAMINOPHEN 975 MG: 325 TABLET, FILM COATED ORAL at 05:51

## 2023-12-31 RX ADMIN — Medication 2.5 MG: at 08:31

## 2023-12-31 RX ADMIN — CYANOCOBALAMIN TAB 500 MCG 1000 MCG: 500 TAB at 08:31

## 2023-12-31 RX ADMIN — REMDESIVIR 100 MG: 100 INJECTION, POWDER, LYOPHILIZED, FOR SOLUTION INTRAVENOUS at 10:41

## 2023-12-31 RX ADMIN — ONDANSETRON 4 MG: 2 INJECTION INTRAMUSCULAR; INTRAVENOUS at 06:12

## 2023-12-31 RX ADMIN — OXYCODONE HYDROCHLORIDE AND ACETAMINOPHEN 500 MG: 500 TABLET ORAL at 08:31

## 2023-12-31 RX ADMIN — FERROUS GLUCONATE 324 MG: 324 TABLET ORAL at 05:52

## 2023-12-31 RX ADMIN — CHOLECALCIFEROL TAB 10 MCG (400 UNIT) 400 UNITS: 10 TAB at 08:31

## 2023-12-31 RX ADMIN — Medication 100 MG: at 08:31

## 2023-12-31 RX ADMIN — PANTOPRAZOLE SODIUM 20 MG: 20 TABLET, DELAYED RELEASE ORAL at 05:52

## 2023-12-31 RX ADMIN — ENOXAPARIN SODIUM 30 MG: 30 INJECTION SUBCUTANEOUS at 10:42

## 2023-12-31 NOTE — ASSESSMENT & PLAN NOTE
- Patient with multiple right-sided facial abrasions, present on presentation.  - Local wound care as indicated.  - Analgesia as needed.
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
## 2023-12-31 NOTE — ASSESSMENT & PLAN NOTE
- Patient with multiple right-sided facial abrasions, present on presentation.  - Local wound care as indicated.  - Analgesia as needed.   Don Mosqueda  PEDIATRICS  [address] [place] [address], [address] [address]  Phone: [phone]  Fax: [phone]  Follow Up Time:
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
## 2023-12-31 NOTE — ASSESSMENT & PLAN NOTE
- Patient with very small grade 1 splenic laceration/injury, present on admission.  - CT scan of the chest, abdomen and pelvis on 12/30/2023 reviewed.  - Continue to monitor abdominal exam.  Exam has remained benign  - Continue diet as tolerated.  - Patient has remained hemodynamically normal with stable normal hemoglobin and no evidence of active or ongoing bleeding.  - Continue multimodal analgesic regimen.  - Continue to encourage incentive spirometer use and adequate pulmonary hygiene.  - May resume light activity as tolerated while following solid organ injury precautions:  Avoid lifting greater than 10 pounds, any strenuous activities and/or exercise, and contact sports until cleared by the trauma service.  Avoid driving and crowded places until cleared by the trauma service.  - PT and OT evaluation and treatment as indicated.  - Outpatient follow-up in the trauma clinic for re-evaluation in approximately 2 weeks.

## 2023-12-31 NOTE — PROGRESS NOTES
[facility]  Progress Note  Name: Anthony Slater I  MRN: [id number]  Unit/Bed#: [facility] 605-01 I Date of Admission: 12/30/2023   Date of Service: 12/31/2023 I Hospital Day: 1    Assessment/Plan   Fall  Assessment & Plan  - Status post fall with the below noted injuries.  - Fall precautions.  - Geriatric Medicine consultation for evaluation, medication review and recommendations.  - PT and OT evaluation and treatment as indicated.  - Case Management consultation for disposition planning.      * Splenic laceration, initial encounter  Assessment & Plan  - Patient with very small grade 1 splenic laceration/injury, present on admission.  - CT scan of the chest, abdomen and pelvis on 12/30/2023 reviewed.  - Continue to monitor abdominal exam.  Exam has remained benign  - Continue diet as tolerated.  - Patient has remained hemodynamically normal with stable normal hemoglobin and no evidence of active or ongoing bleeding.  - Continue multimodal analgesic regimen.  - Continue to encourage incentive spirometer use and adequate pulmonary hygiene.  - May resume light activity as tolerated while following solid organ injury precautions:  Avoid lifting greater than 10 pounds, any strenuous activities and/or exercise, and contact sports until cleared by the trauma service.  Avoid driving and crowded places until cleared by the trauma service.  - PT and OT evaluation and treatment as indicated.  - Outpatient follow-up in the trauma clinic for re-evaluation in approximately 2 weeks.    Facial abrasion, initial encounter  Assessment & Plan  - Patient with multiple right-sided facial abrasions, present on presentation.  - Local wound care as indicated.  - Analgesia as needed.    Chronic left shoulder pain  Assessment & Plan  - Patient with continued chronic left shoulder pain without any new significant changes.  - Continue analgesia as needed.  - Resume home therapy on discharge.  - Outpatient  "follow-up per routine.             TRAUMA TERTIARY SURVEY NOTE    VTE Prophylaxis:Sequential compression device (Venodyne)  and Enoxaparin (Lovenox)     Disposition: Anticipate discharge home today.    Code status:  Level 1 - Full Code    Consultants: IP CONSULT TO CASE MANAGEMENT  IP CONSULT TO GERONTOLOGY    Subjective   Transfer from: [facility]    Mechanism of Injury:Fall     Chief Complaint: \"I'm a little sore.\"    HPI/Last 24 hour events: Patient is overall doing well.  She was able to get rest overnight.  She is tolerating oral intake without nausea or vomiting.  She denies any abdominal pain.  She complains primarily of left shoulder pain, which she reports is chronic without any acute change.  She also has some soreness by her facial abrasions.  She has no other complaints at this time.     Objective   Vitals:   Temp:  [97.7 °F (36.5 °C)-98.4 °F (36.9 °C)] 97.9 °F (36.6 °C)  HR:  [64-90] 74  Resp:  [16-26] 16  BP: (110-141)/(57-68) 113/65    I/O         12/29 0701  12/30 0700 12/30 0701  12/31 0700 12/31 0701  01/01 0700           Unmeasured Stool Occurrence  1 x              Physical Exam:   GENERAL APPEARANCE: Patient in no acute distress.  HEENT: NC, healing right-sided facial abrasions with minimal tenderness; PERRL, EOMs intact; Mucous membranes moist  NECK / BACK: No midline cervical, thoracic or lumbar spine tenderness, step-offs or deformities.  No paraspinal muscular tenderness in the neck or back.  CV: Regular rate and rhythm; no murmur/gallops/rubs appreciated.  CHEST / LUNGS: Clear to auscultation; no wheezes/rales/rhonci.  No chest wall tenderness.  ABD: NABS; soft; non-distended; non-tender.  : Voiding spontaneously.  EXT: +2 pulses bilaterally upper & lower extremities; no edema. Normal range of motion in all 4 extremities without pain, tenderness or deformity.  NEURO: GCS 15; no focal neurologic deficits; neurovascularly intact.  SKIN: Warm, dry and well perfused; no rash; no " jaundice.    Invasive Devices       Peripheral Intravenous Line  Duration             Peripheral IV 12/30/23 Left Antecubital 1 day                       1. Before the illness or injury that brought you to the Emergency, did you need someone to help you on a regular basis? 0=No   2. Since the illness or injury that brought you to the Emergency, have you needed more help than usual to take care of yourself? 0=No   3. Have you been hospitalized for one or more nights during the past 6 months (excluding a stay in the Emergency Department)? 1=Yes   4. In general, do you see well? 0=Yes   5. In general, do you have serious problems with your memory? 0=No   6. Do you take more than three different medications everyday? 1=Yes   TOTAL   2     Did you order a geriatric consult if the score was 2 or greater?: yes         Lab Results: Results: I have personally reviewed all pertinent laboratory/tests results    Imaging Results: I have personally reviewed pertinent reports.   and I have personally reviewed pertinent films in PACS  Chest Xray(s): N/A   FAST exam(s): N/A   CT Scan(s): positive for acute findings: Findings most compatible with subcentimeter splenic laceration most compatible with AAST grade 1 injury.   Additional Xray(s): N/A     Other Studies: N/A       Connor Milton PA-C  12/31/2023  09:58 AM

## 2023-12-31 NOTE — OCCUPATIONAL THERAPY NOTE
Occupational Therapy Evaluation     Patient Name: Anthony Slater  Today's Date: 12/31/2023  Problem List  Principal Problem:    Splenic laceration, initial encounter  Active Problems:    Fall    Facial abrasion, initial encounter    Chronic left shoulder pain    Past Medical History  Past Medical History:   Diagnosis Date    Anxiety     Arthritis     Benign hypertension     Chronic pain disorder     back    COPD (chronic obstructive pulmonary disease) (HCC)     Coronary artery disease     medical management    CPAP (continuous positive airway pressure) dependence     Diabetes (HCC)     Endometrial adenocarcinoma (HCC)     Epilepsy (HCC)     GERD (gastroesophageal reflux disease)     History of echocardiogram 02/07/2014    EF 55%, mild MR.    History of transfusion     Hypertension     Mixed hyperlipidemia     DAVID on CPAP     Shortness of breath     triggered by anxiety    Watermelon stomach     Wears dentures      Past Surgical History  Past Surgical History:   Procedure Laterality Date    BREAST BIOPSY Right 02/24/2015    BREAST BIOPSY Left     ? year    CARDIAC CATHETERIZATION  02/07/2014    EF 65%, mid LAD 60% stenosis and D2 70% stenosis.  Medical management.    CATARACT EXTRACTION Bilateral     COLONOSCOPY      CYSTOSCOPY N/A 07/31/2023    Procedure: CYSTOSCOPY;  Surgeon: Nadir Ash MD;  Location: [facility];  Service: Gynecology Oncology    EGD      JOINT REPLACEMENT Right     RIGHT ELBOW    OOPHORECTOMY      not sure which one was removed    CA LAPS TOTAL HYSTERECT 250 GM/< W/RMVL TUBE/OVARY N/A 07/31/2023    Procedure: ROBOTHYSTERECTOMY, BILATERAL SALPINGO-OOPHORECTOMY, PELVIC SENTINEL NODE BIOPSIES;  Surgeon: Nadir Ash MD;  Location: [facility];  Service: Gynecology Oncology    ROTATOR CUFF REPAIR Right     US GUIDED THYROID BIOPSY  12/04/2020      .   12/31/23 0900   OT Last Visit   OT Visit Date 12/31/23   Note Type   Note type Evaluation   Pain Assessment   Pain Assessment Tool  0-10   Pain Score 10 - Worst Possible Pain   Pain Location/Orientation Orientation: Left;Location: Shoulder   Hospital Pain Intervention(s) Repositioned;Ambulation/increased activity;Emotional support;Relaxation technique   Restrictions/Precautions   Weight Bearing Precautions Per Order No   Other Precautions (S)  Contact/isolation;Airborne/isolation  (Covid+)   Home Living   Type of Home House   Home Layout One level   Bathroom Shower/Tub Tub/shower unit   Bathroom Toilet Raised   Bathroom Equipment Grab bars in shower   Prior Function   Level of Crisp Independent with functional mobility;Independent with ADLs;Independent with IADLS   Lives With Spouse   Receives Help From Family   IADLs Independent with driving;Independent with meal prep;Independent with medication management   Falls in the last 6 months 1 to 4   Vocational Retired   Lifestyle   Autonomy I adl's and functional mobilty - i iadls - shares homemaking with family   Reciprocal Relationships Supportive family   Service to Others retired   Intrinsic Gratification mostly sedentary   Subjective   Subjective offers no c/o   ADL   Eating Assistance 7  Independent   Grooming Assistance 5  Supervision/Setup   UB Bathing Assistance 5  Supervision/Setup   LB Bathing Assistance 5  Supervision/Setup   UB Dressing Assistance 5  Supervision/Setup   LB Dressing Assistance 5  Supervision/Setup   Toileting Assistance  5  Supervision/Setup   Bed Mobility   Supine to Sit 5  Supervision   Transfers   Sit to Stand 5  Supervision   Stand to Sit 5  Supervision   Toilet transfer 5  Supervision   Functional Mobility   Functional Mobility 5  Supervision   Balance   Static Sitting Good   Dynamic Sitting Fair +   Static Standing Fair +   Dynamic Standing Fair   Ambulatory Fair   Activity Tolerance   Activity Tolerance Patient limited by fatigue;Treatment limited secondary to medical complications (Comment);Patient limited by pain   Medical Staff Made Aware PT present for  co-eval 2* medical complexity, comorbidities and limited overall tolerance to activities   RUE Assessment   RUE Assessment WFL   LUE Assessment   LUE Assessment WFL   Cognition   Overall Cognitive Status WFL   Assessment   Limitation Decreased ADL status;Decreased endurance;Decreased self-care trans;Decreased high-level ADLs   Prognosis Good   Assessment Pt is a 78 y.o. female who was admitted to [facility] on 12/30/2023 with Splenic laceration, initial encounter 2* fall. Patient  has a past medical history of Anxiety, Arthritis, Benign hypertension, Chronic pain disorder, COPD (chronic obstructive pulmonary disease) (HCC), Coronary artery disease, CPAP (continuous positive airway pressure) dependence, Diabetes (HCC), Endometrial adenocarcinoma (HCC), Epilepsy (HCC), GERD (gastroesophageal reflux disease), History of echocardiogram, History of transfusion, Hypertension, Mixed hyperlipidemia, DAVID on CPAP, Shortness of breath, Watermelon stomach, and Wears dentures. At baseline pt was completing adls and mobility independently - I iadls - shares homemaking with family. Pt lives with spouse in 1 story home with no BENTON - FF to basement but does not have to go down. Currently pt requires sba for overall ADLS and sba for functional mobility/transfers. Pt currently presents with impairments in the following categories -steps to enter environment and difficulty performing IADLS  activity tolerance, endurance, and standing balance/tolerance. These impairments, as well as pt's fatigue, SOB, risk for falls, and home environment  limit pt's ability to safely engage in all baseline areas of occupation, includingfunctional mobility/transfers, community mobility, laundry , driving, house maintenance, meal prep, cleaning, social participation , and leisure activities however has supportive family who are able to provide assist prn -  From OT standpoint, recommend home with family support upon D/C. No immediate acute OT  needs indicated at this time- d/c from caseload   Goals   Patient Goals go home   Plan   OT Frequency Eval only   Discharge Recommendation   Rehab Resource Intensity Level, OT III (Minimum Resource Intensity)   Additional Comments  continue outpt for vertigo   AM-PAC Daily Activity Inpatient   Lower Body Dressing 3   Bathing 3   Toileting 4   Upper Body Dressing 4   Grooming 4   Eating 4   Daily Activity Raw Score 22   Daily Activity Standardized Score (Calc for Raw Score >=11) 47.1   AM-PAC Applied Cognition Inpatient   Following a Speech/Presentation 4   Understanding Ordinary Conversation 4   Taking Medications 4   Remembering Where Things Are Placed or Put Away 4   Remembering List of 4-5 Errands 4   Taking Care of Complicated Tasks 4   Applied Cognition Raw Score 24   Applied Cognition Standardized Score 62.21   End of Consult   Education Provided Yes   Patient Position at End of Consult Bed/Chair alarm activated;All needs within reach;Bedside chair   Nurse Communication Nurse aware of consult       The patient's raw score on the AM-PAC Daily Activity Inpatient Short Form is 22. A raw score of greater than or equal to 19 suggests the patient may benefit from discharge to home. Please refer to the recommendation of the Occupational Therapist for safe discharge planning.      Documentation Completed By:    NEL Hurtado/L  [employer] Certified - [id number]

## 2023-12-31 NOTE — PLAN OF CARE
Problem: PAIN - ADULT  Goal: Verbalizes/displays adequate comfort level or baseline comfort level  Description: Interventions:  - Encourage patient to monitor pain and request assistance  - Assess pain using appropriate pain scale  - Administer analgesics based on type and severity of pain and evaluate response  - Implement non-pharmacological measures as appropriate and evaluate  - Notify physician/advanced practitioner if interventions unsuccessful or patient reports new pain  Outcome: Progressing     Problem: SAFETY ADULT  Goal: Patient will remain free of falls  Description: INTERVENTIONS:  - Educate patient/family on patient safety including physical limitations  - Instruct patient to call for assistance with activity   - Consult OT/PT to assist with strengthening/mobility   - Keep Call bell within reach  - Keep bed low and locked with side rails adjusted as appropriate  - Keep care items and personal belongings within reach  - Initiate and maintain comfort rounds  - Make Fall Risk Sign visible to staff  - Offer Toileting every two Hours, in advance of need  - Initiate/Maintain bed alarm  - Obtain necessary fall risk management equipment  - Apply yellow socks and bracelet for high fall risk patients  - Consider moving patient to room near nurses station  Outcome: Progressing

## 2023-12-31 NOTE — ASSESSMENT & PLAN NOTE
- Patient with very small grade 1 splenic laceration/injury, present on admission.  - CT scan of the chest, abdomen and pelvis on 12/30/2023 reviewed.  - Continue to monitor abdominal exam.  Exam has remained benign  - Continue diet as tolerated.  - Patient has remained hemodynamically normal with stable normal hemoglobin and no evidence of active or ongoing bleeding.  - Continue multimodal analgesic regimen.  - Continue to encourage incentive spirometer use and adequate pulmonary hygiene.  - May resume light activity as tolerated while following solid organ injury precautions:  Avoid lifting greater than 10 pounds, any strenuous activities and/or exercise, and contact sports until cleared by the trauma service.  Avoid driving and crowded places until cleared by the trauma service.  - PT and OT evaluation and treatment as indicated.  - Outpatient follow-up in the trauma clinic for re-evaluation in approximately 2 weeks.   - No follow-up imaging necessary.

## 2023-12-31 NOTE — ASSESSMENT & PLAN NOTE
- Patient with continued chronic left shoulder pain without any new significant changes.  - Continue analgesia as needed.  - Resume home therapy on discharge.  - Outpatient follow-up per routine.

## 2023-12-31 NOTE — DISCHARGE SUMMARY
[facility]  Discharge- Anthony Slater 1945, 78 y.o. female MRN: [id number]  [facility] Encounter: [id number]  Primary Care Provider: Marycarmen Calvillo MD   Date and time admitted to hospital: 12/30/2023  8:24 AM    Fall  Assessment & Plan  - Status post fall with the below noted injuries.  - Fall precautions.  - Geriatric Medicine consultation for evaluation, medication review and recommendations.  - PT and OT evaluation and treatment as indicated.  - Case Management consultation for disposition planning.      * Splenic laceration, initial encounter  Assessment & Plan  - Patient with very small grade 1 splenic laceration/injury, present on admission.  - CT scan of the chest, abdomen and pelvis on 12/30/2023 reviewed.  - Continue to monitor abdominal exam.  Exam has remained benign  - Continue diet as tolerated.  - Patient has remained hemodynamically normal with stable normal hemoglobin and no evidence of active or ongoing bleeding.  - Continue multimodal analgesic regimen.  - Continue to encourage incentive spirometer use and adequate pulmonary hygiene.  - May resume light activity as tolerated while following solid organ injury precautions:  Avoid lifting greater than 10 pounds, any strenuous activities and/or exercise, and contact sports until cleared by the trauma service.  Avoid driving and crowded places until cleared by the trauma service.  - PT and OT evaluation and treatment as indicated.  - Outpatient follow-up in the trauma clinic for re-evaluation in approximately 2 weeks.   - No follow-up imaging necessary.    Facial abrasion, initial encounter  Assessment & Plan  - Patient with multiple right-sided facial abrasions, present on presentation.  - Local wound care as indicated.  - Analgesia as needed.    Chronic left shoulder pain  Assessment & Plan  - Patient with continued chronic left shoulder pain without any new significant changes.  - Continue  analgesia as needed.  - Resume home therapy on discharge.  - Outpatient follow-up per routine.        Discharge Summary - Trauma Service   Anthony Slater 78 y.o. female MRN: [id number]  Unit/Bed#: [facility] 605-01 Encounter: [id number]    Admission Date: 12/30/2023     Discharge Date: 12/31/2023    Admitting Diagnosis: Fall, initial encounter [W19.XXXA]  Other injury of unspecified body region, initial encounter [T14.8XXA]    Discharge Diagnosis: See above.    Attending and Service: Dr. Altamirano, Acute Care Surgical Services.    Consulting Physician(s): Geriatric medicine.    Imaging and Procedures Performed:     CT chest abdomen pelvis w contrast    Result Date: 12/30/2023  Impression: Chest: No acute findings. Abdomen and pelvis: Findings most compatible with subcentimeter splenic laceration most compatible with AAST grade 1 injury. Other nonemergent findings above. Workstation performed: [id number]     XR shoulder 2+ views LEFT    Result Date: 12/30/2023  Impression: No acute osseous abnormality, left shoulder or left humerus. Workstation performed: [id number]     XR humerus LEFT    Result Date: 12/30/2023  Impression: No acute osseous abnormality, left shoulder or left humerus. Workstation performed: [id number]     CT recon only thoracolumbar (No Charge)    Result Date: 12/30/2023  Impression: No fracture or traumatic subluxation. Workstation performed: [id number]     CT spine cervical without contrast    Result Date: 12/30/2023  Impression: No cervical spine fracture or traumatic malalignment. Workstation performed: [id number]     CT facial bones without contrast    Result Date: 12/30/2023  Impression: No facial bone fracture identified. Workstation performed: [id number]     CT head without contrast    Result Date: 12/30/2023  Impression: No acute intracranial abnormality.  Nonemergent findings above. Workstation performed: [id number]       Hospital Course: Anthony Slater is a 78-year-old female who presented to [facility]  [facility] emergency department following a syncopal fall in the bathroom overnight.  She was found on the floor by her  and noted a recent diagnosis of COVID with treatment started as an outpatient; patient on Paxlovid.  She noted mild COVID symptoms including congestion and fever.  She did complain of some pain in her right forehead where she struck her head after the fall.  On her initial trauma evaluation by [facility] trauma service, her primary survey was noted to be unremarkable.  On secondary survey, she was afebrile with normal vital signs; she did have right periorbital abrasions with ecchymosis and contusion; remainder of her exam is unremarkable.  Her initial workup included labs and the above and imaging studies.    She was admitted to the trauma service status post syncopal fall with right forehead contusion and abrasions, a grade 1 splenic laceration and COVID.  She was continued on supportive medical care for her COVID symptoms.  She underwent serial abdominal evaluations and hemoglobin checks with no evidence of active or ongoing bleeding and a benign abdominal exam throughout her hospital encounter.  She underwent a syncopal evaluation with vital sign monitoring and no evidence of orthostatic hypotension, labs and ECG.  She did well and remained clinically stable throughout her hospital encounter.  PT and OT evaluated her.  She was cleared for discharge home on 12/31/2023.   was consulted to assist with disposition planning.  For further details of her hospital encounter, please see her complete medical records.    On discharge, the patient is instructed to follow-up with the patient's primary care provider to review the events of the patient's recent hospitalization and incidental findings. The patient is instructed to follow-up in the Trauma Clinic as scheduled on 1/16/2024 at 1:15 PM.  The patient should follow the provided discharge instructions.    Condition at  Discharge: stable     Discharge instructions/Information to patient and family:   See after visit summary for information provided to patient and family.      Provisions for Follow-Up Care:  See after visit summary for information related to follow-up care and any pertinent home health orders.      Disposition: See After Visit Summary for discharge disposition information.    Planned Readmission: No    Discharge Statement   I spent 20 minutes discharging the patient. This time was spent on the day of discharge. I had direct contact with the patient on the day of discharge. Additional documentation is required if more than 30 minutes were spent on discharge.     Discharge Medications:  See after visit summary for reconciled discharge medications provided to patient and family.      Connor Milton PA-C  12/31/2023  10:04 AM

## 2023-12-31 NOTE — PHYSICAL THERAPY NOTE
PHYSICAL THERAPY EVALUATION  NAME:  Anthony Slater  DATE: 12/31/23    AGE:   78 y.o.  Mrn:   [id number]  ADMIT DX:  Fall, initial encounter [W19.XXXA]  Other injury of unspecified body region, initial encounter [T14.8XXA]  Problem List:   Patient Active Problem List   Diagnosis    Angiodysplasia of gastrointestinal tract    Obstructive sleep apnea syndrome    Anxiety state    Coronary arteriosclerosis    Gastric antral vascular ectasia    History of total abdominal hysterectomy and bilateral salpingo-oophorectomy    Endometrial cancer (HCC)    Drug-induced osteonecrosis of jaw (HCC)    Hyperlipidemia    Migraine    Osteoporosis    Thyroid nodule    Fall    Splenic laceration, initial encounter    Facial abrasion, initial encounter    Chronic left shoulder pain       Past Medical History  Past Medical History:   Diagnosis Date    Anxiety     Arthritis     Benign hypertension     Chronic pain disorder     back    COPD (chronic obstructive pulmonary disease) (HCC)     Coronary artery disease     medical management    CPAP (continuous positive airway pressure) dependence     Diabetes (HCC)     Endometrial adenocarcinoma (HCC)     Epilepsy (HCC)     GERD (gastroesophageal reflux disease)     History of echocardiogram 02/07/2014    EF 55%, mild MR.    History of transfusion     Hypertension     Mixed hyperlipidemia     DAVID on CPAP     Shortness of breath     triggered by anxiety    Watermelon stomach     Wears dentures        Past Surgical History  Past Surgical History:   Procedure Laterality Date    BREAST BIOPSY Right 02/24/2015    BREAST BIOPSY Left     ? year    CARDIAC CATHETERIZATION  02/07/2014    EF 65%, mid LAD 60% stenosis and D2 70% stenosis.  Medical management.    CATARACT EXTRACTION Bilateral     COLONOSCOPY      CYSTOSCOPY N/A 07/31/2023    Procedure: CYSTOSCOPY;  Surgeon: Nadir Ash MD;  Location: [facility] OR;  Service: Gynecology Oncology    EGD      JOINT REPLACEMENT Right     RIGHT ELBOW     OOPHORECTOMY      not sure which one was removed    LA LAPS TOTAL HYSTERECT 250 GM/< W/RMVL TUBE/OVARY N/A 07/31/2023    Procedure: ROBOTHYSTERECTOMY, BILATERAL SALPINGO-OOPHORECTOMY, PELVIC SENTINEL NODE BIOPSIES;  Surgeon: Nadir Ash MD;  Location: [facility];  Service: Gynecology Oncology    ROTATOR CUFF REPAIR Right     US GUIDED THYROID BIOPSY  12/04/2020       Length Of Stay: 1  Performed at least 2 patient identifiers during session: Name and Birthday       12/31/23 0906   PT Last Visit   PT Visit Date 12/31/23   Note Type   Note type Evaluation   Pain Assessment   Pain Assessment Tool 0-10   Pain Score 10 - Worst Possible Pain   Pain Location/Orientation Orientation: Left;Location: Shoulder   Pain Onset/Description Onset: Ongoing   Restrictions/Precautions   Weight Bearing Precautions Per Order No   Other Precautions Contact/isolation;Airborne/isolation;Bed Alarm;Chair Alarm;Fall Risk   Home Living   Type of Home House   Home Layout One level  (0 [place])   Bathroom Shower/Tub Tub/shower unit   Bathroom Toilet Raised   Bathroom Equipment Grab bars in shower   Home Equipment   (no AD used at baseline)   Prior Function   Level of [place] Independent with ADLs;Independent with functional mobility   Lives With Spouse   Receives Help From Family   IADLs Independent with meal prep;Independent with medication management;Family/Friend/Other provides transportation   Falls in the last 6 months 1 to 4   Vocational Retired   General   Family/Caregiver Present No   Cognition   Overall Cognitive Status WFL   Arousal/Participation Alert   Orientation Level Oriented X4   Memory Within functional limits   Following Commands Follows all commands and directions without difficulty   RLE Assessment   RLE Assessment WFL   LLE Assessment   LLE Assessment WFL   Coordination   Sensation WFL   Bed Mobility   Supine to Sit 5  Supervision   Additional items Assist x 1;HOB elevated;Increased time required   Additional Comments  pt reported dizziness with movement however reported this is due to chronic vertigo   Transfers   Sit to Stand 5  Supervision   Additional items Verbal cues   Stand to Sit 5  Supervision   Additional items Verbal cues   Toilet transfer 5  Supervision   Additional items Standard toilet;Verbal cues  (grab bar)   Ambulation/Elevation   Gait pattern Improper Weight shift;Decreased foot clearance   Gait Assistance 5  Supervision   Additional items Verbal cues   Assistive Device   (HHA)   Distance 15 ft x 2   Ambulation/Elevation Additional Comments dicussed use of possible AD upon d/c home   Balance   Static Sitting Good   Dynamic Sitting Fair +   Static Standing Fair   Dynamic Standing Fair -   Ambulatory Fair -   Endurance Deficit   Endurance Deficit Yes   Endurance Deficit Description pt reporting slight fatigue   Activity Tolerance   Activity Tolerance Patient limited by fatigue   Assessment   Prognosis Good   Assessment Pt is 78 y.o. female seen for PT evaluation s/p admit to [facility] on 12/30/2023 w/ Splenic laceration, initial encounter. PT consulted to assess pt's functional mobility and d/c needs. Order placed for PT eval and tx, w/  up in chair  order. Pt agreeable to PT  session upon arrival, pt found supine in bed.  PTA, pt was independent w/ all functional mobility w/ no D, ambulates unrestricted distances and all terrain, lives w/ wife in 1 level home, and retired.  Pt to benefit from continued PT tx to address deficits and maximize level of functional independent mobility and consistency. Upon conclusion pt  seated in recliner. Complexity: Comorbidities affecting pt's physical performance at time of assessment include: COPD, DM, anxiety, and fall with forehead contusion and chronic pain . Personal factors affecting pt at time of IE include: advanced age, history of falls, and anxiety. Please find objective findings from PT assessment regarding body systems outlined above with impairments and  limitations including impaired balance, decreased endurance, gait deviations, pain, and fall risk.  Pt's clinical presentation is currently unstable/unpredictable seen in pt's presentation of abnormal H&H, pain, polypharmacy, and recent fall . The patient's [facility] Basic Mobility Inpatient Short Form Raw Score is 20.  Based on patient presentations and impairments, pt would most appropriately benefit from Level III resource intensity upon discharge. Please also refer to the recommendation of the Physical Therapist for safe discharge planning. RN verbalized pt appropriate for PT session. Pt seen as a co-eval with OT due to the patient's co-morbidities, clinically unstable presentation, and present impairments which are a regression from the patient's baseline.   Goals   Patient Goals to go to the bathroom   LTG Expiration Date 01/10/24   Long Term Goal #1 Pt will: Perform bed mobility tasks to modified I to improve ease of bed mobility. Perform transfers to modified I to improve ease of transfers. Perform ambulation with MI  for 250 feet to  increase Indep in home environment. Increase dynamic standing balance to F+ to decrease fall risk.   Increase OOB activity tolerance to 10 minutes without s/s of exertion to decrease fall risk.   Plan   Treatment/Interventions Functional transfer training;Endurance training;Gait training;Equipment eval/education   PT Frequency 3-5x/wk   Discharge Recommendation   Rehab Resource Intensity Level, PT III (Minimum Resource Intensity)   Equipment Recommended   (possible RW)   [facility]   Turning in Flat Bed Without Bedrails 4   Lying on Back to Sitting on Edge of Flat Bed Without Bedrails 4   Moving Bed to Chair 3   Standing Up From Chair Using Arms 3   Walk in Room 3   Climb 3-5 Stairs With Railing 3   Basic Mobility Inpatient Raw Score 20   Basic Mobility Standardized Score 43.99   Highest Level Of Mobility   -[facility] Goal 6: Walk 10 steps or more   -HLM  Achieved 7: Walk 25 feet or more       Time In: 0824  Time Out: 0906  Total Evaluation Minutes: 17    Savanna Contreras, PT

## 2024-01-02 ENCOUNTER — TRANSITIONAL CARE MANAGEMENT (OUTPATIENT)
Dept: FAMILY MEDICINE CLINIC | Facility: CLINIC | Age: 79
End: 2024-01-02

## 2024-01-05 ENCOUNTER — HOSPITAL ENCOUNTER (EMERGENCY)
Facility: HOSPITAL | Age: 79
Discharge: HOME | End: 2024-01-05
Attending: EMERGENCY MEDICINE
Payer: MEDICARE

## 2024-01-05 ENCOUNTER — APPOINTMENT (EMERGENCY)
Dept: RADIOLOGY | Facility: HOSPITAL | Age: 79
End: 2024-01-05
Payer: MEDICARE

## 2024-01-05 VITALS
BODY MASS INDEX: 21.34 KG/M2 | TEMPERATURE: 99.4 F | RESPIRATION RATE: 20 BRPM | OXYGEN SATURATION: 99 % | DIASTOLIC BLOOD PRESSURE: 60 MMHG | HEART RATE: 81 BPM | HEIGHT: 61 IN | WEIGHT: 113 LBS | SYSTOLIC BLOOD PRESSURE: 130 MMHG

## 2024-01-05 DIAGNOSIS — R53.1 WEAKNESS: Primary | ICD-10-CM

## 2024-01-05 LAB
ALBUMIN SERPL BCP-MCNC: 3.4 G/DL (ref 3.5–5)
ALP SERPL-CCNC: 54 U/L (ref 34–104)
ALT SERPL W P-5'-P-CCNC: 25 U/L (ref 7–52)
ANION GAP SERPL CALCULATED.3IONS-SCNC: 7 MMOL/L
APTT PPP: 28 SECONDS (ref 23–37)
AST SERPL W P-5'-P-CCNC: 29 U/L (ref 13–39)
BASOPHILS # BLD AUTO: 0.02 THOUSANDS/ÂΜL (ref 0–0.1)
BASOPHILS NFR BLD AUTO: 0 % (ref 0–1)
BILIRUB SERPL-MCNC: 0.26 MG/DL (ref 0.2–1)
BILIRUB UR QL STRIP: NEGATIVE
BUN SERPL-MCNC: 16 MG/DL (ref 5–25)
CALCIUM ALBUM COR SERPL-MCNC: 9.1 MG/DL (ref 8.3–10.1)
CALCIUM SERPL-MCNC: 8.6 MG/DL (ref 8.4–10.2)
CHLORIDE SERPL-SCNC: 107 MMOL/L (ref 96–108)
CLARITY UR: CLEAR
CO2 SERPL-SCNC: 25 MMOL/L (ref 21–32)
COLOR UR: YELLOW
CREAT SERPL-MCNC: 0.88 MG/DL (ref 0.6–1.3)
EOSINOPHIL # BLD AUTO: 0.1 THOUSAND/ÂΜL (ref 0–0.61)
EOSINOPHIL NFR BLD AUTO: 1 % (ref 0–6)
ERYTHROCYTE [DISTWIDTH] IN BLOOD BY AUTOMATED COUNT: 15.3 % (ref 11.6–15.1)
GFR SERPL CREATININE-BSD FRML MDRD: 62 ML/MIN/1.73SQ M
GLUCOSE SERPL-MCNC: 140 MG/DL (ref 65–140)
GLUCOSE UR STRIP-MCNC: NEGATIVE MG/DL
HCT VFR BLD AUTO: 32.8 % (ref 34.8–46.1)
HGB BLD-MCNC: 10.3 G/DL (ref 11.5–15.4)
HGB UR QL STRIP.AUTO: NEGATIVE
IMM GRANULOCYTES # BLD AUTO: 0.05 THOUSAND/UL (ref 0–0.2)
IMM GRANULOCYTES NFR BLD AUTO: 1 % (ref 0–2)
INR PPP: 1.01 (ref 0.84–1.19)
KETONES UR STRIP-MCNC: NEGATIVE MG/DL
LACTATE SERPL-SCNC: 1.7 MMOL/L (ref 0.5–2)
LEUKOCYTE ESTERASE UR QL STRIP: NEGATIVE
LYMPHOCYTES # BLD AUTO: 1.41 THOUSANDS/ÂΜL (ref 0.6–4.47)
LYMPHOCYTES NFR BLD AUTO: 17 % (ref 14–44)
MCH RBC QN AUTO: 26.3 PG (ref 26.8–34.3)
MCHC RBC AUTO-ENTMCNC: 31.4 G/DL (ref 31.4–37.4)
MCV RBC AUTO: 84 FL (ref 82–98)
MONOCYTES # BLD AUTO: 0.62 THOUSAND/ÂΜL (ref 0.17–1.22)
MONOCYTES NFR BLD AUTO: 7 % (ref 4–12)
NEUTROPHILS # BLD AUTO: 6.28 THOUSANDS/ÂΜL (ref 1.85–7.62)
NEUTS SEG NFR BLD AUTO: 74 % (ref 43–75)
NITRITE UR QL STRIP: NEGATIVE
NRBC BLD AUTO-RTO: 0 /100 WBCS
PH UR STRIP.AUTO: 7 [PH]
PLATELET # BLD AUTO: 254 THOUSANDS/UL (ref 149–390)
PMV BLD AUTO: 11.4 FL (ref 8.9–12.7)
POTASSIUM SERPL-SCNC: 3.6 MMOL/L (ref 3.5–5.3)
PROCALCITONIN SERPL-MCNC: <0.05 NG/ML
PROT SERPL-MCNC: 6.1 G/DL (ref 6.4–8.4)
PROT UR STRIP-MCNC: NEGATIVE MG/DL
PROTHROMBIN TIME: 13.5 SECONDS (ref 11.6–14.5)
RBC # BLD AUTO: 3.91 MILLION/UL (ref 3.81–5.12)
SODIUM SERPL-SCNC: 139 MMOL/L (ref 135–147)
SP GR UR STRIP.AUTO: 1.01
TSH SERPL DL<=0.05 MIU/L-ACNC: 1.56 UIU/ML (ref 0.45–4.5)
UROBILINOGEN UR QL STRIP.AUTO: 0.2 E.U./DL
WBC # BLD AUTO: 8.48 THOUSAND/UL (ref 4.31–10.16)

## 2024-01-05 PROCEDURE — 84145 PROCALCITONIN (PCT): CPT | Performed by: EMERGENCY MEDICINE

## 2024-01-05 PROCEDURE — 83605 ASSAY OF LACTIC ACID: CPT | Performed by: EMERGENCY MEDICINE

## 2024-01-05 PROCEDURE — 96365 THER/PROPH/DIAG IV INF INIT: CPT

## 2024-01-05 PROCEDURE — 85025 COMPLETE CBC W/AUTO DIFF WBC: CPT | Performed by: EMERGENCY MEDICINE

## 2024-01-05 PROCEDURE — 71045 X-RAY EXAM CHEST 1 VIEW: CPT

## 2024-01-05 PROCEDURE — 99285 EMERGENCY DEPT VISIT HI MDM: CPT | Performed by: EMERGENCY MEDICINE

## 2024-01-05 PROCEDURE — 97163 PT EVAL HIGH COMPLEX 45 MIN: CPT

## 2024-01-05 PROCEDURE — 81003 URINALYSIS AUTO W/O SCOPE: CPT | Performed by: EMERGENCY MEDICINE

## 2024-01-05 PROCEDURE — 80053 COMPREHEN METABOLIC PANEL: CPT | Performed by: EMERGENCY MEDICINE

## 2024-01-05 PROCEDURE — 99285 EMERGENCY DEPT VISIT HI MDM: CPT

## 2024-01-05 PROCEDURE — 84443 ASSAY THYROID STIM HORMONE: CPT | Performed by: EMERGENCY MEDICINE

## 2024-01-05 PROCEDURE — 36415 COLL VENOUS BLD VENIPUNCTURE: CPT | Performed by: EMERGENCY MEDICINE

## 2024-01-05 PROCEDURE — 96361 HYDRATE IV INFUSION ADD-ON: CPT

## 2024-01-05 PROCEDURE — 97167 OT EVAL HIGH COMPLEX 60 MIN: CPT

## 2024-01-05 PROCEDURE — 87040 BLOOD CULTURE FOR BACTERIA: CPT | Performed by: EMERGENCY MEDICINE

## 2024-01-05 PROCEDURE — 85610 PROTHROMBIN TIME: CPT | Performed by: EMERGENCY MEDICINE

## 2024-01-05 PROCEDURE — 85730 THROMBOPLASTIN TIME PARTIAL: CPT | Performed by: EMERGENCY MEDICINE

## 2024-01-05 PROCEDURE — 93005 ELECTROCARDIOGRAM TRACING: CPT

## 2024-01-05 RX ORDER — ACETAMINOPHEN 10 MG/ML
1000 INJECTION, SOLUTION INTRAVENOUS ONCE
Status: COMPLETED | OUTPATIENT
Start: 2024-01-05 | End: 2024-01-05

## 2024-01-05 RX ADMIN — ACETAMINOPHEN 1000 MG: 10 INJECTION INTRAVENOUS at 08:14

## 2024-01-05 RX ADMIN — SODIUM CHLORIDE 1000 ML: 0.9 INJECTION, SOLUTION INTRAVENOUS at 06:52

## 2024-01-05 RX ADMIN — SODIUM CHLORIDE 1000 ML: 0.9 INJECTION, SOLUTION INTRAVENOUS at 06:51

## 2024-01-05 NOTE — ED PROVIDER NOTES
History  Chief Complaint   Patient presents with    Weakness - Generalized     Arrival via EMS. Just not feeling well. Tested positive for COVID over Xmas. Fell one week ago, bruising to face.      HPI      Very pleasant, nontoxic-appearing, 78-year-old female presents emergency department via EMS secondary to a chief complaint of generalized fatigue.  Patient was recently diagnosed on 29 December 2023 with COVID-19 infection.  Recently evaluated in this emergency department and subsequently transferred to [facility] for a very mild grade 1 splenic laceration requiring no surgical intervention and discharged the next day.  Patient awoke earlier today at 2 AM feeling generalized fatigue and shaking chills consistent with rigors.  Patient denies a cough, congestion, abdominal pain, urinary frequency or dysuria.  Prior to Admission Medications   Prescriptions Last Dose Informant Patient Reported? Taking?   Ascorbic Acid (vitamin C) 100 MG tablet  Self Yes No   Sig: Take 500 mg by mouth 2 (two) times a day With rosehips   Cholecalciferol (Vitamin D3) 1.25 MG (77832 UT) CAPS  Self Yes No   Sig: Take 1,000 Units by mouth daily   Iron Heme Polypeptide (Proferrin ES) 12 MG TABS  Self Yes No   Sig: one tablet b.i.d.   acetaminophen (TYLENOL) 325 mg tablet   No No   Sig: Take 2 tablets (650 mg total) by mouth every 4 (four) hours as needed for mild pain   atorvastatin (LIPITOR) 40 mg tablet  Self No No   Sig: Take 1 tablet (40 mg total) by mouth daily   co-enzyme Q-10 100 mg capsule  Self Yes No   Sig: Take by mouth daily   metoprolol tartrate (LOPRESSOR) 25 mg tablet   No No   Sig: Take 0.5 tablets (12.5 mg total) by mouth daily at bedtime   omeprazole (PriLOSEC) 20 mg delayed release capsule  Self No No   Sig: Take 1 capsule (20 mg total) by mouth daily   senna (SENOKOT) 8.6 mg   No No   Sig: Take 1 tablet (8.6 mg total) by mouth daily for 5 days   traZODone (DESYREL) 50 mg tablet  Self Yes No   Sig: Take 50 mg by  mouth daily at bedtime 1/2 tablet   vitamin B-12 (VITAMIN B-12) 1,000 mcg tablet  Self Yes No   Sig: Take 1,000 mcg by mouth daily      Facility-Administered Medications: None       Past Medical History:   Diagnosis Date    Anxiety     Arthritis     Benign hypertension     Chronic pain disorder     back    COPD (chronic obstructive pulmonary disease) (HCC)     Coronary artery disease     medical management    CPAP (continuous positive airway pressure) dependence     Diabetes (HCC)     Endometrial adenocarcinoma (HCC)     Epilepsy (HCC)     GERD (gastroesophageal reflux disease)     History of echocardiogram 02/07/2014    EF 55%, mild MR.    History of transfusion     Hypertension     Mixed hyperlipidemia     DAVID on CPAP     Shortness of breath     triggered by anxiety    Watermelon stomach     Wears dentures        Past Surgical History:   Procedure Laterality Date    BREAST BIOPSY Right 02/24/2015    BREAST BIOPSY Left     ? year    CARDIAC CATHETERIZATION  02/07/2014    EF 65%, mid LAD 60% stenosis and D2 70% stenosis.  Medical management.    CATARACT EXTRACTION Bilateral     COLONOSCOPY      CYSTOSCOPY N/A 07/31/2023    Procedure: CYSTOSCOPY;  Surgeon: Nadir Ash MD;  Location: [facility];  Service: Gynecology Oncology    EGD      JOINT REPLACEMENT Right     RIGHT ELBOW    OOPHORECTOMY      not sure which one was removed    AR LAPS TOTAL HYSTERECT 250 GM/< W/RMVL TUBE/OVARY N/A 07/31/2023    Procedure: ROBOTHYSTERECTOMY, BILATERAL SALPINGO-OOPHORECTOMY, PELVIC SENTINEL NODE BIOPSIES;  Surgeon: Nadir Ash MD;  Location: [facility];  Service: Gynecology Oncology    ROTATOR CUFF REPAIR Right     US GUIDED THYROID BIOPSY  12/04/2020       Family History   Problem Relation Age of Onset    Heart disease Mother     Coronary artery disease Sister         history of CABG    No Known Problems Father     No Known Problems Daughter     No Known Problems Sister     No Known Problems Sister     No Known  Problems Sister     No Known Problems Daughter      I have reviewed and agree with the history as documented.    E-Cigarette/Vaping    E-Cigarette Use Never User      E-Cigarette/Vaping Substances    Nicotine No     THC No     CBD No     Flavoring No     Other No     Unknown No      Social History     Tobacco Use    Smoking status: Never     Passive exposure: Never    Smokeless tobacco: Never   Vaping Use    Vaping status: Never Used   Substance Use Topics    Alcohol use: Not Currently    Drug use: Never       Review of Systems   Constitutional:  Positive for chills and fatigue.   HENT: Negative.     Eyes: Negative.    Respiratory: Negative.  Negative for chest tightness and shortness of breath.    Cardiovascular: Negative.  Negative for chest pain, palpitations and leg swelling.   Gastrointestinal: Negative.    Endocrine: Negative.    Genitourinary: Negative.  Negative for hematuria and urgency.   Musculoskeletal: Negative.    Skin: Negative.    Allergic/Immunologic: Negative.    Neurological: Negative.    Hematological: Negative.    Psychiatric/Behavioral: Negative.         Physical Exam  Physical Exam  Vitals and nursing note reviewed.   Constitutional:       Appearance: Normal appearance. She is normal weight.   HENT:      Head: Normocephalic.      Comments: Old contusion R side scalp area.      Ears appear normal.  External auditory canals patent without erythema or edema bilaterally.  TM grey/flat bilaterally.  Nose normal inspection, no deformity, nares patent bilaterally.  No septal hematoma, No epistaxis.  Mucous membranes moist, pink.  Tongue midline without edema.  Uvula midline without deviation.  Posterior pharynx widely patent.  No posterior erythema.  Tonsils without edema, erythema or purulent exudate.  No tongue or lip swelling present. No defined dental abscess.  No sublingual or submandibular fullness or swelling.  No trismus.  No drooling or pooling of secretions. No stridor w/o evidence of  brawniness under the tongue.      Right Ear: External ear normal.      Left Ear: External ear normal.      Nose: Nose normal.      Mouth/Throat:      Mouth: Mucous membranes are moist.      Pharynx: Oropharynx is clear.   Eyes:      Extraocular Movements: Extraocular movements intact.      Conjunctiva/sclera: Conjunctivae normal.      Pupils: Pupils are equal, round, and reactive to light.   Cardiovascular:      Rate and Rhythm: Normal rate and regular rhythm.      Pulses: Normal pulses.      Heart sounds: Normal heart sounds.   Pulmonary:      Effort: Pulmonary effort is normal.      Breath sounds: Normal breath sounds.      Comments: No flail segments noted on exam, equal breath sounds in the posterior and anterior lung fields, no tenderness upon palpation of the anterior and posterior ribcage's / thoracic chest wall.  No bruising, no contusions, no outward signs of trauma or swelling noted.   Abdominal:      General: Abdomen is flat. Bowel sounds are normal.   Musculoskeletal:         General: Normal range of motion.      Cervical back: Normal range of motion.   Skin:     General: Skin is warm.      Capillary Refill: Capillary refill takes less than 2 seconds.   Neurological:      General: No focal deficit present.      Mental Status: She is alert and oriented to person, place, and time. Mental status is at baseline.   Psychiatric:         Mood and Affect: Mood normal.         Behavior: Behavior normal.         Thought Content: Thought content normal.         Judgment: Judgment normal.         Vital Signs  ED Triage Vitals [01/05/24 0617]   Temperature Pulse Respirations Blood Pressure SpO2   99.4 °F (37.4 °C) 89 18 166/73 100 %      Temp src Heart Rate Source Patient Position - Orthostatic VS BP Location FiO2 (%)   -- Monitor Sitting Left arm --      Pain Score       6           Vitals:    01/05/24 0930 01/05/24 0945 01/05/24 1100 01/05/24 1200   BP: 144/67 141/65 140/64 130/60   Pulse: 92 86 82 81   Patient  Position - Orthostatic VS: Lying Lying Sitting Sitting         Visual Acuity      ED Medications  Medications   sodium chloride 0.9 % bolus 1,000 mL (0 mL Intravenous Stopped 1/5/24 0816)     Followed by   sodium chloride 0.9 % bolus 1,000 mL (0 mL Intravenous Stopped 1/5/24 0816)   acetaminophen (Ofirmev) injection 1,000 mg (0 mg Intravenous Stopped 1/5/24 0934)       Diagnostic Studies  Results Reviewed       Procedure Component Value Units Date/Time    Blood culture #1 [[id number]] Collected: 01/05/24 0644    Lab Status: Preliminary result Specimen: Blood from Arm, Right Updated: 01/05/24 1401     Blood Culture Received in Microbiology Lab. Culture in Progress.    Blood culture #2 [[id number]] Collected: 01/05/24 0644    Lab Status: Preliminary result Specimen: Blood from Arm, Left Updated: 01/05/24 1401     Blood Culture Received in Microbiology Lab. Culture in Progress.    TSH [[id number]]  (Normal) Collected: 01/05/24 0644    Lab Status: Final result Specimen: Blood from Arm, Left Updated: 01/05/24 0752     TSH 3RD GENERATON 1.558 uIU/mL     Procalcitonin [[id number]]  (Normal) Collected: 01/05/24 0644    Lab Status: Final result Specimen: Blood from Arm, Left Updated: 01/05/24 0722     Procalcitonin <0.05 ng/ml     UA w Reflex to Microscopic w Reflex to Culture [[id number]]  (Normal) Collected: 01/05/24 0707    Lab Status: Final result Specimen: Urine, Other Updated: 01/05/24 0713     Color, UA Yellow     Clarity, UA Clear     Specific Gravity, UA 1.015     pH, UA 7.0     Leukocytes, UA Negative     Nitrite, UA Negative     Protein, UA Negative mg/dl      Glucose, UA Negative mg/dl      Ketones, UA Negative mg/dl      Urobilinogen, UA 0.2 E.U./dl      Bilirubin, UA Negative     Occult Blood, UA Negative    Comprehensive metabolic panel [[id number]]  (Abnormal) Collected: 01/05/24 0644    Lab Status: Final result Specimen: Blood from Arm, Left Updated: 01/05/24 0712     Sodium 139 mmol/L      Potassium 3.6 mmol/L       Chloride 107 mmol/L      CO2 25 mmol/L      ANION GAP 7 mmol/L      BUN 16 mg/dL      Creatinine 0.88 mg/dL      Glucose 140 mg/dL      Calcium 8.6 mg/dL      Corrected Calcium 9.1 mg/dL      AST 29 U/L      ALT 25 U/L      Alkaline Phosphatase 54 U/L      Total Protein 6.1 g/dL      Albumin 3.4 g/dL      Total Bilirubin 0.26 mg/dL      eGFR 62 ml/min/1.73sq m     Narrative:      National Kidney Disease Foundation guidelines for Chronic Kidney Disease (CKD):     Stage 1 with normal or high GFR (GFR > 90 mL/min/1.73 square meters)    Stage 2 Mild CKD (GFR = 60-89 mL/min/1.73 square meters)    Stage 3A Moderate CKD (GFR = 45-59 mL/min/1.73 square meters)    Stage 3B Moderate CKD (GFR = 30-44 mL/min/1.73 square meters)    Stage 4 Severe CKD (GFR = 15-29 mL/min/1.73 square meters)    Stage 5 End Stage CKD (GFR <15 mL/min/1.73 square meters)  Note: GFR calculation is accurate only with a steady state creatinine    Lactic acid [[id number]]  (Normal) Collected: 01/05/24 0644    Lab Status: Final result Specimen: Blood from Arm, Left Updated: 01/05/24 0712     LACTIC ACID 1.7 mmol/L     Narrative:      Result may be elevated if tourniquet was used during collection.    Protime-INR [[id number]]  (Normal) Collected: 01/05/24 0644    Lab Status: Final result Specimen: Blood from Arm, Left Updated: 01/05/24 0708     Protime 13.5 seconds      INR 1.01    APTT [[id number]]  (Normal) Collected: 01/05/24 0644    Lab Status: Final result Specimen: Blood from Arm, Left Updated: 01/05/24 0708     PTT 28 seconds     CBC and differential [[id number]]  (Abnormal) Collected: 01/05/24 0644    Lab Status: Final result Specimen: Blood from Arm, Left Updated: 01/05/24 0654     WBC 8.48 Thousand/uL      RBC 3.91 Million/uL      Hemoglobin 10.3 g/dL      Hematocrit 32.8 %      MCV 84 fL      MCH 26.3 pg      MCHC 31.4 g/dL      RDW 15.3 %      MPV 11.4 fL      Platelets 254 Thousands/uL      nRBC 0 /100 WBCs      Neutrophils Relative 74 %       Immat GRANS % 1 %      Lymphocytes Relative 17 %      Monocytes Relative 7 %      Eosinophils Relative 1 %      Basophils Relative 0 %      Neutrophils Absolute 6.28 Thousands/µL      Immature Grans Absolute 0.05 Thousand/uL      Lymphocytes Absolute 1.41 Thousands/µL      Monocytes Absolute 0.62 Thousand/µL      Eosinophils Absolute 0.10 Thousand/µL      Basophils Absolute 0.02 Thousands/µL                    XR chest 1 view portable   ED Interpretation by Ronnie Leavitt III, DO (01/05 6450)   No acute osseous abnormalities in this portable chest x-ray.      Final Result by Christian Osborne MD (01/05 0935)      No acute cardiopulmonary disease.      Findings are stable            Workstation performed: [id number]                    Procedures  ECG 12 Lead Documentation Only    Date/Time: 1/5/2024 6:19 AM    Performed by: Ronnie Leavitt III, DO  Authorized by: Ronnie Leavitt III, DO    Indications / Diagnosis:  Weakness  ECG reviewed by me, the ED Provider: yes    Comments:      I personally reviewed this EKG that was performed the patient January 5, 2024, EKG was completed at 6:19 AM and interpreted by me the same time, normal sinus rhythm with no acute ST abnormalities, ventricular rate 91 bpm, remaining portion of all is within normal limits.    I, RONNIE LEAVITT D.O., was the attending physician on duty at the time the patient visited the emergency department. The patient was evaluated by the AP. I was personally available for consultation concerning the patient. I did not see the patient nor participate in the medical decision making process of the encounter and the patient was dispositioned in the emergency department. I am administratively signing the chart after the fact.                 ED Course  ED Course as of 01/05/24 1636   Fri Jan 05, 2024   0635 Seen and evaluated, orders placed.  Recently discharged from [facility] post fall with a very small grade 1  "splenic laceration with no intervention, positive over Christmas for COVID-19, had shaking chills today at home upon waking up at 2 AM.    Focused differential diagnosis in this patient is as follows: UTI versus electrolyte abnormalities versus pneumonia versus remnants of COVID-19 vs. Thyroid dysfunction.   0749 Base labs are unremarkable,  noticed that she is extremely weak at home,   0753 Reassessed, awaiting a urinalysis,  reports that he is in trouble taking care of her at home, they do not have any outpatient resources specifically visiting nurse.  Consult case management, PT OT.   0759 Patient signed out to Dr. Serrano, pending plan previously dictated.                               SBIRT 22yo+      Flowsheet Row Most Recent Value   Initial Alcohol Screen: US AUDIT-C     1. How often do you have a drink containing alcohol? 0 Filed at: 01/05/2024 0617   2. How many drinks containing alcohol do you have on a typical day you are drinking?  0 Filed at: 01/05/2024 0617   3a. Male UNDER 65: How often do you have five or more drinks on one occasion? 0 Filed at: 01/05/2024 0617   3b. FEMALE Any Age, or MALE 65+: How often do you have 4 or more drinks on one occassion? 0 Filed at: 01/05/2024 0617   Audit-C Score 0 Filed at: 01/05/2024 0617   MARY ANNE: How many times in the past year have you...    Used an illegal drug or used a prescription medication for non-medical reasons? Never Filed at: 01/05/2024 0617                      Medical Decision Making  Recent admission to [facility] for trauma, see ED course for specifics, recent covid infection last month, appears there is deconditioning from COVID illness and hospitalization, work up negative,  noted no services coming into residence for assistance, signed out to day provider pending case management / PT / OT eval.    Portions of the record may have been created with voice recognition software. Occasional wrong word or \"sound a like\" substitutions may have " occurred due to the inherent limitations of voice recognition software. Read the chart carefully and recognize, using context, where substitutions have occurred.       Amount and/or Complexity of Data Reviewed  Labs: ordered.  Radiology: ordered and independent interpretation performed.    Risk  Prescription drug management.             Disposition  Final diagnoses:   Weakness     Time reflects when diagnosis was documented in both MDM as applicable and the Disposition within this note       Time User Action Codes Description Comment    1/5/2024  7:55 AM [email or web address] Add [R53.1] Weakness           ED Disposition       ED Disposition   Discharge    Condition   Stable    Date/Time   Fri Jan 5, 2024 1203    Comment   Anthony Slater discharge to home/self care.                   Follow-up Information       Follow up With Specialties Details Why Contact Info    [facility] Follow up Physical Therapist will call prior to comeing to see you upon discharge [address]  [address]  [facility] [address]  [phone]      Marycarmen Calvillo MD Family Medicine On 1/9/2024  [address] [place], [place] [address] [place] [id number] [phone]              Discharge Medication List as of 1/5/2024 12:03 PM        CONTINUE these medications which have NOT CHANGED    Details   acetaminophen (TYLENOL) 325 mg tablet Take 2 tablets (650 mg total) by mouth every 4 (four) hours as needed for mild pain, Starting Sun 12/31/2023, No Print      Ascorbic Acid (vitamin C) 100 MG tablet Take 500 mg by mouth 2 (two) times a day With rosehips, Historical Med      atorvastatin (LIPITOR) 40 mg tablet Take 1 tablet (40 mg total) by mouth daily, Starting Wed 11/22/2023, Normal      Cholecalciferol (Vitamin D3) 1.25 MG (43474 UT) CAPS Take 1,000 Units by mouth daily, Historical Med      co-enzyme Q-10 100 mg capsule Take by mouth daily, Historical Med      Iron Heme Polypeptide (Proferrin ES) 12 MG TABS one tablet b.i.d.,  Historical Med      metoprolol tartrate (LOPRESSOR) 25 mg tablet Take 0.5 tablets (12.5 mg total) by mouth daily at bedtime, Starting Thu 12/28/2023, Normal      omeprazole (PriLOSEC) 20 mg delayed release capsule Take 1 capsule (20 mg total) by mouth daily, Starting Wed 10/25/2023, Normal      senna (SENOKOT) 8.6 mg Take 1 tablet (8.6 mg total) by mouth daily for 5 days, Starting Mon 1/1/2024, Until Sat 1/6/2024, Normal      traZODone (DESYREL) 50 mg tablet Take 50 mg by mouth daily at bedtime 1/2 tablet, Starting Thu 6/29/2023, Historical Med      vitamin B-12 (VITAMIN B-12) 1,000 mcg tablet Take 1,000 mcg by mouth daily, Historical Med                 PDMP Review         Value Time User    PDMP Reviewed  Yes 12/31/2023 10:16 AM Connor Milton PA-C            ED Provider  Electronically Signed by             Nahid Leavitt III,   01/05/24 7737

## 2024-01-05 NOTE — OCCUPATIONAL THERAPY NOTE
Occupational Therapy Evaluation      Anthony Slater    1/5/2024    Active Problems:  There are no active Hospital Problems.      Past Medical History:   Diagnosis Date    Anxiety     Arthritis     Benign hypertension     Chronic pain disorder     back    COPD (chronic obstructive pulmonary disease) (HCC)     Coronary artery disease     medical management    CPAP (continuous positive airway pressure) dependence     Diabetes (HCC)     Endometrial adenocarcinoma (HCC)     Epilepsy (HCC)     GERD (gastroesophageal reflux disease)     History of echocardiogram 02/07/2014    EF 55%, mild MR.    History of transfusion     Hypertension     Mixed hyperlipidemia     DAVID on CPAP     Shortness of breath     triggered by anxiety    Watermelon stomach     Wears dentures        Past Surgical History:   Procedure Laterality Date    BREAST BIOPSY Right 02/24/2015    BREAST BIOPSY Left     ? year    CARDIAC CATHETERIZATION  02/07/2014    EF 65%, mid LAD 60% stenosis and D2 70% stenosis.  Medical management.    CATARACT EXTRACTION Bilateral     COLONOSCOPY      CYSTOSCOPY N/A 07/31/2023    Procedure: CYSTOSCOPY;  Surgeon: Nadir Ash MD;  Location: [facility];  Service: Gynecology Oncology    EGD      JOINT REPLACEMENT Right     RIGHT ELBOW    OOPHORECTOMY      not sure which one was removed    NY LAPS TOTAL HYSTERECT 250 GM/< W/RMVL TUBE/OVARY N/A 07/31/2023    Procedure: ROBOTHYSTERECTOMY, BILATERAL SALPINGO-OOPHORECTOMY, PELVIC SENTINEL NODE BIOPSIES;  Surgeon: Nadir Ash MD;  Location: [facility];  Service: Gynecology Oncology    ROTATOR CUFF REPAIR Right     US GUIDED THYROID BIOPSY  12/04/2020 01/05/24 0901   OT Last Visit   OT Visit Date 01/05/24   Note Type   Note type Evaluation   Pain Assessment   Pain Assessment Tool 0-10   Pain Score 10 - Worst Possible Pain   Pain Location/Orientation Orientation: Left;Location: Shoulder   Pain Radiating Towards yes, from L lateral neck into L shoulder   Pain  Onset/Description Onset: Ongoing;Frequency: Constant/Continuous;Descriptor: Aching;Descriptor: Sore   Effect of Pain on Daily Activities yes   Patient's Stated Pain Goal No pain   Hospital Pain Intervention(s) Repositioned;Elevated;Emotional support;Environmental changes   Multiple Pain Sites No   Restrictions/Precautions   Weight Bearing Precautions Per Order No   Other Precautions Fall Risk;Pain   Home Living   Type of Home House  ([place] ranch)   Home Layout One level;Performs ADLs on one level;Able to live on main level with bedroom/bathroom;Stairs to enter with rails  (1 + 2 [place])   Bathroom Shower/Tub Walk-in shower  (preferred)   Bathroom Toilet Raised   Bathroom Equipment Shower chair;Grab bars in shower   Bathroom Accessibility Accessible   Home Equipment Walker;Cane  (denies prior usage)   Prior Function   Level of [place] Independent with ADLs;Independent with functional mobility;Needs assistance with IADLS   Lives With Spouse   Receives Help From Family   IADLs Family/Friend/Other provides transportation;Independent with meal prep;Independent with medication management   Falls in the last 6 months 1 to 4   Vocational Retired   ADL   UB Bathing Assistance 4  Minimal Assistance   LB Bathing Assistance 2  Maximal Assistance   UB Dressing Assistance 4  Minimal Assistance   LB Dressing Assistance 1  Total Assistance   Bed Mobility   Supine to Sit 3  Moderate assistance   Additional items Assist x 1;HOB elevated;Increased time required;Verbal cues;LE management   Sit to Supine 3  Moderate assistance   Additional items Assist x 2;Increased time required;Verbal cues;LE management   Additional Comments Verbal cues for bedrail utilization and proper body mechanics.Anthony reports baseline vertigo with increased time provided edge of bed.   Transfers   Sit to Stand 3  Moderate assistance   Additional items Assist x 2;Increased time required;Verbal cues   Stand to Sit 3  Moderate assistance   Additional items  Assist x 2;Increased time required;Verbal cues   Stand pivot Unable to assess  (with safety concerns due to pain and fatigue severity)   Balance   Static Sitting Fair   Dynamic Sitting Fair -   Static Standing Poor +   Dynamic Standing Poor   Ambulatory Poor   Activity Tolerance   Activity Tolerance Patient limited by fatigue;Patient limited by pain   Medical Staff Made Aware CM and DO [place]   RUE Assessment   RUE Assessment X  (AROM WFL)   RUE Strength   RUE Overall Strength Deficits  (3/5)   LUE Assessment   LUE Assessment X  (AROM WFL)   LUE Strength   LUE Overall Strength Deficits  (3/5)   Vision-Basic Assessment   Current Vision Wears glasses all the time   Cognition   Overall Cognitive Status Impaired   Arousal/Participation Alert;Cooperative   Attention Attends with cues to redirect   Orientation Level Oriented to person;Oriented to place;Oriented to time;Disoriented to situation   Memory Within functional limits   Following Commands Follows one step commands with increased time or repetition   Assessment   Limitation Decreased ADL status;Decreased UE strength;Decreased Safe judgement during ADL;Decreased endurance;Decreased self-care trans;Decreased high-level ADLs;Decreased cognition   Prognosis Good   Assessment Pt is a 78 y.o. female seen for OT evaluation s/p admit to [facility] on [facility]/5/2024 w/ Fall. Comorbidities affecting pt's functional performance at time of assessment include:  DAVID, Endometrial CA, HLD . Personal factors affecting pt at time of IE include:steps to enter environment, difficulty performing ADLS, and limited insight into deficits. Prior to admission, pt was Mod I with ADLs. Upon evaluation: the following deficits impact occupational performance: decreased strength, decreased balance, decreased tolerance, impaired attention, impaired initiation, impaired memory, impaired problem solving, decreased safety awareness, and increased pain. Pt to benefit from continued skilled OT tx while in  the hospital to address deficits as defined above and maximize level of functional independence w ADL's and functional mobility. Occupational Performance areas to address include: bathing/shower, toilet hygiene, dressing, functional mobility, and clothing management. From OT standpoint, recommendation at time of d/c would be Level I (Maximum Resource Intensity).   Goals   Patient Goals To feel better   Plan   Treatment Interventions ADL retraining;Functional transfer training;UE strengthening/ROM;Endurance training;Patient/family training;Equipment evaluation/education;Compensatory technique education;Energy conservation;Activityengagement;Cognitive reorientation   Goal Expiration Date 01/15/24   OT Treatment Day 0   OT Frequency 5-7x/wk   Discharge Recommendation   Rehab Resource Intensity Level, OT I (Maximum Resource Intensity)   Additional Comments  Pt seen as a co-eval with PT due to the patient's co-morbidities, clinically unstable presentation, and present impairments which are a regression from the patient's baseline.   Additional Comments 2 The patient's raw score on the -PAC Daily Activity Inpatient Short Form is 14. A raw score of less than 19 suggests the patient may benefit from discharge to post-acute rehabilitation services. Please refer to the recommendation of the Occupational Therapist for safe discharge planning.   AM-PAC Daily Activity Inpatient   Lower Body Dressing 1   Bathing 2   Toileting 1   Upper Body Dressing 3   Grooming 3   Eating 4   Daily Activity Raw Score 14   Daily Activity Standardized Score (Calc for Raw Score >=11) 33.39   AM-PAC Applied Cognition Inpatient   Following a Speech/Presentation 4   Understanding Ordinary Conversation 4   Taking Medications 3   Remembering Where Things Are Placed or Put Away 3   Remembering List of 4-5 Errands 2   Taking Care of Complicated Tasks 2   Applied Cognition Raw Score 18   Applied Cognition Standardized Score 38.07     GOALS:    Pt will  achieve the following within specified time frame: STG  Pt will achieve the following goals within 5 days    *ADL transfers with Mod (A) for inc'd independence with ADLs/purposeful tasks    *UB ADL with CGA for inc'd independence with self cares    *LB ADL with Max (A) using AE prn for inc'd independence with self cares    *Toileting with Max (A) for clothing management and hygiene for return to PLOF with personal care    *Increase static stand balance to F- and dyn stand balance to P+ for inc'd safety with standing purposeful tasks    *Increase stand tolerance x3 m for inc'd tolerance with standing purposeful tasks    *Participate in 10m UE therex to increase overall stamina/activity tolerance for purposeful tasks    *Bed mobility- Mod (A) for inc'd independence to manage own comfort and initiate EOB & OOB purposeful tasks    Pt will achieve the following within specified time frame: LTG  Pt will achieve the following goals within 10 days    *ADL transfers with Min (A) for inc'd independence with ADLs/purposeful tasks    *UB ADL with (S) for inc'd independence with self cares    *LB ADL with Mod (A) using AE prn for inc'd independence with self cares    *Toileting with Mod (A) for clothing management and hygiene for return to PLOF with personal care    *Increase static stand balance to F and dyn stand balance to F- for inc'd safety with standing purposeful tasks    *Increase stand tolerance x5 m for inc'd tolerance with standing purposeful tasks    *Bed mobility- Min (A) for inc'd independence to manage own comfort and initiate EOB & OOB purposeful tasks      Nicole Morrell MS, OTR/L

## 2024-01-05 NOTE — DISCHARGE INSTRUCTIONS
Return to the ER for any new, concerning, or worsening issues.    Physical therapy has arranged home therapy for you through [facility].    Continue other current therapies/treatments.

## 2024-01-05 NOTE — CASE MANAGEMENT
Case Management Assessment & Discharge Planning Note    Patient name Anthony Slater  Location ED 05/ED 05 MRN [id number]  : 1945 Date 2024       Current Admission Date: 2024  Current Admission Diagnosis:  Patient Active Problem List    Diagnosis Date Noted    Fall 2023    Splenic laceration, initial encounter 2023    Facial abrasion, initial encounter 2023    Chronic left shoulder pain 2023    Endometrial cancer (HCC) 2023    History of total abdominal hysterectomy and bilateral salpingo-oophorectomy 2023    Angiodysplasia of gastrointestinal tract 06/15/2023    Thyroid nodule 2021    Drug-induced osteonecrosis of jaw (HCC) 2018    Migraine 2017    Obstructive sleep apnea syndrome 2014    Anxiety state 2014    Coronary arteriosclerosis 2014    Gastric antral vascular ectasia 2014    Hyperlipidemia 2014    Osteoporosis 2014      LOS (days): 0  Geometric Mean LOS (GMLOS) (days):   Days to GMLOS:     OBJECTIVE:          Current admission status: Emergency       Preferred Pharmacy:   [facility] [address] [place] [address]  [address] [address]  Phone: [phone] Fax: [phone]    Primary Care Provider: Marycarmen Calvillo MD    Primary Insurance: MEDICARE  Secondary Insurance: AARP    ASSESSMENT:  Active Health Care Proxies    There are no active Health Care Proxies on file.       Advance Directives  Does patient have a Health Care POA?: No  Was patient offered paperwork?: Yes (Declined they are going to a )  Does patient currently have a Health Care decision maker?: Yes, please see Health Care Proxy section  Does patient have Advance Directives?: No  Was patient offered paperwork?: Yes (declined they are going to a )  Primary Contact: Yoni Slater    Readmission Root Cause  30 Day Readmission: No    Patient Information  Admitted  from:: Home  Mental Status: Alert  During Assessment patient was accompanied by: Spouse, Daughter  Assessment information provided by:: Patient  Primary Caregiver: Self  Support Systems: Spouse/significant other  County of Residence:  do you live in?: [place]  Home entry access options. Select all that apply.: Stairs  Number of steps to enter home.: 3  Do the steps have railings?: Yes  Type of Current Residence: [facility]  Living Arrangements: Lives w/ Spouse/significant other  Is patient a ?: No    Activities of Daily Living Prior to Admission  Functional Status: Independent  Completes ADLs independently?: Yes  Ambulates independently?: Yes  Does patient use assisted devices?: Yes  Assisted Devices (DME) used: Walker  Does patient currently own DME?: Yes  What DME does the patient currently own?: Walker, Straight Cane  Does patient have a history of Outpatient Therapy (PT/OT)?: Yes ([facility] [address])  Does the patient have a history of Short-Term Rehab?: No  Does patient have a history of HHC?: No  Does patient currently have HHC?: No    Patient Information Continued  Income Source: Pension/jail  Does patient have prescription coverage?: Yes  Does patient receive dialysis treatments?: No  Does patient have a history of substance abuse?: No  Does patient have a history of Mental Health Diagnosis?: No    PHQ 2/9 Screening   Reviewed PHQ 2/9 Depression Screening Score?: No    Means of Transportation  Means of Transport to Appts:: Family transport      Housing Stability: Low Risk  (1/5/2024)    Housing Stability Vital Sign     Unable to Pay for Housing in the Last Year: No     Number of Places Lived in the Last Year: 1     Unstable Housing in the Last Year: No   Food Insecurity: No Food Insecurity (1/5/2024)    Hunger Vital Sign     Worried About Running Out of Food in the Last Year: Never true     Ran Out of Food in the Last Year: Never true   Transportation Needs: No Transportation Needs  (1/5/2024)    PRAPARE - Transportation     Lack of Transportation (Medical): No     Lack of Transportation (Non-Medical): No   Utilities: Not At Risk (1/5/2024)    [facility] Utilities     Threatened with loss of utilities: No       DISCHARGE DETAILS:    Discharge planning discussed with:: Pt spouse and daughter at the bedside  Freedom of Choice: Yes  Comments - Freedom of Choice: PT is recommending STR.  Pt and family declined STR, wants C servicces.  A blanket referral was made via [facility].  Pt will choose from the accepting agencies.  ED MD made aware of same    Contacts  Patient Contacts: Rory Slater  Relationship to Patient:: Family  Contact Method: In Person  Reason/Outcome: Emergency Contact    Requested Home Health Care         Is the patient interested in HHC at discharge?: Yes  Home Health Discipline requested:: Occupational Therapy, Physical Therapy  [facility]  Home Health Services Needed:: Strengthening/Theraputic Exercises to Improve Function, Gait/ADL Training, Evaluate Functional Status and Safety  Homebound Criteria Met:: Communicable Disease, Uses an Assist Device (i.e. cane, walker, etc), Requires the Assistance of Another Person for Safe Ambulation or to Leave the Home  Supporting Clincal Findings:: Fatigues Easliy in Short Distances, Limited Endurance, Dyspnea with Exertion    DME Referral Provided  Referral made for DME?: No    Would you like to participate in our [facility] service program?  : No - Declined    Treatment Team Recommendation: Short Term Rehab  Discharge Destination Plan:: Home with Home Health Care  Transport at Discharge : Family   PT was recommending STR.  PT and family at the bedside would like [facility] services.  Referral made via [facility].  Notified Dr Singh of same.

## 2024-01-05 NOTE — PLAN OF CARE
Problem: OCCUPATIONAL THERAPY ADULT  Goal: Performs self-care activities at highest level of function for planned discharge setting.  See evaluation for individualized goals.  Description: Treatment Interventions: ADL retraining, Functional transfer training, UE strengthening/ROM, Endurance training, Patient/family training, Equipment evaluation/education, Compensatory technique education, Energy conservation, Activityengagement, Cognitive reorientation    See flowsheet documentation for full assessment, interventions and recommendations.   Note: Limitation: Decreased ADL status, Decreased UE strength, Decreased Safe judgement during ADL, Decreased endurance, Decreased self-care trans, Decreased high-level ADLs, Decreased cognition  Prognosis: Good  Assessment: Pt is a 78 y.o. female seen for OT evaluation s/p admit to [facility] on 1/5/2024 w/ Fall. Comorbidities affecting pt's functional performance at time of assessment include:  DAVID, Endometrial CA, HLD . Personal factors affecting pt at time of IE include:steps to enter environment, difficulty performing ADLS, and limited insight into deficits. Prior to admission, pt was Mod I with ADLs. Upon evaluation: the following deficits impact occupational performance: decreased strength, decreased balance, decreased tolerance, impaired attention, impaired initiation, impaired memory, impaired problem solving, decreased safety awareness, and increased pain. Pt to benefit from continued skilled OT tx while in the hospital to address deficits as defined above and maximize level of functional independence w ADL's and functional mobility. Occupational Performance areas to address include: bathing/shower, toilet hygiene, dressing, functional mobility, and clothing management. From OT standpoint, recommendation at time of d/c would be Level I (Maximum Resource Intensity).     Rehab Resource Intensity Level, OT: I (Maximum Resource Intensity)     Nicole Morrell MS, OTR/L

## 2024-01-05 NOTE — PHYSICAL THERAPY NOTE
Physical Therapy Evaluation     Patient's Name: Anthony Slater    Admitting Diagnosis  No admission diagnoses are documented for this encounter.    Problem List  Patient Active Problem List   Diagnosis    Angiodysplasia of gastrointestinal tract    Obstructive sleep apnea syndrome    Anxiety state    Coronary arteriosclerosis    Gastric antral vascular ectasia    History of total abdominal hysterectomy and bilateral salpingo-oophorectomy    Endometrial cancer (HCC)    Drug-induced osteonecrosis of jaw (HCC)    Hyperlipidemia    Migraine    Osteoporosis    Thyroid nodule    Fall    Splenic laceration, initial encounter    Facial abrasion, initial encounter    Chronic left shoulder pain       Past Medical History  Past Medical History:   Diagnosis Date    Anxiety     Arthritis     Benign hypertension     Chronic pain disorder     back    COPD (chronic obstructive pulmonary disease) (HCC)     Coronary artery disease     medical management    CPAP (continuous positive airway pressure) dependence     Diabetes (HCC)     Endometrial adenocarcinoma (HCC)     Epilepsy (HCC)     GERD (gastroesophageal reflux disease)     History of echocardiogram 02/07/2014    EF 55%, mild MR.    History of transfusion     Hypertension     Mixed hyperlipidemia     DAVID on CPAP     Shortness of breath     triggered by anxiety    Watermelon stomach     Wears dentures        Past Surgical History  Past Surgical History:   Procedure Laterality Date    BREAST BIOPSY Right 02/24/2015    BREAST BIOPSY Left     ? year    CARDIAC CATHETERIZATION  02/07/2014    EF 65%, mid LAD 60% stenosis and D2 70% stenosis.  Medical management.    CATARACT EXTRACTION Bilateral     COLONOSCOPY      CYSTOSCOPY N/A 07/31/2023    Procedure: CYSTOSCOPY;  Surgeon: Nadir Ash MD;  Location: [facility];  Service: Gynecology Oncology    EGD      JOINT REPLACEMENT Right     RIGHT ELBOW    OOPHORECTOMY      not sure which one was removed    TX LAPS TOTAL HYSTERECT  250 GM/< W/RMVL TUBE/OVARY N/A 07/31/2023    Procedure: ROBOTHYSTERECTOMY, BILATERAL SALPINGO-OOPHORECTOMY, PELVIC SENTINEL NODE BIOPSIES;  Surgeon: Nadir Ash MD;  Location: [facility];  Service: Gynecology Oncology    ROTATOR CUFF REPAIR Right     US GUIDED THYROID BIOPSY  12/04/2020 01/05/24 0847   PT Last Visit   PT Visit Date 01/05/24   Note Type   Note type Evaluation   Pain Assessment   Pain Assessment Tool 0-10   Pain Score 10 - Worst Possible Pain   Pain Location/Orientation Orientation: Left;Location: Shoulder   Pain Radiating Towards yes, from L lateral neck into L shoulder   Pain Onset/Description Onset: Ongoing;Frequency: Constant/Continuous;Descriptor: Aching;Descriptor: Sore   Effect of Pain on Daily Activities yes   Patient's Stated Pain Goal No pain   Hospital Pain Intervention(s) Repositioned;Elevated;Emotional support;Environmental changes   Multiple Pain Sites No   Restrictions/Precautions   Weight Bearing Precautions Per Order No   Other Precautions Fall Risk;Pain;Cognitive;Multiple lines;Telemetry   Home Living   Type of Home House  (small ran)   Home Layout One level;Performs ADLs on one level;Able to live on main level with bedroom/bathroom;Stairs to enter with rails  (1 + 2 [place])   Bathroom Shower/Tub Walk-in shower  (preferred)   Bathroom Toilet Raised   Bathroom Equipment Shower chair;Grab bars in shower   Bathroom Accessibility Accessible   Home Equipment Walker;Cane  (denies prior usage)   Prior Function   Level of Dawn Independent with ADLs;Independent with functional mobility;Needs assistance with IADLS   Lives With Spouse   Receives Help From Family   IADLs Family/Friend/Other provides transportation;Independent with meal prep;Independent with medication management   Falls in the last 6 months 1 to 4   Vocational Retired   General   Family/Caregiver Present Yes   Cognition   Overall Cognitive Status Impaired   Arousal/Participation Alert   Orientation Level  Oriented to person;Oriented to time;Disoriented to situation;Disoriented to place   Memory Within functional limits   Following Commands Follows one step commands with increased time or repetition   Comments Anthony was agreeable to PT assessment, pleasant.   RLE Assessment   RLE Assessment X  (3+/5 gross musculature)   LLE Assessment   LLE Assessment X  (3+/5 gross musculature)   Vision-Basic Assessment   Current Vision Wears glasses all the time   Vestibular   Spontaneous Nystagmus (-) no evidence of nystagmus at rest in room light   Gaze Holding Nystagmus (-) no evidence of nystagmus   Coordination   Movements are Fluid and Coordinated 0   Coordination and Movement Description Incremental, antalgic mobility requiring increased time and tactile facilitation.   Bed Mobility   Supine to Sit 3  Moderate assistance   Additional items Assist x 1;HOB elevated;Bedrails;Increased time required;Verbal cues;LE management   Sit to Supine 3  Moderate assistance   Additional items Assist x 2;Bedrails;Increased time required;Verbal cues;LE management   Additional Comments Verbal cues for bedrail utilization and proper body mechanics.Anthony reports baseline vertigo with increased time provided edge of bed.   Transfers   Sit to Stand 3  Moderate assistance   Additional items Assist x 2;Bedrails;Increased time required;Verbal cues  (RW utilization)   Stand to Sit 3  Moderate assistance   Additional items Assist x 2;Bedrails;Increased time required;Verbal cues   Stand pivot Unable to assess  (with safety concerns due to pain and fatigue severity)   Additional Comments Verbal cues for bedrail utilization and proper body mechanics.   Ambulation/Elevation   Gait pattern Improper Weight shift;Antalgic;Narrow BOO;Forward Flexion;Decreased foot clearance;Short stride;Step to;Excessively slow   Gait Assistance 3  Moderate assist   Additional items Assist x 2;Verbal cues;Tactile cues   Assistive Device Rolling walker   Distance 1  foot  (toward head of bed as distance could not be further advanced)   Stair Management Assistance Not tested   Ambulation/Elevation Additional Comments Verbal cues for proper AD utilization and base of support widening.   Balance   Static Sitting Fair   Dynamic Sitting Fair -   Static Standing Poor +   Dynamic Standing Poor   Ambulatory Poor   Endurance Deficit   Endurance Deficit Yes   Activity Tolerance   Activity Tolerance Patient limited by fatigue;Patient limited by pain   Medical Staff Made Aware Yes, CM and Dr. Serrano was informed of d/c disposition recommendation.   Nurse Made Aware Yes, nursing staff was informed of assessment outcome.   Assessment   Prognosis Good   Problem List Decreased strength;Decreased endurance;Impaired balance;Decreased mobility;Decreased coordination;Decreased cognition;Impaired judgement;Pain   Assessment Pt is 78 y.o. female seen for PT evaluation s/p admit to  [facility]  on 1/5/2024 w/generalized weakness . PT consulted to assess pt's functional mobility and d/c needs. Order placed for PT eval and tx order. Comorbidities affecting pt's physical performance at time of assessment include: generalized weakness, fall, splenic laceration,COVID-19,osteoporosis, chronic L shoulder pain, anxiety state. PTA, pt was independent w/ all functional mobility w/ o AD usage . Personal factors affecting pt at time of IE include: stairs to enter home, inability to ambulate household distances, inability to navigate community distances, inability to navigate level surfaces w/o external assistance, unable to perform dynamic tasks in community, decreased cognition, positive fall history, anxiety, unable to perform physical activity, and inability to perform ADLs. Please find objective findings from PT assessment regarding body systems outlined above with impairments and limitations including weakness, impaired balance, decreased endurance, impaired coordination, gait deviations,  decreased activity tolerance, decreased functional mobility tolerance, decreased safety awareness, impaired judgement, fall risk, and decreased cognition. The following objective measures performed on IE also reveal limitations: AM-PAC 6-Clicks low functioning score of 8. From PT/mobility standpoint, recommendation at time of d/c would be of maximal resource intensity pending progress in order to facilitate return to PLOF.   Goals   Patient Goals to feel better soon   LTG Expiration Date 01/15/24   Long Term Goal #1 1.)Patient will complete bed mobility min A of 1 for decrease need for caregiver assistance, decrease burden of care. 2.) Patient will complete transfers min A of 1 to decrease risk of falls, facilitate upright standing posture. 3.) BLE strength to greater than/equal to 4/5 gross musculature to increase ability to safely transfer, control descent to chair. 4.) Patient will exhibit increase dynamic standing to Fair 2-3 minutes without LOB min A of 1 to improve activity tolerance. 5.) Patient will exhibit increase dynamic ambulatory balance to Fair 35-50 feet w/AD min A of 1 to improve ability to mobilize to toilet, chair and decrease risk for additional medical complications. 6.) Patient will exhibit good self monitoring and ability to follow 2 step commands to increase complexity of tasks and resume ADL's without LOB.   PT Treatment Day 0   Plan   Treatment/Interventions Functional transfer training;LE strengthening/ROM;Elevations;Therapeutic exercise;Endurance training;Patient/family training;Equipment eval/education;Bed mobility;Gait training;Spoke to nursing;Spoke to case management;Spoke to MD;OT   PT Frequency 3-5x/wk   Discharge Recommendation   Rehab Resource Intensity Level, PT I (Maximum Resource Intensity)   Equipment Recommended Walker   Walker Package Recommended Wheeled walker   Change/add to Walker Package? No   Additional Comments Upon conclusion, Anthony was resting in bed with all needs  within reach.   AM-PAC Basic Mobility Inpatient   Turning in Flat Bed Without Bedrails 2   Lying on Back to Sitting on Edge of Flat Bed Without Bedrails 2   Moving Bed to Chair 1   Standing Up From Chair Using Arms 1   Walk in Room 1   Climb 3-5 Stairs With Railing 1   Basic Mobility Inpatient Raw Score 8   Turning Head Towards Sound 4   Follow Simple Instructions 3   Low Function Basic Mobility Raw Score  15   Low Function Basic Mobility Standardized Score  23.9   Highest Level Of Mobility   -HLM Goal 3: Sit at edge of bed   -HLM Achieved 3: Sit at edge of bed     History/Personal Factors/Comorbidities: generalized weakness, fall, splenic laceration,COVID-19,osteoporosis, chronic L shoulder pain, anxiety state    # of body structures/limitations: muscle weakness, activity intolerance,decreased endurance, impaired balance, gait deviations,pain, impaired coordination, impaired cognition    Clinical presentation: unstable as seen in fall risk with positive fall history, constant pain severe in nature, progressive symptoms prior to hospitalization,impaired cognition    Initial Assessment Time: 0846-0901    Bindu Landa, PT

## 2024-01-05 NOTE — PLAN OF CARE
Problem: PHYSICAL THERAPY ADULT  Goal: Performs mobility at highest level of function for planned discharge setting.  See evaluation for individualized goals.  Description: Treatment/Interventions: Functional transfer training, LE strengthening/ROM, Elevations, Therapeutic exercise, Endurance training, Patient/family training, Equipment eval/education, Bed mobility, Gait training, Spoke to nursing, Spoke to case management, Spoke to MD, OT  Equipment Recommended: Walker       See flowsheet documentation for full assessment, interventions and recommendations.  Note: Prognosis: Good  Problem List: Decreased strength, Decreased endurance, Impaired balance, Decreased mobility, Decreased coordination, Decreased cognition, Impaired judgement, Pain  Assessment: Pt is 78 y.o. female seen for PT evaluation s/p admit to  [facility]  on 1/5/2024 w/generalized weakness . PT consulted to assess pt's functional mobility and d/c needs. Order placed for PT eval and tx order. Comorbidities affecting pt's physical performance at time of assessment include: generalized weakness, fall, splenic laceration,COVID-19,osteoporosis, chronic L shoulder pain, anxiety state. PTA, pt was independent w/ all functional mobility w/ o AD usage . Personal factors affecting pt at time of IE include: stairs to enter home, inability to ambulate household distances, inability to navigate community distances, inability to navigate level surfaces w/o external assistance, unable to perform dynamic tasks in community, decreased cognition, positive fall history, anxiety, unable to perform physical activity, and inability to perform ADLs. Please find objective findings from PT assessment regarding body systems outlined above with impairments and limitations including weakness, impaired balance, decreased endurance, impaired coordination, gait deviations, decreased activity tolerance, decreased functional mobility tolerance, decreased safety  Please call pt regarding the following--need to consider another non-NSAID due to kidney function   awareness, impaired judgement, fall risk, and decreased cognition. The following objective measures performed on IE also reveal limitations: AM-PAC 6-Clicks low functioning score of 8. From PT/mobility standpoint, recommendation at time of d/c would be of maximal resource intensity pending progress in order to facilitate return to PLOF.        Rehab Resource Intensity Level, PT: I (Maximum Resource Intensity)    See flowsheet documentation for full assessment.

## 2024-01-05 NOTE — ED CARE HANDOFF
Emergency Department Sign Out Note        Sign out and transfer of care from Dr. Leavitt. See Separate Emergency Department note.     The patient, Anthony Slater, was evaluated by the previous provider for COVID positive, generalized weakness, family is having difficulty managing her at home.    Workup Completed:  Patient had an emergency department workup which consisted of a CBC, CMP, urinalysis, coagulation studies, as well as a chest x-ray.    ED Course / Workup Pending (followup):  Patient is awaiting PT OT evaluation as well as case management.    PT evaluated the patient and they are feeling the patient could use rehab however family is not interested in this and neither is the patient.  For this reason it was decided that the patient will have at home rehab ordered.  Case management spearheaded this process and the patient will be discharged home with instructions to follow-up with rehab and return to the ER for new or concerning issues.    Patient discharged.                                ED Course as of 01/05/24 1632   Fri Jan 05, 2024   0903 PT recommends rehab for bed #5 as well.   0934 Case discussed with case management who talked with the family.  The patient and family do not want her placed in rehab.  For this reason case management will set up home therapy.   1203 Physical therapy has arranged [facility] for therapy for this patient     Procedures  Medical Decision Making  Amount and/or Complexity of Data Reviewed  Labs: ordered.  Radiology: ordered and independent interpretation performed.    Risk  Prescription drug management.            Disposition  Final diagnoses:   Weakness     Time reflects when diagnosis was documented in both MDM as applicable and the Disposition within this note       Time User Action Codes Description Comment    1/5/2024  7:55 AM Nahid Leavitt Add [R53.1] Weakness           ED Disposition       ED Disposition   Discharge    Condition   Stable     Date/Time   Fri Jan 5, 2024 12:03 PM    Comment   Anthony Slater discharge to home/self care.                   Follow-up Information       Follow up With Specialties Details Why Contact Info    [facility] Follow up Physical Therapist will call prior to comeing to see you upon discharge [address]  [address]  [place] [address]  [phone]      Marycarmen Calvillo MD Family Medicine On 1/9/2024  [address] [place] [address] [place] [address]  [phone]            Discharge Medication List as of 1/5/2024 12:03 PM        CONTINUE these medications which have NOT CHANGED    Details   acetaminophen (TYLENOL) 325 mg tablet Take 2 tablets (650 mg total) by mouth every 4 (four) hours as needed for mild pain, Starting Sun 12/31/2023, No Print      Ascorbic Acid (vitamin C) 100 MG tablet Take 500 mg by mouth 2 (two) times a day With kaceyhips, Historical Med      atorvastatin (LIPITOR) 40 mg tablet Take 1 tablet (40 mg total) by mouth daily, Starting Wed 11/22/2023, Normal      Cholecalciferol (Vitamin D3) 1.25 MG (72819 UT) CAPS Take 1,000 Units by mouth daily, Historical Med      co-enzyme Q-10 100 mg capsule Take by mouth daily, Historical Med      Iron Heme Polypeptide (Proferrin ES) 12 MG TABS one tablet b.i.d., Historical Med      metoprolol tartrate (LOPRESSOR) 25 mg tablet Take 0.5 tablets (12.5 mg total) by mouth daily at bedtime, Starting Thu 12/28/2023, Normal      omeprazole (PriLOSEC) 20 mg delayed release capsule Take 1 capsule (20 mg total) by mouth daily, Starting Wed 10/25/2023, Normal      senna (SENOKOT) 8.6 mg Take 1 tablet (8.6 mg total) by mouth daily for 5 days, Starting Mon 1/1/2024, Until Sat 1/6/2024, Normal      traZODone (DESYREL) 50 mg tablet Take 50 mg by mouth daily at bedtime 1/2 tablet, Starting Thu 6/29/2023, Historical Med      vitamin B-12 (VITAMIN B-12) 1,000 mcg tablet Take 1,000 mcg by mouth daily, Historical Med                  ED  Provider  Electronically Signed by     Hadley Serrano Jr., DO  01/05/24 7976

## 2024-01-05 NOTE — PROGRESS NOTES
Patient:    MRN:  [id number]    Aida Request ID:  [id number]    Level of care reserved:  Home Health Agency    Partner Reserved:  [facility], [facility] [place] [address] [phone]    Clinical needs requested:    Geography searched:  [phone]    Start of Service:    Request sent:  10:13am EST on 1/5/2024 by Yazmin Bennett    Partner reserved:  11:23am EST on 1/5/2024 by Yazmin Bennett    Choice list shared:

## 2024-01-06 LAB
ATRIAL RATE: 91 BPM
P AXIS: 65 DEGREES
PR INTERVAL: 162 MS
QRS AXIS: 40 DEGREES
QRSD INTERVAL: 78 MS
QT INTERVAL: 360 MS
QTC INTERVAL: 442 MS
T WAVE AXIS: 55 DEGREES
VENTRICULAR RATE: 91 BPM

## 2024-01-07 LAB
BACTERIA BLD CULT: NORMAL
BACTERIA BLD CULT: NORMAL

## 2024-01-09 ENCOUNTER — OFFICE VISIT (OUTPATIENT)
Dept: FAMILY MEDICINE CLINIC | Facility: CLINIC | Age: 79
End: 2024-01-09
Payer: MEDICARE

## 2024-01-09 VITALS
HEART RATE: 82 BPM | HEIGHT: 61 IN | SYSTOLIC BLOOD PRESSURE: 158 MMHG | OXYGEN SATURATION: 99 % | BODY MASS INDEX: 21.34 KG/M2 | DIASTOLIC BLOOD PRESSURE: 70 MMHG | TEMPERATURE: 96.8 F | WEIGHT: 113 LBS

## 2024-01-09 DIAGNOSIS — R35.0 URINE FREQUENCY: ICD-10-CM

## 2024-01-09 DIAGNOSIS — R55 SYNCOPE, UNSPECIFIED SYNCOPE TYPE: Primary | ICD-10-CM

## 2024-01-09 DIAGNOSIS — D50.8 OTHER IRON DEFICIENCY ANEMIA: ICD-10-CM

## 2024-01-09 DIAGNOSIS — N18.30 STAGE 3 CHRONIC KIDNEY DISEASE, UNSPECIFIED WHETHER STAGE 3A OR 3B CKD (HCC): ICD-10-CM

## 2024-01-09 DIAGNOSIS — F41.9 ANXIETY: ICD-10-CM

## 2024-01-09 DIAGNOSIS — S36.039D LACERATION OF SPLEEN, SUBSEQUENT ENCOUNTER: ICD-10-CM

## 2024-01-09 DIAGNOSIS — C54.1 ENDOMETRIAL CANCER (HCC): ICD-10-CM

## 2024-01-09 DIAGNOSIS — R11.0 NAUSEA: ICD-10-CM

## 2024-01-09 DIAGNOSIS — K21.9 GASTROESOPHAGEAL REFLUX DISEASE, UNSPECIFIED WHETHER ESOPHAGITIS PRESENT: ICD-10-CM

## 2024-01-09 PROBLEM — D64.9 ABSOLUTE ANEMIA: Status: ACTIVE | Noted: 2024-01-09

## 2024-01-09 PROCEDURE — 99495 TRANSJ CARE MGMT MOD F2F 14D: CPT | Performed by: FAMILY MEDICINE

## 2024-01-09 RX ORDER — ONDANSETRON 4 MG/1
TABLET, ORALLY DISINTEGRATING ORAL
Qty: 20 TABLET | Refills: 3 | Status: SHIPPED | OUTPATIENT
Start: 2024-01-09

## 2024-01-09 NOTE — PROGRESS NOTES
Assessment & Plan     1. Syncope, unspecified syncope type  Comments:  likely due to Covid/weakness/dehydration encouraged good hydration; Cont with PT/OT    2. Laceration of spleen, subsequent encounter  Comments:  Mild and without need for further treatment.  Does have follow-up with trauma surgeon next week.    3. Nausea  Assessment & Plan:  Likely due to increased gastritis due to all of the above.  Will stop iron and vitamin C and increase omeprazole to twice daily.  Diet discussed at length including multiple small meals a day; good hydration including boost and Ensure as well as her prune juice; yogurt; soups; fresh fruit; and cooked veggies;   Zofran if needed for nausea patient to call if symptoms not improved with treatment as noted.  And phone call in 1 week to assess status    Orders:  -     ondansetron (ZOFRAN-ODT) 4 mg disintegrating tablet; 1 tablet dissolved on tongue q 4-6 hrs PRN nausea    4. Other iron deficiency anemia  Comments:  Chronic problem worse and I am sure due to recent hospitalization.  For now hold on iron due to GI symptoms but will monitor.    5. Stage 3 chronic kidney disease, unspecified whether stage 3a or 3b CKD (HCC)  Comments:  As noted strongly encourage good hydration despite voiding frequency    6. Endometrial cancer (HCC)  Comments:  Without need for further treatment at present    7. Urine frequency  Comments:  Recent UA normal    8. Anxiety  Comments:  Anxiety is worse especially at bedtime therefore  recently increased her trazodone to 1 pill at bedtime.  Will call if still not helping    9. Gastroesophageal reflux disease, unspecified whether esophagitis present         Subjective     Transitional Care Management Review:   Anthony Slater is a 78 y.o. female here for TCM follow up.     During the TCM phone call patient stated:  TCM Call     Date and time call was made  1/2/2024  9:08 AM    Hospital care reviewed  Records reviewed    Patient was hospitialized  at  [facility]    Date of Admission  12/30/23    Date of discharge  12/31/23    Diagnosis  Splenic laceration, initial encounter    Disposition  Home      TCM Call     Scheduled for follow up?  Yes    Did you obtain your prescribed medications  Yes    Do you need help managing your prescriptions or medications  No    Is transportation to your appointment needed  No    I have advised the patient to call PCP with any new or worsening symptoms  Cecile Chirinos RMA        Patient here for hospital follow-up, being admitted from December 30 through December 31 after a fall resulting in large amount of bruising over her right face and forehead as well as right knee and right abdomen and back other more seriously workup revealing a small laceration of her spleen therefore requiring transportation to [place] where VS's remained stable; therefore discharged.  Patient states the fall occurred when she got up to go to the bathroom feeling she had a syncopal episode awakening on the floor.  She had just been diagnosed with COVID a week or so before and was quite weak with decreased appetite and admitted to her not drinking enough.  After discharge from the hospital she continued with weakness for returning to the emergency room in January 5th, workup at that time negative except for slight anemia.  She is now complaining of nausea when she eats without vomiting; lots of belching; no abdominal pain; feeling hungry when she is not eating; and with constipation relieved with prune juice.  She states she is able to drink fluids without GI symptoms and has been drinking some Ensure and eating yogurt again without GI upset.   feels she was having GI symptoms even before her fall when she had COVID.  Was treated at that time with paxlovid, however without any other change in medication.  She is ambulating but feeling weak however denies any lightheadedness or dizziness.  [facility] nurses,PT,and OT are beginning their  "services.  She does complain that everything she drinks she immediately \"pees out\" however denies any dysuria, hematuria, or fever. ( Recent UA in ER normal)      Review of Systems    Objective     /70 (BP Location: Left arm, Patient Position: Sitting, Cuff Size: Standard)   Pulse 82   Temp (!) 96.8 °F (36 °C) (Tympanic)   Ht 5' 1\" (1.549 m)   Wt 51.3 kg (113 lb)   SpO2 99%   BMI 21.35 kg/m²      Physical Exam  Vitals and nursing note reviewed.   Constitutional:       General: She is not in acute distress.     Appearance: She is well-developed. She is ill-appearing.   HENT:      Head: Normocephalic and atraumatic.   Eyes:      Conjunctiva/sclera: Conjunctivae normal.   Neck:      Vascular: No carotid bruit.   Cardiovascular:      Rate and Rhythm: Normal rate and regular rhythm.      Heart sounds: No murmur heard.     Comments: LE=243/70 sit    125/70 stand  Pulmonary:      Effort: Pulmonary effort is normal. No respiratory distress.      Breath sounds: Normal breath sounds.   Abdominal:      Palpations: Abdomen is soft. There is no mass.      Tenderness: There is no abdominal tenderness.      Comments: Exam in sitting position with nl BS's   Musculoskeletal:         General: No swelling.      Cervical back: Neck supple.      Right lower leg: No edema.      Left lower leg: No edema.      Comments: No calf TTP   Lymphadenopathy:      Cervical: No cervical adenopathy.   Skin:     General: Skin is warm and dry.      Capillary Refill: Capillary refill takes less than 2 seconds.      Coloration: Skin is pale.      Comments: Huge area of old ecchymosis over right forehead and temple area with a 2 to 3 cm scab macular area lateral to her right eye.  Future old ecchymotic area noted over right knee without swelling or warmth with good range of motion without pain or increased laxity.  No tenderness to palpation.   Neurological:      General: No focal deficit present.      Mental Status: She is alert and oriented " to person, place, and time.      Motor: Weakness present.   Psychiatric:         Mood and Affect: Mood normal.         Behavior: Behavior normal.         Thought Content: Thought content normal.       Medications have been reviewed by provider in current encounter    Marycarmen Calvillo MD

## 2024-01-09 NOTE — ASSESSMENT & PLAN NOTE
Likely due to increased gastritis due to all of the above.  Will stop iron and vitamin C and increase omeprazole to twice daily.  Diet discussed at length including multiple small meals a day; good hydration including boost and Ensure as well as her prune juice; yogurt; soups; fresh fruit; and cooked veggies;   Zofran if needed for nausea patient to call if symptoms not improved with treatment as noted.  And phone call in 1 week to assess status

## 2024-01-10 LAB
BACTERIA BLD CULT: NORMAL
BACTERIA BLD CULT: NORMAL

## 2024-01-15 ENCOUNTER — OFFICE VISIT (OUTPATIENT)
Dept: FAMILY MEDICINE CLINIC | Facility: CLINIC | Age: 79
End: 2024-01-15
Payer: MEDICARE

## 2024-01-15 DIAGNOSIS — R42 VERTIGO: ICD-10-CM

## 2024-01-15 DIAGNOSIS — G47.33 OBSTRUCTIVE SLEEP APNEA SYNDROME: ICD-10-CM

## 2024-01-15 DIAGNOSIS — K21.9 GASTROESOPHAGEAL REFLUX DISEASE, UNSPECIFIED WHETHER ESOPHAGITIS PRESENT: ICD-10-CM

## 2024-01-15 DIAGNOSIS — F41.1 ANXIETY STATE: Primary | ICD-10-CM

## 2024-01-15 DIAGNOSIS — R55 SYNCOPE, UNSPECIFIED SYNCOPE TYPE: ICD-10-CM

## 2024-01-15 PROCEDURE — 99442 PR PHYS/QHP TELEPHONE EVALUATION 11-20 MIN: CPT | Performed by: FAMILY MEDICINE

## 2024-01-15 RX ORDER — OMEPRAZOLE 20 MG/1
20 CAPSULE, DELAYED RELEASE ORAL 2 TIMES DAILY
Qty: 180 CAPSULE | Refills: 3 | Status: SHIPPED | OUTPATIENT
Start: 2024-01-15 | End: 2024-01-16 | Stop reason: SDUPTHER

## 2024-01-15 RX ORDER — SERTRALINE HYDROCHLORIDE 25 MG/1
25 TABLET, FILM COATED ORAL
Qty: 30 TABLET | Refills: 5 | Status: SHIPPED | OUTPATIENT
Start: 2024-01-15 | End: 2024-07-13

## 2024-01-15 NOTE — PROGRESS NOTES
"Virtual Brief Visit    This Visit is being completed by telephone. The Patient is located at Home and in the following state in which I hold an active license [place]    The patient was identified by name and date of birth. Anthony Slater was informed that this is a telemedicine visit and that the visit is being conducted through the Epic Embedded platform. She agrees to proceed..  My office door was closed. No one else was in the room.  She acknowledged consent and understanding of privacy and security of the video platform. The patient has agreed to participate and understands they can discontinue the visit at any time.    Patient is aware this is a billable service.   Patient still very weak but denies any further lightheaded episodes.  Occasional dizziness but not often.  She admits to not doing her Epley maneuvers at present.  Has been visited by PT and OT on 1 occasion which went well.  She is very nebulous about her symptoms today apparently what is bothering her most is her ongoing anxiety worse in the evening; unrelieved with increased trazodone to 1 pill at bedtime and still not sleeping well.  Per  she is worried \"about everything\" even that phone visit with me today.  Appetite apparently is somewhat improved, difficult to assess.  Currently she still has lots of belching after food intake but denies nausea only needing the Zofran on 2 occasions.  He is currently taking the Prilosec twice daily and is not taking her iron or vitamin C.  She denies any abdominal pain.    Informs me that she has not been using her CPAP and after talking to company to we begin she will need \"to start at the beginning\" which means a repeat sleep study.  She and her  feels she cannot go through any testing at present and would like to hold off this problem till other problems as noted above improved.    Assessment/Plan:    Problem List Items Addressed This Visit     Obstructive sleep apnea syndrome    Anxiety state " - Primary     Will begin sertraline at bedtime and continue trazodone 50 mg at bedtime.  Repeat phone call in 2 to 3 weeks to reassess.         Relevant Medications    sertraline (ZOLOFT) 25 mg tablet    Gastroesophageal reflux disease    Relevant Medications    omeprazole (PriLOSEC) 20 mg delayed release capsule   Other Visit Diagnoses     Syncope, unspecified syncope type        Without recurrence.  Encouraged good hydration.    Vertigo        Encouraged Epley maneuvers to prevent any worsening of symptoms.          Recent Visits  Date Type Provider Dept   01/09/24 Office Visit [facility]   Showing recent visits within past 7 days and meeting all other requirements  Today's Visits  Date Type Provider Dept   01/15/24 Office Visit [facility]   Showing today's visits and meeting all other requirements  Future Appointments  No visits were found meeting these conditions.  Showing future appointments within next 150 days and meeting all other requirements         Visit Time  Total Visit Duration: 15 minutes

## 2024-01-15 NOTE — ASSESSMENT & PLAN NOTE
Will begin sertraline at bedtime and continue trazodone 50 mg at bedtime.  Repeat phone call in 2 to 3 weeks to reassess.

## 2024-01-16 DIAGNOSIS — K21.9 GASTROESOPHAGEAL REFLUX DISEASE, UNSPECIFIED WHETHER ESOPHAGITIS PRESENT: ICD-10-CM

## 2024-01-16 RX ORDER — OMEPRAZOLE 20 MG/1
20 CAPSULE, DELAYED RELEASE ORAL 2 TIMES DAILY
Qty: 180 CAPSULE | Refills: 3 | Status: SHIPPED | OUTPATIENT
Start: 2024-01-16

## 2024-01-18 ENCOUNTER — TELEPHONE (OUTPATIENT)
Dept: FAMILY MEDICINE CLINIC | Facility: CLINIC | Age: 79
End: 2024-01-18

## 2024-01-18 DIAGNOSIS — R11.0 NAUSEA: Primary | ICD-10-CM

## 2024-01-18 DIAGNOSIS — K55.20 ANGIODYSPLASIA OF GASTROINTESTINAL TRACT: ICD-10-CM

## 2024-01-18 RX ORDER — FAMOTIDINE 20 MG/1
20 TABLET, FILM COATED ORAL
Qty: 10 TABLET | Refills: 0 | Status: SHIPPED | OUTPATIENT
Start: 2024-01-18 | End: 2024-01-23 | Stop reason: SDUPTHER

## 2024-01-18 NOTE — PROGRESS NOTES
Spoke with  today can.  Patient has persistent nausea.  Recently had omeprazole changed from 20 daily to 20 twice daily and was given Zofran.  They have plenty of Zofran.  I have instructed them to take 2 of the omeprazole 20s in the morning (total of 40 mg) and have added famotidine 20 mg at bedtime.  Continue with Zofran I have also recommended the get blood work given the persistence and nausea.

## 2024-01-18 NOTE — TELEPHONE ENCOUNTER
Patient's  called stating at last visit Dr Calvillo prescribed sertraline 25mg 1 at bedtime and increased omeprazole 20mg to 1 bid. Before this she had a lot of nausea, belching and gas and seems sx's did not improve. Only started medications 1/15/24. She is doing ensure, applesauce, cream soups, really not much solid foods at all.  She also has a lot of fatigue, no energy, and not sleeping. Does have zofran at home for the nausea and is using.  [facility] was in today to start Phys. Tx but did not do since patient was not feeling well. They did check her vitals and they all were good and no fever.  Asked if I would check with Dr Calvillo and see if there are any further suggestions she would have for patient .  Advised Dr Calvillo out of office but would check with some one else and see what they suggest.  Phone [phone]

## 2024-01-19 ENCOUNTER — APPOINTMENT (OUTPATIENT)
Dept: LAB | Facility: HOSPITAL | Age: 79
End: 2024-01-19
Payer: MEDICARE

## 2024-01-19 DIAGNOSIS — K55.20 ANGIODYSPLASIA OF GASTROINTESTINAL TRACT: ICD-10-CM

## 2024-01-19 DIAGNOSIS — R11.0 NAUSEA: ICD-10-CM

## 2024-01-19 LAB
ALBUMIN SERPL BCP-MCNC: 3.7 G/DL (ref 3.5–5)
ALP SERPL-CCNC: 56 U/L (ref 34–104)
ALT SERPL W P-5'-P-CCNC: 17 U/L (ref 7–52)
ANION GAP SERPL CALCULATED.3IONS-SCNC: 7 MMOL/L
AST SERPL W P-5'-P-CCNC: 19 U/L (ref 13–39)
BASOPHILS # BLD AUTO: 0.02 THOUSANDS/ÂΜL (ref 0–0.1)
BASOPHILS NFR BLD AUTO: 0 % (ref 0–1)
BILIRUB SERPL-MCNC: 0.28 MG/DL (ref 0.2–1)
BUN SERPL-MCNC: 12 MG/DL (ref 5–25)
CALCIUM SERPL-MCNC: 8.8 MG/DL (ref 8.4–10.2)
CHLORIDE SERPL-SCNC: 103 MMOL/L (ref 96–108)
CO2 SERPL-SCNC: 27 MMOL/L (ref 21–32)
CREAT SERPL-MCNC: 0.94 MG/DL (ref 0.6–1.3)
EOSINOPHIL # BLD AUTO: 0.09 THOUSAND/ÂΜL (ref 0–0.61)
EOSINOPHIL NFR BLD AUTO: 1 % (ref 0–6)
ERYTHROCYTE [DISTWIDTH] IN BLOOD BY AUTOMATED COUNT: 15.4 % (ref 11.6–15.1)
GFR SERPL CREATININE-BSD FRML MDRD: 57 ML/MIN/1.73SQ M
GLUCOSE P FAST SERPL-MCNC: 109 MG/DL (ref 65–99)
HCT VFR BLD AUTO: 29.2 % (ref 34.8–46.1)
HGB BLD-MCNC: 9 G/DL (ref 11.5–15.4)
IMM GRANULOCYTES # BLD AUTO: 0.06 THOUSAND/UL (ref 0–0.2)
IMM GRANULOCYTES NFR BLD AUTO: 1 % (ref 0–2)
LYMPHOCYTES # BLD AUTO: 1.36 THOUSANDS/ÂΜL (ref 0.6–4.47)
LYMPHOCYTES NFR BLD AUTO: 11 % (ref 14–44)
MCH RBC QN AUTO: 26.4 PG (ref 26.8–34.3)
MCHC RBC AUTO-ENTMCNC: 30.8 G/DL (ref 31.4–37.4)
MCV RBC AUTO: 86 FL (ref 82–98)
MONOCYTES # BLD AUTO: 0.97 THOUSAND/ÂΜL (ref 0.17–1.22)
MONOCYTES NFR BLD AUTO: 8 % (ref 4–12)
NEUTROPHILS # BLD AUTO: 10.09 THOUSANDS/ÂΜL (ref 1.85–7.62)
NEUTS SEG NFR BLD AUTO: 79 % (ref 43–75)
NRBC BLD AUTO-RTO: 0 /100 WBCS
PLATELET # BLD AUTO: 303 THOUSANDS/UL (ref 149–390)
PMV BLD AUTO: 9.6 FL (ref 8.9–12.7)
POTASSIUM SERPL-SCNC: 3.9 MMOL/L (ref 3.5–5.3)
PROT SERPL-MCNC: 6.8 G/DL (ref 6.4–8.4)
RBC # BLD AUTO: 3.41 MILLION/UL (ref 3.81–5.12)
SODIUM SERPL-SCNC: 137 MMOL/L (ref 135–147)
WBC # BLD AUTO: 12.59 THOUSAND/UL (ref 4.31–10.16)

## 2024-01-19 PROCEDURE — 36415 COLL VENOUS BLD VENIPUNCTURE: CPT

## 2024-01-19 PROCEDURE — 80053 COMPREHEN METABOLIC PANEL: CPT

## 2024-01-19 PROCEDURE — 85025 COMPLETE CBC W/AUTO DIFF WBC: CPT

## 2024-01-23 ENCOUNTER — OFFICE VISIT (OUTPATIENT)
Dept: FAMILY MEDICINE CLINIC | Facility: CLINIC | Age: 79
End: 2024-01-23
Payer: MEDICARE

## 2024-01-23 VITALS
OXYGEN SATURATION: 99 % | HEIGHT: 61 IN | HEART RATE: 72 BPM | TEMPERATURE: 97.9 F | DIASTOLIC BLOOD PRESSURE: 62 MMHG | WEIGHT: 115.6 LBS | SYSTOLIC BLOOD PRESSURE: 136 MMHG | BODY MASS INDEX: 21.83 KG/M2

## 2024-01-23 DIAGNOSIS — F41.1 ANXIETY STATE: ICD-10-CM

## 2024-01-23 DIAGNOSIS — R11.0 NAUSEA: ICD-10-CM

## 2024-01-23 DIAGNOSIS — K55.20 ANGIODYSPLASIA OF GASTROINTESTINAL TRACT: ICD-10-CM

## 2024-01-23 DIAGNOSIS — K21.00 GASTROESOPHAGEAL REFLUX DISEASE WITH ESOPHAGITIS, UNSPECIFIED WHETHER HEMORRHAGE: Primary | ICD-10-CM

## 2024-01-23 DIAGNOSIS — K31.819 GASTRIC ANTRAL VASCULAR ECTASIA: ICD-10-CM

## 2024-01-23 DIAGNOSIS — Z23 ENCOUNTER FOR IMMUNIZATION: ICD-10-CM

## 2024-01-23 PROCEDURE — 99214 OFFICE O/P EST MOD 30 MIN: CPT | Performed by: FAMILY MEDICINE

## 2024-01-23 PROCEDURE — G0009 ADMIN PNEUMOCOCCAL VACCINE: HCPCS

## 2024-01-23 PROCEDURE — 90677 PCV20 VACCINE IM: CPT

## 2024-01-23 RX ORDER — FAMOTIDINE 20 MG/1
20 TABLET, FILM COATED ORAL
Qty: 30 TABLET | Refills: 1 | Status: SHIPPED | OUTPATIENT
Start: 2024-01-23

## 2024-01-23 NOTE — PROGRESS NOTES
Name: Anthony Slater      : 1945      MRN: [id number]  Encounter Provider: Akosua Schwab DO  Encounter Date: 2024   Encounter department: [facility]    Assessment & Plan     1. Gastroesophageal reflux disease with esophagitis, unspecified whether hemorrhage  Assessment & Plan:  Advised patient to continue with omeprazole daily.  Move her famotidine from nightly to prior to dinner.  [place]uque meals for now, low acidity.  Advised her  to call me within a week if still persistent symptoms, we could consider adding Carafate.    Orders:  -     Ambulatory Referral to Gastroenterology; Future    2. Angiodysplasia of gastrointestinal tract  -     Ambulatory Referral to Gastroenterology; Future    3. Gastric antral vascular ectasia  Assessment & Plan:  Patient reports it has been a while since she has had EGD surveillance.  Her  sees [facility], referral placed today.    Orders:  -     Ambulatory Referral to Gastroenterology; Future    4. Nausea  Assessment & Plan:  Zofran as needed.  Has not needed lately.    Orders:  -     famotidine (PEPCID) 20 mg tablet; Take 1 tablet (20 mg total) by mouth daily at bedtime    5. Encounter for immunization  -     Pneumococcal Conjugate Vaccine 20-valent (PCV20)    6. Anxiety state  Assessment & Plan:  Continue sertraline 25 mg a day.  Patient reports she has follow-up arranged with her PCP Dr. Calvillo.              Subjective      F/u weakness, ER follow-up, GI upset--patient here with her  for recheck.  Several notable medical issues recently including COVID infection, syncopal episode at home which resulted in a minor splenic laceration (surgery not required), anxiety, insomnia, and GI upset.  COVID infection has resolved.    Since she was discharged from the hospital she reports poor sleep quality, increase in her anxiety.  Sertraline was recently added at a low-dose, she has been compliant with medication but  does not note significant improvement in anxiety just yet (med started January 15).    Upon further questioning, patient reports her stomach frequently awakens her.  Complains of epigastric burning, increase in belching.  Symptoms occasionally last all night.  She will frequently get up and eat something, this occasionally helps.  Has Zofran at home but has not needed as no recent complaints of nausea.  Bowel movements are unchanged, reportedly small and infrequent.  Recently famotidine was added at bedtime, this has improved symptoms minimally.  Also taking omeprazole.  Appetite reportedly normal, did stop Ensure as she felt it was exacerbating her GERD.  History of watermelon stomach.  Previously established with Dr. Stewart who recently retired.  She would like to establish with [facility].  Reports her last EGD was prior to the pandemic.      Review of Systems   Constitutional:  Positive for activity change and fatigue. Negative for appetite change, chills, fever and unexpected weight change.   Respiratory:  Negative for cough and shortness of breath.    Cardiovascular:  Negative for chest pain.   Gastrointestinal:  Positive for abdominal pain. Negative for abdominal distention, anal bleeding, blood in stool, constipation, diarrhea, nausea, rectal pain and vomiting.   Genitourinary:  Negative for difficulty urinating, dysuria, frequency, hematuria and vaginal bleeding.   Neurological:  Positive for dizziness and weakness.       Current Outpatient Medications on File Prior to Visit   Medication Sig   • acetaminophen (TYLENOL) 325 mg tablet Take 2 tablets (650 mg total) by mouth every 4 (four) hours as needed for mild pain   • Ascorbic Acid (vitamin C) 100 MG tablet Take 500 mg by mouth 2 (two) times a day With rosehips   • atorvastatin (LIPITOR) 40 mg tablet Take 1 tablet (40 mg total) by mouth daily   • Cholecalciferol (Vitamin D3) 1.25 MG (07991 UT) CAPS Take 1,000 Units by mouth daily   • co-enzyme Q-10  "100 mg capsule Take by mouth daily   • Iron Heme Polypeptide (Proferrin ES) 12 MG TABS one tablet b.i.d.   • metoprolol tartrate (LOPRESSOR) 25 mg tablet Take 0.5 tablets (12.5 mg total) by mouth daily at bedtime   • omeprazole (PriLOSEC) 20 mg delayed release capsule Take 1 capsule (20 mg total) by mouth 2 (two) times a day   • ondansetron (ZOFRAN-ODT) 4 mg disintegrating tablet 1 tablet dissolved on tongue q 4-6 hrs PRN nausea   • sertraline (ZOLOFT) 25 mg tablet Take 1 tablet (25 mg total) by mouth daily at bedtime   • traZODone (DESYREL) 50 mg tablet Take 50 mg by mouth daily at bedtime   • vitamin B-12 (VITAMIN B-12) 1,000 mcg tablet Take 1,000 mcg by mouth daily   • [DISCONTINUED] famotidine (PEPCID) 20 mg tablet Take 1 tablet (20 mg total) by mouth daily at bedtime   • senna (SENOKOT) 8.6 mg Take 1 tablet (8.6 mg total) by mouth daily for 5 days       Objective     /62 (BP Location: Left arm, Patient Position: Sitting, Cuff Size: Large)   Pulse 72   Temp 97.9 °F (36.6 °C) (Tympanic)   Ht 5' 1\" (1.549 m)   Wt 52.4 kg (115 lb 9.6 oz)   SpO2 99%   BMI 21.84 kg/m²     Physical Exam  Vitals and nursing note reviewed.   Constitutional:       General: She is not in acute distress.     Appearance: Normal appearance.   HENT:      Head: Normocephalic and atraumatic.   Cardiovascular:      Rate and Rhythm: Normal rate and regular rhythm.      Pulses: Normal pulses.      Heart sounds: No murmur heard.  Pulmonary:      Effort: Pulmonary effort is normal.      Breath sounds: Normal breath sounds. No wheezing, rhonchi or rales.   Abdominal:      General: Abdomen is flat. Bowel sounds are normal.      Palpations: Abdomen is soft.      Tenderness: There is abdominal tenderness in the epigastric area.   Musculoskeletal:      Cervical back: Normal range of motion.   Neurological:      General: No focal deficit present.      Mental Status: She is alert and oriented to person, place, and time.   Psychiatric:         " Mood and Affect: Mood normal.         Behavior: Behavior normal.         Thought Content: Thought content normal.         Judgment: Judgment normal.       Akosua Schwab, DO

## 2024-01-23 NOTE — ASSESSMENT & PLAN NOTE
Advised patient to continue with omeprazole daily.  Move her famotidine from nightly to prior to dinner.  [place] meals for now, low acidity.  Advised her  to call me within a week if still persistent symptoms, we could consider adding Carafate.

## 2024-01-23 NOTE — ASSESSMENT & PLAN NOTE
Patient reports it has been a while since she has had EGD surveillance.  Her  sees [facility], referral placed today.

## 2024-01-23 NOTE — ASSESSMENT & PLAN NOTE
Continue sertraline 25 mg a day.  Patient reports she has follow-up arranged with her PCP Dr. Calvillo.

## 2024-02-01 ENCOUNTER — TELEPHONE (OUTPATIENT)
Dept: HEMATOLOGY ONCOLOGY | Facility: CLINIC | Age: 79
End: 2024-02-01

## 2024-02-01 NOTE — TELEPHONE ENCOUNTER
Appointment Change  Cancel, Reschedule, Change to Virtual      Who are you speaking with? Patient   If it is not the patient, is the caller listed on the communication consent form? N/A   Which provider is the appointment scheduled with? Dr. Ash   When was the original appointment scheduled?    Please list date and time 03/05/2024 @ 3:15PM    At which location is the appointment scheduled to take place? [place]   Was the appointment rescheduled?     Was the appointment changed from an in person visit to a virtual visit?    If so, please list the details of the change.   Yes, 03/08/2024 @1:45PM    What is the reason for the appointment change? provider

## 2024-02-05 ENCOUNTER — OFFICE VISIT (OUTPATIENT)
Dept: FAMILY MEDICINE CLINIC | Facility: CLINIC | Age: 79
End: 2024-02-05
Payer: MEDICARE

## 2024-02-05 VITALS
OXYGEN SATURATION: 99 % | SYSTOLIC BLOOD PRESSURE: 128 MMHG | DIASTOLIC BLOOD PRESSURE: 56 MMHG | BODY MASS INDEX: 21.49 KG/M2 | TEMPERATURE: 98.2 F | HEIGHT: 61 IN | HEART RATE: 80 BPM | WEIGHT: 113.8 LBS

## 2024-02-05 DIAGNOSIS — R11.0 NAUSEA: ICD-10-CM

## 2024-02-05 DIAGNOSIS — D50.0 IRON DEFICIENCY ANEMIA DUE TO CHRONIC BLOOD LOSS: ICD-10-CM

## 2024-02-05 DIAGNOSIS — R14.2 BELCHING: ICD-10-CM

## 2024-02-05 DIAGNOSIS — F41.1 ANXIETY STATE: Primary | ICD-10-CM

## 2024-02-05 PROBLEM — S00.81XA FACIAL ABRASION, INITIAL ENCOUNTER: Status: RESOLVED | Noted: 2023-12-31 | Resolved: 2024-02-05
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
## 2024-02-05 PROCEDURE — 99212 OFFICE O/P EST SF 10 MIN: CPT | Performed by: FAMILY MEDICINE

## 2024-02-05 RX ORDER — SERTRALINE HYDROCHLORIDE 25 MG/1
TABLET, FILM COATED ORAL
Qty: 60 TABLET | Refills: 5 | Status: SHIPPED | OUTPATIENT
Start: 2024-02-05 | End: 2024-02-05 | Stop reason: SDUPTHER

## 2024-02-05 RX ORDER — SERTRALINE HYDROCHLORIDE 25 MG/1
TABLET, FILM COATED ORAL
Qty: 60 TABLET | Refills: 5 | Status: SHIPPED | OUTPATIENT
Start: 2024-02-05 | End: 2024-02-07

## 2024-02-05 NOTE — PROGRESS NOTES
Name: Anthony Slater      : 1945      MRN: [id number]  Encounter Provider: Marycarmen Calvillo MD  Encounter Date: 2024   Encounter department: [facility]    Assessment & Plan     1. Anxiety state  Comments:  increase sertraline 2 pills hs and conjt same trazadone   repeat phone call 3 weeks  Orders:  -     sertraline (ZOLOFT) 25 mg tablet; 2 tablets daily hs    2. Belching  Comments:  cont same meds till G-I eval    ?? will need EGD - jamil with decreased H/H    3. Nausea  Comments:  As above    4. Iron deficiency anemia due to chronic blood loss  Comments:  Pt back in iron - G-I sx's no worse with this med   ( has taken in past without G-I sx's)  Orders:  -     CBC and differential; Future  -     Iron Panel (Includes Ferritin, Iron Sat%, Iron, and TIBC); Future        Depression Screening and Follow-up Plan: Patient was screened for depression during today's encounter. They screened negative with a PHQ-2 score of 0.        Subjective      She is not sure if GI symptoms has had all improved.  Taking the omeprazole and now Pepcid before dinner.  Still complaining of lots of belching occasional abdominal pain and nausea relieved with Pepto-Bismol.  She states appetite may be somewhat improved and denies vomiting.  States bowel movements are good without hematochezia or melena.  She does have follow-up with GI next week.    She feels anxiety and sleep may be a little bit better.  She has now been on Zoloft for 3 weeks and taking trazodone 50 mg at bedtime.  Has been also think she is doing somewhat better      Review of Systems    Current Outpatient Medications on File Prior to Visit   Medication Sig   • acetaminophen (TYLENOL) 325 mg tablet Take 2 tablets (650 mg total) by mouth every 4 (four) hours as needed for mild pain   • Ascorbic Acid (vitamin C) 100 MG tablet Take 500 mg by mouth 2 (two) times a day With rosehips   • atorvastatin (LIPITOR) 40 mg tablet Take 1 tablet (40 mg  "total) by mouth daily   • Cholecalciferol (Vitamin D3) 1.25 MG (18542 UT) CAPS Take 1,000 Units by mouth daily   • co-enzyme Q-10 100 mg capsule Take by mouth daily   • famotidine (PEPCID) 20 mg tablet Take 1 tablet (20 mg total) by mouth daily at bedtime   • Iron Heme Polypeptide (Proferrin ES) 12 MG TABS one tablet b.i.d.   • metoprolol tartrate (LOPRESSOR) 25 mg tablet Take 0.5 tablets (12.5 mg total) by mouth daily at bedtime   • omeprazole (PriLOSEC) 20 mg delayed release capsule Take 1 capsule (20 mg total) by mouth 2 (two) times a day   • ondansetron (ZOFRAN-ODT) 4 mg disintegrating tablet 1 tablet dissolved on tongue q 4-6 hrs PRN nausea   • traZODone (DESYREL) 50 mg tablet Take 50 mg by mouth daily at bedtime   • vitamin B-12 (VITAMIN B-12) 1,000 mcg tablet Take 1,000 mcg by mouth daily   • [DISCONTINUED] sertraline (ZOLOFT) 25 mg tablet Take 1 tablet (25 mg total) by mouth daily at bedtime   • senna (SENOKOT) 8.6 mg Take 1 tablet (8.6 mg total) by mouth daily for 5 days       Objective     /56 (BP Location: Left arm, Patient Position: Sitting, Cuff Size: Adult)   Pulse 80   Temp 98.2 °F (36.8 °C) (Tympanic)   Ht 5' 1\" (1.549 m)   Wt 51.6 kg (113 lb 12.8 oz)   SpO2 99%   BMI 21.50 kg/m²     Physical Exam  Neurological:      Mental Status: She is oriented to person, place, and time.   Psychiatric:         Mood and Affect: Mood normal.         Behavior: Behavior normal.      Comments: Patient far less anxious today.  She is quiet and composed and and does not appear frustrated as before.  She answers questions appropriately with good eye contact.  He sits quietly without \"fidgeting\".       Marycarmen Calvillo MD    "

## 2024-02-07 DIAGNOSIS — F41.1 ANXIETY STATE: ICD-10-CM

## 2024-02-07 RX ORDER — SERTRALINE HYDROCHLORIDE 25 MG/1
TABLET, FILM COATED ORAL
Qty: 180 TABLET | Refills: 5 | Status: SHIPPED | OUTPATIENT
Start: 2024-02-07

## 2024-02-12 ENCOUNTER — TELEPHONE (OUTPATIENT)
Age: 79
End: 2024-02-12

## 2024-02-12 DIAGNOSIS — I25.10 CORONARY ARTERY DISEASE INVOLVING NATIVE CORONARY ARTERY OF NATIVE HEART WITHOUT ANGINA PECTORIS: ICD-10-CM

## 2024-02-12 NOTE — TELEPHONE ENCOUNTER
Due to inclement weather, providers only doing virtual visits. LVM for patient to call back to change to virtual or to r/s appt. If patient would like virtual please verify if patient wants to do it through [employer], have a link sent to them or a phone call visit.

## 2024-02-20 ENCOUNTER — CONSULT (OUTPATIENT)
Age: 79
End: 2024-02-20
Payer: MEDICARE

## 2024-02-20 VITALS
TEMPERATURE: 97.6 F | WEIGHT: 116 LBS | RESPIRATION RATE: 18 BRPM | BODY MASS INDEX: 21.9 KG/M2 | SYSTOLIC BLOOD PRESSURE: 124 MMHG | OXYGEN SATURATION: 98 % | DIASTOLIC BLOOD PRESSURE: 56 MMHG | HEIGHT: 61 IN | HEART RATE: 77 BPM

## 2024-02-20 DIAGNOSIS — K31.819 GASTRIC ANTRAL VASCULAR ECTASIA: ICD-10-CM

## 2024-02-20 DIAGNOSIS — R14.0 BLOATING: ICD-10-CM

## 2024-02-20 DIAGNOSIS — K21.00 GASTROESOPHAGEAL REFLUX DISEASE WITH ESOPHAGITIS, UNSPECIFIED WHETHER HEMORRHAGE: ICD-10-CM

## 2024-02-20 DIAGNOSIS — D64.9 NORMOCYTIC ANEMIA: Primary | ICD-10-CM

## 2024-02-20 DIAGNOSIS — K55.20 ANGIODYSPLASIA OF GASTROINTESTINAL TRACT: ICD-10-CM

## 2024-02-20 PROCEDURE — 99204 OFFICE O/P NEW MOD 45 MIN: CPT | Performed by: INTERNAL MEDICINE

## 2024-02-21 PROBLEM — S36.039A SPLENIC LACERATION, INITIAL ENCOUNTER: Status: RESOLVED | Noted: 2023-12-31 | Resolved: 2024-02-21

## 2024-02-22 ENCOUNTER — TELEPHONE (OUTPATIENT)
Age: 79
End: 2024-02-22

## 2024-03-01 ENCOUNTER — OFFICE VISIT (OUTPATIENT)
Dept: FAMILY MEDICINE CLINIC | Facility: CLINIC | Age: 79
End: 2024-03-01
Payer: MEDICARE

## 2024-03-01 DIAGNOSIS — R53.82 CHRONIC FATIGUE: ICD-10-CM

## 2024-03-01 DIAGNOSIS — F41.1 ANXIETY STATE: ICD-10-CM

## 2024-03-01 DIAGNOSIS — F41.1 ANXIETY STATE: Primary | ICD-10-CM

## 2024-03-01 DIAGNOSIS — R11.0 NAUSEA: ICD-10-CM

## 2024-03-01 PROCEDURE — 99441 PR PHYS/QHP TELEPHONE EVALUATION 5-10 MIN: CPT | Performed by: FAMILY MEDICINE

## 2024-03-01 RX ORDER — BUSPIRONE HYDROCHLORIDE 5 MG/1
5 TABLET ORAL 3 TIMES DAILY
Qty: 40 TABLET | Refills: 5 | Status: SHIPPED | OUTPATIENT
Start: 2024-03-01 | End: 2024-03-01 | Stop reason: SDUPTHER

## 2024-03-01 RX ORDER — BUSPIRONE HYDROCHLORIDE 5 MG/1
5 TABLET ORAL 3 TIMES DAILY
Qty: 40 TABLET | Refills: 5 | Status: SHIPPED | OUTPATIENT
Start: 2024-03-01

## 2024-03-01 NOTE — PROGRESS NOTES
Virtual Brief Visit    This Visit is being completed by telephone. The Patient is located at Home and in the following state in which I hold an active license [place]    The patient was identified by name and date of birth. Anthony Slater was informed that this is a telemedicine visit and that the visit is being conducted through the Epic Embedded platform. She agrees to proceed..  My office door was closed. No one else was in the room.  She acknowledged consent and understanding of privacy and security of the video platform. The patient has agreed to participate and understands they can discontinue the visit at any time.    Patient is aware this is a billable service.     Patient states since on the 2 sertraline daily she is more tired however sleeping better.  She and her  do not feel it is helping her anxiety since still very worried about everything, easily tremulous and shaking when upset.  She was seen by GI and her iron discontinued so far with any change in her GI symptoms.  She is scheduled for an EGD in several weeks.  Assessment/Plan:    Problem List Items Addressed This Visit     Anxiety state - Primary    Relevant Medications    busPIRone (BUSPAR) 5 mg tablet    Nausea   Other Visit Diagnoses     Chronic fatigue        She will continue her trazodone at bedtime          Recent Visits  No visits were found meeting these conditions.  Showing recent visits within past 7 days and meeting all other requirements  Today's Visits  Date Type Provider Dept   03/01/24 Office Visit MD [facility]   Showing today's visits and meeting all other requirements  Future Appointments  No visits were found meeting these conditions.  Showing future appointments within next 150 days and meeting all other requirements         Visit Time  Total Visit Duration: 10 minutes

## 2024-03-08 ENCOUNTER — RA CDI HCC (OUTPATIENT)
Dept: OTHER | Facility: HOSPITAL | Age: 79
End: 2024-03-08

## 2024-03-08 ENCOUNTER — OFFICE VISIT (OUTPATIENT)
Dept: GYNECOLOGIC ONCOLOGY | Facility: CLINIC | Age: 79
End: 2024-03-08
Payer: MEDICARE

## 2024-03-08 VITALS
TEMPERATURE: 99.2 F | SYSTOLIC BLOOD PRESSURE: 128 MMHG | DIASTOLIC BLOOD PRESSURE: 70 MMHG | BODY MASS INDEX: 22.36 KG/M2 | WEIGHT: 118.4 LBS | HEART RATE: 84 BPM | HEIGHT: 61 IN

## 2024-03-08 DIAGNOSIS — C54.1 ENDOMETRIAL CANCER (HCC): Primary | ICD-10-CM

## 2024-03-08 PROCEDURE — 99213 OFFICE O/P EST LOW 20 MIN: CPT | Performed by: OBSTETRICS & GYNECOLOGY

## 2024-03-08 NOTE — PROGRESS NOTES
Assessment/Plan:    Problem List Items Addressed This Visit          Genitourinary    Endometrial cancer (HCC) - Primary     Patient has no clinical evidence of disease.  I plan to see her back in approximately 4 months.  She will contact me sooner if she has any new symptoms.              CHIEF COMPLAINT:   Routine endometrial cancer surveillance    Problem:  Cancer Staging   Endometrial cancer (HCC)  Staging form: Corpus Uteri - Carcinoma, AJCC 8th Edition  - Clinical stage from 7/31/2023: FIGO Stage IB (cT1b, cN0(sn), cM0) - Signed by DOTTIE Umaña on 8/29/2023        Previous therapy:  Oncology History   Endometrial cancer ([facility])   7/31/2023 -  Cancer Staged    Staging form: Corpus Uteri - Carcinoma, AJCC 8th Edition  - Clinical stage from 7/31/2023: FIGO Stage IB (cT1b, cN0(sn), cM0) - Signed by DOTTIE Umaña on 8/29/2023  Stage prefix: Initial diagnosis  Method of lymph node assessment: [place] lymph node biopsy  Histologic grade (G): G1  Histologic grading system: 3 grade system       8/29/2023 Initial Diagnosis    Endometrial cancer (HCC)     10/2/2023 - 10/16/2023 Radiation      Plan ID Energy Fractions Dose per Fraction (cGy) Dose Correction (cGy) Total Dose Delivered (cGy) Elapsed Days   Cyl 2.5 cm Ir 192 3 / 3 700 0 2,100 14      Treatment dates:  C1 HDR: 10/2/2023 - 10/16/2023               Patient ID: Anthony Slater is a 78 y.o. female  [facility]  Patient with stage Ib grade 1 high intermediate risk endometrial cancer.  She completed vaginal brachytherapy in October 2023.  Presents for follow-up.  Before [place] had a fall and also describes some upper abdominal pain.  Denies vaginal bleeding, drainage or discharge.  Denies changes in bowel or bladder function.  ECOG performance status is 1.  The following portions of the patient's history were reviewed and updated as appropriate: allergies, current medications, past family history, past medical history, past social history, past surgical  "history, and problem list.    Review of Systems  Per HPI.  Current Outpatient Medications   Medication Sig Dispense Refill    atorvastatin (LIPITOR) 40 mg tablet Take 1 tablet (40 mg total) by mouth daily 90 tablet 1    busPIRone (BUSPAR) 5 mg tablet Take 1 tablet (5 mg total) by mouth 3 (three) times a day 40 tablet 5    Cholecalciferol (Vitamin D3) 1.25 MG (94785 UT) CAPS Take 1,000 Units by mouth daily      co-enzyme Q-10 100 mg capsule Take by mouth daily      famotidine (PEPCID) 20 mg tablet Take 1 tablet (20 mg total) by mouth daily at bedtime 30 tablet 1    metoprolol tartrate (LOPRESSOR) 25 mg tablet Take 1 tablet (25 mg total) by mouth in the morning 90 tablet 3    omeprazole (PriLOSEC) 20 mg delayed release capsule Take 1 capsule (20 mg total) by mouth 2 (two) times a day 180 capsule 3    traZODone (DESYREL) 50 mg tablet Take 50 mg by mouth daily at bedtime      vitamin B-12 (VITAMIN B-12) 1,000 mcg tablet Take 1,000 mcg by mouth daily      acetaminophen (TYLENOL) 325 mg tablet Take 2 tablets (650 mg total) by mouth every 4 (four) hours as needed for mild pain (Patient not taking: Reported on 3/8/2024)      Ascorbic Acid (vitamin C) 100 MG tablet Take 500 mg by mouth 2 (two) times a day With rosehips (Patient not taking: Reported on 3/8/2024)      ondansetron (ZOFRAN-ODT) 4 mg disintegrating tablet 1 tablet dissolved on tongue q 4-6 hrs PRN nausea (Patient not taking: Reported on 3/8/2024) 20 tablet 3    senna (SENOKOT) 8.6 mg Take 1 tablet (8.6 mg total) by mouth daily for 5 days 5 tablet 0     No current facility-administered medications for this visit.           Objective:    Blood pressure 128/70, pulse 84, temperature 99.2 °F (37.3 °C), height 5' 1\" (1.549 m), weight 53.7 kg (118 lb 6.4 oz).  Body mass index is 22.37 kg/m².  Body surface area is 1.51 meters squared.    Physical Exam  Vitals reviewed. Exam conducted with a chaperone present.   Constitutional:       General: She is not in acute " distress.     Appearance: Normal appearance. She is not ill-appearing or toxic-appearing.   Pulmonary:      Effort: Pulmonary effort is normal.   Abdominal:      General: There is no distension.      Palpations: There is no mass.      Tenderness: There is no abdominal tenderness. There is no guarding.      Hernia: No hernia is present.   Genitourinary:     Comments: Normal external female genitalia. Normal Bartholin's and Tilton's glands. Normal urethral meatus and no evidence of urethral discharge or masses.  Bladder without fullness mass or tenderness. Vagina with mild atrophy but without lesion or discharge. No significant pelvic organ prolapse noted.  Cervix and uterus are surgically absent.  Bimanual exam demonstrates no evidence of pelvic masses.  Anus without fissure of lesion.        Nadir Ash MD, FACOG, FACS  3/8/2024  2:08 PM

## 2024-03-08 NOTE — LETTER
March 8, 2024       No Recipients    Patient: Anthony Slater   YOB: 1945   Date of Visit: 3/8/2024       Dear Dr. Thurston Recipients:    Thank you for referring Anthony Slater to me for evaluation. Below are my notes for this consultation.    If you have questions, please do not hesitate to call me. I look forward to following your patient along with you.         Sincerely,        Nadir Ash MD        CC:   No Recipients    Nadir Ash MD  3/8/2024  2:08 PM  Incomplete  Assessment/Plan:    Problem List Items Addressed This Visit          Genitourinary    Endometrial cancer (HCC) - Primary     Patient has no clinical evidence of disease.  I plan to see her back in approximately 4 months.  She will contact me sooner if she has any new symptoms.              CHIEF COMPLAINT:   Routine endometrial cancer surveillance    Problem:  Cancer Staging   Endometrial cancer (HCC)  Staging form: Corpus Uteri - Carcinoma, AJCC 8th Edition  - Clinical stage from 7/31/2023: FIGO Stage IB (cT1b, cN0(sn), cM0) - Signed by DOTTIE Umaña on 8/29/2023        Previous therapy:  Oncology History   Endometrial cancer (HCC)   7/31/2023 -  Cancer Staged    Staging form: Corpus Uteri - Carcinoma, AJCC 8th Edition  - Clinical stage from 7/31/2023: FIGO Stage IB (cT1b, cN0(sn), cM0) - Signed by DOTTIE Umaña on 8/29/2023  Stage prefix: Initial diagnosis  Method of lymph node assessment: [place] lymph node biopsy  Histologic grade (G): G1  Histologic grading system: 3 grade system       8/29/2023 Initial Diagnosis    Endometrial cancer (HCC)     10/2/2023 - 10/16/2023 Radiation      Plan ID Energy Fractions Dose per Fraction (cGy) Dose Correction (cGy) Total Dose Delivered (cGy) Elapsed Days   Cyl 2.5 cm Ir 192 3 / 3 700 0 2,100 14      Treatment dates:  C1 HDR: 10/2/2023 - 10/16/2023               Patient ID: Anthony Slater is a 78 y.o. female  HPI  Patient with stage Ib grade 1 high intermediate  risk endometrial cancer.  She completed vaginal brachytherapy in October 2023.  Presents for follow-up.  Before [place] had a fall and also describes some upper abdominal pain.  Denies vaginal bleeding, drainage or discharge.  Denies changes in bowel or bladder function.  ECOG performance status is 1.  The following portions of the patient's history were reviewed and updated as appropriate: allergies, current medications, past family history, past medical history, past social history, past surgical history, and problem list.    Review of Systems  Per HPI.  Current Outpatient Medications   Medication Sig Dispense Refill   • atorvastatin (LIPITOR) 40 mg tablet Take 1 tablet (40 mg total) by mouth daily 90 tablet 1   • busPIRone (BUSPAR) 5 mg tablet Take 1 tablet (5 mg total) by mouth 3 (three) times a day 40 tablet 5   • Cholecalciferol (Vitamin D3) 1.25 MG (83937 UT) CAPS Take 1,000 Units by mouth daily     • co-enzyme Q-10 100 mg capsule Take by mouth daily     • famotidine (PEPCID) 20 mg tablet Take 1 tablet (20 mg total) by mouth daily at bedtime 30 tablet 1   • metoprolol tartrate (LOPRESSOR) 25 mg tablet Take 1 tablet (25 mg total) by mouth in the morning 90 tablet 3   • omeprazole (PriLOSEC) 20 mg delayed release capsule Take 1 capsule (20 mg total) by mouth 2 (two) times a day 180 capsule 3   • traZODone (DESYREL) 50 mg tablet Take 50 mg by mouth daily at bedtime     • vitamin B-12 (VITAMIN B-12) 1,000 mcg tablet Take 1,000 mcg by mouth daily     • acetaminophen (TYLENOL) 325 mg tablet Take 2 tablets (650 mg total) by mouth every 4 (four) hours as needed for mild pain (Patient not taking: Reported on 3/8/2024)     • Ascorbic Acid (vitamin C) 100 MG tablet Take 500 mg by mouth 2 (two) times a day With rosehips (Patient not taking: Reported on 3/8/2024)     • ondansetron (ZOFRAN-ODT) 4 mg disintegrating tablet 1 tablet dissolved on tongue q 4-6 hrs PRN nausea (Patient not taking: Reported on 3/8/2024) 20  "tablet 3   • senna (SENOKOT) 8.6 mg Take 1 tablet (8.6 mg total) by mouth daily for 5 days 5 tablet 0     No current facility-administered medications for this visit.           Objective:    Blood pressure 128/70, pulse 84, temperature 99.2 °F (37.3 °C), height 5' 1\" (1.549 m), weight 53.7 kg (118 lb 6.4 oz).  Body mass index is 22.37 kg/m².  Body surface area is 1.51 meters squared.    Physical Exam  Vitals reviewed. Exam conducted with a chaperone present.   Constitutional:       General: She is not in acute distress.     Appearance: Normal appearance. She is not ill-appearing or toxic-appearing.   Pulmonary:      Effort: Pulmonary effort is normal.   Abdominal:      General: There is no distension.      Palpations: There is no mass.      Tenderness: There is no abdominal tenderness. There is no guarding.      Hernia: No hernia is present.   Genitourinary:     Comments: Normal external female genitalia. Normal Bartholin's and Ivan's glands. Normal urethral meatus and no evidence of urethral discharge or masses.  Bladder without fullness mass or tenderness. Vagina with mild atrophy but without lesion or discharge. No significant pelvic organ prolapse noted.  Cervix and uterus are surgically absent.  Bimanual exam demonstrates no evidence of pelvic masses.  Anus without fissure of lesion.        Nadir Ash MD, FACOG, FACS  3/8/2024  2:08 PM        "

## 2024-03-08 NOTE — ASSESSMENT & PLAN NOTE
Patient has no clinical evidence of disease.  I plan to see her back in approximately 4 months.  She will contact me sooner if she has any new symptoms.

## 2024-03-18 ENCOUNTER — OFFICE VISIT (OUTPATIENT)
Dept: FAMILY MEDICINE CLINIC | Facility: CLINIC | Age: 79
End: 2024-03-18
Payer: MEDICARE

## 2024-03-18 DIAGNOSIS — R53.1 WEAKNESS: ICD-10-CM

## 2024-03-18 DIAGNOSIS — F41.1 ANXIETY STATE: Primary | ICD-10-CM

## 2024-03-18 DIAGNOSIS — D64.9 ANEMIA, UNSPECIFIED TYPE: Primary | ICD-10-CM

## 2024-03-18 DIAGNOSIS — D64.9 ANEMIA, UNSPECIFIED TYPE: ICD-10-CM

## 2024-03-18 DIAGNOSIS — F41.1 ANXIETY STATE: ICD-10-CM

## 2024-03-18 DIAGNOSIS — E53.8 VITAMIN B12 DEFICIENCY: ICD-10-CM

## 2024-03-18 DIAGNOSIS — C54.1 ENDOMETRIAL CANCER (HCC): ICD-10-CM

## 2024-03-18 DIAGNOSIS — R53.82 CHRONIC FATIGUE: ICD-10-CM

## 2024-03-18 PROCEDURE — 99442 PR PHYS/QHP TELEPHONE EVALUATION 11-20 MIN: CPT | Performed by: FAMILY MEDICINE

## 2024-03-18 RX ORDER — BUSPIRONE HYDROCHLORIDE 5 MG/1
5 TABLET ORAL 3 TIMES DAILY
Qty: 360 TABLET | Refills: 5 | Status: SHIPPED | OUTPATIENT
Start: 2024-03-18

## 2024-03-18 NOTE — ASSESSMENT & PLAN NOTE
"I am sure at least in part due to her anemia.  Will reassess labs and await further GI eval before any other evaluation.  Patient may benefit from physical therapy but at present she feels she is \"too weak\"   will schedule follow-up after her EGD.  "

## 2024-03-18 NOTE — PROGRESS NOTES
"Virtual Brief Visit    This Visit is being completed by telephone. The Patient is located at Home and in the following state in which I hold an active license [place]    The patient was identified by name and date of birth. Anthony Slater was informed that this is a telemedicine visit and that the visit is being conducted through the Epic Embedded platform. She agrees to proceed..  My office door was closed. No one else was in the room.  She acknowledged consent and understanding of privacy and security of the video platform. The patient has agreed to participate and understands they can discontinue the visit at any time.    Patient is aware this is a billable service.   Patient states that her shakes and anxiety are markedly improved with the BuSpar taking it 3 times daily.  She still is feeling very fatigued despite the fact she sleeps 8 to 10 hours at night and occasionally naps during the day.  She also is very weak, not able to ambulate long distances due to her weakness.  She denies any focal weakness.  She denies any associated chest pain, shortness of breath, palpitations, or lightheadedness.   is concerned since GI stopped her iron pills.  He feels she looks very pale and \"yellow\".  Spite stopping the iron she still is complaining of severe belching especially after eating with other GI symptoms without change.  She is scheduled for an EGD on April 1.    Assessment/Plan:    Problem List Items Addressed This Visit     Anxiety state - Primary     Markedly improved with BuSpar therefore we will continue         Endometrial cancer (HCC)     Quiescent as per recent follow-up with heme-onc.         Absolute anemia     Discussed with patient and  the reason iron was stopped was monitor for GI symptoms with Adderall improve off the medication.  I advise repeating labs before beginning iron pills.  She states GI symptoms have not had all changed since of this medication.  She does denies any bleeding, " "hematochezia, or melena.         Weakness     I am sure at least in part due to her anemia.  Will reassess labs and await further GI eval before any other evaluation.  Patient may benefit from physical therapy but at present she feels she is \"too weak\"   will schedule follow-up after her EGD.            Recent Visits  No visits were found meeting these conditions.  Showing recent visits within past 7 days and meeting all other requirements  Today's Visits  Date Type Provider Dept   03/18/24 Office Visit MD [facility]   Showing today's visits and meeting all other requirements  Future Appointments  No visits were found meeting these conditions.  Showing future appointments within next 150 days and meeting all other requirements         Visit Time  Total Visit Duration: 15 minutes            "

## 2024-03-18 NOTE — ASSESSMENT & PLAN NOTE
Discussed with patient and  the reason iron was stopped was monitor for GI symptoms with Adderall improve off the medication.  I advise repeating labs before beginning iron pills.  She states GI symptoms have not had all changed since of this medication.  She does denies any bleeding, hematochezia, or melena.

## 2024-03-20 ENCOUNTER — TELEPHONE (OUTPATIENT)
Dept: FAMILY MEDICINE CLINIC | Facility: CLINIC | Age: 79
End: 2024-03-20

## 2024-03-20 ENCOUNTER — HOSPITAL ENCOUNTER (INPATIENT)
Facility: HOSPITAL | Age: 79
LOS: 1 days | Discharge: HOME | DRG: 393 | End: 2024-03-22
Attending: INTERNAL MEDICINE | Admitting: INTERNAL MEDICINE
Payer: MEDICARE

## 2024-03-20 ENCOUNTER — APPOINTMENT (OUTPATIENT)
Dept: LAB | Facility: HOSPITAL | Age: 79
DRG: 393 | End: 2024-03-20
Payer: MEDICARE

## 2024-03-20 DIAGNOSIS — D50.0 IRON DEFICIENCY ANEMIA DUE TO CHRONIC BLOOD LOSS: ICD-10-CM

## 2024-03-20 DIAGNOSIS — E78.2 MIXED HYPERLIPIDEMIA: ICD-10-CM

## 2024-03-20 DIAGNOSIS — E55.9 VITAMIN D DEFICIENCY: ICD-10-CM

## 2024-03-20 DIAGNOSIS — K31.811 GASTROINTESTINAL HEMORRHAGE ASSOCIATED WITH ANGIODYSPLASIA OF STOMACH AND DUODENUM: Primary | ICD-10-CM

## 2024-03-20 DIAGNOSIS — R53.82 CHRONIC FATIGUE: ICD-10-CM

## 2024-03-20 DIAGNOSIS — R11.0 NAUSEA: ICD-10-CM

## 2024-03-20 DIAGNOSIS — E53.8 VITAMIN B12 DEFICIENCY: ICD-10-CM

## 2024-03-20 DIAGNOSIS — D64.9 ANEMIA, UNSPECIFIED TYPE: ICD-10-CM

## 2024-03-20 DIAGNOSIS — D64.9 ANEMIA: ICD-10-CM

## 2024-03-20 DIAGNOSIS — E04.1 THYROID NODULE: ICD-10-CM

## 2024-03-20 PROBLEM — K92.2 GIB (GASTROINTESTINAL BLEEDING): Status: ACTIVE | Noted: 2024-03-20

## 2024-03-20 PROBLEM — D50.9 IRON DEFICIENCY ANEMIA: Status: ACTIVE | Noted: 2024-03-20

## 2024-03-20 LAB
25(OH)D3 SERPL-MCNC: 57.9 NG/ML (ref 30–100)
ABO GROUP BLD: NORMAL
ALBUMIN SERPL BCP-MCNC: 3.7 G/DL (ref 3.5–5)
ALP SERPL-CCNC: 59 U/L (ref 34–104)
ALT SERPL W P-5'-P-CCNC: 11 U/L (ref 7–52)
ANION GAP SERPL CALCULATED.3IONS-SCNC: 7 MMOL/L (ref 4–13)
AST SERPL W P-5'-P-CCNC: 16 U/L (ref 13–39)
BASOPHILS # BLD AUTO: 0.01 THOUSANDS/ÂΜL (ref 0–0.1)
BASOPHILS NFR BLD AUTO: 0 % (ref 0–1)
BILIRUB SERPL-MCNC: 0.27 MG/DL (ref 0.2–1)
BLD GP AB SCN SERPL QL: NEGATIVE
BUN SERPL-MCNC: 20 MG/DL (ref 5–25)
CALCIUM SERPL-MCNC: 8.6 MG/DL (ref 8.4–10.2)
CHLORIDE SERPL-SCNC: 106 MMOL/L (ref 96–108)
CHOLEST SERPL-MCNC: 91 MG/DL
CO2 SERPL-SCNC: 23 MMOL/L (ref 21–32)
CREAT SERPL-MCNC: 1.03 MG/DL (ref 0.6–1.3)
EOSINOPHIL # BLD AUTO: 0.17 THOUSAND/ÂΜL (ref 0–0.61)
EOSINOPHIL NFR BLD AUTO: 2 % (ref 0–6)
ERYTHROCYTE [DISTWIDTH] IN BLOOD BY AUTOMATED COUNT: 18.8 % (ref 11.6–15.1)
FERRITIN SERPL-MCNC: 4 NG/ML (ref 11–307)
GFR SERPL CREATININE-BSD FRML MDRD: 51 ML/MIN/1.73SQ M
GLUCOSE P FAST SERPL-MCNC: 102 MG/DL (ref 65–99)
HCT VFR BLD AUTO: 14.3 % (ref 34.8–46.1)
HDLC SERPL-MCNC: 36 MG/DL
HGB BLD-MCNC: 4 G/DL (ref 11.5–15.4)
HGB BLD-MCNC: 7.2 G/DL (ref 11.5–15.4)
IMM GRANULOCYTES # BLD AUTO: 0.07 THOUSAND/UL (ref 0–0.2)
IMM GRANULOCYTES NFR BLD AUTO: 1 % (ref 0–2)
IRON SERPL-MCNC: <10 UG/DL (ref 50–212)
LDLC SERPL CALC-MCNC: 42 MG/DL (ref 0–100)
LYMPHOCYTES # BLD AUTO: 1.44 THOUSANDS/ÂΜL (ref 0.6–4.47)
LYMPHOCYTES NFR BLD AUTO: 17 % (ref 14–44)
MCH RBC QN AUTO: 19.4 PG (ref 26.8–34.3)
MCHC RBC AUTO-ENTMCNC: 28 G/DL (ref 31.4–37.4)
MCV RBC AUTO: 69 FL (ref 82–98)
MONOCYTES # BLD AUTO: 0.95 THOUSAND/ÂΜL (ref 0.17–1.22)
MONOCYTES NFR BLD AUTO: 11 % (ref 4–12)
NEUTROPHILS # BLD AUTO: 6.02 THOUSANDS/ÂΜL (ref 1.85–7.62)
NEUTS SEG NFR BLD AUTO: 69 % (ref 43–75)
NONHDLC SERPL-MCNC: 55 MG/DL
NRBC BLD AUTO-RTO: 0 /100 WBCS
PLATELET # BLD AUTO: 322 THOUSANDS/UL (ref 149–390)
PMV BLD AUTO: 10.8 FL (ref 8.9–12.7)
POTASSIUM SERPL-SCNC: 3.9 MMOL/L (ref 3.5–5.3)
PROT SERPL-MCNC: 6.2 G/DL (ref 6.4–8.4)
RBC # BLD AUTO: 2.06 MILLION/UL (ref 3.81–5.12)
RH BLD: NEGATIVE
SODIUM SERPL-SCNC: 136 MMOL/L (ref 135–147)
SPECIMEN EXPIRATION DATE: NORMAL
TIBC SERPL-MCNC: <422 UG/DL (ref 250–450)
TRIGL SERPL-MCNC: 63 MG/DL
TSH SERPL DL<=0.05 MIU/L-ACNC: 2.22 UIU/ML (ref 0.45–4.5)
UIBC SERPL-MCNC: 412 UG/DL (ref 155–355)
VIT B12 SERPL-MCNC: 645 PG/ML (ref 180–914)
WBC # BLD AUTO: 8.66 THOUSAND/UL (ref 4.31–10.16)

## 2024-03-20 PROCEDURE — 80061 LIPID PANEL: CPT

## 2024-03-20 PROCEDURE — 83550 IRON BINDING TEST: CPT

## 2024-03-20 PROCEDURE — 99285 EMERGENCY DEPT VISIT HI MDM: CPT | Performed by: INTERNAL MEDICINE

## 2024-03-20 PROCEDURE — 83540 ASSAY OF IRON: CPT

## 2024-03-20 PROCEDURE — P9016 RBC LEUKOCYTES REDUCED: HCPCS

## 2024-03-20 PROCEDURE — 82728 ASSAY OF FERRITIN: CPT | Performed by: INTERNAL MEDICINE

## 2024-03-20 PROCEDURE — C9113 INJ PANTOPRAZOLE SODIUM, VIA: HCPCS | Performed by: INTERNAL MEDICINE

## 2024-03-20 PROCEDURE — 83540 ASSAY OF IRON: CPT | Performed by: INTERNAL MEDICINE

## 2024-03-20 PROCEDURE — 80053 COMPREHEN METABOLIC PANEL: CPT

## 2024-03-20 PROCEDURE — 86900 BLOOD TYPING SEROLOGIC ABO: CPT | Performed by: INTERNAL MEDICINE

## 2024-03-20 PROCEDURE — 36430 TRANSFUSION BLD/BLD COMPNT: CPT

## 2024-03-20 PROCEDURE — 36415 COLL VENOUS BLD VENIPUNCTURE: CPT

## 2024-03-20 PROCEDURE — 86901 BLOOD TYPING SEROLOGIC RH(D): CPT | Performed by: INTERNAL MEDICINE

## 2024-03-20 PROCEDURE — 85025 COMPLETE CBC W/AUTO DIFF WBC: CPT

## 2024-03-20 PROCEDURE — 86850 RBC ANTIBODY SCREEN: CPT | Performed by: INTERNAL MEDICINE

## 2024-03-20 PROCEDURE — 99284 EMERGENCY DEPT VISIT MOD MDM: CPT

## 2024-03-20 PROCEDURE — 85018 HEMOGLOBIN: CPT | Performed by: INTERNAL MEDICINE

## 2024-03-20 PROCEDURE — 86920 COMPATIBILITY TEST SPIN: CPT

## 2024-03-20 PROCEDURE — 84443 ASSAY THYROID STIM HORMONE: CPT

## 2024-03-20 PROCEDURE — 93005 ELECTROCARDIOGRAM TRACING: CPT

## 2024-03-20 PROCEDURE — 82728 ASSAY OF FERRITIN: CPT

## 2024-03-20 PROCEDURE — 83550 IRON BINDING TEST: CPT | Performed by: INTERNAL MEDICINE

## 2024-03-20 PROCEDURE — 82306 VITAMIN D 25 HYDROXY: CPT

## 2024-03-20 PROCEDURE — 99223 1ST HOSP IP/OBS HIGH 75: CPT | Performed by: INTERNAL MEDICINE

## 2024-03-20 PROCEDURE — 82607 VITAMIN B-12: CPT

## 2024-03-20 RX ORDER — BUSPIRONE HYDROCHLORIDE 5 MG/1
5 TABLET ORAL 3 TIMES DAILY
Status: DISCONTINUED | OUTPATIENT
Start: 2024-03-20 | End: 2024-03-22 | Stop reason: HOSPADM

## 2024-03-20 RX ORDER — ATORVASTATIN CALCIUM 40 MG/1
40 TABLET, FILM COATED ORAL DAILY
Status: DISCONTINUED | OUTPATIENT
Start: 2024-03-20 | End: 2024-03-22 | Stop reason: HOSPADM

## 2024-03-20 RX ORDER — SODIUM CHLORIDE 9 MG/ML
75 INJECTION, SOLUTION INTRAVENOUS CONTINUOUS
Status: DISCONTINUED | OUTPATIENT
Start: 2024-03-20 | End: 2024-03-21

## 2024-03-20 RX ORDER — PANTOPRAZOLE SODIUM 40 MG/10ML
40 INJECTION, POWDER, LYOPHILIZED, FOR SOLUTION INTRAVENOUS EVERY 12 HOURS
Status: DISCONTINUED | OUTPATIENT
Start: 2024-03-20 | End: 2024-03-22 | Stop reason: HOSPADM

## 2024-03-20 RX ORDER — ONDANSETRON 2 MG/ML
4 INJECTION INTRAMUSCULAR; INTRAVENOUS EVERY 6 HOURS PRN
Status: DISCONTINUED | OUTPATIENT
Start: 2024-03-20 | End: 2024-03-22 | Stop reason: HOSPADM

## 2024-03-20 RX ORDER — SODIUM CHLORIDE 9 MG/ML
125 INJECTION, SOLUTION INTRAVENOUS CONTINUOUS
Status: DISCONTINUED | OUTPATIENT
Start: 2024-03-20 | End: 2024-03-20

## 2024-03-20 RX ORDER — TRAZODONE HYDROCHLORIDE 50 MG/1
50 TABLET ORAL
Status: DISCONTINUED | OUTPATIENT
Start: 2024-03-20 | End: 2024-03-22 | Stop reason: HOSPADM

## 2024-03-20 RX ADMIN — BUSPIRONE HYDROCHLORIDE 5 MG: 5 TABLET ORAL at 22:01

## 2024-03-20 RX ADMIN — SODIUM CHLORIDE 75 ML/HR: 0.9 INJECTION, SOLUTION INTRAVENOUS at 17:02

## 2024-03-20 RX ADMIN — IRON SUCROSE 300 MG: 20 INJECTION, SOLUTION INTRAVENOUS at 20:37

## 2024-03-20 RX ADMIN — PANTOPRAZOLE SODIUM 40 MG: 40 INJECTION, POWDER, FOR SOLUTION INTRAVENOUS at 17:05

## 2024-03-20 RX ADMIN — BUSPIRONE HYDROCHLORIDE 5 MG: 5 TABLET ORAL at 17:08

## 2024-03-20 RX ADMIN — ATORVASTATIN CALCIUM 40 MG: 40 TABLET, FILM COATED ORAL at 17:08

## 2024-03-20 RX ADMIN — TRAZODONE HYDROCHLORIDE 50 MG: 50 TABLET ORAL at 22:01

## 2024-03-20 NOTE — ED NOTES
Blood bank notified RN about blood being ready. Report being called to ICU and ready for transport to ICU room.  Consent needed by ordering/ admitting provider. Notified via TT.       Analisa Ring RN  03/20/24 [id number]

## 2024-03-20 NOTE — H&P
Critical [facility] hospital  H&P  Name: Anthony Slater 78 y.o. female I MRN: [id number]  Unit/Bed#: ED 16 I Date of Admission: 3/20/2024   Date of Service: 3/20/2024 I Hospital Day: 0      Assessment/Plan   * GIB (gastrointestinal bleeding)  Assessment & Plan  Patient with a history of GAVE presents with melena and significant blood loss anemia  Hemodynamically stable  Spoke with critical care who believe the patient is appropriate for level 2 stepdown  Gastroenterology aware  Patient was scheduled for EGD on 4/1/2024 however due to significant lethargy and anemia presented to the ED for evaluation    IV PPI 2 times daily  Trend hemoglobin and hematocrit every 6 hours  NPO sips with meds  Gastroenterology consultation for endoscopic evaluation  Check iron panel  Venofer 300 mg IV every other day    Iron deficiency anemia  Assessment & Plan  Hemoglobin 4.0 on presentation likely secondary to slow upper GI bleed in the setting of GAVE  Patient presents with abnormal laboratory test in the outpatient setting discovered by her PCP  Patient is hemodynamically stable    Transfuse 3 units of packed red blood cells now  Trend hemoglobin and hematocrit every 6 hours  Follow-up iron panel  Venofer 300 mg IV every other day  Management of upper GI bleed as above    Stage 3 chronic kidney disease, unspecified whether stage 3a or 3b CKD (HCC)  Assessment & Plan  Lab Results   Component Value Date    EGFR 51 03/20/2024    EGFR 57 01/19/2024    EGFR 62 01/05/2024    CREATININE 1.03 03/20/2024    CREATININE 0.94 01/19/2024    CREATININE 0.88 01/05/2024   Creatinine at baseline    Trend BMP  Avoid nephrotoxic agents and hypotension    Anxiety state  Assessment & Plan  Mood appears appropriate    Continue home buspirone and trazodone         VTE Prophylaxis: Contraindicated 2/2 anemia  Code Status: Level 1 Full Code    Anticipated Length of Stay:  Patient will be admitted on an Observation basis with an anticipated length  of stay of less than 2 midnights.   Justification for Hospital Stay: UGIB    Total Time for Visit, including Counseling / Coordination of Care: I have spent a total time of 45 minutes on 03/20/24 in caring for this patient including Diagnostic results, Prognosis, Risks and benefits of tx options, Instructions for management, Patient and family education, Importance of tx compliance, Risk factor reductions, Impressions, Counseling / Coordination of care, Documenting in the medical record, Reviewing / ordering tests, medicine, procedures  , Obtaining or reviewing history  , and Communicating with other healthcare professionals .    Chief Complaint:   Abnormal laboratory test    History of Present Illness:    Anthony Slater is a 78 y.o. female with a past medical history significant for CKD stage III, GAVE, anxiety who presents from her outpatient PCP office with abnormal laboratory value with a hemoglobin of 4.  The patient has a history of GAVE and was supposed to obtain an EGD on 4/1/2024 however was found to have significant anemia at her PCP office today.  In the ED, the patient was found to be hemodynamically stable.  Laboratory analysis remarkable for hemoglobin level of 4.0.  3 units of packed red blood cells were ordered.  Blood consent was obtained.  Gastroenterology was made aware and plan for EGD on 3/21/2024 pending improvement of her hemoglobin above 7.    Review of Systems:    Review of Systems   Constitutional:  Negative for chills and fever.   HENT:  Negative for ear pain and sore throat.    Eyes:  Negative for pain and visual disturbance.   Respiratory:  Negative for cough and shortness of breath.    Cardiovascular:  Negative for chest pain and palpitations.   Gastrointestinal:  Negative for abdominal pain and vomiting.   Genitourinary:  Negative for dysuria and hematuria.   Musculoskeletal:  Negative for arthralgias and back pain.   Skin:  Negative for color change and rash.   Neurological:   Negative for seizures and syncope.   All other systems reviewed and are negative.      Past Medical and Surgical History:     Past Medical History:   Diagnosis Date    Anxiety     Arthritis     Benign hypertension     Chronic pain disorder     back    COPD (chronic obstructive pulmonary disease) (HCC)     Coronary artery disease     medical management    CPAP (continuous positive airway pressure) dependence     Diabetes (HCC)     Endometrial adenocarcinoma (HCC)     Epilepsy (HCC)     GERD (gastroesophageal reflux disease)     History of echocardiogram 02/07/2014    EF 55%, mild MR.    History of transfusion     Hypertension     Mixed hyperlipidemia     DAVID on CPAP     Shortness of breath     triggered by anxiety    Watermelon stomach     Wears dentures        Past Surgical History:   Procedure Laterality Date    BREAST BIOPSY Right 02/24/2015    BREAST BIOPSY Left     ? year    CARDIAC CATHETERIZATION  02/07/2014    EF 65%, mid LAD 60% stenosis and D2 70% stenosis.  Medical management.    CATARACT EXTRACTION Bilateral     COLONOSCOPY      CYSTOSCOPY N/A 07/31/2023    Procedure: CYSTOSCOPY;  Surgeon: Nadir Ash MD;  Location: [facility];  Service: Gynecology Oncology    EGD      JOINT REPLACEMENT Right     RIGHT ELBOW    OOPHORECTOMY      not sure which one was removed    AR LAPS TOTAL HYSTERECT 250 GM/< W/RMVL TUBE/OVARY N/A 07/31/2023    Procedure: ROBOTHYSTERECTOMY, BILATERAL SALPINGO-OOPHORECTOMY, PELVIC SENTINEL NODE BIOPSIES;  Surgeon: Nadir Ash MD;  Location: [facility];  Service: Gynecology Oncology    ROTATOR CUFF REPAIR Right     US GUIDED THYROID BIOPSY  12/04/2020       Meds/Allergies:    Prior to Admission medications    Medication Sig Start Date End Date Taking? Authorizing Provider   atorvastatin (LIPITOR) 40 mg tablet Take 1 tablet (40 mg total) by mouth daily 11/22/23   Marycarmen Calvillo MD   busPIRone (BUSPAR) 5 mg tablet Take 1 tablet (5 mg total) by mouth 3 (three) times a day  "3/18/24   Marycarmen Calvillo MD   Cholecalciferol (Vitamin D3) 1.25 MG (54086 UT) CAPS Take 1,000 Units by mouth daily    Historical Provider, MD   co-enzyme Q-10 100 mg capsule Take by mouth daily    Historical Provider, MD   famotidine (PEPCID) 20 mg tablet Take 1 tablet (20 mg total) by mouth daily at bedtime 1/23/24   Akosua Schwab DO   metoprolol tartrate (LOPRESSOR) 25 mg tablet Take 1 tablet (25 mg total) by mouth in the morning 2/12/24   Marycarmen Calvillo MD   omeprazole (PriLOSEC) 20 mg delayed release capsule Take 1 capsule (20 mg total) by mouth 2 (two) times a day 1/16/24   Marycarmen Calvillo MD   senna (SENOKOT) 8.6 mg Take 1 tablet (8.6 mg total) by mouth daily for 5 days 1/1/24 1/6/24  Connor Milton PA-C   traZODone (DESYREL) 50 mg tablet Take 50 mg by mouth daily at bedtime 6/29/23   Historical Provider, MD   vitamin B-12 (VITAMIN B-12) 1,000 mcg tablet Take 1,000 mcg by mouth daily    Historical Provider, MD       Allergies:   Allergies   Allergen Reactions    Advil [Ibuprofen]     Aspirin Other (See Comments)     gi bleed    Caspofungin Other (See Comments)    Hydrocodone     Pravastatin GI Intolerance    Tramadol GI Intolerance       Social History:     Marital Status: /Civil Union   Substance Use History:   Social History     Substance and Sexual Activity   Alcohol Use Not Currently     Social History     Tobacco Use   Smoking Status Never    Passive exposure: Never   Smokeless Tobacco Never     Social History     Substance and Sexual Activity   Drug Use Never       Family History:    Pertinent family history reviewed    Physical Exam:     Vitals:   Blood Pressure: 132/60 (03/20/24 1600)  Pulse: 90 (03/20/24 1600)  Temperature: 97.6 °F (36.4 °C) (03/20/24 1454)  Temp Source: Temporal (03/20/24 1454)  Respirations: 18 (03/20/24 1600)  Height: 5' 1\" (154.9 cm) (03/20/24 1454)  Weight - Scale: 51.7 kg (114 lb) (03/20/24 1454)  SpO2: 100 % (03/20/24 1600)    Physical Exam  Vitals " and nursing note reviewed.   Constitutional:       General: She is not in acute distress.     Appearance: She is well-developed.   HENT:      Head: Normocephalic and atraumatic.   Eyes:      Conjunctiva/sclera: Conjunctivae normal.   Cardiovascular:      Rate and Rhythm: Normal rate and regular rhythm.      Heart sounds: No murmur heard.  Pulmonary:      Effort: Pulmonary effort is normal. No respiratory distress.      Breath sounds: Normal breath sounds.   Abdominal:      Palpations: Abdomen is soft.      Tenderness: There is no abdominal tenderness.   Musculoskeletal:         General: No swelling.      Cervical back: Neck supple.   Skin:     General: Skin is warm and dry.      Capillary Refill: Capillary refill takes less than 2 seconds.   Neurological:      Mental Status: She is alert.   Psychiatric:         Mood and Affect: Mood normal.          Additional Data:     Lab Results: I have reviewed pertinent results     Results from last 7 days   Lab Units 03/20/24  1308   WBC Thousand/uL 8.66   HEMOGLOBIN g/dL 4.0*   HEMATOCRIT % 14.3*   PLATELETS Thousands/uL 322   NEUTROS PCT % 69   LYMPHS PCT % 17   MONOS PCT % 11   EOS PCT % 2     Results from last 7 days   Lab Units 03/20/24  1308   SODIUM mmol/L 136   POTASSIUM mmol/L 3.9   CHLORIDE mmol/L 106   CO2 mmol/L 23   BUN mg/dL 20   CREATININE mg/dL 1.03   ANION GAP mmol/L 7   CALCIUM mg/dL 8.6   ALBUMIN g/dL 3.7   TOTAL BILIRUBIN mg/dL 0.27   ALK PHOS U/L 59   ALT U/L 11   AST U/L 16                       Imaging: I have reviewed pertinent imaging     No orders to display       EKG, Pathology, and Other Studies Reviewed on Admission:   EKG: NSR    [employer] / [facility] Records Reviewed    ** Please Note: This note has been constructed using a voice recognition system. **

## 2024-03-20 NOTE — ED PROVIDER NOTES
"History  Chief Complaint   Patient presents with    Abnormal Lab     Was told to come tot he ED for a HGB of 4.0     78-year-old female presents with sudden onset of anemia.  Seen by her primary care doctor, and routine blood work was ordered.  She is to be going through a GI workup in the future, and her iron was discontinued.  Today on routine labs her hemoglobin was 4.0.  She was instructed come to emergency room for further evaluation.  She does have some palpitations, occasional shortness of breath on exertion but otherwise she feels fine.  Her past she was described as having a \"watermelon stomach\".  Was followed by GI for some time.  She stopped having EGD sometime ago.  She was to get an EGD on 1 April, but now presents with a significant anemia.  She has a history of coronary artery disease, hyperlipidemia, and history of transfusion dating back many years.  Also has a history of hypertension.        Prior to Admission Medications   Prescriptions Last Dose Informant Patient Reported? Taking?   Cholecalciferol (Vitamin D3) 1.25 MG (66780 UT) CAPS 3/20/2024 at 0900 Self Yes Yes   Sig: Take 1,000 Units by mouth daily   atorvastatin (LIPITOR) 40 mg tablet 3/19/2024 at 2230 Self No Yes   Sig: Take 1 tablet (40 mg total) by mouth daily   busPIRone (BUSPAR) 5 mg tablet 3/20/2024 at 0900  No Yes   Sig: Take 1 tablet (5 mg total) by mouth 3 (three) times a day   co-enzyme Q-10 100 mg capsule 3/20/2024 at 2200 Self Yes Yes   Sig: Take by mouth daily   famotidine (PEPCID) 20 mg tablet 3/19/2024 at 1600 Self No Yes   Sig: Take 1 tablet (20 mg total) by mouth daily at bedtime   metoprolol tartrate (LOPRESSOR) 25 mg tablet 3/19/2024 at 2200 Self No Yes   Sig: Take 1 tablet (25 mg total) by mouth in the morning   omeprazole (PriLOSEC) 20 mg delayed release capsule 3/20/2024 at 0900 Self No Yes   Sig: Take 1 capsule (20 mg total) by mouth 2 (two) times a day   senna (SENOKOT) 8.6 mg   No No   Sig: Take 1 tablet (8.6 mg " total) by mouth daily for 5 days   traZODone (DESYREL) 50 mg tablet 3/19/2024 at 2200 Self Yes Yes   Sig: Take 50 mg by mouth daily at bedtime   vitamin B-12 (VITAMIN B-12) 1,000 mcg tablet 3/20/2024 at 0900 Self Yes Yes   Sig: Take 1,000 mcg by mouth daily      Facility-Administered Medications: None       Past Medical History:   Diagnosis Date    Anxiety     Arthritis     Benign hypertension     Chronic pain disorder     back    COPD (chronic obstructive pulmonary disease) (HCC)     Coronary artery disease     medical management    CPAP (continuous positive airway pressure) dependence     Diabetes (HCC)     Endometrial adenocarcinoma (HCC)     Epilepsy (HCC)     GERD (gastroesophageal reflux disease)     History of echocardiogram 02/07/2014    EF 55%, mild MR.    History of transfusion     Hypertension     Mixed hyperlipidemia     DAVID on CPAP     Shortness of breath     triggered by anxiety    Watermelon stomach     Wears dentures        Past Surgical History:   Procedure Laterality Date    BREAST BIOPSY Right 02/24/2015    BREAST BIOPSY Left     ? year    CARDIAC CATHETERIZATION  02/07/2014    EF 65%, mid LAD 60% stenosis and D2 70% stenosis.  Medical management.    CATARACT EXTRACTION Bilateral     COLONOSCOPY      CYSTOSCOPY N/A 07/31/2023    Procedure: CYSTOSCOPY;  Surgeon: Nadir Ash MD;  Location: [facility];  Service: Gynecology Oncology    EGD      JOINT REPLACEMENT Right     RIGHT ELBOW    OOPHORECTOMY      not sure which one was removed    MN LAPS TOTAL HYSTERECT 250 GM/< W/RMVL TUBE/OVARY N/A 07/31/2023    Procedure: ROBOTHYSTERECTOMY, BILATERAL SALPINGO-OOPHORECTOMY, PELVIC SENTINEL NODE BIOPSIES;  Surgeon: Nadir Ash MD;  Location: [facility];  Service: Gynecology Oncology    ROTATOR CUFF REPAIR Right     US GUIDED THYROID BIOPSY  12/04/2020       Family History   Problem Relation Age of Onset    Heart disease Mother     Coronary artery disease Sister         history of CABG    No  Known Problems Father     No Known Problems Daughter     No Known Problems Sister     No Known Problems Sister     No Known Problems Sister     No Known Problems Daughter      I have reviewed and agree with the history as documented.    E-Cigarette/Vaping    E-Cigarette Use Never User      E-Cigarette/Vaping Substances    Nicotine No     THC No     CBD No     Flavoring No     Other No     Unknown No      Social History     Tobacco Use    Smoking status: Never     Passive exposure: Never    Smokeless tobacco: Never   Vaping Use    Vaping status: Never Used   Substance Use Topics    Alcohol use: Not Currently    Drug use: Never       Review of Systems   Constitutional:  Negative for chills and fever.   HENT:  Negative for rhinorrhea and sore throat.    Eyes:  Negative for visual disturbance.   Respiratory:  Negative for cough and shortness of breath.    Cardiovascular:  Negative for chest pain and leg swelling.   Gastrointestinal:  Positive for abdominal pain. Negative for diarrhea, nausea and vomiting.   Genitourinary:  Negative for dysuria.   Musculoskeletal:  Positive for arthralgias and back pain. Negative for myalgias.   Skin:  Negative for rash.   Neurological:  Negative for dizziness and headaches.   Psychiatric/Behavioral:  Negative for confusion.    All other systems reviewed and are negative.      Physical Exam  Physical Exam  Vitals and nursing note reviewed.   Constitutional:       Appearance: She is well-developed.      Comments: Pale   HENT:      Nose: Nose normal.      Mouth/Throat:      Pharynx: No oropharyngeal exudate.   Eyes:      General: No scleral icterus.     Conjunctiva/sclera: Conjunctivae normal.      Pupils: Pupils are equal, round, and reactive to light.   Neck:      Vascular: No JVD.      Trachea: No tracheal deviation.   Cardiovascular:      Rate and Rhythm: Normal rate and regular rhythm.      Heart sounds: Normal heart sounds. No murmur heard.  Pulmonary:      Effort: Pulmonary effort  is normal. No respiratory distress.      Breath sounds: Normal breath sounds. No wheezing or rales.   Abdominal:      General: Bowel sounds are normal.      Palpations: Abdomen is soft.      Tenderness: There is no abdominal tenderness. There is no guarding.   Musculoskeletal:         General: No tenderness. Normal range of motion.      Cervical back: Normal range of motion and neck supple.   Skin:     General: Skin is warm and dry.      Coloration: Skin is pale.   Neurological:      Mental Status: She is alert and oriented to person, place, and time.      Cranial Nerves: No cranial nerve deficit.      Sensory: No sensory deficit.      Motor: No abnormal muscle tone.      Comments: 5/5 motor, nl sens   Psychiatric:         Mood and Affect: Mood normal.         Behavior: Behavior normal.         Vital Signs  ED Triage Vitals [03/20/24 1454]   Temperature Pulse Respirations Blood Pressure SpO2   97.6 °F (36.4 °C) 93 18 153/68 100 %      Temp Source Heart Rate Source Patient Position - Orthostatic VS BP Location FiO2 (%)   Temporal Monitor Sitting Left arm --      Pain Score       No Pain           Vitals:    03/20/24 1815 03/20/24 1830 03/20/24 1845 03/20/24 1900   BP: 123/58 115/57 123/58 119/57   Pulse: 85 87 87 83   Patient Position - Orthostatic VS:             Visual Acuity  Visual Acuity      Flowsheet Row Most Recent Value   L Pupil Size (mm) 3   R Pupil Size (mm) 3            ED Medications  Medications   atorvastatin (LIPITOR) tablet 40 mg (40 mg Oral Given 3/20/24 1708)   busPIRone (BUSPAR) tablet 5 mg (5 mg Oral Given 3/20/24 1708)   traZODone (DESYREL) tablet 50 mg (has no administration in time range)   ondansetron (ZOFRAN) injection 4 mg (has no administration in time range)   pantoprazole (PROTONIX) injection 40 mg (40 mg Intravenous Given 3/20/24 1705)   iron sucrose (VENOFER) 300 mg in sodium chloride 0.9 % 250 mL IVPB (has no administration in time range)   sodium chloride 0.9 % infusion (0 mL/hr  "Intravenous Stopped 3/20/24 1730)       Diagnostic Studies  Results Reviewed       Procedure Component Value Units Date/Time    TIBC Panel (incl. Iron, TIBC, % Iron Saturation) [[id number]] Collected: 03/20/24 1731    Lab Status: In process Specimen: Blood from Arm, Right Updated: 03/20/24 1733    Ferritin [[id number]] Collected: 03/20/24 1731    Lab Status: In process Specimen: Blood from Arm, Right Updated: 03/20/24 1733    Serial Hemoglobin Q6hrs [[id number]]     Lab Status: No result Specimen: Blood                    No orders to display              Procedures  Procedures         ED Course                               SBIRT 20yo+      Flowsheet Row Most Recent Value   Initial Alcohol Screen: US AUDIT-C     1. How often do you have a drink containing alcohol? 0 Filed at: 03/20/2024 6332   2. How many drinks containing alcohol do you have on a typical day you are drinking?  0 Filed at: 03/20/2024 2279   3a. Male UNDER 65: How often do you have five or more drinks on one occasion? 0 Filed at: 03/20/2024 0259   3b. FEMALE Any Age, or MALE 65+: How often do you have 4 or more drinks on one occassion? 0 Filed at: 03/20/2024 1459   Audit-C Score 0 Filed at: 03/20/2024 1459   MARY ANNE: How many times in the past year have you...    Used an illegal drug or used a prescription medication for non-medical reasons? Never Filed at: 03/20/2024 1459                      Medical Decision Making  78-year-old female presents with sudden onset of anemia.  Seen by her primary care doctor, and routine blood work was ordered.  She is to be going through a GI workup in the future, and her iron was discontinued.  Today on routine labs her hemoglobin was 4.0.  She was instructed come to emergency room for further evaluation.  She does have some palpitations, occasional shortness of breath on exertion but otherwise she feels fine.  Her past she was described as having a \"watermelon stomach\".  Was followed by GI for some time.  She stopped " having EGD sometime ago.  She was to get an EGD on 1 April, but now presents with a significant anemia.  She has a history of coronary artery disease, hyperlipidemia, and history of transfusion dating back many years.  Also has a history of hypertension.    Differential includes mild dysplastic syndrome although his indices more suggestive of iron deficiency.  Does require an GI workup, despite her age of 78.  Will need transfusions.    Amount and/or Complexity of Data Reviewed  External Data Reviewed: labs.     Details: Patient labs revealing hemoglobin of 4.0.  Microcytic indices.  Labs: ordered.     Details: Previous hemoglobin was 9, today's labs reported 4.0.  Type and screen in the emergency room.  ECG/medicine tests: ordered and independent interpretation performed.     Details: EKG ordered and independently interpreted by me revealing normal sinus rhythm with diffuse nonspecific ST-T wave abnormalities.    Risk  Decision regarding hospitalization.  Risk Details: Patient at risk for further decline in her hemoglobin so will require admission for further blood transfusion for Slim.  Potential for GI evaluation will be left up to them.  Patient is aware that we will need to be admitted.             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 Jennings and the patient's admission status was agreed to be Admission Status: observation status to the service of Dr. Jennings.               Follow-up Information    None         Current Discharge Medication List        CONTINUE these medications which have NOT CHANGED    Details   atorvastatin (LIPITOR) 40 mg tablet Take 1 tablet (40 mg total) by mouth daily  Qty: 90 tablet, Refills: 1    Associated Diagnoses: Dyslipidemia; Coronary artery disease involving native coronary artery of native heart without angina pectoris      busPIRone (BUSPAR) 5 mg tablet Take 1 tablet (5 mg total) by mouth 3 (three) times a day  Qty: 360 tablet, Refills: 5    Associated Diagnoses: Anxiety state      Cholecalciferol (Vitamin D3) 1.25 MG (71523 UT) CAPS Take 1,000 Units by mouth daily      co-enzyme Q-10 100 mg capsule Take by mouth daily      famotidine (PEPCID) 20 mg tablet Take 1 tablet (20 mg total) by mouth daily at bedtime  Qty: 30 tablet, Refills: 1    Associated Diagnoses: Nausea      metoprolol tartrate (LOPRESSOR) 25 mg tablet Take 1 tablet (25 mg total) by mouth in the morning  Qty: 90 tablet, Refills: 3    Associated Diagnoses: Coronary artery disease involving native coronary artery of native heart without angina pectoris      omeprazole (PriLOSEC) 20 mg delayed release capsule Take 1 capsule (20 mg total) by mouth 2 (two) times a day  Qty: 180 capsule, Refills: 3    Associated Diagnoses: Gastroesophageal reflux disease, unspecified whether esophagitis present      traZODone (DESYREL) 50 mg tablet Take 50 mg by mouth daily at bedtime      vitamin B-12 (VITAMIN B-12) 1,000 mcg tablet Take 1,000 mcg by mouth daily      senna (SENOKOT) 8.6 mg Take 1 tablet (8.6 mg total) by mouth daily for 5 days  Qty: 5 tablet, Refills: 0    Associated Diagnoses: Fall, initial encounter             No discharge procedures on file.    PDMP Review         Value Time User    PDMP Reviewed  Yes 12/31/2023 10:16 AM Connor Milton  NADEEM            ED Provider  Electronically Signed by             Armand Doll,   03/20/24 192

## 2024-03-20 NOTE — TELEPHONE ENCOUNTER
Patient's  called back and notified patient needs to go to Emergency room due to critical hemoglobin level.

## 2024-03-20 NOTE — ASSESSMENT & PLAN NOTE
Lab Results   Component Value Date    EGFR 51 03/20/2024    EGFR 57 01/19/2024    EGFR 62 01/05/2024    CREATININE 1.03 03/20/2024    CREATININE 0.94 01/19/2024    CREATININE 0.88 01/05/2024   Creatinine at baseline    Trend BMP  Avoid nephrotoxic agents and hypotension

## 2024-03-20 NOTE — NURSING NOTE
Arrived via litter on monitor and room air accompanied by ED RN. Alert and oriented. Denies pain. C/o severe debilitating fatigue over the past 3 months since suzy covid19.  Heart tones clear with palpable pulses.  No edema.  Pallor.  Lungs clear.  Abdomen unremarkable. States stools have been regular and normal in appearance.  Voiding clear yellow. IV site intact.  Skin intact.

## 2024-03-20 NOTE — PLAN OF CARE
Problem: METABOLIC, FLUID AND ELECTROLYTES - ADULT  Goal: Electrolytes maintained within normal limits  Description: INTERVENTIONS:  - Monitor labs and assess patient for signs and symptoms of electrolyte imbalances  - Administer electrolyte replacement as ordered  - Monitor response to electrolyte replacements, including repeat lab results as appropriate  - Instruct patient on fluid and nutrition as appropriate  Outcome: Progressing     Problem: HEMATOLOGIC - ADULT  Goal: Maintains hematologic stability  Description: INTERVENTIONS  - Assess for signs and symptoms of bleeding or hemorrhage  - Monitor labs  - Administer supportive blood products/factors as ordered and appropriate  Outcome: Progressing     Problem: MUSCULOSKELETAL - ADULT  Goal: Maintain or return mobility to safest level of function  Description: INTERVENTIONS:  - Assess patient's ability to carry out ADLs; assess patient's baseline for ADL function and identify physical deficits which impact ability to perform ADLs (bathing, care of mouth/teeth, toileting, grooming, dressing, etc.)  - Assess/evaluate cause of self-care deficits   - Assess range of motion  - Assess patient's mobility  - Assess patient's need for assistive devices and provide as appropriate  - Encourage maximum independence but intervene and supervise when necessary  - Involve family in performance of ADLs  - Assess for home care needs following discharge   - Consider OT consult to assist with ADL evaluation and planning for discharge  - Provide patient education as appropriate  Outcome: Progressing

## 2024-03-20 NOTE — TELEPHONE ENCOUNTER
Verbal order from Dr Calvillo to call patient and advise to ER for further evaluation. Called patient and left message on answer machine to call our office back

## 2024-03-20 NOTE — ASSESSMENT & PLAN NOTE
Patient with a history of GAVE presents with melena and significant blood loss anemia  Hemodynamically stable  Spoke with critical care who believe the patient is appropriate for level 2 stepdown  Gastroenterology aware  Patient was scheduled for EGD on 4/1/2024 however due to significant lethargy and anemia presented to the ED for evaluation    IV PPI 2 times daily  Trend hemoglobin and hematocrit every 6 hours  NPO sips with meds  Gastroenterology consultation for endoscopic evaluation  Check iron panel  Venofer 300 mg IV every other day

## 2024-03-20 NOTE — ASSESSMENT & PLAN NOTE
Hemoglobin 4.0 on presentation likely secondary to slow upper GI bleed in the setting of GAVE  Patient presents with abnormal laboratory test in the outpatient setting discovered by her PCP  Patient is hemodynamically stable    Transfuse 3 units of packed red blood cells now  Trend hemoglobin and hematocrit every 6 hours  Follow-up iron panel  Venofer 300 mg IV every other day  Management of upper GI bleed as above

## 2024-03-21 ENCOUNTER — ANESTHESIA EVENT (INPATIENT)
Dept: GASTROENTEROLOGY | Facility: HOSPITAL | Age: 79
DRG: 393 | End: 2024-03-21
Payer: MEDICARE

## 2024-03-21 ENCOUNTER — APPOINTMENT (OUTPATIENT)
Dept: GASTROENTEROLOGY | Facility: HOSPITAL | Age: 79
DRG: 393 | End: 2024-03-21
Payer: MEDICARE

## 2024-03-21 ENCOUNTER — ANESTHESIA (INPATIENT)
Dept: GASTROENTEROLOGY | Facility: HOSPITAL | Age: 79
DRG: 393 | End: 2024-03-21
Payer: MEDICARE

## 2024-03-21 LAB
ANION GAP SERPL CALCULATED.3IONS-SCNC: 10 MMOL/L (ref 4–13)
ATRIAL RATE: 98 BPM
BASOPHILS # BLD AUTO: 0.02 THOUSANDS/ÂΜL (ref 0–0.1)
BASOPHILS NFR BLD AUTO: 0 % (ref 0–1)
BUN SERPL-MCNC: 19 MG/DL (ref 5–25)
CALCIUM SERPL-MCNC: 8.2 MG/DL (ref 8.4–10.2)
CHLORIDE SERPL-SCNC: 110 MMOL/L (ref 96–108)
CO2 SERPL-SCNC: 18 MMOL/L (ref 21–32)
CREAT SERPL-MCNC: 1 MG/DL (ref 0.6–1.3)
EOSINOPHIL # BLD AUTO: 0.19 THOUSAND/ÂΜL (ref 0–0.61)
EOSINOPHIL NFR BLD AUTO: 2 % (ref 0–6)
ERYTHROCYTE [DISTWIDTH] IN BLOOD BY AUTOMATED COUNT: 21.9 % (ref 11.6–15.1)
FERRITIN SERPL-MCNC: 3 NG/ML (ref 11–307)
GFR SERPL CREATININE-BSD FRML MDRD: 53 ML/MIN/1.73SQ M
GLUCOSE P FAST SERPL-MCNC: 84 MG/DL (ref 65–99)
GLUCOSE SERPL-MCNC: 84 MG/DL (ref 65–140)
HCT VFR BLD AUTO: 24.3 % (ref 34.8–46.1)
HGB BLD-MCNC: 7.7 G/DL (ref 11.5–15.4)
IMM GRANULOCYTES # BLD AUTO: 0.11 THOUSAND/UL (ref 0–0.2)
IMM GRANULOCYTES NFR BLD AUTO: 1 % (ref 0–2)
IRON SERPL-MCNC: <10 UG/DL (ref 50–212)
LYMPHOCYTES # BLD AUTO: 1.23 THOUSANDS/ÂΜL (ref 0.6–4.47)
LYMPHOCYTES NFR BLD AUTO: 11 % (ref 14–44)
MAGNESIUM SERPL-MCNC: 2.1 MG/DL (ref 1.9–2.7)
MCH RBC QN AUTO: 24.1 PG (ref 26.8–34.3)
MCHC RBC AUTO-ENTMCNC: 31.7 G/DL (ref 31.4–37.4)
MCV RBC AUTO: 76 FL (ref 82–98)
MONOCYTES # BLD AUTO: 0.92 THOUSAND/ÂΜL (ref 0.17–1.22)
MONOCYTES NFR BLD AUTO: 9 % (ref 4–12)
NEUTROPHILS # BLD AUTO: 8.34 THOUSANDS/ÂΜL (ref 1.85–7.62)
NEUTS SEG NFR BLD AUTO: 77 % (ref 43–75)
NRBC BLD AUTO-RTO: 0 /100 WBCS
P AXIS: 61 DEGREES
PHOSPHATE SERPL-MCNC: 2.5 MG/DL (ref 2.3–4.1)
PLATELET # BLD AUTO: 254 THOUSANDS/UL (ref 149–390)
PMV BLD AUTO: 9.9 FL (ref 8.9–12.7)
POTASSIUM SERPL-SCNC: 3.8 MMOL/L (ref 3.5–5.3)
PR INTERVAL: 150 MS
QRS AXIS: 44 DEGREES
QRSD INTERVAL: 82 MS
QT INTERVAL: 342 MS
QTC INTERVAL: 436 MS
RBC # BLD AUTO: 3.19 MILLION/UL (ref 3.81–5.12)
SODIUM SERPL-SCNC: 138 MMOL/L (ref 135–147)
T WAVE AXIS: 8 DEGREES
TIBC SERPL-MCNC: <416 UG/DL (ref 250–450)
UIBC SERPL-MCNC: 406 UG/DL (ref 155–355)
VENTRICULAR RATE: 98 BPM
WBC # BLD AUTO: 10.81 THOUSAND/UL (ref 4.31–10.16)

## 2024-03-21 PROCEDURE — 80048 BASIC METABOLIC PNL TOTAL CA: CPT | Performed by: INTERNAL MEDICINE

## 2024-03-21 PROCEDURE — 0DB68ZZ EXCISION OF STOMACH, VIA NATURAL OR ARTIFICIAL OPENING ENDOSCOPIC: ICD-10-PCS | Performed by: INTERNAL MEDICINE

## 2024-03-21 PROCEDURE — C9113 INJ PANTOPRAZOLE SODIUM, VIA: HCPCS | Performed by: INTERNAL MEDICINE

## 2024-03-21 PROCEDURE — 0D578ZZ DESTRUCTION OF STOMACH, PYLORUS, VIA NATURAL OR ARTIFICIAL OPENING ENDOSCOPIC: ICD-10-PCS | Performed by: INTERNAL MEDICINE

## 2024-03-21 PROCEDURE — 30233N1 TRANSFUSION OF NONAUTOLOGOUS RED BLOOD CELLS INTO PERIPHERAL VEIN, PERCUTANEOUS APPROACH: ICD-10-PCS | Performed by: INTERNAL MEDICINE

## 2024-03-21 PROCEDURE — 83735 ASSAY OF MAGNESIUM: CPT | Performed by: INTERNAL MEDICINE

## 2024-03-21 PROCEDURE — 99222 1ST HOSP IP/OBS MODERATE 55: CPT | Performed by: STUDENT IN AN ORGANIZED HEALTH CARE EDUCATION/TRAINING PROGRAM

## 2024-03-21 PROCEDURE — 85025 COMPLETE CBC W/AUTO DIFF WBC: CPT | Performed by: INTERNAL MEDICINE

## 2024-03-21 PROCEDURE — 88342 IMHCHEM/IMCYTCHM 1ST ANTB: CPT | Performed by: PATHOLOGY

## 2024-03-21 PROCEDURE — 99232 SBSQ HOSP IP/OBS MODERATE 35: CPT | Performed by: INTERNAL MEDICINE

## 2024-03-21 PROCEDURE — 84100 ASSAY OF PHOSPHORUS: CPT | Performed by: INTERNAL MEDICINE

## 2024-03-21 PROCEDURE — 82948 REAGENT STRIP/BLOOD GLUCOSE: CPT

## 2024-03-21 PROCEDURE — 43270 EGD LESION ABLATION: CPT | Performed by: STUDENT IN AN ORGANIZED HEALTH CARE EDUCATION/TRAINING PROGRAM

## 2024-03-21 PROCEDURE — 93010 ELECTROCARDIOGRAM REPORT: CPT | Performed by: INTERNAL MEDICINE

## 2024-03-21 PROCEDURE — 88305 TISSUE EXAM BY PATHOLOGIST: CPT | Performed by: PATHOLOGY

## 2024-03-21 PROCEDURE — 43251 EGD REMOVE LESION SNARE: CPT | Performed by: STUDENT IN AN ORGANIZED HEALTH CARE EDUCATION/TRAINING PROGRAM

## 2024-03-21 RX ORDER — ACETAMINOPHEN 325 MG/1
650 TABLET ORAL EVERY 6 HOURS PRN
Status: DISCONTINUED | OUTPATIENT
Start: 2024-03-21 | End: 2024-03-22 | Stop reason: HOSPADM

## 2024-03-21 RX ORDER — LIDOCAINE HYDROCHLORIDE 20 MG/ML
INJECTION, SOLUTION EPIDURAL; INFILTRATION; INTRACAUDAL; PERINEURAL AS NEEDED
Status: DISCONTINUED | OUTPATIENT
Start: 2024-03-21 | End: 2024-03-21

## 2024-03-21 RX ORDER — SODIUM CHLORIDE, SODIUM LACTATE, POTASSIUM CHLORIDE, CALCIUM CHLORIDE 600; 310; 30; 20 MG/100ML; MG/100ML; MG/100ML; MG/100ML
50 INJECTION, SOLUTION INTRAVENOUS CONTINUOUS
Status: DISCONTINUED | OUTPATIENT
Start: 2024-03-21 | End: 2024-03-21

## 2024-03-21 RX ORDER — PROPOFOL 10 MG/ML
INJECTION, EMULSION INTRAVENOUS AS NEEDED
Status: DISCONTINUED | OUTPATIENT
Start: 2024-03-21 | End: 2024-03-21

## 2024-03-21 RX ADMIN — BUSPIRONE HYDROCHLORIDE 5 MG: 5 TABLET ORAL at 09:12

## 2024-03-21 RX ADMIN — PROPOFOL 80 MG: 10 INJECTION, EMULSION INTRAVENOUS at 15:08

## 2024-03-21 RX ADMIN — BUSPIRONE HYDROCHLORIDE 5 MG: 5 TABLET ORAL at 17:35

## 2024-03-21 RX ADMIN — LIDOCAINE HYDROCHLORIDE 100 MG: 20 INJECTION, SOLUTION EPIDURAL; INFILTRATION; INTRACAUDAL; PERINEURAL at 15:08

## 2024-03-21 RX ADMIN — PANTOPRAZOLE SODIUM 40 MG: 40 INJECTION, POWDER, FOR SOLUTION INTRAVENOUS at 04:00

## 2024-03-21 RX ADMIN — PANTOPRAZOLE SODIUM 40 MG: 40 INJECTION, POWDER, FOR SOLUTION INTRAVENOUS at 17:35

## 2024-03-21 RX ADMIN — ACETAMINOPHEN 650 MG: 325 TABLET ORAL at 13:24

## 2024-03-21 RX ADMIN — BUSPIRONE HYDROCHLORIDE 5 MG: 5 TABLET ORAL at 21:35

## 2024-03-21 RX ADMIN — SODIUM CHLORIDE, SODIUM LACTATE, POTASSIUM CHLORIDE, AND CALCIUM CHLORIDE 50 ML/HR: .6; .31; .03; .02 INJECTION, SOLUTION INTRAVENOUS at 14:22

## 2024-03-21 RX ADMIN — ATORVASTATIN CALCIUM 40 MG: 40 TABLET, FILM COATED ORAL at 09:12

## 2024-03-21 RX ADMIN — TRAZODONE HYDROCHLORIDE 50 MG: 50 TABLET ORAL at 21:35

## 2024-03-21 NOTE — NURSING NOTE
Pt is alert and oriented.  Denies pain at present.  Moves fair in bed.  OOB to commode with supervision.  Gait steady and denies dizziness.  Continues to complain that she is somewhat forgetful.  Heart tones clear with palpable pulses.  No edema.  Color is more pink that yesterday but continues to appear quite pale. Skin is warm and dry.  Lungs are clear with slight upper airway wheeze heard throughout. Breathing is easy and unlabored.  Abdomen is soft and non tender.  Denies nausea.  NPO for procedure.  IV site intact.  Skin is intact.

## 2024-03-21 NOTE — NUTRITION
03/21/24 1240   Biochemical Data,Medical Tests, and Procedures   Biochemical Data/Medical Tests/Procedures Lab values reviewed;Meds reviewed   Labs (Comment) 3/21/2024 iron less than 10, ferritin 3, chloride 110, calcium 8.2, hemoglobin 7.7, hematocrit 24.3   Meds (Comment) Atorvastatin, iron sucrose, Protonix, trazodone, sodium chloride infusion at 75 mL/h   Nutrition-Focused Physical Exam   Nutrition-Focused Physical Exam Findings RN skin assessment reviewed;No edema documented;No skin issues documented   Nutrition-Focused Physical Exam Findings Noted moderate clavicle muscle loss, mild temporal muscle loss.  1 malnutrition criteria definitively met.   Medical-Related Concerns GAVE, stage III CKD, iron deficiency anemia, endometrial cancer, DAVID, GERD, particular of colon   Adequacy of Intake   Nutrition Modality NPO   Current PO Intake   Current Diet Order NPO   Estimated calorie intake compared to estimated need Nutrient needs are not met.   PES Statement   Oral or Nutritional Support Intake (2) Inadequate oral intake NI-2.1   Related to Other (Comment)  (acute and chronic clinical conditions)   As evidenced by: NFPE findings;Intake < estimated needs   Recommendations/Interventions   Malnutrition/BMI Present No  (1 criteria met, muscle loss, will continue to monitor.)   Summary Weight loss, poor p.o.-MST 2.  Presents with abnormal outpatient lab, hemoglobin 4.0.  Found to have GIB.  Past medical history significant for GAVE, stage III CKD, iron deficiency anemia, endometrial cancer, DAVID, GERD, particular of colon.  Weight history reviewed.  No significant changes.  No edema.  No pressure areas.  Noted to have bloating/gas per chart review.  N.p.o. at present.  RD protocol not initiated.  Met with patient at bedside. She reports her appetite depends on the time of day. Usually has 3 meals daily. States she was advised to follow a bland diet in setting of diverticular disease. Drinks Ensure on occasion. Avoids  pineapple for unknown reason. Consumes Ensure on occasion however stopped drinking recently as advised by a MD. Patient cooks. Her  grocery shops. Reports difficulty chewing in setting of weight loss, ill fitting dentures, dentures difficult to place in mouth. Denies swallowing difficulty. She reports choosing softer foods and is able to cut food into tolerable pieces. Reports weight loss over past 2 years. Noted moderate clavicle muscle loss, mild temporal muscle loss.  1 malnutrition criteria definitively met.  Will continue to monitor and revisit nutrition supplement options at follow-up.  Anticipate diet advancement per GI.  Recommend surgical soft or low fiber/low residue diet.  RD to follow as hospital course progresses.   Interventions/Recommendations Diet to advance;Monitor I & O's   Education Assessment   Education Education not indicated at this time   Patient Nutrition Goals   Goal Avoid weight loss;Tolerate PO diet

## 2024-03-21 NOTE — PLAN OF CARE
Problem: Potential for Falls  Goal: Patient will remain free of falls  Description: INTERVENTIONS:  - Educate patient/family on patient safety including physical limitations  - Instruct patient to call for assistance with activity   - Consult OT/PT to assist with strengthening/mobility   - Keep Call bell within reach  - Keep bed low and locked with side rails adjusted as appropriate  - Keep care items and personal belongings within reach  - Initiate and maintain comfort rounds  - Make Fall Risk Sign visible to staff  - Offer Toileting every 2 Hours, in advance of need  - Initiate/Maintain BED alarm  - Obtain necessary fall risk management equipment:   - Apply yellow socks and bracelet for high fall risk patients  - Consider moving patient to room near nurses station  Outcome: Progressing     Problem: PAIN - ADULT  Goal: Verbalizes/displays adequate comfort level or baseline comfort level  Description: Interventions:  - Encourage patient to monitor pain and request assistance  - Assess pain using appropriate pain scale  - Administer analgesics based on type and severity of pain and evaluate response  - Implement non-pharmacological measures as appropriate and evaluate response  - Consider cultural and social influences on pain and pain management  - Notify physician/advanced practitioner if interventions unsuccessful or patient reports new pain  Outcome: Progressing     Problem: INFECTION - ADULT  Goal: Absence or prevention of progression during hospitalization  Description: INTERVENTIONS:  - Assess and monitor for signs and symptoms of infection  - Monitor lab/diagnostic results  - Monitor all insertion sites, i.e. indwelling lines, tubes, and drains  - Monitor endotracheal if appropriate and nasal secretions for changes in amount and color  - [place] appropriate cooling/warming therapies per order  - Administer medications as ordered  - Instruct and encourage patient and family to use good hand hygiene  technique  - Identify and instruct in appropriate isolation precautions for identified infection/condition  Outcome: Progressing     Problem: SAFETY ADULT  Goal: Patient will remain free of falls  Description: INTERVENTIONS:  - Educate patient/family on patient safety including physical limitations  - Instruct patient to call for assistance with activity   - Consult OT/PT to assist with strengthening/mobility   - Keep Call bell within reach  - Keep bed low and locked with side rails adjusted as appropriate  - Keep care items and personal belongings within reach  - Initiate and maintain comfort rounds  - Make Fall Risk Sign visible to staff  - Offer Toileting every 2 Hours, in advance of need  - Initiate/Maintain BED alarm  - Obtain necessary fall risk management equipment:   - Apply yellow socks and bracelet for high fall risk patients  - Consider moving patient to room near nurses station  Outcome: Progressing  Goal: Maintain or return to baseline ADL function  Description: INTERVENTIONS:  -  Assess patient's ability to carry out ADLs; assess patient's baseline for ADL function and identify physical deficits which impact ability to perform ADLs (bathing, care of mouth/teeth, toileting, grooming, dressing, etc.)  - Assess/evaluate cause of self-care deficits   - Assess range of motion  - Assess patient's mobility; develop plan if impaired  - Assess patient's need for assistive devices and provide as appropriate  - Encourage maximum independence but intervene and supervise when necessary  - Involve family in performance of ADLs  - Assess for home care needs following discharge   - Consider OT consult to assist with ADL evaluation and planning for discharge  - Provide patient education as appropriate  Outcome: Progressing  Goal: Maintains/Returns to pre admission functional level  Description: INTERVENTIONS:  - Perform AM-PAC 6 Click Basic Mobility/ Daily Activity assessment daily.  - Set and communicate daily  mobility goal to care team and patient/family/caregiver.   - Collaborate with rehabilitation services on mobility goals if consulted  - Perform Range of Motion 2 times a day.  - Reposition patient every 3 hours.  - Dangle patient 3 times a day  - Stand patient 3 times a day  - Ambulate patient 3 times a day  - Out of bed to chair 3 times a day   - Out of bed for meals 3 times a day  - Out of bed for toileting  - Record patient progress and toleration of activity level   Outcome: Progressing     Problem: DISCHARGE PLANNING  Goal: Discharge to home or other facility with appropriate resources  Description: INTERVENTIONS:  - Identify barriers to discharge w/patient and caregiver  - Arrange for needed discharge resources and transportation as appropriate  - Identify discharge learning needs (meds, wound care, etc.)  - Arrange for interpretive services to assist at discharge as needed  - Refer to Case Management Department for coordinating discharge planning if the patient needs post-hospital services based on physician/advanced practitioner order or complex needs related to functional status, cognitive ability, or social support system  Outcome: Progressing     Problem: Knowledge Deficit  Goal: Patient/family/caregiver demonstrates understanding of disease process, treatment plan, medications, and discharge instructions  Description: Complete learning assessment and assess knowledge base.  Interventions:  - Provide teaching at level of understanding  - Provide teaching via preferred learning methods  Outcome: Progressing     Problem: METABOLIC, FLUID AND ELECTROLYTES - ADULT  Goal: Electrolytes maintained within normal limits  Description: INTERVENTIONS:  - Monitor labs and assess patient for signs and symptoms of electrolyte imbalances  - Administer electrolyte replacement as ordered  - Monitor response to electrolyte replacements, including repeat lab results as appropriate  - Instruct patient on fluid and nutrition  as appropriate  Outcome: Progressing     Problem: SKIN/TISSUE INTEGRITY - ADULT  Goal: Skin Integrity remains intact(Skin Breakdown Prevention)  Description: Assess:  -Perform Roger assessment every shift  -Clean and moisturize skin every shift  -Inspect skin when repositioning, toileting, and assisting with ADLS  -Assess under medical devices such as dressing every shift  -Assess extremities for adequate circulation and sensation     Bed Management:  -Have minimal linens on bed & keep smooth, unwrinkled  -Change linens as needed when moist or perspiring  -Avoid sitting or lying in one position for more than 2 hours while in bed  -Keep HOB at 30degrees     Toileting:  -Offer bedside commode  -Assess for incontinence every 2  -Use incontinent care products after each incontinent episode such as moisture barrier    Activity:  -Mobilize patient 3 times a day  -Encourage activity and walks on unit  -Encourage or provide ROM exercises   -Turn and reposition patient every 2 Hours  -Use appropriate equipment to lift or move patient in bed  -Instruct/ Assist with weight shifting every 15 mins.  when out of bed in chair  -Consider limitation of chair time 2 hour intervals    Skin Care:  -Avoid use of baby powder, tape, friction and shearing, hot water or constrictive clothing  -Relieve pressure over bony prominences using mepilex  -Do not massage red bony areas    Next Steps:  -Teach patient strategies to minimize risks such as nutrition   -Consider consults to  interdisciplinary teams such as nutrition  Outcome: Progressing     Problem: HEMATOLOGIC - ADULT  Goal: Maintains hematologic stability  Description: INTERVENTIONS  - Assess for signs and symptoms of bleeding or hemorrhage  - Monitor labs  - Administer supportive blood products/factors as ordered and appropriate  Outcome: Progressing     Problem: MUSCULOSKELETAL - ADULT  Goal: Maintain or return mobility to safest level of function  Description: INTERVENTIONS:  -  Assess patient's ability to carry out ADLs; assess patient's baseline for ADL function and identify physical deficits which impact ability to perform ADLs (bathing, care of mouth/teeth, toileting, grooming, dressing, etc.)  - Assess/evaluate cause of self-care deficits   - Assess range of motion  - Assess patient's mobility  - Assess patient's need for assistive devices and provide as appropriate  - Encourage maximum independence but intervene and supervise when necessary  - Involve family in performance of ADLs  - Assess for home care needs following discharge   - Consider OT consult to assist with ADL evaluation and planning for discharge  - Provide patient education as appropriate  Outcome: Progressing     Problem: Potential for Falls  Goal: Patient will remain free of falls  Description: INTERVENTIONS:  - Educate patient/family on patient safety including physical limitations  - Instruct patient to call for assistance with activity   - Consult OT/PT to assist with strengthening/mobility   - Keep Call bell within reach  - Keep bed low and locked with side rails adjusted as appropriate  - Keep care items and personal belongings within reach  - Initiate and maintain comfort rounds  - Make Fall Risk Sign visible to staff  - Offer Toileting every 2 Hours, in advance of need  - Initiate/Maintain bed alarm  - Obtain necessary fall risk management equipment:   - Apply yellow socks and bracelet for high fall risk patients  - Consider moving patient to room near nurses station  Outcome: Progressing     Problem: PAIN - ADULT  Goal: Verbalizes/displays adequate comfort level or baseline comfort level  Description: Interventions:  - Encourage patient to monitor pain and request assistance  - Assess pain using appropriate pain scale  - Administer analgesics based on type and severity of pain and evaluate response  - Implement non-pharmacological measures as appropriate and evaluate response  - Consider cultural and social  influences on pain and pain management  - Notify physician/advanced practitioner if interventions unsuccessful or patient reports new pain  Outcome: Progressing     Problem: INFECTION - ADULT  Goal: Absence or prevention of progression during hospitalization  Description: INTERVENTIONS:  - Assess and monitor for signs and symptoms of infection  - Monitor lab/diagnostic results  - Monitor all insertion sites, i.e. indwelling lines, tubes, and drains  - Monitor endotracheal if appropriate and nasal secretions for changes in amount and color  - [place] appropriate cooling/warming therapies per order  - Administer medications as ordered  - Instruct and encourage patient and family to use good hand hygiene technique  - Identify and instruct in appropriate isolation precautions for identified infection/condition  Outcome: Progressing     Problem: SAFETY ADULT  Goal: Patient will remain free of falls  Description: INTERVENTIONS:  - Educate patient/family on patient safety including physical limitations  - Instruct patient to call for assistance with activity   - Consult OT/PT to assist with strengthening/mobility   - Keep Call bell within reach  - Keep bed low and locked with side rails adjusted as appropriate  - Keep care items and personal belongings within reach  - Initiate and maintain comfort rounds  - Make Fall Risk Sign visible to staff  - Offer Toileting every 2 Hours, in advance of need  - Initiate/Maintain bed alarm  - Obtain necessary fall risk management equipment:   - Apply yellow socks and bracelet for high fall risk patients  - Consider moving patient to room near nurses station  Outcome: Progressing  Goal: Maintain or return to baseline ADL function  Description: INTERVENTIONS:  -  Assess patient's ability to carry out ADLs; assess patient's baseline for ADL function and identify physical deficits which impact ability to perform ADLs (bathing, care of mouth/teeth, toileting, grooming, dressing, etc.)  -  Assess/evaluate cause of self-care deficits   - Assess range of motion  - Assess patient's mobility; develop plan if impaired  - Assess patient's need for assistive devices and provide as appropriate  - Encourage maximum independence but intervene and supervise when necessary  - Involve family in performance of ADLs  - Assess for home care needs following discharge   - Consider OT consult to assist with ADL evaluation and planning for discharge  - Provide patient education as appropriate  Outcome: Progressing  Goal: Maintains/Returns to pre admission functional level  Description: INTERVENTIONS:  - Perform AM-PAC 6 Click Basic Mobility/ Daily Activity assessment daily.  - Set and communicate daily mobility goal to care team and patient/family/caregiver.   - Collaborate with rehabilitation services on mobility goals if consulted  - Perform Range of Motion 3 times a day.  - Reposition patient every 2 hours.  - Dangle patient 3 times a day  - Stand patient 3 times a day  - Ambulate patient 3 times a day  - Out of bed to chair 3 times a day   - Out of bed for meals 3 times a day  - Out of bed for toileting  - Record patient progress and toleration of activity level   Outcome: Progressing     Problem: DISCHARGE PLANNING  Goal: Discharge to home or other facility with appropriate resources  Description: INTERVENTIONS:  - Identify barriers to discharge w/patient and caregiver  - Arrange for needed discharge resources and transportation as appropriate  - Identify discharge learning needs (meds, wound care, etc.)  - Arrange for interpretive services to assist at discharge as needed  - Refer to Case Management Department for coordinating discharge planning if the patient needs post-hospital services based on physician/advanced practitioner order or complex needs related to functional status, cognitive ability, or social support system  Outcome: Progressing     Problem: Knowledge Deficit  Goal: Patient/family/caregiver  demonstrates understanding of disease process, treatment plan, medications, and discharge instructions  Description: Complete learning assessment and assess knowledge base.  Interventions:  - Provide teaching at level of understanding  - Provide teaching via preferred learning methods  Outcome: Progressing     Problem: METABOLIC, FLUID AND ELECTROLYTES - ADULT  Goal: Electrolytes maintained within normal limits  Description: INTERVENTIONS:  - Monitor labs and assess patient for signs and symptoms of electrolyte imbalances  - Administer electrolyte replacement as ordered  - Monitor response to electrolyte replacements, including repeat lab results as appropriate  - Instruct patient on fluid and nutrition as appropriate  Outcome: Progressing     Problem: SKIN/TISSUE INTEGRITY - ADULT  Goal: Skin Integrity remains intact(Skin Breakdown Prevention)  Description: Assess:  -Perform Roger assessment every shift  -Clean and moisturize skin every 2  -Inspect skin when repositioning, toileting, and assisting with ADLS  -Assess under medical devices such as dressing every shift  -Assess extremities for adequate circulation and sensation     Bed Management:  -Have minimal linens on bed & keep smooth, unwrinkled  -Change linens as needed when moist or perspiring  -Avoid sitting or lying in one position for more than 2 hours while in bed  -Keep HOB at 30degrees     Toileting:  -Offer bedside commode  -Assess for incontinence every 2  -Use incontinent care products after each incontinent episode such as     Activity:  -Mobilize patient 3 times a day  -Encourage activity and walks on unit  -Encourage or provide ROM exercises   -Turn and reposition patient every 2 Hours  -Use appropriate equipment to lift or move patient in bed  -Instruct/ Assist with weight shifting every 15 when out of bed in chair  -Consider limitation of chair time 2 hour intervals    Skin Care:  -Avoid use of baby powder, tape, friction and shearing, hot water  or constrictive clothing  -Relieve pressure over bony prominences using mepilex  -Do not massage red bony areas    Next Steps:  -Teach patient strategies to minimize risks such as nutrtion   -Consider consults to  interdisciplinary teams such as nutrition  Outcome: Progressing     Problem: HEMATOLOGIC - ADULT  Goal: Maintains hematologic stability  Description: INTERVENTIONS  - Assess for signs and symptoms of bleeding or hemorrhage  - Monitor labs  - Administer supportive blood products/factors as ordered and appropriate  Outcome: Progressing     Problem: MUSCULOSKELETAL - ADULT  Goal: Maintain or return mobility to safest level of function  Description: INTERVENTIONS:  - Assess patient's ability to carry out ADLs; assess patient's baseline for ADL function and identify physical deficits which impact ability to perform ADLs (bathing, care of mouth/teeth, toileting, grooming, dressing, etc.)  - Assess/evaluate cause of self-care deficits   - Assess range of motion  - Assess patient's mobility  - Assess patient's need for assistive devices and provide as appropriate  - Encourage maximum independence but intervene and supervise when necessary  - Involve family in performance of ADLs  - Assess for home care needs following discharge   - Consider OT consult to assist with ADL evaluation and planning for discharge  - Provide patient education as appropriate  Outcome: Progressing

## 2024-03-21 NOTE — PLAN OF CARE
Problem: Potential for Falls  Goal: Patient will remain free of falls  Description: INTERVENTIONS:  - Educate patient/family on patient safety including physical limitations  - Instruct patient to call for assistance with activity   - Consult OT/PT to assist with strengthening/mobility   - Keep Call bell within reach  - Keep bed low and locked with side rails adjusted as appropriate  - Keep care items and personal belongings within reach  - Initiate and maintain comfort rounds  - Make Fall Risk Sign visible to staff  - Offer Toileting every 2 Hours, in advance of need  - Initiate/Maintain bed alarm  - Obtain necessary fall risk management equipment: walker   - Apply yellow socks and bracelet for high fall risk patients  - Consider moving patient to room near nurses station  Outcome: Progressing     Problem: PAIN - ADULT  Goal: Verbalizes/displays adequate comfort level or baseline comfort level  Description: Interventions:  - Encourage patient to monitor pain and request assistance  - Assess pain using appropriate pain scale  - Administer analgesics based on type and severity of pain and evaluate response  - Implement non-pharmacological measures as appropriate and evaluate response  - Consider cultural and social influences on pain and pain management  - Notify physician/advanced practitioner if interventions unsuccessful or patient reports new pain  Outcome: Progressing     Problem: INFECTION - ADULT  Goal: Absence or prevention of progression during hospitalization  Description: INTERVENTIONS:  - Assess and monitor for signs and symptoms of infection  - Monitor lab/diagnostic results  - Monitor all insertion sites, i.e. indwelling lines, tubes, and drains  - Monitor endotracheal if appropriate and nasal secretions for changes in amount and color  - Micro appropriate cooling/warming therapies per order  - Administer medications as ordered  - Instruct and encourage patient and family to use good hand hygiene  technique  - Identify and instruct in appropriate isolation precautions for identified infection/condition  Outcome: Progressing     Problem: SAFETY ADULT  Goal: Patient will remain free of falls  Description: INTERVENTIONS:  - Educate patient/family on patient safety including physical limitations  - Instruct patient to call for assistance with activity   - Consult OT/PT to assist with strengthening/mobility   - Keep Call bell within reach  - Keep bed low and locked with side rails adjusted as appropriate  - Keep care items and personal belongings within reach  - Initiate and maintain comfort rounds  - Make Fall Risk Sign visible to staff  - Apply yellow socks and bracelet for high fall risk patients  - Consider moving patient to room near nurses station  Outcome: Progressing  Goal: Maintain or return to baseline ADL function  Description: INTERVENTIONS:  -  Assess patient's ability to carry out ADLs; assess patient's baseline for ADL function and identify physical deficits which impact ability to perform ADLs (bathing, care of mouth/teeth, toileting, grooming, dressing, etc.)  - Assess/evaluate cause of self-care deficits   - Assess range of motion  - Assess patient's mobility; develop plan if impaired  - Assess patient's need for assistive devices and provide as appropriate  - Encourage maximum independence but intervene and supervise when necessary  - Involve family in performance of ADLs  - Assess for home care needs following discharge   - Consider OT consult to assist with ADL evaluation and planning for discharge  - Provide patient education as appropriate  Outcome: Progressing  Goal: Maintains/Returns to pre admission functional level  Description: INTERVENTIONS:  - Perform AM-PAC 6 Click Basic Mobility/ Daily Activity assessment daily.  - Set and communicate daily mobility goal to care team and patient/family/caregiver.   - Collaborate with rehabilitation services on mobility goals if consulted  - Perform  Range of Motion 3 times a day.  - Reposition patient every 2 hours.  - Dangle patient 3 times a day  - Stand patient 3 times a day  - Ambulate patient 3 times a day  - Out of bed to chair 3 times a day   - Out of bed for meals 3 times a day  - Out of bed for toileting  - Record patient progress and toleration of activity level   Outcome: Progressing     Problem: DISCHARGE PLANNING  Goal: Discharge to home or other facility with appropriate resources  Description: INTERVENTIONS:  - Identify barriers to discharge w/patient and caregiver  - Arrange for needed discharge resources and transportation as appropriate  - Identify discharge learning needs (meds, wound care, etc.)  - Arrange for interpretive services to assist at discharge as needed  - Refer to Case Management Department for coordinating discharge planning if the patient needs post-hospital services based on physician/advanced practitioner order or complex needs related to functional status, cognitive ability, or social support system  Outcome: Progressing     Problem: Knowledge Deficit  Goal: Patient/family/caregiver demonstrates understanding of disease process, treatment plan, medications, and discharge instructions  Description: Complete learning assessment and assess knowledge base.  Interventions:  - Provide teaching at level of understanding  - Provide teaching via preferred learning methods  Outcome: Progressing     Problem: METABOLIC, FLUID AND ELECTROLYTES - ADULT  Goal: Electrolytes maintained within normal limits  Description: INTERVENTIONS:  - Monitor labs and assess patient for signs and symptoms of electrolyte imbalances  - Administer electrolyte replacement as ordered  - Monitor response to electrolyte replacements, including repeat lab results as appropriate  - Instruct patient on fluid and nutrition as appropriate  Outcome: Progressing     Problem: SKIN/TISSUE INTEGRITY - ADULT  Goal: Skin Integrity remains intact(Skin Breakdown  Prevention)  Description: Assess:  -Perform Roger assessment every 24  -Clean and moisturize skin every 24  -Inspect skin when repositioning, toileting, and assisting with ADLS  -Assess under medical devices such as bp  every 12   -Assess extremities for adequate circulation and sensation     Bed Management:  -Have minimal linens on bed & keep smooth, unwrinkled  -Change linens as needed when moist or perspiring  -Avoid sitting or lying in one position for more than 2 hours while in bed  -Keep HOB at 30degrees     Toileting:  -Offer bedside commode    Activity:  -Mobilize patient 3 times a day  -Encourage activity and walks on unit  -Encourage or provide ROM exercises   -Turn and reposition patient every 2 Hours  -Use appropriate equipment to lift or move patient in bed  -Instruct/ Assist with weight shifting every 30 when out of bed in chair  -Consider limitation of chair time 8 hour intervals    Skin Care:  -Avoid use of baby powder, tape, friction and shearing, hot water or constrictive clothing  -Relieve pressure over bony prominences using mepilex   -Do not massage red bony areas    Outcome: Progressing     Problem: HEMATOLOGIC - ADULT  Goal: Maintains hematologic stability  Description: INTERVENTIONS  - Assess for signs and symptoms of bleeding or hemorrhage  - Monitor labs  - Administer supportive blood products/factors as ordered and appropriate  Outcome: Progressing     Problem: MUSCULOSKELETAL - ADULT  Goal: Maintain or return mobility to safest level of function  Description: INTERVENTIONS:  - Assess patient's ability to carry out ADLs; assess patient's baseline for ADL function and identify physical deficits which impact ability to perform ADLs (bathing, care of mouth/teeth, toileting, grooming, dressing, etc.)  - Assess/evaluate cause of self-care deficits   - Assess range of motion  - Assess patient's mobility  - Assess patient's need for assistive devices and provide as appropriate  - Encourage  maximum independence but intervene and supervise when necessary  - Involve family in performance of ADLs  - Assess for home care needs following discharge   - Consider OT consult to assist with ADL evaluation and planning for discharge  - Provide patient education as appropriate  Outcome: Progressing

## 2024-03-21 NOTE — ASSESSMENT & PLAN NOTE
Patient with a history of GAVE presents with melena and significant blood loss anemia  Hemodynamically stable  Spoke with critical care who believe the patient is appropriate for level 2 stepdown  Gastroenterology aware  Patient was scheduled for EGD on 4/1/2024 however due to significant lethargy and anemia presented to the ED for evaluation    IV PPI 2 times daily  Since hemoglobin has improved trend daily CBC  NPO sips with meds  Gastroenterology consultation for endoscopic evaluation  Check iron panel  Venofer 300 mg IV every other day

## 2024-03-21 NOTE — ASSESSMENT & PLAN NOTE
Hemoglobin 4.0 on presentation likely secondary to slow upper GI bleed in the setting of GAVE  Patient presents with abnormal laboratory test in the outpatient setting discovered by her PCP  Patient is hemodynamically stable  Hemoglobin has improved to 7.7 status post 3 units of packed red blood cells    Trend CBC  Follow-up iron panel  Venofer 300 mg IV every other day  Management of upper GI bleed as above  Management of upper GI bleed as above

## 2024-03-21 NOTE — ANESTHESIA POSTPROCEDURE EVALUATION
Post-Op Assessment Note    CV Status:  Stable  Pain Score: 0    Pain management: adequate       Mental Status:  Sleepy   Hydration Status:  Euvolemic   PONV Controlled:  Controlled   Airway Patency:  Patent     Post Op Vitals Reviewed: Yes      Staff: CRNA               BP   137/63   Temp   97   Pulse  80   Resp   14   SpO2   99

## 2024-03-21 NOTE — CASE MANAGEMENT
Case Management Assessment & Discharge Planning Note    Patient name Anthony Slater  Location ICU 10/ICU 10-01 MRN [id number]  : 1945 Date 3/21/2024       Current Admission Date: 3/20/2024  Current Admission Diagnosis:GIB (gastrointestinal bleeding)   Patient Active Problem List    Diagnosis Date Noted    GIB (gastrointestinal bleeding) 2024    Iron deficiency anemia 2024    Weakness 2024    Gastroesophageal reflux disease 01/15/2024    Stage 3 chronic kidney disease, unspecified whether stage 3a or 3b CKD (HCC) 2024    Nausea 2024    Absolute anemia 2024    Chronic left shoulder pain 2023    Endometrial cancer (HCC) 2023    History of total abdominal hysterectomy and bilateral salpingo-oophorectomy 2023    Angiodysplasia of gastrointestinal tract 06/15/2023    Thyroid nodule 2021    Drug-induced osteonecrosis of jaw (HCC) 2018    Migraine 2017    Obstructive sleep apnea syndrome 2014    Anxiety state 2014    Coronary arteriosclerosis 2014    Gastric antral vascular ectasia 2014    Hyperlipidemia 2014    Osteoporosis 2014    Diverticular disease of colon 2014      LOS (days): 0  Geometric Mean LOS (GMLOS) (days): 2.1  Days to GMLOS:2     OBJECTIVE:              Current admission status: Inpatient       Preferred Pharmacy:   [facility] - [place], [place] - [address]  [address]  [address] [address]  Phone: [phone] Fax: [phone]    [id number] - [place], [place] - [address] [facility]  [address] [facility] [address]  Phone: [phone] Fax: [phone]    Primary Care Provider: Marycarmen Calvillo MD    Primary Insurance: MEDICARE  Secondary Insurance: AARP    ASSESSMENT:  Active Health Care Proxies    There are no active Health Care Proxies on file.       Advance Directives  Does patient have a Health Care POA?:  No  Was patient offered paperwork?: Yes (States they are doing with a )  Does patient currently have a Health Care decision maker?: Yes, please see Health Care Proxy section  Does patient have Advance Directives?: No  Was patient offered paperwork?: Yes (States they are going through a )  Primary Contact: Yoni Slater      Readmission Root Cause  30 Day Readmission: No    Patient Information  Admitted from:: Home  Mental Status: Alert  During Assessment patient was accompanied by: Not accompanied during assessment  Assessment information provided by:: Patient  Primary Caregiver: Self  Support Systems: Spouse/significant other  County of Residence: [facility] do you live in?: [place]  Home entry access options. Select all that apply.: Stairs  Number of steps to enter home.: 3  Do the steps have railings?: Yes  Type of Current Residence: [facility]  Living Arrangements: Lives w/ Spouse/significant other  Is patient a ?: No    Activities of Daily Living Prior to Admission  Functional Status: Independent  Completes ADLs independently?: Yes  Ambulates independently?: Yes  Does patient use assisted devices?: Yes  Assisted Devices (DME) used: Walker  Does patient currently own DME?: Yes  What DME does the patient currently own?: Straight Cane, Walker  Does patient have a history of Outpatient Therapy (PT/OT)?: Yes ([facility])  Does the patient have a history of Short-Term Rehab?: No  Does patient have a history of HHC?: Yes ([facility])  Does patient currently have HHC?: No         Patient Information Continued  Income Source: Pension/half-way  Does patient have prescription coverage?: Yes  Does patient receive dialysis treatments?: No  Does patient have a history of substance abuse?: No  Does patient have a history of Mental Health Diagnosis?: No    PHQ 2/9 Screening   Reviewed PHQ 2/9 Depression Screening Score?: No    Means of Transportation  Means of Transport to Appts:: Family  transport      Social Determinants of Health (SDOH)      Flowsheet Row Most Recent Value   Housing Stability    In the last 12 months, was there a time when you were not able to pay the mortgage or rent on time? N   In the last 12 months, how many places have you lived? 1   In the last 12 months, was there a time when you did not have a steady place to sleep or slept in a shelter (including now)? N   Transportation Needs    In the past 12 months, has lack of transportation kept you from medical appointments or from getting medications? no   In the past 12 months, has lack of transportation kept you from meetings, work, or from getting things needed for daily living? No   Food Insecurity    Within the past 12 months, you worried that your food would run out before you got the money to buy more. Never true   Within the past 12 months, the food you bought just didn't last and you didn't have money to get more. Never true   Utilities    In the past 12 months has the electric, gas, oil, or water company threatened to shut off services in your home? No            DISCHARGE DETAILS:    Discharge planning discussed with:: Pt                      Contacts  Patient Contacts: Yoni Slater  Contact Method: Phone  Phone Number: [phone]  Reason/Outcome: Emergency Contact    Requested Home Health Care         Is the patient interested in HHC at discharge?: No    DME Referral Provided  Referral made for DME?: No         Would you like to participate in our [facility] service program?  : No - Declined    Treatment Team Recommendation: Home  Discharge Destination Plan:: Home

## 2024-03-21 NOTE — ASSESSMENT & PLAN NOTE
Lab Results   Component Value Date    EGFR 53 03/21/2024    EGFR 51 03/20/2024    EGFR 57 01/19/2024    CREATININE 1.00 03/21/2024    CREATININE 1.03 03/20/2024    CREATININE 0.94 01/19/2024   Creatinine at baseline    Trend BMP  Avoid nephrotoxic agents and hypotension

## 2024-03-21 NOTE — ANESTHESIA PREPROCEDURE EVALUATION
Procedure:  EGD  SHAYY and Hx of GAVE with prior cauterization for EGD    Hbg 4 on presentation came up to 7.2 most recent 7.4   ECG: NSR  HTN - metoprolol  DAVID CPAP  COPD    Denies the following: CP/SOB with exertion, stroke/TIA, seizure    Relevant Problems   CARDIO   (+) Coronary arteriosclerosis   (+) Hyperlipidemia   (+) Migraine      GI/HEPATIC   (+) GIB (gastrointestinal bleeding)   (+) Gastroesophageal reflux disease      /RENAL   (+) Stage 3 chronic kidney disease, unspecified whether stage 3a or 3b CKD (HCC)      GYN   (+) Endometrial cancer (HCC)      HEMATOLOGY   (+) Absolute anemia   (+) Iron deficiency anemia      NEURO/PSYCH   (+) Anxiety state   (+) Chronic left shoulder pain   (+) Migraine      PULMONARY   (+) Obstructive sleep apnea syndrome        Physical Exam    Airway    Mallampati score: II  TM Distance: >3 FB  Neck ROM: full     Dental    lower dentures and upper dentures    Cardiovascular      Pulmonary      Other Findings  post-pubertal.      Anesthesia Plan  ASA Score- 3     Anesthesia Type- IV sedation with anesthesia with ASA Monitors.         Additional Monitors:     Airway Plan:            Plan Factors-Exercise tolerance (METS): >4 METS.    Chart reviewed.   Existing labs reviewed. Patient summary reviewed.    Patient is not a current smoker.      Obstructive sleep apnea risk education given perioperatively.        Induction-     Postoperative Plan-     Informed Consent- Anesthetic plan and risks discussed with patient.  I personally reviewed this patient with the CRNA. Discussed and agreed on the Anesthesia Plan with the CRNA..

## 2024-03-21 NOTE — PROGRESS NOTES
[facility]  Progress Note  Name: Anthony Slater I  MRN: [id number]  Unit/Bed#: ICU 10-01 I Date of Admission: 3/20/2024   Date of Service: 3/21/2024 I Hospital Day: 0    Assessment/Plan   * GIB (gastrointestinal bleeding)  Assessment & Plan  Patient with a history of GAVE presents with melena and significant blood loss anemia  Hemodynamically stable  Spoke with critical care who believe the patient is appropriate for level 2 stepdown  Gastroenterology aware  Patient was scheduled for EGD on 4/1/2024 however due to significant lethargy and anemia presented to the ED for evaluation    IV PPI 2 times daily  Since hemoglobin has improved trend daily CBC  NPO sips with meds  Gastroenterology consultation for endoscopic evaluation  Check iron panel  Venofer 300 mg IV every other day    Iron deficiency anemia  Assessment & Plan  Hemoglobin 4.0 on presentation likely secondary to slow upper GI bleed in the setting of GAVE  Patient presents with abnormal laboratory test in the outpatient setting discovered by her PCP  Patient is hemodynamically stable  Hemoglobin has improved to 7.7 status post 3 units of packed red blood cells    Trend CBC  Follow-up iron panel  Venofer 300 mg IV every other day  Management of upper GI bleed as above  Management of upper GI bleed as above    Stage 3 chronic kidney disease, unspecified whether stage 3a or 3b CKD (HCC)  Assessment & Plan  Lab Results   Component Value Date    EGFR 53 03/21/2024    EGFR 51 03/20/2024    EGFR 57 01/19/2024    CREATININE 1.00 03/21/2024    CREATININE 1.03 03/20/2024    CREATININE 0.94 01/19/2024   Creatinine at baseline    Trend BMP  Avoid nephrotoxic agents and hypotension    Anxiety state  Assessment & Plan  Mood appears appropriate    Continue home buspirone and trazodone           VTE Pharmacologic Prophylaxis: VTE Score: 0  contraindicated 2/2 anemia    Mobility:   Basic Mobility Inpatient Raw Score: 22  -[facility] Goal: 7: Walk 25  feet or more  JH-HLM Achieved: 6: Walk 10 steps or more  JH-HLM Goal NOT achieved. Continue with multidisciplinary rounding and encourage appropriate mobility to improve upon JH-HLM goals.    Patient Centered Rounds: I performed bedside rounds with nursing staff today.     Discussions with Specialists or Other Care Team Provider: Gastroenterology    Education and Discussions with Family / Patient: Updated  ( and daughter) at bedside.    Total Time Spent on Date of Encounter in care of patient: 35 mins. This time was spent on one or more of the following: performing physical exam; counseling and coordination of care; obtaining or reviewing history; documenting in the medical record; reviewing/ordering tests, medications or procedures; communicating with other healthcare professionals and discussing with patient's family/caregivers.    Current Length of Stay: 0 day(s)  Current Patient Status: Observation   Certification Statement: The patient will continue to require additional inpatient hospital stay due to UGIB complicated by anemia  Discharge Plan: Anticipate discharge later today or tomorrow to home.    Code Status: Level 1 - Full Code    Subjective:   Patient seen and examined at bedside. No acute events overnight. Denies chest pain, SOB, diaphoresis, nausea/vomiting/diarrhea, fevers/chills.  Hemoglobin significantly improved to 7.7.  Plan for EGD today.    Objective:     Vitals:   Temp (24hrs), Av °F (36.7 °C), Min:97.5 °F (36.4 °C), Max:98.5 °F (36.9 °C)    Temp:  [97.5 °F (36.4 °C)-98.5 °F (36.9 °C)] 98 °F (36.7 °C)  HR:  [77-95] 82  Resp:  [17-46] 22  BP: (115-163)/(55-68) 145/65  SpO2:  [95 %-100 %] 98 %  Body mass index is 22.08 kg/m².     Input and Output Summary (last 24 hours):     Intake/Output Summary (Last 24 hours) at 3/21/2024 0808  Last data filed at 3/21/2024 0001  Gross per 24 hour   Intake 904 ml   Output 750 ml   Net 154 ml       Physical Exam:   Physical Exam  Vitals  and nursing note reviewed.   Constitutional:       General: She is not in acute distress.     Appearance: She is well-developed.   HENT:      Head: Normocephalic and atraumatic.   Eyes:      Conjunctiva/sclera: Conjunctivae normal.   Cardiovascular:      Rate and Rhythm: Normal rate and regular rhythm.      Heart sounds: No murmur heard.  Pulmonary:      Effort: Pulmonary effort is normal. No respiratory distress.      Breath sounds: Normal breath sounds.   Abdominal:      Palpations: Abdomen is soft.      Tenderness: There is no abdominal tenderness.   Musculoskeletal:         General: No swelling.      Cervical back: Neck supple.   Skin:     General: Skin is warm and dry.      Capillary Refill: Capillary refill takes less than 2 seconds.   Neurological:      Mental Status: She is alert.   Psychiatric:         Mood and Affect: Mood normal.          Additional Data:     Labs:  Results from last 7 days   Lab Units 03/21/24  0607   WBC Thousand/uL 10.81*   HEMOGLOBIN g/dL 7.7*   HEMATOCRIT % 24.3*   PLATELETS Thousands/uL 254   NEUTROS PCT % 77*   LYMPHS PCT % 11*   MONOS PCT % 9   EOS PCT % 2     Results from last 7 days   Lab Units 03/21/24  0607 03/20/24  1308   SODIUM mmol/L 138 136   POTASSIUM mmol/L 3.8 3.9   CHLORIDE mmol/L 110* 106   CO2 mmol/L 18* 23   BUN mg/dL 19 20   CREATININE mg/dL 1.00 1.03   ANION GAP mmol/L 10 7   CALCIUM mg/dL 8.2* 8.6   ALBUMIN g/dL  --  3.7   TOTAL BILIRUBIN mg/dL  --  0.27   ALK PHOS U/L  --  59   ALT U/L  --  11   AST U/L  --  16   GLUCOSE RANDOM mg/dL 84  --                        Lines/Drains:  Invasive Devices       Peripheral Intravenous Line  Duration             Peripheral IV 03/20/24 Left Antecubital <1 day    Peripheral IV 03/20/24 Left;Proximal;Upper;Ventral (anterior) Arm <1 day                          Imaging: Reviewed radiology reports from this admission including: chest xray    Recent Cultures (last 7 days):         Last 24 Hours Medication List:   Current  Facility-Administered Medications   Medication Dose Route Frequency Provider Last Rate    atorvastatin  40 mg Oral Daily Jamir Jennings DO      busPIRone  5 mg Oral TID Jamir Jennings DO      iron sucrose  300 mg Intravenous Every Other Day Jamir Jennings DO      ondansetron  4 mg Intravenous Q6H PRN Jamir Jennings DO      pantoprazole  40 mg Intravenous Q12H Jamir Jennings DO      sodium chloride  75 mL/hr Intravenous Continuous Jamir Jennings DO Stopped (03/20/24 1730)    traZODone  50 mg Oral HS Jamir Jennings DO          Today, Patient Was Seen By: Jamir Jennings DO    **Please Note: This note may have been constructed using a voice recognition system.**

## 2024-03-21 NOTE — CONSULTS
[facility] - Inpatient Consultation    PATIENT INFORMATION      Anthony Slater 78 y.o. female MRN: [id number]  Unit/Bed#: ICU 10-01 Encounter: [id number]  PCP: Marycarmen Calvillo MD  Date of Admission:  3/20/2024  Date of Consultation: 03/21/24  Requesting Physician: Jamir Jennings, DO       ASSESSMENT & PLAN     Anthony Slater is a 78 y.o. female with PMH significant for anxiety, CKD, endometrial cancer status post hysterectomy and BSO, GERD, GAVE who presented to [facility] on 3/20 after outpatient labs demonstrated hemoglobin of 4. GI consulted for evaluation of iron deficiency anemia.    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 No overt bleeding per her report. However, does endorse early satiety, abdominal bloating, belching, small amount of weight loss (7-10lbs over one year), epigastric pain. Recently discontinued iron supplement last month. Planned for outpatient EGD on 4/1. Patient is hesitant to proceed with colonoscopy at this time unless absolutely necessary. On pepcid and omeprazole outpatient.   Will plan for EGD today  Keep NPO pending procedure  Continue IV PPI BID until EGD - further recommendation to follow based on results of procedure  Continue IV iron supplementation  Monitor Hgb daily given current stability and transfuse for <7.0  Monitor stool output    REASON FOR CONSULTATION      Anemia    HISTORY OF PRESENT ILLNESS      Anthony Slater is a 78 y.o. female with PMH significant for anxiety, CKD, endometrial cancer status post hysterectomy and BSO, GERD, GAVE who presented to [facility] on 3/20 after outpatient labs demonstrated hemoglobin of 4.  Patient endorses significant fatigue over the past few months.  She recently saw outpatient gastroenterology  "for abdominal bloating/belching, decreased p.o. intake, and 10 pound weight loss over the last year.  Patient denies any overt bleeding.  She states that her stools have been normal and brown without black or red stool noted.  Does endorse epigastric discomfort. She also states that recently her iron supplementation was discontinued in the GI office with normal iron studies.  However, patient was on iron supplementation when iron studies were performed. Repeat iron studies done with CBC prior to presentation significant for ferritin 4, iron <10, TIBC <422, UIBC 412, and iron sat could not be calculated. Patient admitted to medicine service for further evaluation and management of anemia. Received 3u PRBC with improvement in Hgb to 7.2.     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 Treatment was discontinued, patient reports that this was due to bleeding with treatment. However, unclear reason and unable to review prior reports. Was planned for outpatient EGD on 4/1.    REVIEW OF SYSTEMS     CONSTITUTIONAL: Denies any fever, chills, rigors, + weight loss, + fatigue  HEENT: No earache or tinnitus, denies hearing loss or visual disturbances  CARDIOVASCULAR: No chest pain or palpitations   RESPIRATORY: Denies any cough, hemoptysis, shortness of breath or dyspnea on exertion  GASTROINTESTINAL: As noted in the History of Present Illness   GENITOURINARY: No problems with urination, denies any hematuria or dysuria  NEUROLOGIC: No dizziness or vertigo, denies headaches   MUSCULOSKELETAL: Denies any muscle or joint pain   SKIN: Denies skin rashes or itching   ENDOCRINE: Denies excessive thirst, denies intolerance to heat or cold  PSYCHOSOCIAL: Denies depression or anxiety, denies any recent memory loss     PAST MEDICAL & SURGICAL HISTORY      Past Medical History:   Diagnosis Date    Anxiety     Arthritis     Benign hypertension     Chronic pain disorder     back    COPD (chronic " obstructive pulmonary disease) (HCC)     Coronary artery disease     medical management    CPAP (continuous positive airway pressure) dependence     Diabetes (HCC)     Endometrial adenocarcinoma (HCC)     Epilepsy (HCC)     GERD (gastroesophageal reflux disease)     History of echocardiogram 02/07/2014    EF 55%, mild MR.    History of transfusion     Hypertension     Mixed hyperlipidemia     DAVID on CPAP     Shortness of breath     triggered by anxiety    Watermelon stomach     Wears dentures        Past Surgical History:   Procedure Laterality Date    BREAST BIOPSY Right 02/24/2015    BREAST BIOPSY Left     ? year    CARDIAC CATHETERIZATION  02/07/2014    EF 65%, mid LAD 60% stenosis and D2 70% stenosis.  Medical management.    CATARACT EXTRACTION Bilateral     COLONOSCOPY      CYSTOSCOPY N/A 07/31/2023    Procedure: CYSTOSCOPY;  Surgeon: Nadir Ash MD;  Location: [facility];  Service: Gynecology Oncology    EGD      JOINT REPLACEMENT Right     RIGHT ELBOW    OOPHORECTOMY      not sure which one was removed    KY LAPS TOTAL HYSTERECT 250 GM/< W/RMVL TUBE/OVARY N/A 07/31/2023    Procedure: ROBOTHYSTERECTOMY, BILATERAL SALPINGO-OOPHORECTOMY, PELVIC SENTINEL NODE BIOPSIES;  Surgeon: Nadir Ash MD;  Location: [facility];  Service: Gynecology Oncology    ROTATOR CUFF REPAIR Right     US GUIDED THYROID BIOPSY  12/04/2020       MEDICATIONS & ALLERGIES       Medications:   Medications Prior to Admission   Medication    atorvastatin (LIPITOR) 40 mg tablet    busPIRone (BUSPAR) 5 mg tablet    Cholecalciferol (Vitamin D3) 1.25 MG (48550 UT) CAPS    co-enzyme Q-10 100 mg capsule    famotidine (PEPCID) 20 mg tablet    metoprolol tartrate (LOPRESSOR) 25 mg tablet    omeprazole (PriLOSEC) 20 mg delayed release capsule    traZODone (DESYREL) 50 mg tablet    vitamin B-12 (VITAMIN B-12) 1,000 mcg tablet    senna (SENOKOT) 8.6 mg     Current Facility-Administered Medications   Medication Dose Route Frequency     "atorvastatin (LIPITOR) tablet 40 mg  40 mg Oral Daily    busPIRone (BUSPAR) tablet 5 mg  5 mg Oral TID    iron sucrose (VENOFER) 300 mg in sodium chloride 0.9 % 250 mL IVPB  300 mg Intravenous Every Other Day    ondansetron (ZOFRAN) injection 4 mg  4 mg Intravenous Q6H PRN    pantoprazole (PROTONIX) injection 40 mg  40 mg Intravenous Q12H    sodium chloride 0.9 % infusion  75 mL/hr Intravenous Continuous    traZODone (DESYREL) tablet 50 mg  50 mg Oral HS       Allergies:   Allergies   Allergen Reactions    Advil [Ibuprofen]     Aspirin Other (See Comments)     gi bleed    Caspofungin Other (See Comments)    Hydrocodone     Pravastatin GI Intolerance    Tramadol GI Intolerance       SOCIAL HISTORY      Social History     Marital Status: /Civil Union    Substance Use History:   Social History     Substance and Sexual Activity   Alcohol Use Not Currently     Social History     Tobacco Use   Smoking Status Never    Passive exposure: Never   Smokeless Tobacco Never     Social History     Substance and Sexual Activity   Drug Use Never       FAMILY HISTORY      Family History   Problem Relation Age of Onset    Heart disease Mother     Coronary artery disease Sister         history of CABG    No Known Problems Father     No Known Problems Daughter     No Known Problems Sister     No Known Problems Sister     No Known Problems Sister     No Known Problems Daughter        PHYSICAL EXAM     Objective   Blood pressure 144/65, pulse 77, temperature 98 °F (36.7 °C), temperature source Temporal, resp. rate 21, height 5' 1\" (1.549 m), weight 53 kg (116 lb 13.5 oz), SpO2 98%. Body mass index is 22.08 kg/m².    Intake/Output Summary (Last 24 hours) at 3/21/2024 1054  Last data filed at 3/21/2024 1033  Gross per 24 hour   Intake 904 ml   Output 950 ml   Net -46 ml       General Appearance:   Alert, cooperative, pale, frail   HEENT:   Normocephalic, atraumatic, anicteric     Neck:   Supple, symmetrical, trachea midline   Lungs:  "  Equal chest rise, respirations unlabored    Heart:   Regular rate and rhythm   Abdomen:   Soft, non-tender, non-distended; normal bowel sounds; no masses, no organomegaly    Rectal:   Deferred    Extremities:   No cyanosis, clubbing or edema    Neuro:   Moves all 4 extremities    Skin:   No jaundice, rashes, or lesions      ADDITIONAL DATA     Lab Results:     Results from last 7 days   Lab Units 03/21/24  0607   WBC Thousand/uL 10.81*   HEMOGLOBIN g/dL 7.7*   HEMATOCRIT % 24.3*   PLATELETS Thousands/uL 254   NEUTROS PCT % 77*   LYMPHS PCT % 11*   MONOS PCT % 9   EOS PCT % 2     Results from last 7 days   Lab Units 03/21/24  0607 03/20/24  1308   POTASSIUM mmol/L 3.8 3.9   CHLORIDE mmol/L 110* 106   CO2 mmol/L 18* 23   BUN mg/dL 19 20   CREATININE mg/dL 1.00 1.03   CALCIUM mg/dL 8.2* 8.6   ALK PHOS U/L  --  59   ALT U/L  --  11   AST U/L  --  16           Imaging:    No results found.    EKG, Pathology, and Other Studies Reviewed on Admission:   EKG: Reviewed      Counseling / Coordination of Care Time: 30 total mins spent in consult. Greater than 50% of total time spent on patient counseling and coordination of care.    Elizabeth Kearns DO  Gastroenterology Fellow  [facility]  Division of Gastroenterology and Hepatology  Available on [employer]  ...............................................................................................................................................  ** Please Note: This note is constructed using a voice recognition dictation system. **

## 2024-03-22 ENCOUNTER — TELEPHONE (OUTPATIENT)
Age: 79
End: 2024-03-22

## 2024-03-22 ENCOUNTER — TRANSITIONAL CARE MANAGEMENT (OUTPATIENT)
Dept: FAMILY MEDICINE CLINIC | Facility: CLINIC | Age: 79
End: 2024-03-22

## 2024-03-22 VITALS
SYSTOLIC BLOOD PRESSURE: 139 MMHG | HEART RATE: 88 BPM | RESPIRATION RATE: 29 BRPM | DIASTOLIC BLOOD PRESSURE: 68 MMHG | TEMPERATURE: 98.3 F | BODY MASS INDEX: 20.94 KG/M2 | OXYGEN SATURATION: 98 % | HEIGHT: 61 IN | WEIGHT: 110.89 LBS

## 2024-03-22 LAB
ABO GROUP BLD BPU: NORMAL
ANION GAP SERPL CALCULATED.3IONS-SCNC: 10 MMOL/L (ref 4–13)
BASOPHILS # BLD AUTO: 0.04 THOUSANDS/ÂΜL (ref 0–0.1)
BASOPHILS NFR BLD AUTO: 0 % (ref 0–1)
BPU ID: NORMAL
BUN SERPL-MCNC: 14 MG/DL (ref 5–25)
CALCIUM SERPL-MCNC: 8.6 MG/DL (ref 8.4–10.2)
CHLORIDE SERPL-SCNC: 110 MMOL/L (ref 96–108)
CO2 SERPL-SCNC: 20 MMOL/L (ref 21–32)
CREAT SERPL-MCNC: 0.99 MG/DL (ref 0.6–1.3)
CROSSMATCH: NORMAL
EOSINOPHIL # BLD AUTO: 0.25 THOUSAND/ÂΜL (ref 0–0.61)
EOSINOPHIL NFR BLD AUTO: 3 % (ref 0–6)
ERYTHROCYTE [DISTWIDTH] IN BLOOD BY AUTOMATED COUNT: 22.8 % (ref 11.6–15.1)
GFR SERPL CREATININE-BSD FRML MDRD: 53 ML/MIN/1.73SQ M
GLUCOSE SERPL-MCNC: 69 MG/DL (ref 65–140)
GLUCOSE SERPL-MCNC: 81 MG/DL (ref 65–140)
GLUCOSE SERPL-MCNC: 83 MG/DL (ref 65–140)
HCT VFR BLD AUTO: 26.6 % (ref 34.8–46.1)
HGB BLD-MCNC: 8.2 G/DL (ref 11.5–15.4)
IMM GRANULOCYTES # BLD AUTO: 0.09 THOUSAND/UL (ref 0–0.2)
IMM GRANULOCYTES NFR BLD AUTO: 1 % (ref 0–2)
LYMPHOCYTES # BLD AUTO: 1.52 THOUSANDS/ÂΜL (ref 0.6–4.47)
LYMPHOCYTES NFR BLD AUTO: 16 % (ref 14–44)
MAGNESIUM SERPL-MCNC: 2.1 MG/DL (ref 1.9–2.7)
MCH RBC QN AUTO: 23.6 PG (ref 26.8–34.3)
MCHC RBC AUTO-ENTMCNC: 30.8 G/DL (ref 31.4–37.4)
MCV RBC AUTO: 77 FL (ref 82–98)
MONOCYTES # BLD AUTO: 0.91 THOUSAND/ÂΜL (ref 0.17–1.22)
MONOCYTES NFR BLD AUTO: 10 % (ref 4–12)
NEUTROPHILS # BLD AUTO: 6.72 THOUSANDS/ÂΜL (ref 1.85–7.62)
NEUTS SEG NFR BLD AUTO: 70 % (ref 43–75)
NRBC BLD AUTO-RTO: 0 /100 WBCS
PHOSPHATE SERPL-MCNC: 2.5 MG/DL (ref 2.3–4.1)
PLATELET # BLD AUTO: 238 THOUSANDS/UL (ref 149–390)
PMV BLD AUTO: 10.2 FL (ref 8.9–12.7)
POTASSIUM SERPL-SCNC: 4 MMOL/L (ref 3.5–5.3)
RBC # BLD AUTO: 3.47 MILLION/UL (ref 3.81–5.12)
SODIUM SERPL-SCNC: 140 MMOL/L (ref 135–147)
UNIT DISPENSE STATUS: NORMAL
UNIT PRODUCT CODE: NORMAL
UNIT PRODUCT VOLUME: 300 ML
UNIT PRODUCT VOLUME: 350 ML
UNIT PRODUCT VOLUME: 350 ML
UNIT RH: NORMAL
WBC # BLD AUTO: 9.53 THOUSAND/UL (ref 4.31–10.16)

## 2024-03-22 PROCEDURE — 83735 ASSAY OF MAGNESIUM: CPT | Performed by: INTERNAL MEDICINE

## 2024-03-22 PROCEDURE — 80048 BASIC METABOLIC PNL TOTAL CA: CPT | Performed by: INTERNAL MEDICINE

## 2024-03-22 PROCEDURE — C9113 INJ PANTOPRAZOLE SODIUM, VIA: HCPCS | Performed by: INTERNAL MEDICINE

## 2024-03-22 PROCEDURE — 84100 ASSAY OF PHOSPHORUS: CPT | Performed by: INTERNAL MEDICINE

## 2024-03-22 PROCEDURE — 99239 HOSP IP/OBS DSCHRG MGMT >30: CPT | Performed by: INTERNAL MEDICINE

## 2024-03-22 PROCEDURE — 85025 COMPLETE CBC W/AUTO DIFF WBC: CPT | Performed by: INTERNAL MEDICINE

## 2024-03-22 RX ORDER — FERROUS SULFATE 324(65)MG
324 TABLET, DELAYED RELEASE (ENTERIC COATED) ORAL
Qty: 60 TABLET | Refills: 2 | Status: SHIPPED | OUTPATIENT
Start: 2024-03-22

## 2024-03-22 RX ORDER — FAMOTIDINE 20 MG/1
20 TABLET, FILM COATED ORAL
Qty: 30 TABLET | Refills: 2 | Status: SHIPPED | OUTPATIENT
Start: 2024-03-22

## 2024-03-22 RX ORDER — PANTOPRAZOLE SODIUM 40 MG/1
40 TABLET, DELAYED RELEASE ORAL 2 TIMES DAILY
Qty: 60 TABLET | Refills: 2 | Status: SHIPPED | OUTPATIENT
Start: 2024-03-22

## 2024-03-22 RX ADMIN — ATORVASTATIN CALCIUM 40 MG: 40 TABLET, FILM COATED ORAL at 09:20

## 2024-03-22 RX ADMIN — PANTOPRAZOLE SODIUM 40 MG: 40 INJECTION, POWDER, FOR SOLUTION INTRAVENOUS at 05:20

## 2024-03-22 RX ADMIN — BUSPIRONE HYDROCHLORIDE 5 MG: 5 TABLET ORAL at 09:20

## 2024-03-22 RX ADMIN — IRON SUCROSE 300 MG: 20 INJECTION, SOLUTION INTRAVENOUS at 09:20

## 2024-03-22 NOTE — NURSING NOTE
AVS printed and reviewed with patient and spouse.  Both verbalized understanding.  No home care ordered on d/c

## 2024-03-22 NOTE — DISCHARGE SUMMARY
[facility]  Discharge- Anthony Slater 1945, 78 y.o. female MRN: [id number]  Unit/Bed#: ICU 10-01 Encounter: [id number]  Primary Care Provider: Marycarmen Calvillo MD   Date and time admitted to hospital: 3/20/2024  2:47 PM    * GIB (gastrointestinal bleeding)  Assessment & Plan  Patient with a history of GAVE presents with melena and significant blood loss anemia  Hemodynamically stable  Spoke with critical care who believe the patient is appropriate for level 2 stepdown  Gastroenterology aware  Patient was scheduled for EGD on 4/1/2024 however due to significant lethargy and anemia presented to the ED for evaluation    IV PPI 2 times daily  Since hemoglobin has improved trend daily CBC  NPO sips with meds  Gastroenterology consultation for endoscopic evaluation  Check iron panel  Venofer 300 mg IV every other day    Iron deficiency anemia  Assessment & Plan  Hemoglobin 4.0 on presentation likely secondary to slow upper GI bleed in the setting of GAVE  Patient presents with abnormal laboratory test in the outpatient setting discovered by her PCP  Patient is hemodynamically stable  Hemoglobin has improved to 7.7 status post 3 units of packed red blood cells    Trend CBC  Follow-up iron panel  Venofer 300 mg IV every other day  Management of upper GI bleed as above  Management of upper GI bleed as above    Stage 3 chronic kidney disease, unspecified whether stage 3a or 3b CKD (HCC)  Assessment & Plan  Lab Results   Component Value Date    EGFR 53 03/22/2024    EGFR 53 03/21/2024    EGFR 51 03/20/2024    CREATININE 0.99 03/22/2024    CREATININE 1.00 03/21/2024    CREATININE 1.03 03/20/2024   Creatinine at baseline    Trend BMP  Avoid nephrotoxic agents and hypotension    Anxiety state  Assessment & Plan  Mood appears appropriate    Continue home buspirone and trazodone              Medical Problems       Resolved Problems  Date Reviewed: 3/22/2024   None         Admission Date:   Admission  Orders (From admission, onward)       Ordered        03/21/24 1323  Inpatient Admission  Once            03/20/24 1546  Place in Observation  Once                            Admitting Diagnosis: Anemia [D64.9]  Abnormal laboratory test [R89.9]  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 female with a past medical history significant for CKD stage III, GAVE, anxiety who presents from her outpatient PCP office with abnormal laboratory value with a hemoglobin of 4.  The patient has a history of GAVE and was supposed to obtain an EGD on 4/1/2024 however was found to have significant anemia at her PCP office today.  In the ED, the patient was found to be hemodynamically stable.  Laboratory analysis remarkable for hemoglobin level of 4.0.  3 units of packed red blood cells were ordered.  Blood consent was obtained.  Gastroenterology was made aware and plan for EGD on 3/21/2024 pending improvement of her hemoglobin above 7.     Procedures Performed: No orders of the defined types were placed in this encounter.      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  The patient also underwent polypectomy.  Hemoglobin is now above 8.  The patient will be discharged on oral PPI 2 times daily and H2 blocker as well as iron supplementation.  The patient should follow-up in the outpatient setting with GI clinic.  She was strongly encouraged to resume all preadmission medications at all preadmission dosages and to follow-up with her PCP within 7-14 days.    Significant Findings, Care, Treatment and Services Provided: EGD    Complications: Not applicable    Condition at Discharge: stable         Discharge instructions/Information to patient and family:   See after visit summary for information provided to patient and  family.      Provisions for Follow-Up Care:  See after visit summary for information related to follow-up care and any pertinent home health orders.      PCP: Marycarmen Calvillo MD    Disposition: Home    Planned Readmission: No    Discharge Statement   I spent 45 minutes discharging the patient. This time was spent on the day of discharge. I had direct contact with the patient on the day of discharge. Additional documentation is required if more than 30 minutes were spent on discharge.     Discharge Medications:  See after visit summary for reconciled discharge medications provided to patient and family.

## 2024-03-22 NOTE — ASSESSMENT & PLAN NOTE
Lab Results   Component Value Date    EGFR 53 03/22/2024    EGFR 53 03/21/2024    EGFR 51 03/20/2024    CREATININE 0.99 03/22/2024    CREATININE 1.00 03/21/2024    CREATININE 1.03 03/20/2024   Creatinine at baseline    Trend BMP  Avoid nephrotoxic agents and hypotension

## 2024-03-22 NOTE — PLAN OF CARE
Problem: Potential for Falls  Goal: Patient will remain free of falls  Description: INTERVENTIONS:  - Educate patient/family on patient safety including physical limitations  - Instruct patient to call for assistance with activity   - Consult OT/PT to assist with strengthening/mobility   - Keep Call bell within reach  - Keep bed low and locked with side rails adjusted as appropriate  - Keep care items and personal belongings within reach  - Initiate and maintain comfort rounds  - Make Fall Risk Sign visible to staff  - Offer Toileting every 2 Hours, in advance of need  - Initiate/Maintain bed/chairalarm  - Obtain necessary fall risk management equipment:   - Apply yellow socks and bracelet for high fall risk patients  - Consider moving patient to room near nurses station  Outcome: Progressing     Problem: PAIN - ADULT  Goal: Verbalizes/displays adequate comfort level or baseline comfort level  Description: Interventions:  - Encourage patient to monitor pain and request assistance  - Assess pain using appropriate pain scale  - Administer analgesics based on type and severity of pain and evaluate response  - Implement non-pharmacological measures as appropriate and evaluate response  - Consider cultural and social influences on pain and pain management  - Notify physician/advanced practitioner if interventions unsuccessful or patient reports new pain  Outcome: Progressing     Problem: INFECTION - ADULT  Goal: Absence or prevention of progression during hospitalization  Description: INTERVENTIONS:  - Assess and monitor for signs and symptoms of infection  - Monitor lab/diagnostic results  - Monitor all insertion sites, i.e. indwelling lines, tubes, and drains  - Monitor endotracheal if appropriate and nasal secretions for changes in amount and color  - [place] appropriate cooling/warming therapies per order  - Administer medications as ordered  - Instruct and encourage patient and family to use good hand hygiene  technique  - Identify and instruct in appropriate isolation precautions for identified infection/condition  Outcome: Progressing     Problem: SAFETY ADULT  Goal: Patient will remain free of falls  Description: INTERVENTIONS:  - Educate patient/family on patient safety including physical limitations  - Instruct patient to call for assistance with activity   - Consult OT/PT to assist with strengthening/mobility   - Keep Call bell within reach  - Keep bed low and locked with side rails adjusted as appropriate  - Keep care items and personal belongings within reach  - Initiate and maintain comfort rounds  - Make Fall Risk Sign visible to staff  - Offer Toileting every 2 Hours, in advance of need  - Initiate/Maintain bed/chair alarm  - Obtain necessary fall risk management equipment:     - Apply yellow socks and bracelet for high fall risk patients  - Consider moving patient to room near nurses station  Outcome: Progressing  Goal: Maintain or return to baseline ADL function  Description: INTERVENTIONS:  -  Assess patient's ability to carry out ADLs; assess patient's baseline for ADL function and identify physical deficits which impact ability to perform ADLs (bathing, care of mouth/teeth, toileting, grooming, dressing, etc.)  - Assess/evaluate cause of self-care deficits   - Assess range of motion  - Assess patient's mobility; develop plan if impaired  - Assess patient's need for assistive devices and provide as appropriate  - Encourage maximum independence but intervene and supervise when necessary  - Involve family in performance of ADLs  - Assess for home care needs following discharge   - Consider OT consult to assist with ADL evaluation and planning for discharge  - Provide patient education as appropriate  Outcome: Progressing  Goal: Maintains/Returns to pre admission functional level  Description: INTERVENTIONS:  - Perform AM-PAC 6 Click Basic Mobility/ Daily Activity assessment daily.  - Set and communicate daily  mobility goal to care team and patient/family/caregiver.   - Collaborate with rehabilitation services on mobility goals if consulted  - Perform Range of Motion 3   times a day.  - Reposition patient every 2 hours.  - Dangle patient 3 times a day  - Stand patient 3 times a day  - Ambulate patient 3 times a day  - Out of bed to chair 3 times a day   - Out of bed for meals 3 times a day  - Out of bed for toileting  - Record patient progress and toleration of activity level   Outcome: Progressing     Problem: DISCHARGE PLANNING  Goal: Discharge to home or other facility with appropriate resources  Description: INTERVENTIONS:  - Identify barriers to discharge w/patient and caregiver  - Arrange for needed discharge resources and transportation as appropriate  - Identify discharge learning needs (meds, wound care, etc.)  - Arrange for interpretive services to assist at discharge as needed  - Refer to Case Management Department for coordinating discharge planning if the patient needs post-hospital services based on physician/advanced practitioner order or complex needs related to functional status, cognitive ability, or social support system  Outcome: Progressing     Problem: Knowledge Deficit  Goal: Patient/family/caregiver demonstrates understanding of disease process, treatment plan, medications, and discharge instructions  Description: Complete learning assessment and assess knowledge base.  Interventions:  - Provide teaching at level of understanding  - Provide teaching via preferred learning methods  Outcome: Progressing     Problem: METABOLIC, FLUID AND ELECTROLYTES - ADULT  Goal: Electrolytes maintained within normal limits  Description: INTERVENTIONS:  - Monitor labs and assess patient for signs and symptoms of electrolyte imbalances  - Administer electrolyte replacement as ordered  - Monitor response to electrolyte replacements, including repeat lab results as appropriate  - Instruct patient on fluid and nutrition  as appropriate  Outcome: Progressing     Problem: SKIN/TISSUE INTEGRITY - ADULT  Goal: Skin Integrity remains intact(Skin Breakdown Prevention)  Description: Assess:  -Perform Roger assessment every shift  -Clean and moisturize skin every shift  -Inspect skin when repositioning, toileting, and assisting with ADLS  -Assess under medical devices such as dressings every shift  -Assess extremities for adequate circulation and sensation     Bed Management:  -Have minimal linens on bed & keep smooth, unwrinkled  -Change linens as needed when moist or perspiring  -Avoid sitting or lying in one position for more than 2 hours while in bed  -Keep HOB at 30degrees     Toileting:  -Offer bedside commode  -Assess for incontinence every shift  -Use incontinent care products after each incontinent episode such as moisture barrier    Activity:  -Mobilize patient 3 times a day  -Encourage activity and walks on unit  -Encourage or provide ROM exercises   -Turn and reposition patient every 2 Hours  -Use appropriate equipment to lift or move patient in bed  -Instruct/ Assist with weight shifting every 15 mins. when out of bed in chair  -Consider limitation of chair time 2 hour intervals    Skin Care:  -Avoid use of baby powder, tape, friction and shearing, hot water or constrictive clothing  -Relieve pressure over bony prominences using mepilex  -Do not massage red bony areas    Next Steps:  -Teach patient strategies to minimize risks such as good nutrition   -Consider consults to  interdisciplinary teams such as nutrition  Outcome: Progressing     Problem: HEMATOLOGIC - ADULT  Goal: Maintains hematologic stability  Description: INTERVENTIONS  - Assess for signs and symptoms of bleeding or hemorrhage  - Monitor labs  - Administer supportive blood products/factors as ordered and appropriate  Outcome: Progressing     Problem: MUSCULOSKELETAL - ADULT  Goal: Maintain or return mobility to safest level of function  Description:  INTERVENTIONS:  - Assess patient's ability to carry out ADLs; assess patient's baseline for ADL function and identify physical deficits which impact ability to perform ADLs (bathing, care of mouth/teeth, toileting, grooming, dressing, etc.)  - Assess/evaluate cause of self-care deficits   - Assess range of motion  - Assess patient's mobility  - Assess patient's need for assistive devices and provide as appropriate  - Encourage maximum independence but intervene and supervise when necessary  - Involve family in performance of ADLs  - Assess for home care needs following discharge   - Consider OT consult to assist with ADL evaluation and planning for discharge  - Provide patient education as appropriate  Outcome: Progressing     Problem: Nutrition/Hydration-ADULT  Goal: Nutrient/Hydration intake appropriate for improving, restoring or maintaining nutritional needs  Description: Monitor and assess patient's nutrition/hydration status for malnutrition. Collaborate with interdisciplinary team and initiate plan and interventions as ordered.  Monitor patient's weight and dietary intake as ordered or per policy. Utilize nutrition screening tool and intervene as necessary. Determine patient's food preferences and provide high-protein, high-caloric foods as appropriate.     INTERVENTIONS:  - Monitor oral intake, urinary output, labs, and treatment plans  - Assess nutrition and hydration status and recommend course of action  - Evaluate amount of meals eaten  - Assist patient with eating if necessary   - Allow adequate time for meals  - Recommend/ encourage appropriate diets, oral nutritional supplements, and vitamin/mineral supplements  - Order, calculate, and assess calorie counts as needed  - Recommend, monitor, and adjust tube feedings and TPN/PPN based on assessed needs  - Assess need for intravenous fluids  - Provide specific nutrition/hydration education as appropriate  - Include patient/family/caregiver in decisions  related to nutrition  Outcome: Progressing

## 2024-03-22 NOTE — TELEPHONE ENCOUNTER
----- Message from Elizabeth Kearns DO sent at 3/22/2024 11:56 AM EDT -----  Regarding: reschedule  Good morning,    Can you please call patient to arrange follow up in GI clinic in 2-4 weeks. Can also cancel EGD on 4/1 as it has been completed while hospitalized.     ThanksDonnie

## 2024-03-25 NOTE — PROGRESS NOTES
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

## 2024-03-26 PROCEDURE — 88305 TISSUE EXAM BY PATHOLOGIST: CPT | Performed by: PATHOLOGY

## 2024-03-26 PROCEDURE — 88342 IMHCHEM/IMCYTCHM 1ST ANTB: CPT | Performed by: PATHOLOGY

## 2024-04-02 ENCOUNTER — APPOINTMENT (OUTPATIENT)
Age: 79
End: 2024-04-02
Payer: MEDICARE

## 2024-04-02 ENCOUNTER — OFFICE VISIT (OUTPATIENT)
Dept: FAMILY MEDICINE CLINIC | Facility: CLINIC | Age: 79
End: 2024-04-02
Payer: MEDICARE

## 2024-04-02 VITALS
SYSTOLIC BLOOD PRESSURE: 102 MMHG | HEIGHT: 61 IN | TEMPERATURE: 97 F | BODY MASS INDEX: 21.14 KG/M2 | WEIGHT: 112 LBS | OXYGEN SATURATION: 99 % | DIASTOLIC BLOOD PRESSURE: 58 MMHG | HEART RATE: 78 BPM

## 2024-04-02 DIAGNOSIS — E44.0 MODERATE PROTEIN-CALORIE MALNUTRITION (HCC): ICD-10-CM

## 2024-04-02 DIAGNOSIS — R63.4 WEIGHT LOSS: ICD-10-CM

## 2024-04-02 DIAGNOSIS — R41.3 MEMORY LOSS DUE TO MEDICAL CONDITION: ICD-10-CM

## 2024-04-02 DIAGNOSIS — D50.0 IRON DEFICIENCY ANEMIA DUE TO CHRONIC BLOOD LOSS: ICD-10-CM

## 2024-04-02 DIAGNOSIS — D64.9 ANEMIA, UNSPECIFIED TYPE: ICD-10-CM

## 2024-04-02 DIAGNOSIS — F41.1 ANXIETY STATE: ICD-10-CM

## 2024-04-02 DIAGNOSIS — R53.1 WEAKNESS: ICD-10-CM

## 2024-04-02 DIAGNOSIS — K31.811 GASTROINTESTINAL HEMORRHAGE ASSOCIATED WITH ANGIODYSPLASIA OF STOMACH AND DUODENUM: Primary | ICD-10-CM

## 2024-04-02 LAB
BASOPHILS # BLD AUTO: 0.06 THOUSANDS/ÂΜL (ref 0–0.1)
BASOPHILS NFR BLD AUTO: 1 % (ref 0–1)
EOSINOPHIL # BLD AUTO: 0.16 THOUSAND/ÂΜL (ref 0–0.61)
EOSINOPHIL NFR BLD AUTO: 2 % (ref 0–6)
FERRITIN SERPL-MCNC: 130 NG/ML (ref 11–307)
HCT VFR BLD AUTO: 36.2 % (ref 34.8–46.1)
HGB BLD-MCNC: 10.6 G/DL (ref 11.5–15.4)
IMM GRANULOCYTES # BLD AUTO: 0.08 THOUSAND/UL (ref 0–0.2)
IMM GRANULOCYTES NFR BLD AUTO: 1 % (ref 0–2)
IRON SATN MFR SERPL: 19 % (ref 15–50)
IRON SERPL-MCNC: 66 UG/DL (ref 50–212)
LYMPHOCYTES # BLD AUTO: 1.38 THOUSANDS/ÂΜL (ref 0.6–4.47)
LYMPHOCYTES NFR BLD AUTO: 15 % (ref 14–44)
MCH RBC QN AUTO: 25.1 PG (ref 26.8–34.3)
MCHC RBC AUTO-ENTMCNC: 29.3 G/DL (ref 31.4–37.4)
MCV RBC AUTO: 86 FL (ref 82–98)
MONOCYTES # BLD AUTO: 0.83 THOUSAND/ÂΜL (ref 0.17–1.22)
MONOCYTES NFR BLD AUTO: 9 % (ref 4–12)
NEUTROPHILS # BLD AUTO: 7.03 THOUSANDS/ÂΜL (ref 1.85–7.62)
NEUTS SEG NFR BLD AUTO: 72 % (ref 43–75)
NRBC BLD AUTO-RTO: 0 /100 WBCS
PLATELET # BLD AUTO: 290 THOUSANDS/UL (ref 149–390)
RBC # BLD AUTO: 4.22 MILLION/UL (ref 3.81–5.12)
TIBC SERPL-MCNC: 353 UG/DL (ref 250–450)
UIBC SERPL-MCNC: 287 UG/DL (ref 155–355)
WBC # BLD AUTO: 9.54 THOUSAND/UL (ref 4.31–10.16)

## 2024-04-02 PROCEDURE — 36415 COLL VENOUS BLD VENIPUNCTURE: CPT

## 2024-04-02 PROCEDURE — 83540 ASSAY OF IRON: CPT

## 2024-04-02 PROCEDURE — 99495 TRANSJ CARE MGMT MOD F2F 14D: CPT | Performed by: FAMILY MEDICINE

## 2024-04-02 PROCEDURE — 82728 ASSAY OF FERRITIN: CPT

## 2024-04-02 PROCEDURE — 83550 IRON BINDING TEST: CPT

## 2024-04-02 PROCEDURE — 85025 COMPLETE CBC W/AUTO DIFF WBC: CPT

## 2024-04-02 NOTE — ASSESSMENT & PLAN NOTE
Due to feelings with BuSpar advise she try tapering to twice daily for 2 to 3 weeks and if without adverse symptoms then to decrease to 1 tab at bedtime.

## 2024-04-02 NOTE — ASSESSMENT & PLAN NOTE
Improved by time of discharge and repeat labs ordered today.  I discussed with patient and her  discussed with GI if p.o. iron supplementation is of benefit due to her GI problems.  She may benefit more from IV iron infusions when needed.  Will await their eval and reports of labs today.  For now continue her iron just 1 pill daily.

## 2024-04-02 NOTE — ASSESSMENT & PLAN NOTE
Encouraged cooking; reading; out - of - house activities   Reassured I think sx's will improve as she physically feels better

## 2024-04-02 NOTE — PROGRESS NOTES
Assessment & Plan     1. 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  To continue same medications and follow-up with GI.  As per their notation, will be deciding if repeat EGDs are necessary.      2. Iron deficiency anemia due to chronic blood loss    3. Anemia, unspecified type  Assessment & Plan:  Improved by time of discharge and repeat labs ordered today.  I discussed with patient and her  discussed with GI if p.o. iron supplementation is of benefit due to her GI problems.  She may benefit more from IV iron infusions when needed.  Will await their eval and reports of labs today.  For now continue her iron just 1 pill daily.    Orders:  -     CBC and differential; Future  -     Iron Panel (Includes Ferritin, Iron Sat%, Iron, and TIBC); Future    4. Moderate protein-calorie malnutrition (HCC)  Comments:  Diet discussed as noted    5. Weakness  Assessment & Plan:  Dueto anemia and improving slowly. Encouraged out and about and as much ambulation as possible      6. Memory loss due to medical condition  Assessment & Plan:  Encouraged cooking; reading; out - of - house activities   Reassured I think sx's will improve as she physically feels better      7. Anxiety state  Assessment & Plan:  Due to feelings with BuSpar advise she try tapering to twice daily for 2 to 3 weeks and if without adverse symptoms then to decrease to 1 tab at bedtime.      8. Weight loss  Assessment & Plan:  Diet discussed.  He cannot tolerate Ensure since causes increased belching and diarrhea.  High-protein foods discussed also advised multiple vitamin on a daily basis.          Depression Screening and Follow-up Plan: Patient was screened for depression during today's encounter. They screened negative with a PHQ-2 score of 0.      Subjective     Transitional Care Management Review:   Anthony Slater is a 78 y.o. female here for TCM  follow up.     During the TCM phone call patient stated:  TCM Call     Date and time call was made  3/22/2024 11:29 AM    Hospital care reviewed  Records reviewed    Patient was hospitialized at  [facility]    Date of Admission  03/20/24    Date of discharge  03/22/24    Diagnosis  GIB    Disposition  Home    Were the patients medications reviewed and updated  Yes      TCM Call     Scheduled for follow up?  Yes    Did you obtain your prescribed medications  Yes    Do you need help managing your prescriptions or medications  No    Is transportation to your appointment needed  No    I have advised the patient to call PCP with any new or worsening symptoms  Cecile Chirinos RMA        Patient here for hospital follow-up following an admission March 20 through March 22 due to hemoglobin of 4.  Her iron deficiency anemia had been monitored closely and in fact she was scheduled for an EGD by gastro when she became very weak and per her  very pale.  CBC feeling hemoglobin is noted.  She was therefore sent to the emergency room and admitted; required 3 units of packed red blood cells and IV Venna for infusion every other day.  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  Area was cauterized and she was continued on her PPI and Pepcid; by time of discharge patient feeling much better, looking much better, still complaining of some abdominal bloating and belching but much improved compared to preadmission symptoms.  Hemoglobin at time of discharge was 9.  She states her appetite is gradually improving; very weak and tired; but ambulating better and in fact has even out of the house shopping with her  into [employer] for [employer].  Does have follow-up with GI on April 8 medications at time of discharge were without change.    Patient and  noticed some increased confusion and memory loss over the past several weeks.  She feels this is gradually improving  "but does complain of not feeling good the time she takes her BuSpar and wonders what the medication is for.  She does sleep well taking trazodone at bedtime.  It is a long time symptoms of anxiety have been fairly stable recently and improved since discharge.      Review of Systems    Objective     /58 (BP Location: Left arm, Patient Position: Sitting, Cuff Size: Large)   Pulse 78   Temp (!) 97 °F (36.1 °C) (Tympanic)   Ht 5' 1\" (1.549 m)   Wt 50.8 kg (112 lb)   SpO2 99%   BMI 21.16 kg/m²      Physical Exam  Vitals and nursing note reviewed.   Constitutional:       General: She is not in acute distress.     Appearance: She is well-developed.      Comments: Still pale and appears tired.   HENT:      Head: Normocephalic and atraumatic.   Eyes:      Conjunctiva/sclera: Conjunctivae normal.   Neck:      Vascular: No carotid bruit.   Cardiovascular:      Rate and Rhythm: Normal rate and regular rhythm.      Heart sounds: No murmur heard.  Pulmonary:      Effort: Pulmonary effort is normal. No respiratory distress.      Breath sounds: Normal breath sounds.   Abdominal:      Palpations: Abdomen is soft. There is no mass.      Tenderness: There is no abdominal tenderness.      Comments: Exam done in sitting position.   Musculoskeletal:         General: No swelling.      Cervical back: Neck supple.      Right lower leg: No edema.      Left lower leg: No edema.      Comments: No calf tenderness   Lymphadenopathy:      Cervical: No cervical adenopathy.   Skin:     General: Skin is warm and dry.      Capillary Refill: Capillary refill takes less than 2 seconds.   Neurological:      Mental Status: She is alert and oriented to person, place, and time.   Psychiatric:         Mood and Affect: Mood normal.         Behavior: Behavior normal.       Medications have been reviewed by provider in current encounter    Marycarmen Calvillo MD         "

## 2024-04-02 NOTE — ASSESSMENT & PLAN NOTE
Diet discussed.  He cannot tolerate Ensure since causes increased belching and diarrhea.  High-protein foods discussed also advised multiple vitamin on a daily basis.

## 2024-04-02 NOTE — ASSESSMENT & PLAN NOTE
Hopefully resolved status post cautery.  To continue same medications and follow-up with GI.  As per their notation, will be deciding if repeat EGDs are necessary.

## 2024-04-03 DIAGNOSIS — D50.0 IRON DEFICIENCY ANEMIA DUE TO CHRONIC BLOOD LOSS: Primary | ICD-10-CM

## 2024-04-09 ENCOUNTER — OFFICE VISIT (OUTPATIENT)
Age: 79
End: 2024-04-09
Payer: MEDICARE

## 2024-04-09 VITALS
OXYGEN SATURATION: 97 % | SYSTOLIC BLOOD PRESSURE: 104 MMHG | DIASTOLIC BLOOD PRESSURE: 78 MMHG | HEART RATE: 78 BPM | BODY MASS INDEX: 21.52 KG/M2 | HEIGHT: 61 IN | RESPIRATION RATE: 18 BRPM | WEIGHT: 114 LBS

## 2024-04-09 DIAGNOSIS — K31.811 GASTROINTESTINAL HEMORRHAGE ASSOCIATED WITH ANGIODYSPLASIA OF STOMACH AND DUODENUM: ICD-10-CM

## 2024-04-09 DIAGNOSIS — R11.0 NAUSEA: ICD-10-CM

## 2024-04-09 DIAGNOSIS — R14.2 BELCHING: Primary | ICD-10-CM

## 2024-04-09 DIAGNOSIS — D50.0 IRON DEFICIENCY ANEMIA DUE TO CHRONIC BLOOD LOSS: ICD-10-CM

## 2024-04-09 PROCEDURE — G2211 COMPLEX E/M VISIT ADD ON: HCPCS | Performed by: NURSE PRACTITIONER

## 2024-04-09 PROCEDURE — 99214 OFFICE O/P EST MOD 30 MIN: CPT | Performed by: NURSE PRACTITIONER

## 2024-04-09 RX ORDER — PANTOPRAZOLE SODIUM 40 MG/1
40 TABLET, DELAYED RELEASE ORAL 2 TIMES DAILY
Qty: 180 TABLET | Refills: 1 | Status: SHIPPED | OUTPATIENT
Start: 2024-04-09

## 2024-04-09 RX ORDER — FAMOTIDINE 20 MG/1
20 TABLET, FILM COATED ORAL
Qty: 90 TABLET | Refills: 1 | Status: SHIPPED | OUTPATIENT
Start: 2024-04-09

## 2024-04-09 RX ORDER — FERROUS SULFATE 324(65)MG
324 TABLET, DELAYED RELEASE (ENTERIC COATED) ORAL
Qty: 180 TABLET | Refills: 1 | Status: SHIPPED | OUTPATIENT
Start: 2024-04-09

## 2024-04-09 NOTE — PROGRESS NOTES
[facility] - Outpatient Follow-up Note  Anthony Slater 78 y.o. female MRN: [id number]  Encounter: [id number]          ASSESSMENT AND PLAN:    The patient presents today or follow-up office visit regarding follow-up from recent hospitalization in March with iron deficiency anemia due to chronic blood loss with chronic belching, nausea, and GERD symptoms.    Exam: Abdomen is soft, nondistended, nontender, with hypoactive bowel sounds x 4. No edema noted of the b/l lower extremities upon exam today. Skin is non-icteric.      1. Belching  2. Nausea  3. Gastrointestinal hemorrhage associated with angiodysplasia of stomach and duodenum  Improving    While the patient and her  were in the office today, I discussed with the patient that at this point in time since her nausea and reflux symptoms have improved, but she does continue with the belching, I would like her to try taking some Beano or Gas-X prior to eating and see if that helps with some of her belching and gas symptoms.  The patient and her  were agreeable and verbalized an understanding.    Continue famotidine and Protonix as prescribed.    - famotidine (PEPCID) 20 mg tablet; Take 1 tablet (20 mg total) by mouth daily at bedtime  Dispense: 90 tablet; Refill: 1  - pantoprazole (PROTONIX) 40 mg tablet; Take 1 tablet (40 mg total) by mouth 2 (two) times a day  Dispense: 180 tablet; Refill: 1    4. Iron deficiency anemia due to chronic blood loss  Improved/Stable    While the patient and her  were in the office today, I discussed with them that at this point time with her most recent blood work showing that her iron levels are actually normal and her hemoglobin is improving and stable and she does not have any signs or symptoms of melena or rectal bleeding, I do not feel it is necessary to consider iron infusions at this time and that for the next month I would like him to continue with the oral iron as  prescribed and proceed with repeat blood work at the beginning of May as ordered.  I advised the patient and her  that once we have the results of the blood work we would then contact them to review results and discuss the possibility of actually trying to take the iron supplement every other day and then do repeat blood work before her next follow-up visit in 4 months.  The patient and her  were agreeable and verbalized understanding.    We did review GI red flag symptoms, including, but not limited to: chronic nausea, vomiting, diarrhea, chills, fever, and unintentional weight loss and should call or contact our office with any changes or concerns. I reviewed with the patient that if they notice any blood while vomiting or in their stool they should contact or office or go to the nearest emergency room for immediate evaluation. The patient was agreeable and verbalized an understanding.    - ferrous sulfate 324 (65 Fe) mg; Take 1 tablet (324 mg total) by mouth daily before breakfast  Dispense: 180 tablet; Refill: 1     The patient will schedule a follow up office visit in 3-4 months, but understands to call or contact our office if there are any issues or concerns in the mean time.  ______________________________________________________________________    SUBJECTIVE: Patient is a 78 y.o. female who was previously seen in our office on 2/20/24 by Dr Baez and presents today for follow-up office visit regarding follow-up from recent hospitalization in March with iron deficiency anemia due to chronic blood loss with chronic belching, nausea, and GERD symptoms.  The patient reports that since her hospitalization, there has been some improvement in her symptoms, but the only thing that is still bothering her is that belching with intermittent bloating.    Since discharge the patient did proceed with repeat iron studies and a CBC which showed that her iron level is actually normal and that her hemoglobin  has improved and is stable at 10.6.  The patient still feels as though she is tired, but also understands that with the hospitalization, fall, and her other issues that it can be normal that it is going to take time, even months to feel like she is getting her energy back.  The patient and her  report that they were asked by her primary care provider, Dr. Calvillo, to discuss whether or not she should continue the oral iron or consider IV infusions with our office.    The patient denies any reflux, nausea, dysphagia, vomiting, decreased appetite, unplanned weight loss, or abdominal pain. Water Intake: 2-3 cups per day.    The patient reports that they have a BM daily and reports that it is relieving, without any consistent diarrhea, nocturnal BMs, constipation, straining, melena or bloody stools. Last BM: This morning. Flatus: Sometimes.    Meds: Famotidine 20 mg at bedtime, Protonix 40 mg twice daily, and iron 325 daily.  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 Path: Normal    COLONOSCOPY: (Unknown):     DUE: 4/5/29    REVIEW OF SYSTEMS IS OTHERWISE NEGATIVE.      Historical Information   Past Medical History:   Diagnosis Date    Anxiety     Arthritis     Benign hypertension     Chronic pain disorder     back    COPD (chronic obstructive pulmonary disease) (HCC)     Coronary artery disease     medical management    CPAP (continuous positive airway pressure) dependence     Diabetes (HCC)     Endometrial adenocarcinoma (HCC)     Epilepsy (HCC)     GERD (gastroesophageal reflux disease)     History of echocardiogram 02/07/2014    EF 55%, mild MR.    History of transfusion     Hypertension     Mixed hyperlipidemia     DAVID on CPAP     Shortness of breath     triggered by anxiety    Watermelon stomach     Wears  dentures      Past Surgical History:   Procedure Laterality Date    BREAST BIOPSY Right 02/24/2015    BREAST BIOPSY Left     ? year    CARDIAC CATHETERIZATION  02/07/2014    EF 65%, mid LAD 60% stenosis and D2 70% stenosis.  Medical management.    CATARACT EXTRACTION Bilateral     COLONOSCOPY      CYSTOSCOPY N/A 07/31/2023    Procedure: CYSTOSCOPY;  Surgeon: Nadir Ash MD;  Location: [facility];  Service: Gynecology Oncology    EGD      JOINT REPLACEMENT Right     RIGHT ELBOW    OOPHORECTOMY      not sure which one was removed    ME LAPS TOTAL HYSTERECT 250 GM/< W/RMVL TUBE/OVARY N/A 07/31/2023    Procedure: ROBOTHYSTERECTOMY, BILATERAL SALPINGO-OOPHORECTOMY, PELVIC SENTINEL NODE BIOPSIES;  Surgeon: Nadir Ash MD;  Location: [facility];  Service: Gynecology Oncology    ROTATOR CUFF REPAIR Right     US GUIDED THYROID BIOPSY  12/04/2020     Social History   Social History     Substance and Sexual Activity   Alcohol Use Not Currently     Social History     Substance and Sexual Activity   Drug Use Never     Social History     Tobacco Use   Smoking Status Never    Passive exposure: Never   Smokeless Tobacco Never     Family History   Problem Relation Age of Onset    Heart disease Mother     Coronary artery disease Sister         history of CABG    No Known Problems Father     No Known Problems Daughter     No Known Problems Sister     No Known Problems Sister     No Known Problems Sister     No Known Problems Daughter        Meds/Allergies       Current Outpatient Medications:     atorvastatin (LIPITOR) 40 mg tablet    busPIRone (BUSPAR) 5 mg tablet    Cholecalciferol (Vitamin D3) 1.25 MG (77831 UT) CAPS    co-enzyme Q-10 100 mg capsule    famotidine (PEPCID) 20 mg tablet    ferrous sulfate 324 (65 Fe) mg    metoprolol tartrate (LOPRESSOR) 25 mg tablet    pantoprazole (PROTONIX) 40 mg tablet    traZODone (DESYREL) 50 mg tablet    vitamin B-12 (VITAMIN B-12) 1,000 mcg tablet    senna (SENOKOT) 8.6  "mg    Allergies   Allergen Reactions    Advil [Ibuprofen]     Aspirin Other (See Comments)     gi bleed    Caspofungin Other (See Comments)    Hydrocodone     Pravastatin GI Intolerance    Tramadol GI Intolerance           Objective     Blood pressure 104/78, pulse 78, resp. rate 18, height 5' 1\" (1.549 m), weight 51.7 kg (114 lb), SpO2 97%. Body mass index is 21.54 kg/m².      PHYSICAL EXAM:      General Appearance:   Alert, cooperative, no distress   HEENT:   Normocephalic, atraumatic, anicteric.     Neck:  Supple, symmetrical, trachea midline   Lungs:   Clear to auscultation bilaterally; no rales, rhonchi or wheezing; respirations unlabored    Heart::   Regular rate and rhythm; no murmur, rub, or gallop.   Abdomen:   Soft, non-tender, non-distended; normal bowel sounds; no masses, no organomegaly    Genitalia:   Deferred    Rectal:   Deferred    Extremities:  No cyanosis, clubbing or edema    Pulses:  2+ and symmetric    Skin:  No jaundice, rashes, or lesions    Lymph nodes:  No palpable cervical lymphadenopathy        Lab Results:   No visits with results within 1 Day(s) from this visit.   Latest known visit with results is:   Appointment on 04/02/2024   Component Date Value    WBC 04/02/2024 9.54     RBC 04/02/2024 4.22     Hemoglobin 04/02/2024 10.6 (L)     Hematocrit 04/02/2024 36.2     MCV 04/02/2024 86     MCH 04/02/2024 25.1 (L)     MCHC 04/02/2024 29.3 (L)     Platelets 04/02/2024 290     nRBC 04/02/2024 0     Segmented % 04/02/2024 72     Immature Grans % 04/02/2024 1     Lymphocytes % 04/02/2024 15     Monocytes % 04/02/2024 9     Eosinophils Relative 04/02/2024 2     Basophils Relative 04/02/2024 1     Absolute Neutrophils 04/02/2024 7.03     Absolute Immature Grans 04/02/2024 0.08     Absolute Lymphocytes 04/02/2024 1.38     Absolute Monocytes 04/02/2024 0.83     Eosinophils Absolute 04/02/2024 0.16     Basophils Absolute 04/02/2024 0.06     Iron Saturation 04/02/2024 19     TIBC 04/02/2024 353     " Iron 04/02/2024 66     UIBC 04/02/2024 287     Ferritin 04/02/2024 130          Radiology Results:   EGD    Result Date: 3/21/2024  Narrative: Table formatting from the original result was not included. [facility] [address] [place] [id number] [phone] DATE OF SERVICE: 3/21/24 PHYSICIAN(S): Attending: Cheyanne Narvaez MD Fellow: Elizabeth Kearns DO INDICATION: Anemia, Iron deficiency anemia due to chronic blood loss POST-OP DIAGNOSIS: See the impression below. PREPROCEDURE: Informed consent was obtained for the procedure, including sedation.  Risks of perforation, hemorrhage, adverse drug reaction and aspiration were discussed. The patient was placed in the left lateral decubitus position. Patient was explained about the risks and benefits of the procedure. Risks including but not limited to bleeding, infection, and perforation were explained in detail. Also explained about less than 100% sensitivity with the exam and other alternatives. PROCEDURE: EGD DETAILS OF PROCEDURE: Patient was taken to the procedure room where a time out was performed to confirm correct patient and correct procedure. The patient underwent monitored anesthesia care, which was administered by an anesthesia professional. The patient's blood pressure, heart rate, level of consciousness, respirations, oxygen, ECG and ETCO2 were monitored throughout the procedure. The scope was introduced through the mouth and advanced to the second part of the duodenum. Retroflexion was performed in the cardia, fundus and incisura. Prior to the procedure, the patient's H. Pylori status was unknown. The patient experienced no blood loss. The procedure was not difficult. The patient tolerated the procedure well. There were no apparent adverse events. ANESTHESIA INFORMATION: ASA: III Anesthesia Type: IV Sedation with Anesthesia MEDICATIONS: No administrations occurring from 1504 to 1526 on 03/21/24 FINDINGS: The upper third  of the esophagus, middle third of the esophagus and lower third of the esophagus appeared normal. Z-line is 36 cm from the incisors. 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 With nodularity Multiple sessile, semi-pedunculated fundic gland polyps measuring smaller than 5 mm in the stomach One 15 mm hyperplastic, semi-pedunculated polyp in the body of the stomach; performed hot snare with complete en bloc removal and retrieved specimen The duodenal bulb and 2nd part of the duodenum appeared normal. 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

## 2024-04-09 NOTE — PATIENT INSTRUCTIONS
Proceed with Blood work in the beginning of May.   Continue famotidine, pantoprazole, and Iron as prescribed for now and after the may blood work we will decide what to do with the daily iron.   Try some Beano or Gas X to help with the belching.   Continue to watch for red flag symptoms.   Schedule a f/u OV in 4 months.     We did review GI red flag symptoms, including, but not limited to: chronic nausea, vomiting, diarrhea, chills, fever, and unintentional weight loss and should call or contact our office with any changes or concerns. I reviewed with the patient that if they notice any blood while vomiting or in their stool they should contact or office or go to the nearest emergency room for immediate evaluation. The patient was agreeable and verbalized an understanding.

## 2024-05-11 DIAGNOSIS — I25.10 CORONARY ARTERY DISEASE INVOLVING NATIVE CORONARY ARTERY OF NATIVE HEART WITHOUT ANGINA PECTORIS: ICD-10-CM

## 2024-05-11 DIAGNOSIS — E78.5 DYSLIPIDEMIA: ICD-10-CM

## 2024-05-13 DIAGNOSIS — F51.04 PSYCHOPHYSIOLOGICAL INSOMNIA: Primary | ICD-10-CM

## 2024-05-13 RX ORDER — ATORVASTATIN CALCIUM 40 MG/1
40 TABLET, FILM COATED ORAL DAILY
Qty: 90 TABLET | Refills: 1 | Status: SHIPPED | OUTPATIENT
Start: 2024-05-13

## 2024-05-13 RX ORDER — TRAZODONE HYDROCHLORIDE 50 MG/1
50 TABLET ORAL
Qty: 90 TABLET | Refills: 1 | Status: SHIPPED | OUTPATIENT
Start: 2024-05-13

## 2024-05-16 ENCOUNTER — TELEPHONE (OUTPATIENT)
Age: 79
End: 2024-05-16

## 2024-05-16 NOTE — TELEPHONE ENCOUNTER
Ferrous Sulfate prescribed by Gastroenterology. Patient's  will call and questioned why medication was cancelled and if patient should in fact still be taking it.

## 2024-05-22 DIAGNOSIS — D50.0 IRON DEFICIENCY ANEMIA DUE TO CHRONIC BLOOD LOSS: Primary | ICD-10-CM

## 2024-05-22 RX ORDER — FERROUS SULFATE 324(65)MG
324 TABLET, DELAYED RELEASE (ENTERIC COATED) ORAL
Qty: 14 TABLET | Refills: 0 | Status: SHIPPED | OUTPATIENT
Start: 2024-05-22

## 2024-05-22 RX ORDER — FERROUS SULFATE 324(65)MG
324 TABLET, DELAYED RELEASE (ENTERIC COATED) ORAL
Qty: 90 TABLET | Refills: 1 | Status: SHIPPED | OUTPATIENT
Start: 2024-05-22

## 2024-05-22 NOTE — TELEPHONE ENCOUNTER
Reason for call: Not a duplicate. Mail Order pharmacy never received refill. Patient would like a shorts supply sent to local pharmacy while waiting for mail order to arrive.  [x] Refill   [] Prior Auth  [] Other:     Office:   [] PCP/Provider -   [x] Specialty/Provider - [facility]        Pharmacy: [facility]  Kylee Dominguez    Does the patient have enough for 3 days?   [] Yes   [x] No - Send as HP to POD

## 2024-06-03 DIAGNOSIS — D50.0 IRON DEFICIENCY ANEMIA DUE TO CHRONIC BLOOD LOSS: ICD-10-CM

## 2024-06-03 NOTE — TELEPHONE ENCOUNTER
Reason for call: Not a duplicate.   Patient spouse state that [facility] would not fill his wife medication because it is considered a OTC medication. Request a 30D supply to local pharmacy until he see the doctor because the medication may change or stop.      [x] Refill   [] Prior Auth  [] Other:     Office:   [] PCP/Provider -   [x] Specialty/Provider - Gastro Dr [facility]    Medication: ferrous sulfate     Dose/Frequency: 324 mg daily     Quantity: 30D    Pharmacy: [facility] [place] on file     Does the patient have enough for 3 days?   [x] Yes   [] No - Send as HP to POD

## 2024-06-04 RX ORDER — FERROUS SULFATE 324(65)MG
324 TABLET, DELAYED RELEASE (ENTERIC COATED) ORAL
Qty: 30 TABLET | Refills: 0 | Status: SHIPPED | OUTPATIENT
Start: 2024-06-04

## 2024-06-07 ENCOUNTER — RADIATION ONCOLOGY FOLLOW-UP (OUTPATIENT)
Dept: RADIATION ONCOLOGY | Facility: CLINIC | Age: 79
End: 2024-06-07
Attending: INTERNAL MEDICINE
Payer: MEDICARE

## 2024-06-07 VITALS
TEMPERATURE: 97.6 F | OXYGEN SATURATION: 98 % | WEIGHT: 114 LBS | SYSTOLIC BLOOD PRESSURE: 151 MMHG | HEIGHT: 61 IN | HEART RATE: 71 BPM | BODY MASS INDEX: 21.52 KG/M2 | DIASTOLIC BLOOD PRESSURE: 79 MMHG

## 2024-06-07 DIAGNOSIS — C54.1 ENDOMETRIAL CANCER (HCC): Primary | ICD-10-CM

## 2024-06-07 PROCEDURE — 99211 OFF/OP EST MAY X REQ PHY/QHP: CPT | Performed by: INTERNAL MEDICINE

## 2024-06-07 PROCEDURE — 99213 OFFICE O/P EST LOW 20 MIN: CPT | Performed by: INTERNAL MEDICINE

## 2024-06-07 NOTE — PROGRESS NOTES
Anthony Slater 1945 is a 79 y.o. female H/O FIGO Stage 1B endometrial cancer S/P KIARA/BSO on 2023 notable for 2.6 cm of FIGO grade 1 endometrial adenocarcinoma, with 83% myometrial invasion, no lymphovascular invasion, lower uterine segment involvement, or cervical stromal involvement, with negative margins and 0 out of 2 sentinel nodes involved.  She completed HDR vaginal brachytherapy on 10/26/23.  She was last seen in radiation oncology on 23, she presents for routine follow-up.    23  CT Chest abdomen pelvis  IMPRESSION:   Chest: No acute findings.   Abdomen and pelvis: Findings most compatible with subcentimeter splenic laceration most compatible with AAST grade 1 injury.   Other nonemergent findings above.    3/8/24  Dr. Ash  No clinical evidence of disease.  F/u in 4 months    3/20/24-3/22/24  [facility]  DX: GI bleed, anemia, Stage 3 CKD    Upcomin24  Dr. Ash    Follow up visit     Oncology History   Endometrial cancer (HCC)   2023 -  Cancer Staged    Staging form: Corpus Uteri - Carcinoma, AJCC 8th Edition  - Clinical stage from 2023: FIGO Stage IB (cT1b, cN0(sn), cM0) - Signed by DOTTIE Umaña on 2023  Stage prefix: Initial diagnosis  Method of lymph node assessment: Alpha lymph node biopsy  Histologic grade (G): G1  Histologic grading system: 3 grade system       2023 Initial Diagnosis    Endometrial cancer (HCC)     10/2/2023 - 10/16/2023 Radiation      Plan ID Energy Fractions Dose per Fraction (cGy) Dose Correction (cGy) Total Dose Delivered (cGy) Elapsed Days   Cyl 2.5 cm Ir 192 3 / 3 700 0 2,100 14      Treatment dates:  C1 HDR: 10/2/2023 - 10/16/2023             Review of Systems:  Review of Systems   Constitutional:  Positive for fatigue.        Activity and appetite has increased.    HENT: Negative.     Eyes: Negative.    Respiratory: Negative.     Cardiovascular: Negative.    Gastrointestinal: Negative.    Endocrine:  Negative.    Genitourinary: Negative.    Musculoskeletal: Negative.    Skin: Negative.    Allergic/Immunologic: Negative.    Neurological: Negative.    Hematological: Negative.    Psychiatric/Behavioral: Negative.         Clinical Trial: no    Pregnancy test needed:  no    Teaching yes    Health Maintenance   Topic Date Due    Osteoporosis Screening  Never done    RSV Vaccine Age 60+ Years (1 - 1-dose 60+ series) Never done    PT PLAN OF CARE  06/03/2023    COVID-19 Vaccine (4 - 2023-24 season) 12/29/2023    Medicare Annual Wellness Visit (AWV)  04/11/2024    ONC Colorectal Surgery Screening  05/28/2024    Endometrial Survivorship Visit  08/24/2024    ONC Weight Management Consult  09/07/2024    ONC Mammogram  09/21/2024    ONC DXA Scan  10/19/2024    Fall Risk  04/02/2025    Urinary Incontinence Screening  04/02/2025    BMI: Adult  04/09/2025    Depression Screening  06/07/2025    Zoster Vaccine  Completed    Pneumococcal Vaccine: 65+ Years  Completed    Influenza Vaccine  Completed    RSV Vaccine age 0-20 Months  Aged Out    HIB Vaccine  Aged Out    IPV Vaccine  Aged Out    Hepatitis A Vaccine  Aged Out    Meningococcal ACWY Vaccine  Aged Out    HPV Vaccine  Aged Out     Patient Active Problem List   Diagnosis    Angiodysplasia of gastrointestinal tract    Obstructive sleep apnea syndrome    Anxiety state    Coronary arteriosclerosis    Gastric antral vascular ectasia    History of total abdominal hysterectomy and bilateral salpingo-oophorectomy    Endometrial cancer (HCC)    Drug-induced osteonecrosis of jaw (HCC)    Hyperlipidemia    Migraine    Osteoporosis    Thyroid nodule    Chronic left shoulder pain    Stage 3 chronic kidney disease, unspecified whether stage 3a or 3b CKD (HCC)    Nausea    Gastroesophageal reflux disease    Diverticular disease of colon    Weakness    Gastrointestinal hemorrhage associated with angiodysplasia of stomach and duodenum    Iron deficiency anemia    Moderate protein-calorie  malnutrition (HCC)    Memory loss due to medical condition    Weight loss    Belching     Past Medical History:   Diagnosis Date    Anxiety     Arthritis     Benign hypertension     Chronic pain disorder     back    COPD (chronic obstructive pulmonary disease) (HCC)     Coronary artery disease     medical management    CPAP (continuous positive airway pressure) dependence     Diabetes (HCC)     Endometrial adenocarcinoma (HCC)     Epilepsy (HCC)     GERD (gastroesophageal reflux disease)     History of echocardiogram 02/07/2014    EF 55%, mild MR.    History of transfusion     Hypertension     Mixed hyperlipidemia     DAVID on CPAP     Shortness of breath     triggered by anxiety    Watermelon stomach     Wears dentures      Past Surgical History:   Procedure Laterality Date    BREAST BIOPSY Right 02/24/2015    BREAST BIOPSY Left     ? year    CARDIAC CATHETERIZATION  02/07/2014    EF 65%, mid LAD 60% stenosis and D2 70% stenosis.  Medical management.    CATARACT EXTRACTION Bilateral     COLONOSCOPY      CYSTOSCOPY N/A 07/31/2023    Procedure: CYSTOSCOPY;  Surgeon: Nadir Ash MD;  Location: [facility];  Service: Gynecology Oncology    EGD      JOINT REPLACEMENT Right     RIGHT ELBOW    OOPHORECTOMY      not sure which one was removed    NM LAPS TOTAL HYSTERECT 250 GM/< W/RMVL TUBE/OVARY N/A 07/31/2023    Procedure: ROBOTHYSTERECTOMY, BILATERAL SALPINGO-OOPHORECTOMY, PELVIC SENTINEL NODE BIOPSIES;  Surgeon: Nadir Ash MD;  Location: [facility];  Service: Gynecology Oncology    ROTATOR CUFF REPAIR Right     US GUIDED THYROID BIOPSY  12/04/2020     Family History   Problem Relation Age of Onset    Heart disease Mother     Coronary artery disease Sister         history of CABG    No Known Problems Father     No Known Problems Daughter     No Known Problems Sister     No Known Problems Sister     No Known Problems Sister     No Known Problems Daughter      Social History     Socioeconomic History    Marital  status: /Civil Union     Spouse name: Not on file    Number of children: Not on file    Years of education: Not on file    Highest education level: Not on file   Occupational History    Not on file   Tobacco Use    Smoking status: Never     Passive exposure: Never    Smokeless tobacco: Never   Vaping Use    Vaping status: Never Used   Substance and Sexual Activity    Alcohol use: Not Currently    Drug use: Never    Sexual activity: Yes     Partners: Male     Birth control/protection: Post-menopausal   Other Topics Concern    Not on file   Social History Narrative    Not on file     Social Determinants of Health     Financial Resource Strain: Not on file   Food Insecurity: No Food Insecurity (3/21/2024)    Hunger Vital Sign     Worried About Running Out of Food in the Last Year: Never true     Ran Out of Food in the Last Year: Never true   Transportation Needs: No Transportation Needs (3/21/2024)    PRAPARE - Transportation     Lack of Transportation (Medical): No     Lack of Transportation (Non-Medical): No   Physical Activity: Not on file   Stress: Not on file   Social Connections: Not on file   Intimate Partner Violence: Not on file   Housing Stability: Low Risk  (3/21/2024)    Housing Stability Vital Sign     Unable to Pay for Housing in the Last Year: No     Number of Times Moved in the Last Year: 1     Homeless in the Last Year: No       Current Outpatient Medications:     atorvastatin (LIPITOR) 40 mg tablet, TAKE 1 TABLET BY MOUTH DAILY, Disp: 90 tablet, Rfl: 1    busPIRone (BUSPAR) 5 mg tablet, Take 1 tablet (5 mg total) by mouth 3 (three) times a day (Patient taking differently: Take 5 mg by mouth 2 (two) times a day), Disp: 360 tablet, Rfl: 5    Cholecalciferol (Vitamin D3) 1.25 MG (47835 UT) CAPS, Take 1,000 Units by mouth daily, Disp: , Rfl:     co-enzyme Q-10 100 mg capsule, Take by mouth daily, Disp: , Rfl:     famotidine (PEPCID) 20 mg tablet, Take 1 tablet (20 mg total) by mouth daily at  "bedtime, Disp: 90 tablet, Rfl: 1    ferrous sulfate 324 (65 Fe) mg, Take 1 tablet (324 mg total) by mouth daily before breakfast, Disp: 90 tablet, Rfl: 1    ferrous sulfate 324 (65 Fe) mg, Take 1 tablet (324 mg total) by mouth daily before breakfast, Disp: 30 tablet, Rfl: 0    metoprolol tartrate (LOPRESSOR) 25 mg tablet, Take 1 tablet (25 mg total) by mouth in the morning, Disp: 90 tablet, Rfl: 3    pantoprazole (PROTONIX) 40 mg tablet, Take 1 tablet (40 mg total) by mouth 2 (two) times a day, Disp: 180 tablet, Rfl: 1    traZODone (DESYREL) 50 mg tablet, Take 1 tablet (50 mg total) by mouth daily at bedtime, Disp: 90 tablet, Rfl: 1    vitamin B-12 (VITAMIN B-12) 1,000 mcg tablet, Take 1,000 mcg by mouth daily, Disp: , Rfl:   Allergies   Allergen Reactions    Advil [Ibuprofen]     Aspirin Other (See Comments)     gi bleed    Caspofungin Other (See Comments)    Hydrocodone     Pravastatin GI Intolerance    Tramadol GI Intolerance     Vitals:    06/07/24 1341   BP: 151/79   BP Location: Left arm   Patient Position: Sitting   Cuff Size: Adult   Pulse: 71   Temp: 97.6 °F (36.4 °C)   TempSrc: Temporal   SpO2: 98%   Weight: 51.7 kg (114 lb)   Height: 5' 1\" (1.549 m)      Pain Score: 0-No pain  "

## 2024-06-07 NOTE — PROGRESS NOTES
Follow-up - Radiation Oncology   Anthony Slater 1945 79 y.o. female [id number]    Cancer Staging   Endometrial cancer (HCC)  Staging form: Corpus Uteri - Carcinoma, AJCC 8th Edition  - Clinical stage from 7/31/2023: FIGO Stage IB (cT1b, cN0(sn), cM0) - Signed by DOTTIE Umaña on 8/29/2023  Stage prefix: Initial diagnosis  Method of lymph node assessment: [place] lymph node biopsy  Histologic grade (G): G1  Histologic grading system: 3 grade system    Assessment/Plan:  Anthony Slater is an 79 y.o. female with FIGO 1B (pT1bN0) endometrial adenocarcinoma, status post KIARA/BSO on 7/31/2023. Her case was reviewed at multidisciplinary tumor board and she underwent vaginal brachytherapy completing on  10/26/23.    She presents for routine 7 month follow-up visit after completion of therapy. She has recovered from usual expected side effects of therapy (if any) and denies developing late toxicity. She notes fatigue.  She had interval CT imaging in December 2023 which showed no evidence of recurrence.  Denies pelvic discomfort with intercourse or pelvic exams. She has been using her vaginal dilator diligently albeit less frequently after her fall.  We discussed continued surveillance.  7/9/24 Gynecology Oncology Follow-up  RTC in 12 months or sooner if needed    Alma Cox MD  Department of Radiation Oncology  [facility]    Total Time Spent  20 minutes spent reviewing EMR in preparation for visit, with the patient, coordination with other providers, and documentation.    No orders of the defined types were placed in this encounter.       History of Present Illness   Interval History:  Anthony Slater 1945 is a 79 y.o. female H/O FIGO Stage 1B endometrial cancer  She completed HDR vaginal brachytherapy on 10/26/23.  She was last seen in radiation oncology on 12/6/23, she presents for routine follow-up.     12/30/23  CT Chest abdomen pelvis  IMPRESSION:   Chest: No acute  findings.   Abdomen and pelvis: Findings most compatible with subcentimeter splenic laceration most compatible with AAST grade 1 injury.   Other nonemergent findings above.     3/8/24  Dr. Ash  No clinical evidence of disease.  F/u in 4 months     3/20/24-3/22/24  [facility]  DX: GI bleed, anemia, Stage 3 CKD     Upcomin24  Dr. Ash    Feels well. No significant complaints.  She had an interval fall and subsequent health complications related to possible COVID infection.  Declines pelvic discomfort with intercourse or pelvic exams. She has been diligently using her vaginal dilator albeit less frequently after her fall. [place] is challenging due to vertigo.     Historical Information   Oncology History   Endometrial cancer (HCC)   2023 -  Cancer Staged    Staging form: Corpus Uteri - Carcinoma, AJCC 8th Edition  - Clinical stage from 2023: FIGO Stage IB (cT1b, cN0(sn), cM0) - Signed by DOTTIE Umaña on 2023  Stage prefix: Initial diagnosis  Method of lymph node assessment: [place] lymph node biopsy  Histologic grade (G): G1  Histologic grading system: 3 grade system       2023 Initial Diagnosis    Endometrial cancer (HCC)     10/2/2023 - 10/16/2023 Radiation      Plan ID Energy Fractions Dose per Fraction (cGy) Dose Correction (cGy) Total Dose Delivered (cGy) Elapsed Days   Cyl 2.5 cm Ir 192 3 / 3 700 0 2,100 14      Treatment dates:  C1 HDR: 10/2/2023 - 10/16/2023           Past Medical History:   Diagnosis Date   • Anxiety    • Arthritis    • Benign hypertension    • Chronic pain disorder     back   • COPD (chronic obstructive pulmonary disease) (HCC)    • Coronary artery disease     medical management   • CPAP (continuous positive airway pressure) dependence    • Diabetes (HCC)    • Endometrial adenocarcinoma (HCC)    • Epilepsy (HCC)    • GERD (gastroesophageal reflux disease)    • History of echocardiogram 2014    EF 55%, mild MR.   • History of  transfusion    • Hypertension    • Mixed hyperlipidemia    • DAVID on CPAP    • Shortness of breath     triggered by anxiety   • Watermelon stomach    • Wears dentures      Past Surgical History:   Procedure Laterality Date   • BREAST BIOPSY Right 02/24/2015   • BREAST BIOPSY Left     ? year   • CARDIAC CATHETERIZATION  02/07/2014    EF 65%, mid LAD 60% stenosis and D2 70% stenosis.  Medical management.   • CATARACT EXTRACTION Bilateral    • COLONOSCOPY     • CYSTOSCOPY N/A 07/31/2023    Procedure: CYSTOSCOPY;  Surgeon: Nadir Ash MD;  Location: [facility];  Service: Gynecology Oncology   • EGD     • JOINT REPLACEMENT Right     RIGHT ELBOW   • OOPHORECTOMY      not sure which one was removed   • VA LAPS TOTAL HYSTERECT 250 GM/< W/RMVL TUBE/OVARY N/A 07/31/2023    Procedure: ROBOTHYSTERECTOMY, BILATERAL SALPINGO-OOPHORECTOMY, PELVIC SENTINEL NODE BIOPSIES;  Surgeon: Nadir Ash MD;  Location: [facility];  Service: Gynecology Oncology   • ROTATOR CUFF REPAIR Right    • US GUIDED THYROID BIOPSY  12/04/2020     Social History   Social History     Substance and Sexual Activity   Alcohol Use Not Currently     Social History     Substance and Sexual Activity   Drug Use Never     Social History     Tobacco Use   Smoking Status Never   • Passive exposure: Never   Smokeless Tobacco Never       Meds/Allergies     Current Outpatient Medications:   •  atorvastatin (LIPITOR) 40 mg tablet, TAKE 1 TABLET BY MOUTH DAILY, Disp: 90 tablet, Rfl: 1  •  busPIRone (BUSPAR) 5 mg tablet, Take 1 tablet (5 mg total) by mouth 3 (three) times a day (Patient taking differently: Take 5 mg by mouth 2 (two) times a day), Disp: 360 tablet, Rfl: 5  •  Cholecalciferol (Vitamin D3) 1.25 MG (35876 UT) CAPS, Take 1,000 Units by mouth daily, Disp: , Rfl:   •  co-enzyme Q-10 100 mg capsule, Take by mouth daily, Disp: , Rfl:   •  famotidine (PEPCID) 20 mg tablet, Take 1 tablet (20 mg total) by mouth daily at bedtime, Disp: 90 tablet, Rfl: 1  •   "ferrous sulfate 324 (65 Fe) mg, Take 1 tablet (324 mg total) by mouth daily before breakfast, Disp: 90 tablet, Rfl: 1  •  ferrous sulfate 324 (65 Fe) mg, Take 1 tablet (324 mg total) by mouth daily before breakfast, Disp: 30 tablet, Rfl: 0  •  metoprolol tartrate (LOPRESSOR) 25 mg tablet, Take 1 tablet (25 mg total) by mouth in the morning, Disp: 90 tablet, Rfl: 3  •  pantoprazole (PROTONIX) 40 mg tablet, Take 1 tablet (40 mg total) by mouth 2 (two) times a day, Disp: 180 tablet, Rfl: 1  •  traZODone (DESYREL) 50 mg tablet, Take 1 tablet (50 mg total) by mouth daily at bedtime, Disp: 90 tablet, Rfl: 1  •  vitamin B-12 (VITAMIN B-12) 1,000 mcg tablet, Take 1,000 mcg by mouth daily, Disp: , Rfl:   Allergies   Allergen Reactions   • Advil [Ibuprofen]    • Aspirin Other (See Comments)     gi bleed   • Caspofungin Other (See Comments)   • Hydrocodone    • Pravastatin GI Intolerance   • Tramadol GI Intolerance       Review of Systems:  Refer to nursing note    OBJECTIVE:   /79 (BP Location: Left arm, Patient Position: Sitting, Cuff Size: Adult)   Pulse 71   Temp 97.6 °F (36.4 °C) (Temporal)   Ht 5' 1\" (1.549 m)   Wt 51.7 kg (114 lb)   SpO2 98%   BMI 21.54 kg/m²     Physical Exam:   General Appearance:  Alert, cooperative, no distress, appears stated age  Lungs: Respirations unlabored, no cyanosis, able to speak in complete sentences without dyspnea.  Abdomen: Non-distended  Gyn: Deferred, upcoming exam next month  Skin: No generalized rash or dermatitis  Neurologic: ANOx3, speech and cognition intact.    Portions of the record may have been created with voice recognition software.  Occasional wrong word or \"sound a like\" substitutions may have occurred due to the inherent limitations of voice recognition software.  Read the chart carefully and recognize, using context, where substitutions have occurred.  "

## 2024-06-12 ENCOUNTER — TELEPHONE (OUTPATIENT)
Dept: GYNECOLOGIC ONCOLOGY | Facility: CLINIC | Age: 79
End: 2024-06-12

## 2024-06-12 NOTE — TELEPHONE ENCOUNTER
Called to inform patient that we need to move her appointment on 7/9/24 @ 1:45 pm to 7/10/24 @ 1:45 pm due to provider not being available. Instructed patient to call the office back if this new appointment does not work for her.

## 2024-06-12 NOTE — TELEPHONE ENCOUNTER
I left a message informing the patient that her appointment with Dr. Ash on 7/10/2024 was changed from 1:45PM to 3:15PM due to the provider being in a meeting. I provided the office number to call if the new time does not work for her.

## 2024-06-14 NOTE — TELEPHONE ENCOUNTER
Patients GI provider:  Dr. Baez    Number to return call: [phone]    Reason for call: Pt's  calling to advise that pt was not able to complete SIBO test. Having difficulty following directions as listed due to symptoms she was seen in office for. Please review and see if there is any other testing that is recommended instead of this. Please call  back to advise.    Scheduled procedure/appointment date if applicable: Procedure 04/01/2024     Subjective   Reason for Visit: Eleonora Varela is an 75 y.o. female here for a Medicare Wellness visit. , annual physical and follow up on htn, comanage dm2 with retinopathy, ckd3b hld, cac score over 400 with no hx mi, hx stenting renl artery and other issues.  LAST VISIT PLAN: 3/05/2024  Eleonora was seen today for follow-up.  Diagnoses and all orders for this visit:  Essential hypertension (Primary)  Need for hepatitis C screening test  -     Cancel: Hepatitis C antibody; Future  -     Hepatitis C antibody; Future  Kappa light chain disease (CMS/HCC)  Atherosclerosis of renal artery (CMS/HCC)  Hyperlipidemia associated with type 2 diabetes mellitus (CMS/HCC)  Grief at loss of child  History of stent insertion of renal artery  Type 2 diabetes mellitus with insulin therapy (CMS/HCC)  Moderate nonproliferative diabetic retinopathy of both eyes without macular edema associated with type 2 diabetes mellitus (CMS/HCC)  Stage 3b chronic kidney disease (CMS/HCC)  -     Renal function panel; Future  Stressful life event affecting family    She is feeling better mood wise, is active in support of her dtr and granddtr as they go through a divorce.   Sugars have been up some but suspect this was related more to the events surrounding her dtr gilbert's death and she is getting back on track.  She is on less hydrochlorothiazide per dr Pavon her cinema cardiologist.  Is on track to see heme about the kappa light chain disease   Her eyes retinopathy are improving. Per pt.  See wrap up section for patient instructions and  additional treatment plan.   Will monitor renal fxn with sglt2i and glp 1 and ckd3b  See orders. She will wait to see if order from dr harris.  Keep follow up here.  I am the primary care physician for this patient's ongoing medical care, which is managed during and in between office visits.  END INFO FROM PRIOR VISIT    HPI  Eleoonra Varela is an 75 y.o. female here for a Medicare Wellness visit. , annual physical  and follow up on htn, comanage dm2 with retinopathy, ckd3b hld, cac score over 400 with no hx mi, hx stenting renl artery and other issues.    Health maintenance items last completed:  Colonoscopy: 9/25/2023- Normal mucosa in terminal ileum. A single sessile 3 mm polyp in transverse colon- removed. Medium internal and external hemorrhoids.   Mammogram: 11/02/2023-Negative  Bone Density: 4-2022  Urine albumin if HTN or DM2: 1/12/2024- urine albumin- 35.3 and urine albumin/cr ratio: 40.7  Pap: REMOTE, AGED OUT  Pelvic:November 2020 TINY CARUNCLE NO CHANGE  Breast exam in office:TODAY  Vaccines due or not completed: utd tetanus 2017, COVID- 2024.  Last Health Maintenance exam: 6/7/2023    Patient Care Team:  Jennifer Kenney MD as PCP - General  Jennifer Kenney MD as PCP - [employer]age PCP  Eyad Yi MD as Consulting Physician (Hematology and Oncology)  Ahsan Cook MD PhD as Consulting Physician (Cardiology)  Alma Pavon MD as Consulting Physician (Cardiology)  Cayden Palacios MD as Consulting Physician (Nephrology)  Ernesto Orantes MD as Referring Physician (Retina Ophthalmology)  Remberto Evans Chi, MD as Referring Physician (Ophthalmology)     Here for follow up and mcw/annual pe  Labs from May reviewed  Lipids are due December-not at goal as ldl 102 and cac scoare over 400 ldl goal is under 70. She is watching diet  Dm2, A1c is due today    Scribe:  Patient is a 75-year-old female who presents today for Annual Wellness Visit.  Patient is part of the [employer].    I reviewed the questions and answers of the Medicare Wellness Visit form.  I discussed code status with the patient, and they understand the difference between full code and DNR.  I discussed HCPOA and LW.  Her HCPOA is her daughter, Aylin, and she will decide on one of her siblings for the second HCPOA.    We reviewed and updated her medication list, medical, family and social history.She takes cranberry  "tabs, she is not sure of the dose, she wrote down \"4200\" on her list. Discussed that usually the dose is 450 -500 mg daily.  Tim ramirez let me know if it is different than the 450-500mg.    Health Maintenance:  Bone density and mammogram due prior to next visit in 09/2024.  Ordered fasting blood work (cholesterol and B12 prior to next visit in 09/2024).  We discussed LDL goal of under 70.    We discussed recent blood work including her creatinine at 1.4 which has improved from 1.5.  A1c today is 8.2%. We discussed a goal of getting her back down to below 7.5%.    Diabetes:  She is exercising 5 days per week (outdoor swim).  Denies hypoglycemic episodes.  She is following a diabetic diet. She takes 10 units of Humalog prior to each meal, 35 units of Lantus in the morning and Ozempic 2 mg every Wednesday.  She is also receiving Eylea injections for diabetic retinopathy.    Continuous glucose monitor data    Duration (days) %Above Target % In Range %Low     Average glucose Usage rate/scans per day    Set Target Range (low-high)     7 days              14 days    56%    44%    30 days    55 %   45%    90 days   57%    43%    Any lows symptomatic?  No.    We changed her range to  going forward.    Her blood sugars run high around 8 am and 4-6 pm.  We discussed what she is eating for breakfast and recommended taking 11 units prior to meals on the days that she does not exercise.  Stay active for 15 minutes after you eat.  Monitor and record her sugars before breakfast and 2 hours after, the same for lunch and dinner.  Limit/avoid candy (M&Ms or chocolate covered cherries).    Life Stressors:  We discussed her current life stressor of the impending divorce of her daughter (Aylin) and the effect on her granddaughter (Ghanshyam). SDepression:  Patient reports she is doing okay.  She feels she is doing ok with grieving on the loss of her daughter early 2024.      Weight Loss:  Patient has lost 3 pounds in the past 3 " months since last visit and has lost 30 pounds since starting the Ozempic last year.    Knee aching:  She reports that it is just annoying and does not inhibit her activity. She does see a chiropractor- she notes she does not let the chiropractor manipulate her neck.  She also goes for regular massages.    Renal stent:  Patient reports doing very well with no complications.      Review of Systems  All systems reviewed and are negative unless otherwise stated in HPI or noted in writing on the written   3  page history and review of systems document reviewed by me and  scanned in with this visit. This is scanned either to notes or to media, with today's date.    Objective             Component  Ref Range & Units 3 wk ago  (5/23/24) 3 wk ago  (5/23/24) 5 mo ago  (1/12/24) 6 mo ago  (12/16/23) 6 mo ago  (12/4/23) 8 mo ago  (10/14/23) 9 mo ago  (9/16/23)   Glucose  74 - 99 mg/dL 108 High   71 Low  233 High  211 High  216 High  252 High    Sodium  136 - 145 mmol/L 138  141 140 140 139 139   Potassium  3.5 - 5.3 mmol/L 3.8  4.1 4.3 3.9 3.9 4.4   Chloride  98 - 107 mmol/L 99  99 99 101 98 100   Bicarbonate  21 - 32 mmol/L 29  31 30 33 High  30 33 High    Anion Gap  10 - 20 mmol/L 14  15 15 10 15 10   Urea Nitrogen  6 - 23 mg/dL 18  27 High  29 High  28 High  22 21   Creatinine  0.50 - 1.05 mg/dL 1.42 High   1.50 High  1.59 High  1.49 High  1.54 High  1.45 High    eGFR  >60 mL/min/1.73m*2 38 Low   36 Low  CM 33 Low  CM 36 Low  CM 35 Low  CM        Lab Results   Component Value Date    WBC 7.7 05/23/2024    HGB 16.1 (H) 05/23/2024    HCT 48.8 (H) 05/23/2024     05/23/2024    CHOL 185 12/16/2023    TRIG 171 (H) 12/16/2023    HDL 49.3 12/16/2023    ALT 31 05/23/2024    AST 25 05/23/2024     05/23/2024    K 3.8 05/23/2024    CL 99 05/23/2024    CREATININE 1.42 (H) 05/23/2024    BUN 18 05/23/2024    CO2 29 05/23/2024    TSH 1.48 05/19/2023    INR 0.9 05/09/2022    HGBA1C 8.5 (A) 06/18/2024     Component  Ref Range &  Units 3 wk ago  (5/23/24) 6 mo ago  (12/4/23) 9 mo ago  (9/16/23) 1 yr ago  (3/27/23) 1 yr ago  (9/16/22) 2 yr ago  (3/10/22) 2 yr ago  (9/21/21)   Ig [place] Free Light Chain  0.33 - 1.94 mg/dL 2.46 High  3.74 High  3.15 High  3.20 High  3.28 High  3.10 High  3.25 High    Ig Lambda Free Light Chain  0.57 - 2.63 mg/dL 2.55 2.41 2.49 2.17 2.51 2.67 High  2.72 High    Kappa/Lambda Ratio  0.26 - 1.65 0.96 1.55        Resulting Agency [facility]                   Hep C neg  Lab Results   Component Value Date    CHOL 185 12/16/2023    CHOL 150 09/16/2023    CHOL 132 05/19/2023     Lab Results   Component Value Date    HDL 49.3 12/16/2023    HDL 42.5 09/16/2023    HDL 37.9 (A) 05/19/2023     Lab Results   Component Value Date    LDLCALC 102 (H) 12/16/2023     Lab Results   Component Value Date    TRIG 171 (H) 12/16/2023    TRIG 143 09/16/2023    TRIG 184 (H) 05/19/2023     Lab Results   Component Value Date    [id number] 05/19/2023       Vitals:  /52 (BP Location: Left arm, Patient Position: Sitting, BP Cuff Size: Adult)   Pulse 72   Resp 18   Ht 1.524 m (5')   Wt 66.7 kg (147 lb)   BMI 28.71 kg/m²       Physical Exam  General: Patient is known to me, looks well, is in usual state of health, with  no obvious distress.  Head: normocephalic  Eyes: PERRL, EOMI, no scleral icterus or conjunctival abnormality  Ears:Normal canals and TM's  Oropharynx: Well hydrated mucosa. no lesions, erythema. palate moves well.  Neck: No masses, no cervical (A/P/ or Lateral) adenopathy. Thyroid not enlarged and no nodules. Carotid pulses normal and no bruits.  Chest: Normal AP diameter. No supraclavicular adenopathy.  Lungs: Clear to auscultation: no rales , wheezes, rhonchi, rubs, examined bilaterally, ant/post /lateral. RR normal.  Heart: RRR. 1/6 systolic murmur ursb without significant change. No GCR S1 S2 normal.  Abd: BS normal, no bruits. No hepatosplenomegaly. No abdominal mass or  tenderness. No distension.  Extremities: No upper extremity edema. No lower leg edema.No ischemia.   Joints: no synovitis seen. No deformities.  Pulses: normal posterior tibialis pulses, normal dorsalis pedis pulses. normal radial pulses. Normal femoral pulses without bruits.  Neuro: CN 2-12 intact . Alert, oriented, appropriate.  No focal weakness observed. No tremors. Ambulation is normal .  Psych: Mood and affect normal. No cognitive deficits noted during this exam. Alert, oriented, appropriate.  Skin: No rash, petechiae or excessive bruising.  Lymph: no SC axillary or inguinal adenopathy  Breast Exam : Symmetric appearance. No masses, nipple discharge, skin retraction, axillary or supraclavicular adenopathy.    Diabetic foot exam:  Skin normal no lesions. Skin is chronically somewhat taut and that is not new.  DP and PT pulses within normal limits.  Callus: none  Deformities: none.  Monofilament testing: Normal.    Eleonora was seen today for medicare annual wellness visit subsequent.  Diagnoses and all orders for this visit:  Encounter for subsequent annual wellness visit (AWV) in Medicare patient (Primary)  Annual visit for general adult medical examination with abnormal findings  Type 2 diabetes mellitus with insulin therapy (Multi)  -     POCT glycosylated hemoglobin (Hb A1C) manually resulted  -     Collection of Capillary Blood Specimen; Future  Systolic murmur  Stage 3b chronic kidney disease (Multi)  Comments:  sees rubén self annually and also part of canvas  Coronary artery disease involving native coronary artery of native heart without angina pectoris  -     Lipid Panel; Future  -     Aspartate Aminotransferase; Future  -     Alanine Aminotransferase; Future  -     Creatine Kinase; Future  -     Cholesterol, LDL Direct; Future  Osteopenia of neck of left femur  Comments:  dexa due  Orders:  -     XR DEXA bone density; Future  Agatston CAC score, >400  Comments:  negative nuclear est june 2023  Last labs not  at goal of ldl under 70- will recheck with weight loss  History of stent insertion of renal artery  Comments:  ultrasound ordered june 2024 by cardiology  Hepatitis C antibody test negative  MGUS (monoclonal gammopathy of unknown significance)  Comments:  labs stable may 2024  Visit for screening mammogram  -     BI mammo bilateral screening tomosynthesis; Future  Full code status  B12 nutritional deficiency  Comments:  is on supplementation, normal level may 2023, is due  Orders:  -     Cancel: Vitamin B12; Future  -     Vitamin B12; Future  Vitamin D deficiency  Comments:  hx levels 20 in past,-normal jan 2024  Encounter for monitoring statin therapy  -     Lipid Panel; Future  -     Aspartate Aminotransferase; Future  -     Alanine Aminotransferase; Future  -     Creatine Kinase; Future  -     Cholesterol, LDL Direct; Future  Type 2 diabetes mellitus with hyperglycemia, with long-term current use of insulin (Multi)  Comments:  eating m and m's. some parties. will cut back on candy. consider  increasing humalog on non exercise days. Re discuss diet with dietician.  Orders:  -     TSH with reflex to Free T4 if abnormal; Future  Other orders  -     Full code     Annual  history and physical completed including  ROS, PE.   Medicare wellness visit  completed including:  Immunizations,   Health Maintenance items,  Discussion of advanced directives and code status, which is: full code  Fall risk and prevention addressed.  Functionality and pain were assessed.   Cancer screening testing was addressed as appropriate-see patient discussion/orders.   Medications were discussed and reviewed/reconciled and refill need assessed/handled.   Patient states taking their medication regularly and appropriately.  Also:   Depression screening PhQ-2 completed: neg  Alcohol misuse screen: neg    In addition to the wellness visit and screening, the above medical issues were addressed:  As well as results of testing completed since last  visit.  Patient Instructions:    Talk to a dietician. Look at increasing humalog to 11 units instead of 10 am and possibly supper on days you are not exercising. Compare sugars pre and 2 hours after meals on days not exercising vs days you do exercise.   Walking or similar for at least  15 minutes after a meal can help . I can place a referral to the dietician if need be.    Cut back on candy.    Goal for next A1c is getting back down to 7.5 .    Fasting blood test prior to September visit-just a few days prior:  Instructions for obtaining lab tests:   You do not need a paper requisition when obtaining tests at a  facility. No appointment is needed for lab tests.  For fasting blood tests:  Please do not consume calories for 10-12 hours prior to this blood test. It is ok to drink water, you should be hydrated.  Please do not take vitamins, supplements or thyroid medication before your blood is drawn this day.   Most lab results should be available for your review on the  My Chart portal within 48 hours. Please contact the office if you have not received results within 2 weeks of obtaining the test.    Mammogram November, can do bone density then or before. No vitamin D or calcium for 48 hours prior to bone density.

## 2024-07-02 DIAGNOSIS — D50.0 IRON DEFICIENCY ANEMIA DUE TO CHRONIC BLOOD LOSS: ICD-10-CM

## 2024-07-02 RX ORDER — FERROUS SULFATE 324(65)MG
TABLET, DELAYED RELEASE (ENTERIC COATED) ORAL
Qty: 30 TABLET | Refills: 5 | Status: SHIPPED | OUTPATIENT
Start: 2024-07-02

## 2024-07-10 ENCOUNTER — OFFICE VISIT (OUTPATIENT)
Dept: GYNECOLOGIC ONCOLOGY | Facility: CLINIC | Age: 79
End: 2024-07-10
Payer: MEDICARE

## 2024-07-10 VITALS
WEIGHT: 117.4 LBS | HEART RATE: 84 BPM | SYSTOLIC BLOOD PRESSURE: 154 MMHG | OXYGEN SATURATION: 100 % | TEMPERATURE: 97.9 F | HEIGHT: 61 IN | RESPIRATION RATE: 18 BRPM | BODY MASS INDEX: 22.16 KG/M2 | DIASTOLIC BLOOD PRESSURE: 86 MMHG

## 2024-07-10 DIAGNOSIS — C54.1 ENDOMETRIAL CANCER (HCC): Primary | ICD-10-CM

## 2024-07-10 PROCEDURE — 99212 OFFICE O/P EST SF 10 MIN: CPT | Performed by: OBSTETRICS & GYNECOLOGY

## 2024-07-10 PROCEDURE — G2211 COMPLEX E/M VISIT ADD ON: HCPCS | Performed by: OBSTETRICS & GYNECOLOGY

## 2024-07-10 NOTE — PROGRESS NOTES
Assessment/Plan:    Problem List Items Addressed This Visit          Genitourinary    Endometrial cancer (HCC) - Primary     I have independently obtained history from patient today.  Have performed pelvic examination.    Patient has no evidence of disease.  She will return to the office in approximately 4 months for routine surveillance.  She will contact us if she has any new symptoms.              CHIEF COMPLAINT:   Follow-up/surveillance for uterine cancer.    Problem:  Cancer Staging   Endometrial cancer (HCC)  Staging form: Corpus Uteri - Carcinoma, AJCC 8th Edition  - Clinical stage from 7/31/2023: FIGO Stage IB (cT1b, cN0(sn), cM0) - Signed by DOTTIE Umaña on 8/29/2023        Previous therapy:  Oncology History   Endometrial cancer ([facility])   7/31/2023 -  Cancer Staged    Staging form: Corpus Uteri - Carcinoma, AJCC 8th Edition  - Clinical stage from 7/31/2023: FIGO Stage IB (cT1b, cN0(sn), cM0) - Signed by DOTTIE Umaña on 8/29/2023  Stage prefix: Initial diagnosis  Method of lymph node assessment: [place] lymph node biopsy  Histologic grade (G): G1  Histologic grading system: 3 grade system       8/29/2023 Initial Diagnosis    Endometrial cancer (HCC)     10/2/2023 - 10/16/2023 Radiation      Plan ID Energy Fractions Dose per Fraction (cGy) Dose Correction (cGy) Total Dose Delivered (cGy) Elapsed Days   Cyl 2.5 cm Ir 192 3 / 3 700 0 2,100 14      Treatment dates:  C1 HDR: 10/2/2023 - 10/16/2023               Patient ID: Anthony Slater is a 79 y.o. 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  After surgery received vaginal brachytherapy which she completed in October 2023.  Presents for routine surveillance.  She has no complaints.  Denies vaginal bleeding, drainage or discharge.  The following portions of the patient's history were reviewed and updated as appropriate: allergies, current medications, past family history, past  "medical history, past social history, past surgical history, and problem list.    Review of Systems Per HPI.    Current Outpatient Medications   Medication Sig Dispense Refill    atorvastatin (LIPITOR) 40 mg tablet TAKE 1 TABLET BY MOUTH DAILY 90 tablet 1    busPIRone (BUSPAR) 5 mg tablet Take 1 tablet (5 mg total) by mouth 3 (three) times a day (Patient taking differently: Take 5 mg by mouth 2 (two) times a day) 360 tablet 5    Cholecalciferol (Vitamin D3) 1.25 MG (29241 UT) CAPS Take 1,000 Units by mouth daily      co-enzyme Q-10 100 mg capsule Take by mouth daily      famotidine (PEPCID) 20 mg tablet Take 1 tablet (20 mg total) by mouth daily at bedtime 90 tablet 1    ferrous sulfate 324 (65 Fe) mg Take 1 tablet (324 mg total) by mouth daily before breakfast 90 tablet 1    ferrous sulfate 324 (65 Fe) mg take 1 tablet by mouth once daily before breakfast 30 tablet 5    metoprolol tartrate (LOPRESSOR) 25 mg tablet Take 1 tablet (25 mg total) by mouth in the morning 90 tablet 3    pantoprazole (PROTONIX) 40 mg tablet Take 1 tablet (40 mg total) by mouth 2 (two) times a day 180 tablet 1    traZODone (DESYREL) 50 mg tablet Take 1 tablet (50 mg total) by mouth daily at bedtime 90 tablet 1    vitamin B-12 (VITAMIN B-12) 1,000 mcg tablet Take 1,000 mcg by mouth daily       No current facility-administered medications for this visit.           Objective:    Blood pressure 154/86, pulse 84, temperature 97.9 °F (36.6 °C), temperature source Temporal, resp. rate 18, height 5' 1\" (1.549 m), weight 53.3 kg (117 lb 6.4 oz), SpO2 100%.  Body mass index is 22.18 kg/m².  Body surface area is 1.51 meters squared.    Physical Exam  Vitals reviewed. Exam conducted with a chaperone present.   Abdominal:      General: Abdomen is flat. There is no distension.      Palpations: Abdomen is soft.      Tenderness: There is no abdominal tenderness.   Genitourinary:     Comments: Normal external female genitalia. Normal Bartholin's and [place]'s " glands. Normal urethral meatus and no evidence of urethral discharge or masses.  Bladder without fullness mass or tenderness. Vagina is atrophic but without lesion or discharge. No significant pelvic organ prolapse noted.  Cervix and uterus are surgically absent.  Bimanual exam demonstrates no evidence of pelvic masses.  Anus without fissure of lesion.    Musculoskeletal:      Right lower leg: No edema.      Left lower leg: No edema.       Nadir Ash MD, FACOG, FACS  7/10/2024  3:42 PM

## 2024-07-10 NOTE — ASSESSMENT & PLAN NOTE
I have independently obtained history from patient today.  Have performed pelvic examination.    Patient has no evidence of disease.  She will return to the office in approximately 4 months for routine surveillance.  She will contact us if she has any new symptoms.

## 2024-07-12 ENCOUNTER — APPOINTMENT (EMERGENCY)
Dept: RADIOLOGY | Facility: HOSPITAL | Age: 79
End: 2024-07-12
Payer: MEDICARE

## 2024-07-12 ENCOUNTER — HOSPITAL ENCOUNTER (EMERGENCY)
Facility: HOSPITAL | Age: 79
Discharge: HOME | End: 2024-07-12
Attending: EMERGENCY MEDICINE
Payer: MEDICARE

## 2024-07-12 ENCOUNTER — APPOINTMENT (EMERGENCY)
Dept: CT IMAGING | Facility: HOSPITAL | Age: 79
End: 2024-07-12
Payer: MEDICARE

## 2024-07-12 VITALS
HEART RATE: 78 BPM | DIASTOLIC BLOOD PRESSURE: 75 MMHG | SYSTOLIC BLOOD PRESSURE: 161 MMHG | RESPIRATION RATE: 18 BRPM | OXYGEN SATURATION: 99 % | TEMPERATURE: 98.3 F

## 2024-07-12 DIAGNOSIS — R25.1 SHAKINESS: ICD-10-CM

## 2024-07-12 DIAGNOSIS — R51.9 HEADACHE: Primary | ICD-10-CM

## 2024-07-12 LAB
ALBUMIN SERPL BCG-MCNC: 4 G/DL (ref 3.5–5)
ALP SERPL-CCNC: 54 U/L (ref 34–104)
ALT SERPL W P-5'-P-CCNC: 13 U/L (ref 7–52)
ANION GAP SERPL CALCULATED.3IONS-SCNC: 8 MMOL/L (ref 4–13)
AST SERPL W P-5'-P-CCNC: 21 U/L (ref 13–39)
BACTERIA UR QL AUTO: NORMAL /HPF
BASOPHILS # BLD AUTO: 0.02 THOUSANDS/ÂΜL (ref 0–0.1)
BASOPHILS NFR BLD AUTO: 0 % (ref 0–1)
BILIRUB SERPL-MCNC: 0.36 MG/DL (ref 0.2–1)
BILIRUB UR QL STRIP: NEGATIVE
BUN SERPL-MCNC: 23 MG/DL (ref 5–25)
CALCIUM SERPL-MCNC: 9.4 MG/DL (ref 8.4–10.2)
CHLORIDE SERPL-SCNC: 105 MMOL/L (ref 96–108)
CLARITY UR: CLEAR
CO2 SERPL-SCNC: 24 MMOL/L (ref 21–32)
COLOR UR: ABNORMAL
CREAT SERPL-MCNC: 1.26 MG/DL (ref 0.6–1.3)
EOSINOPHIL # BLD AUTO: 0.19 THOUSAND/ÂΜL (ref 0–0.61)
EOSINOPHIL NFR BLD AUTO: 2 % (ref 0–6)
ERYTHROCYTE [DISTWIDTH] IN BLOOD BY AUTOMATED COUNT: 13.4 % (ref 11.6–15.1)
FLUAV RNA RESP QL NAA+PROBE: NEGATIVE
FLUBV RNA RESP QL NAA+PROBE: NEGATIVE
GFR SERPL CREATININE-BSD FRML MDRD: 40 ML/MIN/1.73SQ M
GLUCOSE SERPL-MCNC: 138 MG/DL (ref 65–140)
GLUCOSE UR STRIP-MCNC: NEGATIVE MG/DL
HCT VFR BLD AUTO: 34.9 % (ref 34.8–46.1)
HGB BLD-MCNC: 11 G/DL (ref 11.5–15.4)
HGB UR QL STRIP.AUTO: ABNORMAL
IMM GRANULOCYTES # BLD AUTO: 0.04 THOUSAND/UL (ref 0–0.2)
IMM GRANULOCYTES NFR BLD AUTO: 0 % (ref 0–2)
KETONES UR STRIP-MCNC: NEGATIVE MG/DL
LEUKOCYTE ESTERASE UR QL STRIP: NEGATIVE
LYMPHOCYTES # BLD AUTO: 1.27 THOUSANDS/ÂΜL (ref 0.6–4.47)
LYMPHOCYTES NFR BLD AUTO: 13 % (ref 14–44)
MCH RBC QN AUTO: 27 PG (ref 26.8–34.3)
MCHC RBC AUTO-ENTMCNC: 31.5 G/DL (ref 31.4–37.4)
MCV RBC AUTO: 86 FL (ref 82–98)
MONOCYTES # BLD AUTO: 0.8 THOUSAND/ÂΜL (ref 0.17–1.22)
MONOCYTES NFR BLD AUTO: 8 % (ref 4–12)
NEUTROPHILS # BLD AUTO: 7.38 THOUSANDS/ÂΜL (ref 1.85–7.62)
NEUTS SEG NFR BLD AUTO: 77 % (ref 43–75)
NITRITE UR QL STRIP: NEGATIVE
NON-SQ EPI CELLS URNS QL MICRO: NORMAL /HPF
NRBC BLD AUTO-RTO: 0 /100 WBCS
PH UR STRIP.AUTO: 6 [PH]
PLATELET # BLD AUTO: 207 THOUSANDS/UL (ref 149–390)
PMV BLD AUTO: 9.4 FL (ref 8.9–12.7)
POTASSIUM SERPL-SCNC: 3.5 MMOL/L (ref 3.5–5.3)
PROT SERPL-MCNC: 7.1 G/DL (ref 6.4–8.4)
PROT UR STRIP-MCNC: NEGATIVE MG/DL
RBC # BLD AUTO: 4.07 MILLION/UL (ref 3.81–5.12)
RBC #/AREA URNS AUTO: NORMAL /HPF
RSV RNA RESP QL NAA+PROBE: NEGATIVE
SARS-COV-2 RNA RESP QL NAA+PROBE: NEGATIVE
SODIUM SERPL-SCNC: 137 MMOL/L (ref 135–147)
SP GR UR STRIP.AUTO: <=1.005
TSH SERPL DL<=0.05 MIU/L-ACNC: 2.91 UIU/ML (ref 0.45–4.5)
UROBILINOGEN UR QL STRIP.AUTO: 0.2 E.U./DL
WBC # BLD AUTO: 9.7 THOUSAND/UL (ref 4.31–10.16)
WBC #/AREA URNS AUTO: NORMAL /HPF

## 2024-07-12 PROCEDURE — 99284 EMERGENCY DEPT VISIT MOD MDM: CPT | Performed by: EMERGENCY MEDICINE

## 2024-07-12 PROCEDURE — 80053 COMPREHEN METABOLIC PANEL: CPT | Performed by: EMERGENCY MEDICINE

## 2024-07-12 PROCEDURE — 84443 ASSAY THYROID STIM HORMONE: CPT | Performed by: EMERGENCY MEDICINE

## 2024-07-12 PROCEDURE — 36415 COLL VENOUS BLD VENIPUNCTURE: CPT | Performed by: EMERGENCY MEDICINE

## 2024-07-12 PROCEDURE — 85025 COMPLETE CBC W/AUTO DIFF WBC: CPT | Performed by: EMERGENCY MEDICINE

## 2024-07-12 PROCEDURE — 70450 CT HEAD/BRAIN W/O DYE: CPT

## 2024-07-12 PROCEDURE — 71045 X-RAY EXAM CHEST 1 VIEW: CPT

## 2024-07-12 PROCEDURE — 0241U HB NFCT DS VIR RESP RNA 4 TRGT: CPT | Performed by: EMERGENCY MEDICINE

## 2024-07-12 PROCEDURE — 99284 EMERGENCY DEPT VISIT MOD MDM: CPT

## 2024-07-12 PROCEDURE — 81001 URINALYSIS AUTO W/SCOPE: CPT | Performed by: EMERGENCY MEDICINE

## 2024-07-12 RX ORDER — ACETAMINOPHEN 325 MG/1
325 TABLET ORAL ONCE
Status: COMPLETED | OUTPATIENT
Start: 2024-07-12 | End: 2024-07-12

## 2024-07-12 RX ADMIN — ACETAMINOPHEN 325 MG: 325 TABLET ORAL at 22:09

## 2024-07-13 NOTE — ED PROVIDER NOTES
"History  Chief Complaint   Patient presents with    Headache     Patient presents with headache and chills x1 day. Patient denies any nausea or vomiting. Patient family reports that she has not been drinking a lot and has been out in the heat.      HPI      NonToxic 79-year-old female presents to the emergency department with a chief complaint of generalized fatigue and \"feeling shaky\" all day.  Patient is accompanied by her  who is reliable competent historian noted that was seen 2 days ago down at her [facility] provider for her endometrial cancer: Stage Ib high-grade high intermediate risk for endometrial adenocarcinoma s/p vaginal brachytherapy in Oct 2023, down in [place] and felt very fatigued afterwards secondary to the significant elevation ambient heat of late  Prior to Admission Medications   Prescriptions Last Dose Informant Patient Reported? Taking?   Cholecalciferol (Vitamin D3) 1.25 MG (02589 UT) CAPS  Self Yes No   Sig: Take 1,000 Units by mouth daily   atorvastatin (LIPITOR) 40 mg tablet  Self No No   Sig: TAKE 1 TABLET BY MOUTH DAILY   busPIRone (BUSPAR) 5 mg tablet  Self No No   Sig: Take 1 tablet (5 mg total) by mouth 3 (three) times a day   Patient taking differently: Take 5 mg by mouth 2 (two) times a day   co-enzyme Q-10 100 mg capsule  Self Yes No   Sig: Take by mouth daily   famotidine (PEPCID) 20 mg tablet  Self No No   Sig: Take 1 tablet (20 mg total) by mouth daily at bedtime   ferrous sulfate 324 (65 Fe) mg  Self No No   Sig: Take 1 tablet (324 mg total) by mouth daily before breakfast   ferrous sulfate 324 (65 Fe) mg  Self No No   Sig: take 1 tablet by mouth once daily before breakfast   metoprolol tartrate (LOPRESSOR) 25 mg tablet  Self No No   Sig: Take 1 tablet (25 mg total) by mouth in the morning   pantoprazole (PROTONIX) 40 mg tablet  Self No No   Sig: Take 1 tablet (40 mg total) by mouth 2 (two) times a day   traZODone (DESYREL) 50 mg tablet  Self No No   Sig: Take 1 tablet " (50 mg total) by mouth daily at bedtime   vitamin B-12 (VITAMIN B-12) 1,000 mcg tablet  Self Yes No   Sig: Take 1,000 mcg by mouth daily      Facility-Administered Medications: None       Past Medical History:   Diagnosis Date    Anxiety     Arthritis     Benign hypertension     Chronic pain disorder     back    COPD (chronic obstructive pulmonary disease) (HCC)     Coronary artery disease     medical management    CPAP (continuous positive airway pressure) dependence     Diabetes (HCC)     Endometrial adenocarcinoma (HCC)     Epilepsy (HCC)     GERD (gastroesophageal reflux disease)     History of echocardiogram 02/07/2014    EF 55%, mild MR.    History of transfusion     Hypertension     Mixed hyperlipidemia     DAVID on CPAP     Shortness of breath     triggered by anxiety    Watermelon stomach     Wears dentures        Past Surgical History:   Procedure Laterality Date    BREAST BIOPSY Right 02/24/2015    BREAST BIOPSY Left     ? year    CARDIAC CATHETERIZATION  02/07/2014    EF 65%, mid LAD 60% stenosis and D2 70% stenosis.  Medical management.    CATARACT EXTRACTION Bilateral     COLONOSCOPY      CYSTOSCOPY N/A 07/31/2023    Procedure: CYSTOSCOPY;  Surgeon: Nadir Ash MD;  Location: [facility];  Service: Gynecology Oncology    EGD      JOINT REPLACEMENT Right     RIGHT ELBOW    OOPHORECTOMY      not sure which one was removed    WI LAPS TOTAL HYSTERECT 250 GM/< W/RMVL TUBE/OVARY N/A 07/31/2023    Procedure: ROBOTHYSTERECTOMY, BILATERAL SALPINGO-OOPHORECTOMY, PELVIC SENTINEL NODE BIOPSIES;  Surgeon: Nadir Ash MD;  Location: [facility];  Service: Gynecology Oncology    ROTATOR CUFF REPAIR Right     US GUIDED THYROID BIOPSY  12/04/2020       Family History   Problem Relation Age of Onset    Heart disease Mother     Coronary artery disease Sister         history of CABG    No Known Problems Father     No Known Problems Daughter     No Known Problems Sister     No Known Problems Sister     No Known  Problems Sister     No Known Problems Daughter      I have reviewed and agree with the history as documented.    E-Cigarette/Vaping    E-Cigarette Use Never User      E-Cigarette/Vaping Substances    Nicotine No     THC No     CBD No     Flavoring No     Other No     Unknown No      Social History     Tobacco Use    Smoking status: Never     Passive exposure: Never    Smokeless tobacco: Never   Vaping Use    Vaping status: Never Used   Substance Use Topics    Alcohol use: Not Currently    Drug use: Never       Review of Systems   Constitutional: Negative.  Negative for chills, fatigue and fever.   HENT: Negative.     Eyes: Negative.    Respiratory: Negative.  Negative for chest tightness and shortness of breath.    Cardiovascular: Negative.  Negative for chest pain and palpitations.   Gastrointestinal: Negative.  Negative for abdominal pain and diarrhea.   Endocrine: Negative.    Genitourinary: Negative.    Musculoskeletal: Negative.    Skin: Negative.    Allergic/Immunologic: Negative.    Neurological: Negative.    Hematological: Negative.    Psychiatric/Behavioral: Negative.         Physical Exam  Physical Exam  Vitals and nursing note reviewed.   Constitutional:       Appearance: Normal appearance. She is normal weight.   HENT:      Head: Normocephalic and atraumatic.      Right Ear: External ear normal.      Left Ear: External ear normal.      Nose: Nose normal.      Mouth/Throat:      Mouth: Mucous membranes are moist.      Pharynx: Oropharynx is clear.   Eyes:      Extraocular Movements: Extraocular movements intact.      Conjunctiva/sclera: Conjunctivae normal.      Pupils: Pupils are equal, round, and reactive to light.   Cardiovascular:      Rate and Rhythm: Normal rate and regular rhythm.      Pulses: Normal pulses.      Heart sounds: Normal heart sounds.   Pulmonary:      Effort: Pulmonary effort is normal.      Breath sounds: Normal breath sounds.   Abdominal:      General: Abdomen is flat. Bowel sounds  are normal.   Musculoskeletal:         General: Normal range of motion.      Cervical back: Normal range of motion.   Skin:     General: Skin is warm.      Capillary Refill: Capillary refill takes less than 2 seconds.   Neurological:      General: No focal deficit present.      Mental Status: She is alert and oriented to person, place, and time. Mental status is at baseline.   Psychiatric:         Mood and Affect: Mood normal.         Behavior: Behavior normal.         Thought Content: Thought content normal.         Judgment: Judgment normal.         Vital Signs  ED Triage Vitals [07/12/24 2018]   Temperature Pulse Respirations Blood Pressure SpO2   98.3 °F (36.8 °C) 101 19 (!) 181/78 99 %      Temp Source Heart Rate Source Patient Position - Orthostatic VS BP Location FiO2 (%)   Temporal Monitor -- -- --      Pain Score       --           Vitals:    07/12/24 2018 07/12/24 2215   BP: (!) 181/78 161/75   Pulse: 101 78         Visual Acuity      ED Medications  Medications   acetaminophen (TYLENOL) tablet 325 mg (325 mg Oral Given 7/12/24 2209)       Diagnostic Studies  Results Reviewed       Procedure Component Value Units Date/Time    TSH, 3rd generation with Free T4 reflex [[id number]]  (Normal) Collected: 07/12/24 2100    Lab Status: Final result Specimen: Blood from Arm, Left Updated: 07/12/24 2136     TSH 3RD GENERATON 2.906 uIU/mL     FLU/RSV/COVID - if FLU/RSV clinically relevant [[id number]]  (Normal) Collected: 07/12/24 2045    Lab Status: Final result Specimen: Nares from Nose Updated: 07/12/24 2129     SARS-CoV-2 Negative     INFLUENZA A PCR Negative     INFLUENZA B PCR Negative     RSV PCR Negative    Narrative:      FOR PEDIATRIC PATIENTS - copy/paste COVID Guidelines URL to browser: [email or web address]    SARS-CoV-2 assay is a Nucleic Acid Amplification assay intended for the  qualitative detection of nucleic acid from SARS-CoV-2 in  nasopharyngeal  swabs. Results are for the presumptive identification of SARS-CoV-2 RNA.    Positive results are indicative of infection with SARS-CoV-2, the virus  causing COVID-19, but do not rule out bacterial infection or co-infection  with other viruses. Laboratories within the [place] and its  territories are required to report all positive results to the appropriate  public health authorities. Negative results do not preclude SARS-CoV-2  infection and should not be used as the sole basis for treatment or other  patient management decisions. Negative results must be combined with  clinical observations, patient history, and epidemiological information.  This test has not been FDA cleared or approved.    This test has been authorized by FDA under an Emergency Use Authorization  (EUA). This test is only authorized for the duration of time the  declaration that circumstances exist justifying the authorization of the  emergency use of an in vitro diagnostic tests for detection of SARS-CoV-2  virus and/or diagnosis of COVID-19 infection under section 564(b)(1) of  the Act, 21 U.S.C. 360bbb-3(b)(1), unless the authorization is terminated  or revoked sooner. The test has been validated but independent review by FDA  and CLIA is pending.    Test performed using [employer] GeneXpert: This RT-PCR assay targets N2,  a region unique to SARS-CoV-2. A conserved region in the E-gene was chosen  for pan-Sarbecovirus detection which includes SARS-CoV-2.    According to CMS-2020-01-R, this platform meets the definition of high-throughput technology.    Comprehensive metabolic panel [[id number]] Collected: 07/12/24 2100    Lab Status: Final result Specimen: Blood from Arm, Left Updated: 07/12/24 2120     Sodium 137 mmol/L      Potassium 3.5 mmol/L      Chloride 105 mmol/L      CO2 24 mmol/L      ANION GAP 8 mmol/L      BUN 23 mg/dL      Creatinine 1.26 mg/dL      Glucose 138 mg/dL      Calcium 9.4 mg/dL      AST 21 U/L       ALT 13 U/L      Alkaline Phosphatase 54 U/L      Total Protein 7.1 g/dL      Albumin 4.0 g/dL      Total Bilirubin 0.36 mg/dL      eGFR 40 ml/min/1.73sq m     Narrative:      National Kidney Disease Foundation guidelines for Chronic Kidney Disease (CKD):     Stage 1 with normal or high GFR (GFR > 90 mL/min/1.73 square meters)    Stage 2 Mild CKD (GFR = 60-89 mL/min/1.73 square meters)    Stage 3A Moderate CKD (GFR = 45-59 mL/min/1.73 square meters)    Stage 3B Moderate CKD (GFR = 30-44 mL/min/1.73 square meters)    Stage 4 Severe CKD (GFR = 15-29 mL/min/1.73 square meters)    Stage 5 End Stage CKD (GFR <15 mL/min/1.73 square meters)  Note: GFR calculation is accurate only with a steady state creatinine    Urine Microscopic [[id number]]  (Normal) Collected: 07/12/24 2045    Lab Status: Final result Specimen: Urine, Clean Catch Updated: 07/12/24 2107     RBC, UA 1-2 /hpf      WBC, UA 0-1 /hpf      Epithelial Cells Occasional /hpf      Bacteria, UA None Seen /hpf     CBC and differential [[id number]]  (Abnormal) Collected: 07/12/24 2100    Lab Status: Final result Specimen: Blood from Arm, Left Updated: 07/12/24 2105     WBC 9.70 Thousand/uL      RBC 4.07 Million/uL      Hemoglobin 11.0 g/dL      Hematocrit 34.9 %      MCV 86 fL      MCH 27.0 pg      MCHC 31.5 g/dL      RDW 13.4 %      MPV 9.4 fL      Platelets 207 Thousands/uL      nRBC 0 /100 WBCs      Segmented % 77 %      Immature Grans % 0 %      Lymphocytes % 13 %      Monocytes % 8 %      Eosinophils Relative 2 %      Basophils Relative 0 %      Absolute Neutrophils 7.38 Thousands/µL      Absolute Immature Grans 0.04 Thousand/uL      Absolute Lymphocytes 1.27 Thousands/µL      Absolute Monocytes 0.80 Thousand/µL      Eosinophils Absolute 0.19 Thousand/µL      Basophils Absolute 0.02 Thousands/µL     UA w Reflex to Microscopic w Reflex to Culture [[id number]]  (Abnormal) Collected: 07/12/24 2045    Lab Status: Final result Specimen: Urine, Clean Catch Updated:  07/12/24 2054     Color, UA Straw     Clarity, UA Clear     Specific Gravity, UA <=1.005     pH, UA 6.0     Leukocytes, UA Negative     Nitrite, UA Negative     Protein, UA Negative mg/dl      Glucose, UA Negative mg/dl      Ketones, UA Negative mg/dl      Urobilinogen, UA 0.2 E.U./dl      Bilirubin, UA Negative     Occult Blood, UA Trace-Intact                   CT head without contrast   Final Result by Nadir Overton MD (07/12 2142)      No acute intracranial abnormality.      Similar mild chronic microangiopathy.                  Workstation performed: [id number]         XR chest 1 view portable   ED Interpretation by Nahid Leavitt III, DO (07/12 2110)   Audible chest x-ray shows no acute obvious infiltrate, or occult pneumothoraces pneumothoraces.      Final Result by Zayda Menjivar MD (07/12 2204)      No acute cardiopulmonary disease.            Workstation performed: [id number]                    Procedures  Procedures         ED Course  ED Course as of 07/12/24 2341 Fri Jul 12, 2024 2038 Patient seen and evaluated.  1 day history of generalized malaise, headache, slightly dehydrated because she recently went to a doctor's appointment down in [place] and the ambient temperature was elevated.  Positive urinary frequency by history. Hx of endometrial cancer.    Brief focused differential dx in this patient is as follows: UTI versus electrolyte malady versus COVID vs. Complications from endometrial cancer.   2209 Discharge instructions with the family, now the daughter is at the bedside, stated that she was recently placed on BuSpar, 3 times daily, PCP decreased it to twice daily dosing, imaging unremarkable, patient's physical exam remains unremarkable, stable for discharge to home.                                 SBIRT 22yo+      Flowsheet Row Most Recent Value   Initial Alcohol Screen: US AUDIT-C     1. How often do you have a drink containing alcohol? 0 Filed at: 07/12/2024 2020  "  2. How many drinks containing alcohol do you have on a typical day you are drinking?  0 Filed at: 07/12/2024 2020   3a. Male UNDER 65: How often do you have five or more drinks on one occasion? 0 Filed at: 07/12/2024 2020   3b. FEMALE Any Age, or MALE 65+: How often do you have 4 or more drinks on one occassion? 0 Filed at: 07/12/2024 2020   Audit-C Score 0 Filed at: 07/12/2024 2020   MARY ANNE: How many times in the past year have you...    Used an illegal drug or used a prescription medication for non-medical reasons? Never Filed at: 07/12/2024 2020                      Medical Decision Making  Nontoxic-appearing patient presents with mild headache no nuchal rigidity, no history of fever or chills but does report some mild shaking that appears to be related to possible anxiety.  Workup unremarkable, reassurance given to the patient, no clinical indication for further management this time, reviewed all discharge instructions with the patient at the time of discharge including  and daughter.    Portions of the record may have been created with voice recognition software. Occasional wrong word or \"sound a like\" substitutions may have occurred due to the inherent limitations of voice recognition software. Read the chart carefully and recognize, using context, where substitutions have occurred.       Counseling: I had a detailed discussion with the patient and/or guardian regarding: the historical points, exam findings, and any diagnostic results supporting the discharge diagnosis, lab results, radiology results, discharge instructions reviewed with patient and/or family/caregiver and understanding was verbalized. Instructions given to return to the emergency department if symptoms worsen or persist, or if there are any questions or concerns that arise at home.        Amount and/or Complexity of Data Reviewed  Labs: ordered.  Radiology: ordered and independent interpretation performed.    Risk  OTC drugs.         "         Disposition  Final diagnoses:   Headache   Shakiness     Time reflects when diagnosis was documented in both MDM as applicable and the Disposition within this note       Time User Action Codes Description Comment    7/12/2024  9:52 PM Nahid Leavitt Add [R51.9] Headache     7/12/2024  9:53 PM Nahid Leavitt Add [R25.1] Shakiness           ED Disposition       ED Disposition   Discharge    Condition   Stable    Date/Time   Fri Jul 12, 2024 2146    Comment   Anthony Slater discharge to home/self care.                   Follow-up Information       Follow up With Specialties Details Why Contact Info    Marycarmen Calvillo MD Family Medicine   [address] [place] [id number]  [phone]              Discharge Medication List as of 7/12/2024  9:59 PM        CONTINUE these medications which have NOT CHANGED    Details   atorvastatin (LIPITOR) 40 mg tablet TAKE 1 TABLET BY MOUTH DAILY, Starting Mon 5/13/2024, Normal      busPIRone (BUSPAR) 5 mg tablet Take 1 tablet (5 mg total) by mouth 3 (three) times a day, Starting Mon 3/18/2024, Normal      Cholecalciferol (Vitamin D3) 1.25 MG (64215 UT) CAPS Take 1,000 Units by mouth daily, Historical Med      co-enzyme Q-10 100 mg capsule Take by mouth daily, Historical Med      famotidine (PEPCID) 20 mg tablet Take 1 tablet (20 mg total) by mouth daily at bedtime, Starting Tue 4/9/2024, Normal      !! ferrous sulfate 324 (65 Fe) mg Take 1 tablet (324 mg total) by mouth daily before breakfast, Starting Wed 5/22/2024, Normal      !! ferrous sulfate 324 (65 Fe) mg take 1 tablet by mouth once daily before breakfast, Normal      metoprolol tartrate (LOPRESSOR) 25 mg tablet Take 1 tablet (25 mg total) by mouth in the morning, Starting Mon 2/12/2024, Normal      pantoprazole (PROTONIX) 40 mg tablet Take 1 tablet (40 mg total) by mouth 2 (two) times a day, Starting Tue 4/9/2024, Normal      traZODone (DESYREL) 50 mg tablet Take 1 tablet (50 mg total) by mouth daily at  bedtime, Starting Mon 5/13/2024, Normal      vitamin B-12 (VITAMIN B-12) 1,000 mcg tablet Take 1,000 mcg by mouth daily, Historical Med       !! - Potential duplicate medications found. Please discuss with provider.          No discharge procedures on file.    PDMP Review         Value Time User    PDMP Reviewed  Yes 5/13/2024  3:54 PM Marycarmen Calvillo MD            ED Provider  Electronically Signed by             Nahid Leavitt III,   07/12/24 8593

## 2024-07-28 ENCOUNTER — APPOINTMENT (EMERGENCY)
Dept: CT IMAGING | Facility: HOSPITAL | Age: 79
DRG: 057 | End: 2024-07-28
Payer: MEDICARE

## 2024-07-28 ENCOUNTER — HOSPITAL ENCOUNTER (INPATIENT)
Facility: HOSPITAL | Age: 79
LOS: 1 days | Discharge: HOME | DRG: 057 | End: 2024-07-30
Attending: EMERGENCY MEDICINE | Admitting: HOSPITALIST
Payer: MEDICARE

## 2024-07-28 DIAGNOSIS — R41.0 CONFUSION: ICD-10-CM

## 2024-07-28 DIAGNOSIS — R41.89 COGNITIVE DECLINE: ICD-10-CM

## 2024-07-28 DIAGNOSIS — G93.40 ACUTE ENCEPHALOPATHY: ICD-10-CM

## 2024-07-28 DIAGNOSIS — R29.90 STROKE-LIKE SYMPTOM: Primary | ICD-10-CM

## 2024-07-28 PROBLEM — R11.0 NAUSEA: Status: RESOLVED | Noted: 2024-01-09 | Resolved: 2024-07-28

## 2024-07-28 PROBLEM — N18.31 STAGE 3A CHRONIC KIDNEY DISEASE (HCC): Chronic | Status: ACTIVE | Noted: 2024-01-09

## 2024-07-28 PROBLEM — Z90.722 HISTORY OF TOTAL ABDOMINAL HYSTERECTOMY AND BILATERAL SALPINGO-OOPHORECTOMY: Status: RESOLVED | Noted: 2023-07-31 | Resolved: 2024-07-28

## 2024-07-28 PROBLEM — D50.9 IRON DEFICIENCY ANEMIA: Chronic | Status: ACTIVE | Noted: 2024-03-20

## 2024-07-28 PROBLEM — K21.9 GASTROESOPHAGEAL REFLUX DISEASE: Chronic | Status: ACTIVE | Noted: 2024-01-15

## 2024-07-28 PROBLEM — Z90.79 HISTORY OF TOTAL ABDOMINAL HYSTERECTOMY AND BILATERAL SALPINGO-OOPHORECTOMY: Status: RESOLVED | Noted: 2023-07-31 | Resolved: 2024-07-28

## 2024-07-28 PROBLEM — K31.811 GASTROINTESTINAL HEMORRHAGE ASSOCIATED WITH ANGIODYSPLASIA OF STOMACH AND DUODENUM: Status: RESOLVED | Noted: 2024-03-20 | Resolved: 2024-07-28

## 2024-07-28 PROBLEM — Z90.710 HISTORY OF TOTAL ABDOMINAL HYSTERECTOMY AND BILATERAL SALPINGO-OOPHORECTOMY: Status: RESOLVED | Noted: 2023-07-31 | Resolved: 2024-07-28

## 2024-07-28 PROBLEM — R53.1 WEAKNESS: Status: RESOLVED | Noted: 2024-03-18 | Resolved: 2024-07-28

## 2024-07-28 PROBLEM — R14.2 BELCHING: Status: RESOLVED | Noted: 2024-04-09 | Resolved: 2024-07-28

## 2024-07-28 LAB
ALBUMIN SERPL BCG-MCNC: 4 G/DL (ref 3.5–5)
ALP SERPL-CCNC: 52 U/L (ref 34–104)
ALT SERPL W P-5'-P-CCNC: 14 U/L (ref 7–52)
ANION GAP SERPL CALCULATED.3IONS-SCNC: 9 MMOL/L (ref 4–13)
APTT PPP: 31 SECONDS (ref 23–37)
AST SERPL W P-5'-P-CCNC: 25 U/L (ref 13–39)
ATRIAL RATE: 80 BPM
BACTERIA UR QL AUTO: NORMAL /HPF
BASOPHILS # BLD AUTO: 0.03 THOUSANDS/ÂΜL (ref 0–0.1)
BASOPHILS NFR BLD AUTO: 0 % (ref 0–1)
BILIRUB SERPL-MCNC: 0.41 MG/DL (ref 0.2–1)
BILIRUB UR QL STRIP: NEGATIVE
BUN SERPL-MCNC: 22 MG/DL (ref 5–25)
CALCIUM SERPL-MCNC: 9.2 MG/DL (ref 8.4–10.2)
CHLORIDE SERPL-SCNC: 102 MMOL/L (ref 96–108)
CLARITY UR: CLEAR
CO2 SERPL-SCNC: 24 MMOL/L (ref 21–32)
COLOR UR: ABNORMAL
CREAT SERPL-MCNC: 1.5 MG/DL (ref 0.6–1.3)
EOSINOPHIL # BLD AUTO: 0.3 THOUSAND/ÂΜL (ref 0–0.61)
EOSINOPHIL NFR BLD AUTO: 3 % (ref 0–6)
ERYTHROCYTE [DISTWIDTH] IN BLOOD BY AUTOMATED COUNT: 13.5 % (ref 11.6–15.1)
FLUAV RNA RESP QL NAA+PROBE: NEGATIVE
FLUBV RNA RESP QL NAA+PROBE: NEGATIVE
GFR SERPL CREATININE-BSD FRML MDRD: 32 ML/MIN/1.73SQ M
GLUCOSE SERPL-MCNC: 92 MG/DL (ref 65–140)
GLUCOSE UR STRIP-MCNC: NEGATIVE MG/DL
HCT VFR BLD AUTO: 32.5 % (ref 34.8–46.1)
HGB BLD-MCNC: 10.1 G/DL (ref 11.5–15.4)
HGB UR QL STRIP.AUTO: ABNORMAL
IMM GRANULOCYTES # BLD AUTO: 0.02 THOUSAND/UL (ref 0–0.2)
IMM GRANULOCYTES NFR BLD AUTO: 0 % (ref 0–2)
INR PPP: 1.01 (ref 0.84–1.19)
KETONES UR STRIP-MCNC: NEGATIVE MG/DL
LEUKOCYTE ESTERASE UR QL STRIP: ABNORMAL
LYMPHOCYTES # BLD AUTO: 1.61 THOUSANDS/ÂΜL (ref 0.6–4.47)
LYMPHOCYTES NFR BLD AUTO: 17 % (ref 14–44)
MAGNESIUM SERPL-MCNC: 2 MG/DL (ref 1.9–2.7)
MCH RBC QN AUTO: 26.5 PG (ref 26.8–34.3)
MCHC RBC AUTO-ENTMCNC: 31.1 G/DL (ref 31.4–37.4)
MCV RBC AUTO: 85 FL (ref 82–98)
MONOCYTES # BLD AUTO: 0.76 THOUSAND/ÂΜL (ref 0.17–1.22)
MONOCYTES NFR BLD AUTO: 8 % (ref 4–12)
NEUTROPHILS # BLD AUTO: 6.99 THOUSANDS/ÂΜL (ref 1.85–7.62)
NEUTS SEG NFR BLD AUTO: 72 % (ref 43–75)
NITRITE UR QL STRIP: NEGATIVE
NON-SQ EPI CELLS URNS QL MICRO: NORMAL /HPF
NRBC BLD AUTO-RTO: 0 /100 WBCS
P AXIS: 54 DEGREES
PH UR STRIP.AUTO: 6.5 [PH]
PLATELET # BLD AUTO: 207 THOUSANDS/UL (ref 149–390)
PMV BLD AUTO: 10 FL (ref 8.9–12.7)
POTASSIUM SERPL-SCNC: 4 MMOL/L (ref 3.5–5.3)
PR INTERVAL: 164 MS
PROT SERPL-MCNC: 7.2 G/DL (ref 6.4–8.4)
PROT UR STRIP-MCNC: NEGATIVE MG/DL
PROTHROMBIN TIME: 13.5 SECONDS (ref 11.6–14.5)
QRS AXIS: 32 DEGREES
QRSD INTERVAL: 84 MS
QT INTERVAL: 378 MS
QTC INTERVAL: 435 MS
RBC # BLD AUTO: 3.81 MILLION/UL (ref 3.81–5.12)
RBC #/AREA URNS AUTO: NORMAL /HPF
RSV RNA RESP QL NAA+PROBE: NEGATIVE
SARS-COV-2 RNA RESP QL NAA+PROBE: NEGATIVE
SODIUM SERPL-SCNC: 135 MMOL/L (ref 135–147)
SP GR UR STRIP.AUTO: 1.01
T WAVE AXIS: 50 DEGREES
TSH SERPL DL<=0.05 MIU/L-ACNC: 2.13 UIU/ML (ref 0.45–4.5)
UROBILINOGEN UR QL STRIP.AUTO: 0.2 E.U./DL
VENTRICULAR RATE: 80 BPM
WBC # BLD AUTO: 9.71 THOUSAND/UL (ref 4.31–10.16)
WBC #/AREA URNS AUTO: NORMAL /HPF

## 2024-07-28 PROCEDURE — 36415 COLL VENOUS BLD VENIPUNCTURE: CPT | Performed by: EMERGENCY MEDICINE

## 2024-07-28 PROCEDURE — 99223 1ST HOSP IP/OBS HIGH 75: CPT | Performed by: PHYSICIAN ASSISTANT

## 2024-07-28 PROCEDURE — 99285 EMERGENCY DEPT VISIT HI MDM: CPT | Performed by: EMERGENCY MEDICINE

## 2024-07-28 PROCEDURE — 0241U HB NFCT DS VIR RESP RNA 4 TRGT: CPT | Performed by: EMERGENCY MEDICINE

## 2024-07-28 PROCEDURE — 80053 COMPREHEN METABOLIC PANEL: CPT | Performed by: EMERGENCY MEDICINE

## 2024-07-28 PROCEDURE — 99285 EMERGENCY DEPT VISIT HI MDM: CPT

## 2024-07-28 PROCEDURE — 93005 ELECTROCARDIOGRAM TRACING: CPT

## 2024-07-28 PROCEDURE — 85730 THROMBOPLASTIN TIME PARTIAL: CPT | Performed by: EMERGENCY MEDICINE

## 2024-07-28 PROCEDURE — 85025 COMPLETE CBC W/AUTO DIFF WBC: CPT | Performed by: EMERGENCY MEDICINE

## 2024-07-28 PROCEDURE — 81001 URINALYSIS AUTO W/SCOPE: CPT | Performed by: EMERGENCY MEDICINE

## 2024-07-28 PROCEDURE — 70450 CT HEAD/BRAIN W/O DYE: CPT

## 2024-07-28 PROCEDURE — 93010 ELECTROCARDIOGRAM REPORT: CPT | Performed by: INTERNAL MEDICINE

## 2024-07-28 PROCEDURE — 83735 ASSAY OF MAGNESIUM: CPT | Performed by: EMERGENCY MEDICINE

## 2024-07-28 PROCEDURE — 85610 PROTHROMBIN TIME: CPT | Performed by: EMERGENCY MEDICINE

## 2024-07-28 PROCEDURE — 84443 ASSAY THYROID STIM HORMONE: CPT | Performed by: EMERGENCY MEDICINE

## 2024-07-28 RX ORDER — BUSPIRONE HYDROCHLORIDE 5 MG/1
5 TABLET ORAL 2 TIMES DAILY
Status: DISCONTINUED | OUTPATIENT
Start: 2024-07-28 | End: 2024-07-30 | Stop reason: HOSPADM

## 2024-07-28 RX ORDER — SODIUM CHLORIDE 9 MG/ML
75 INJECTION, SOLUTION INTRAVENOUS CONTINUOUS
Status: DISCONTINUED | OUTPATIENT
Start: 2024-07-28 | End: 2024-07-29

## 2024-07-28 RX ORDER — PANTOPRAZOLE SODIUM 40 MG/1
40 TABLET, DELAYED RELEASE ORAL 2 TIMES DAILY
Status: DISCONTINUED | OUTPATIENT
Start: 2024-07-28 | End: 2024-07-30 | Stop reason: HOSPADM

## 2024-07-28 RX ORDER — HYDRALAZINE HYDROCHLORIDE 20 MG/ML
10 INJECTION INTRAMUSCULAR; INTRAVENOUS EVERY 6 HOURS PRN
Status: DISCONTINUED | OUTPATIENT
Start: 2024-07-28 | End: 2024-07-30 | Stop reason: HOSPADM

## 2024-07-28 RX ORDER — ONDANSETRON 2 MG/ML
4 INJECTION INTRAMUSCULAR; INTRAVENOUS EVERY 6 HOURS PRN
Status: DISCONTINUED | OUTPATIENT
Start: 2024-07-28 | End: 2024-07-30 | Stop reason: HOSPADM

## 2024-07-28 RX ORDER — ATORVASTATIN CALCIUM 40 MG/1
40 TABLET, FILM COATED ORAL EVERY EVENING
Status: DISCONTINUED | OUTPATIENT
Start: 2024-07-28 | End: 2024-07-30 | Stop reason: HOSPADM

## 2024-07-28 RX ORDER — FERROUS SULFATE 325(65) MG
325 TABLET ORAL
Status: DISCONTINUED | OUTPATIENT
Start: 2024-07-29 | End: 2024-07-30 | Stop reason: HOSPADM

## 2024-07-28 RX ORDER — ENOXAPARIN SODIUM 100 MG/ML
40 INJECTION SUBCUTANEOUS DAILY
Status: DISCONTINUED | OUTPATIENT
Start: 2024-07-29 | End: 2024-07-28

## 2024-07-28 RX ORDER — FAMOTIDINE 20 MG/1
10 TABLET, FILM COATED ORAL
Status: DISCONTINUED | OUTPATIENT
Start: 2024-07-28 | End: 2024-07-30 | Stop reason: HOSPADM

## 2024-07-28 RX ORDER — TRAZODONE HYDROCHLORIDE 50 MG/1
50 TABLET ORAL
Status: DISCONTINUED | OUTPATIENT
Start: 2024-07-28 | End: 2024-07-30 | Stop reason: HOSPADM

## 2024-07-28 RX ORDER — HEPARIN SODIUM 5000 [USP'U]/ML
5000 INJECTION, SOLUTION INTRAVENOUS; SUBCUTANEOUS EVERY 8 HOURS SCHEDULED
Status: DISCONTINUED | OUTPATIENT
Start: 2024-07-28 | End: 2024-07-30 | Stop reason: HOSPADM

## 2024-07-28 RX ORDER — UBIDECARENONE 30 MG
100 CAPSULE ORAL DAILY
Status: DISCONTINUED | OUTPATIENT
Start: 2024-07-29 | End: 2024-07-30 | Stop reason: HOSPADM

## 2024-07-28 RX ADMIN — TRAZODONE HYDROCHLORIDE 50 MG: 50 TABLET ORAL at 20:56

## 2024-07-28 RX ADMIN — BUSPIRONE HYDROCHLORIDE 5 MG: 5 TABLET ORAL at 20:53

## 2024-07-28 RX ADMIN — HEPARIN SODIUM 5000 UNITS: 5000 INJECTION, SOLUTION INTRAVENOUS; SUBCUTANEOUS at 20:55

## 2024-07-28 RX ADMIN — ATORVASTATIN CALCIUM 40 MG: 40 TABLET, FILM COATED ORAL at 20:53

## 2024-07-28 RX ADMIN — SODIUM CHLORIDE 1000 ML: 0.9 INJECTION, SOLUTION INTRAVENOUS at 19:47

## 2024-07-28 RX ADMIN — FAMOTIDINE 10 MG: 20 TABLET, FILM COATED ORAL at 20:54

## 2024-07-28 RX ADMIN — PANTOPRAZOLE SODIUM 40 MG: 40 TABLET, DELAYED RELEASE ORAL at 20:56

## 2024-07-28 NOTE — ASSESSMENT & PLAN NOTE
Patient with headache and confusion, forgot her daughters name and dysarthria, r/o CVA, possible hypertensive encephalopathy with BP as high as 210/88  CT head negative  Stroke pathway.  Permissive HTN

## 2024-07-28 NOTE — ASSESSMENT & PLAN NOTE
Lab Results   Component Value Date    EGFR 32 07/28/2024    EGFR 40 07/12/2024    EGFR 53 03/22/2024    CREATININE 1.50 (H) 07/28/2024    CREATININE 1.26 07/12/2024    CREATININE 0.99 03/22/2024   Creatinine up from baseline of 1-1.2  IV fluids  Monitor

## 2024-07-28 NOTE — H&P
[facility]  H&P  Name: Anthony Slater 79 y.o. female I MRN: [id number]  Unit/Bed#: PAIGE I Date of Admission: 7/28/2024   Date of Service: 7/28/2024 I Hospital Day: 0      Assessment & Plan   * Acute encephalopathy  Assessment & Plan  Patient with headache and confusion, forgot her daughters name and dysarthria, r/o CVA, possible hypertensive encephalopathy with BP as high as 210/88  CT head negative  Stroke pathway.  Permissive HTN    Iron deficiency anemia  Assessment & Plan  Continue p.o. iron    Gastroesophageal reflux disease  Assessment & Plan  Continue PPI    Stage 3a chronic kidney disease (HCC)  Assessment & Plan  Lab Results   Component Value Date    EGFR 32 07/28/2024    EGFR 40 07/12/2024    EGFR 53 03/22/2024    CREATININE 1.50 (H) 07/28/2024    CREATININE 1.26 07/12/2024    CREATININE 0.99 03/22/2024   Creatinine up from baseline of 1-1.2  IV fluids  Monitor    Hyperlipidemia  Assessment & Plan  Check fasting lipid panel    Anxiety state  Assessment & Plan  Continue BuSpar       VTE Pharmacologic Prophylaxis: VTE Score: 8 High Risk (Score >/= 5) - Pharmacological DVT Prophylaxis Ordered: enoxaparin (Lovenox). Sequential Compression Devices Ordered.  Code Status: Full code  Discussion with patient and     Anticipated Length of Stay: Patient will be admitted on an observation basis with an anticipated length of stay of less than 2 midnights secondary to stroke rule out.    Total Time Spent on Date of Encounter in care of patient: 75 mins. This time was spent on one or more of the following: performing physical exam; counseling and coordination of care; obtaining or reviewing history; documenting in the medical record; reviewing/ordering tests, medications or procedures; communicating with other healthcare professionals and discussing with patient's family/caregivers.    Chief Complaint: Confusion    History of Present Illness:  Anthony Slater is a 79 y.o. female with a  PMH of anxiety, hypertension, COPD, DAVID on CPAP, diabetes mellitus type 2, GERD who presents with confusion. Per , patient had similar symptoms few days ago with headache, confusion, not able function. Today she tried to make something for supper and she did not know how to do anything. Symptoms have resolved and is now better, near her baseline per her . She was fatigued, no weakness noted in extremities. Had difficulty getting her words out. Did not remember daughters name.     Review of Systems:  Review of Systems   Constitutional:  Positive for fatigue.   Respiratory:  Positive for shortness of breath. Negative for cough.    Cardiovascular:  Negative for chest pain and leg swelling.   Neurological:  Positive for headaches. Negative for speech difficulty, weakness and numbness.   Psychiatric/Behavioral:  Positive for confusion.    All other systems reviewed and are negative.      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    History of transfusion     Hypertension     Mixed hyperlipidemia     DAVID on CPAP     Shortness of breath     triggered by anxiety    Watermelon stomach     Wears dentures        Past Surgical History:   Procedure Laterality Date    BREAST BIOPSY Right 02/24/2015    BREAST BIOPSY Left     ? year    CARDIAC CATHETERIZATION  02/07/2014    EF 65%, mid LAD 60% stenosis and D2 70% stenosis.  Medical management.    CATARACT EXTRACTION  Bilateral     COLONOSCOPY      CYSTOSCOPY N/A 07/31/2023    Procedure: CYSTOSCOPY;  Surgeon: Nadir Ash MD;  Location: [facility];  Service: Gynecology Oncology    EGD      JOINT REPLACEMENT Right     RIGHT ELBOW    OOPHORECTOMY      not sure which one was removed    MI LAPS TOTAL HYSTERECT 250 GM/< W/RMVL TUBE/OVARY N/A 07/31/2023    Procedure: ROBOTHYSTERECTOMY, BILATERAL SALPINGO-OOPHORECTOMY, PELVIC SENTINEL NODE BIOPSIES;  Surgeon: Nadir Ash MD;  Location: [facility];  Service: Gynecology Oncology    ROTATOR CUFF REPAIR Right     US GUIDED THYROID BIOPSY  12/04/2020       Meds/Allergies:  Prior to Admission medications    Medication Sig Start Date End Date Taking? Authorizing Provider   atorvastatin (LIPITOR) 40 mg tablet TAKE 1 TABLET BY MOUTH DAILY 5/13/24   Marycarmen Calvillo MD   busPIRone (BUSPAR) 5 mg tablet Take 1 tablet (5 mg total) by mouth 3 (three) times a day  Patient taking differently: Take 5 mg by mouth 2 (two) times a day 3/18/24   Marycarmen Calvillo MD   Cholecalciferol (Vitamin D3) 1.25 MG (63977 UT) CAPS Take 1,000 Units by mouth daily    Historical Provider, MD   co-enzyme Q-10 100 mg capsule Take by mouth daily    Historical Provider, MD   famotidine (PEPCID) 20 mg tablet Take 1 tablet (20 mg total) by mouth daily at bedtime 4/9/24   DOTTIE Jackson   ferrous sulfate 324 (65 Fe) mg Take 1 tablet (324 mg total) by mouth daily before breakfast 5/22/24   DOTTIE Jackson   ferrous sulfate 324 (65 Fe) mg take 1 tablet by mouth once daily before breakfast 7/2/24   DOTTIE Jackson   metoprolol tartrate (LOPRESSOR) 25 mg tablet Take 1 tablet (25 mg total) by mouth in the morning 2/12/24   Marycarmen Calvillo MD   pantoprazole (PROTONIX) 40 mg tablet Take 1 tablet (40 mg total) by mouth 2 (two) times a day 4/9/24   DOTTIE Jackson   traZODone (DESYREL) 50 mg tablet Take 1 tablet (50 mg total) by mouth daily at bedtime 5/13/24   Marycarmen Calvillo MD   vitamin B-12  (VITAMIN B-12) 1,000 mcg tablet Take 1,000 mcg by mouth daily    Historical Provider, MD     I have reviewed home medications with patient personally.    Allergies:   Allergies   Allergen Reactions    Advil [Ibuprofen]     Aspirin Other (See Comments)     gi bleed    Caspofungin Other (See Comments)    Hydrocodone     Pravastatin GI Intolerance    Tramadol GI Intolerance       Social History:  Marital Status: /Civil Union   Occupation: Retired  Patient Pre-hospital Living Situation: With spouse  Patient Pre-hospital Level of Mobility: walks  Patient Pre-hospital Diet Restrictions: none  Substance Use History:   Social History     Substance and Sexual Activity   Alcohol Use Not Currently     Social History     Tobacco Use   Smoking Status Never    Passive exposure: Never   Smokeless Tobacco Never     Social History     Substance and Sexual Activity   Drug Use Never       Family History:  Family History   Problem Relation Age of Onset    Heart disease Mother     Coronary artery disease Sister         history of CABG    No Known Problems Father     No Known Problems Daughter     No Known Problems Sister     No Known Problems Sister     No Known Problems Sister     No Known Problems Daughter        Physical Exam:     Vitals:   Blood Pressure: (!) 189/76 (07/28/24 1940)  Pulse: 72 (07/28/24 1940)  Temperature: 98 °F (36.7 °C) (07/28/24 1830)  Respirations: 19 (07/28/24 1940)  SpO2: 99 % (07/28/24 1940)    Physical Exam  Vitals reviewed.   Constitutional:       Appearance: Normal appearance.      Comments: Frail elderly  female   HENT:      Head: Normocephalic and atraumatic.      Nose: Nose normal.   Eyes:      General:         Right eye: No discharge.         Left eye: No discharge.      Extraocular Movements: Extraocular movements intact.      Conjunctiva/sclera: Conjunctivae normal.   Cardiovascular:      Rate and Rhythm: Normal rate and regular rhythm.   Pulmonary:      Effort: Pulmonary effort is  normal. No respiratory distress.      Breath sounds: Normal breath sounds. No wheezing.   Abdominal:      General: Bowel sounds are normal. There is no distension.      Palpations: Abdomen is soft.      Tenderness: There is no abdominal tenderness. There is no guarding.   Musculoskeletal:         General: No swelling or tenderness. Normal range of motion.      Cervical back: Normal range of motion.      Right lower leg: No edema.      Left lower leg: No edema.   Skin:     General: Skin is warm and dry.      Capillary Refill: Capillary refill takes less than 2 seconds.      Coloration: Skin is pale.   Neurological:      General: No focal deficit present.      Mental Status: She is alert. Mental status is at baseline.      Motor: Weakness present.      Comments: Speech is slow, moving all extremities   Psychiatric:         Mood and Affect: Mood normal.         Behavior: Behavior normal.        National Institutes of Health Stroke Scale (NIHSS)          1a.  Level of  Consciousness (LOC) 0 = Alert, keenly responsive  1 = Not alert, but arousable by minor        stimulation.  2 = Not alert, required repeated stimulation to         attend.  3 = Responds only with reflex motor or totally         unresponsive       1a.  0   1b.  LOC Questions  Asked to say month and his/her age 0 = Answers both questions correctly.   1 = Answers one question correctly        (dysarthric, intubated).  2 = Answers neither question correctly        (aphasic, stupor).      1b.  0   1c.  LOC Commands  Asked to open & close eyes, then  & release with non-affected hand. 0 = Performs both tasks correctly.  1 = Performs one task correctly.  2 = Performs neither task correctly.     1c.  0     2.  Best Gaze  Asked to follow with eyes through horizontal plane. 0 = Normal  1 = Partial gaze palsy.  2 = Forced deviation or total gaze paresis.   2.  0   3.  Visual  Visual fields (quadrants) tested with finger counting or visual threat. 0 = No visual  "loss.  1 = Partial hemianopia (extinction).  2 = Complete hemianopia.  3 = Bilateral hemianopia (including         blindness).       3.  0   4.  Facial Palsy  Asked to show teeth & raise eyebrows. 0 = Normal symmetrical movement.  1 = Minor paralysis.  2 = Partial paralysis (total/near total         paralysis of lower face).  3 = Complete paralysis of one or both         sides (upper & lower).         4.  0   5.  Motor Arm  Asked to extend arms (palm down) 90º (if sitting) or 45º (if supine) & hold for 10 seconds. 0 = No drift; arm stays at 90º/45º for full 10        seconds.  1 = Drift; arm drifts down but does not hit         bed or other support.  2 = Some effort against gravity; drifts down         to bed or support.  3 = No effort against gravity: arm falls to         bed or support.  4 = No movement.  9 = Amputation, joint fusion. 5a. (Left)       0      5b.  (Right)        0    6.  Motor Leg  While supine, asked to hold leg at 30º for 5 seconds. 0 = No drift; leg stays at 30º for full 5        seconds.  1 = Drift; leg drifts down but does not hit         the bed or other support.  2 = Some effort against gravity; drifts down         to bed or support.  3 = No effort against gravity; leg falls to         bed or support.  4 = No movement.  9 = Amputation, joint fusion.    6a.  (Left)       0        6b.  (Right)       0   7.  Limb Ataxia  Finger - nose & heel shin tests on both sides.  0 = Absent.  1 = Present in one limb.  2 = Present in two limbs.     7.  0   8.  Sensory  Sensation or grimace to pin prick or withdrawal from noxious stimuli in obtunded or aphasic patient.  0 = Normal; no sensory loss.  1 = Mild / moderate sensory loss; may be        dulled / \"not as sharp\".  2 = Severe / total sensory loss; coma     8.  0   9.  Best Language  Asked to describe a picture, name objects & read simple words.  (See NIHSS language tools). 0 = No aphasia; normal  1 = Mild / moderate aphasia; some loss of        " fluency / comprehension without        limitation of expression of ideas (can        identify picture from patient's        responses).  2 = Severe aphasia (cannot identify         pictures from responses).  3 = Mute; global aphasia; no usable        speech; cannot follow simple        commands.           9.  0   10.  Dysarthria   0 = Normal.  1 = Mild / moderate; slurs some words;         can be understood.  2 = Severe; so slurred as to be         unintelligible; mute;anarthric.    9 = Intubated or other physical barrier.       10.  0   11.  Extinction & Inattention  Look at Visual (from #3) and double simultaneous tactile.    0 = No abnormality.  1 = Inattention or extinction in one sensory         modality.  2 = Profound yoandy inattention or        inattention to more than one modality;        does not recognize own hand; orients        to only one side of space.         11.  0     Complete NIHSS Score (0-42): 0             Additional Data:     Lab Results:  Results from last 7 days   Lab Units 07/28/24  1832   WBC Thousand/uL 9.71   HEMOGLOBIN g/dL 10.1*   HEMATOCRIT % 32.5*   PLATELETS Thousands/uL 207   SEGS PCT % 72   LYMPHO PCT % 17   MONO PCT % 8   EOS PCT % 3     Results from last 7 days   Lab Units 07/28/24  1832   SODIUM mmol/L 135   POTASSIUM mmol/L 4.0   CHLORIDE mmol/L 102   CO2 mmol/L 24   BUN mg/dL 22   CREATININE mg/dL 1.50*   ANION GAP mmol/L 9   CALCIUM mg/dL 9.2   ALBUMIN g/dL 4.0   TOTAL BILIRUBIN mg/dL 0.41   ALK PHOS U/L 52   ALT U/L 14   AST U/L 25   GLUCOSE RANDOM mg/dL 92     Results from last 7 days   Lab Units 07/28/24  1832   INR  1.01         Lab Results   Component Value Date    HGBA1C 5.0 07/08/2023           Lines/Drains:  Invasive Devices       Peripheral Intravenous Line  Duration             Peripheral IV 07/28/24 Left;Proximal;Ventral (anterior) Forearm <1 day    Peripheral IV 07/28/24 Right;Ventral (anterior) Forearm <1 day                  Imaging: Reviewed radiology reports  from this admission including: CT head  CT head without contrast   Final Result by Chet Johnson MD (07/28 1907)      No acute intracranial abnormality.                  Workstation performed: [id number]         MRI Inpatient Order    (Results Pending)     EKG and Other Studies Reviewed on Admission:   EKG: Personally Reviewed. NSR. HR 80.    ** Please Note: This note has been constructed using a voice recognition system. **

## 2024-07-28 NOTE — ED PROVIDER NOTES
"History  Chief Complaint   Patient presents with    Headache    Altered Mental Status     Patient has had intermittent episodes of confusion. Prior to arrival per family patient \"forgot how to cook dinner\"     79-year-old female presents with confusion over the last 1 to 2 days.  Also complaining of a headache.      Headache  Altered Mental Status  Associated symptoms: headaches        Prior to Admission Medications   Prescriptions Last Dose Informant Patient Reported? Taking?   Cholecalciferol (VITAMIN D3) 1,000 units tablet   Yes Yes   Sig: Take 1,000 Units by mouth daily   atorvastatin (LIPITOR) 40 mg tablet  Self No No   Sig: TAKE 1 TABLET BY MOUTH DAILY   busPIRone (BUSPAR) 5 mg tablet  Self No No   Sig: Take 1 tablet (5 mg total) by mouth 3 (three) times a day   Patient taking differently: Take 5 mg by mouth 2 (two) times a day   co-enzyme Q-10 100 mg capsule  Self Yes No   Sig: Take by mouth daily   famotidine (PEPCID) 20 mg tablet  Self No No   Sig: Take 1 tablet (20 mg total) by mouth daily at bedtime   ferrous sulfate 324 (65 Fe) mg  Self No No   Sig: Take 1 tablet (324 mg total) by mouth daily before breakfast   ferrous sulfate 324 (65 Fe) mg  Self No No   Sig: take 1 tablet by mouth once daily before breakfast   metoprolol tartrate (LOPRESSOR) 25 mg tablet  Self No No   Sig: Take 1 tablet (25 mg total) by mouth in the morning   pantoprazole (PROTONIX) 40 mg tablet  Self No No   Sig: Take 1 tablet (40 mg total) by mouth 2 (two) times a day   vitamin B-12 (VITAMIN B-12) 1,000 mcg tablet  Self Yes No   Sig: Take 1,000 mcg by mouth daily      Facility-Administered Medications: None       Past Medical History:   Diagnosis Date    Anxiety     Arthritis     Benign hypertension     Chronic pain disorder     back    COPD (chronic obstructive pulmonary disease) (HCC)     Coronary artery disease     medical management    CPAP (continuous positive airway pressure) dependence     Diabetes (HCC)     Endometrial " adenocarcinoma (HCC)     Epilepsy (HCC)     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    History of transfusion     Hypertension     Mixed hyperlipidemia     DAVID on CPAP     Shortness of breath     triggered by anxiety    Watermelon stomach     Wears dentures        Past Surgical History:   Procedure Laterality Date    BREAST BIOPSY Right 02/24/2015    BREAST BIOPSY Left     ? year    CARDIAC CATHETERIZATION  02/07/2014    EF 65%, mid LAD 60% stenosis and D2 70% stenosis.  Medical management.    CATARACT EXTRACTION Bilateral     COLONOSCOPY      CYSTOSCOPY N/A 07/31/2023    Procedure: CYSTOSCOPY;  Surgeon: Nadir Ash MD;  Location: [facility];  Service: Gynecology Oncology    EGD      JOINT REPLACEMENT Right     RIGHT ELBOW    OOPHORECTOMY      not sure which one was removed    VT LAPS TOTAL HYSTERECT 250 GM/< W/RMVL TUBE/OVARY N/A 07/31/2023    Procedure: ROBOTHYSTERECTOMY, BILATERAL SALPINGO-OOPHORECTOMY, PELVIC SENTINEL NODE BIOPSIES;  Surgeon: Nadir Ash MD;  Location: [facility];  Service: Gynecology Oncology    ROTATOR CUFF REPAIR Right     US GUIDED THYROID BIOPSY  12/04/2020       Family History   Problem Relation Age of Onset    Heart disease Mother     Coronary artery disease Sister         history of CABG    No Known Problems Father     No Known Problems Daughter     No Known Problems Sister     No Known Problems Sister     No Known Problems Sister     No Known Problems Daughter      I have reviewed and agree with the history as documented.    E-Cigarette/Vaping    E-Cigarette Use Never User      E-Cigarette/Vaping Substances    Nicotine No     THC No     CBD No     Flavoring No     Other No     Unknown No      Social History     Tobacco Use    Smoking status: Never     Passive exposure: Never    Smokeless tobacco: Never   Vaping Use     Vaping status: Never Used   Substance Use Topics    Alcohol use: Not Currently    Drug use: Never       Review of Systems   Neurological:  Positive for headaches.       Physical Exam  Physical Exam  Vitals and nursing note reviewed.   Constitutional:       Appearance: She is normal weight.   HENT:      Head: Normocephalic and atraumatic.   Eyes:      Conjunctiva/sclera: Conjunctivae normal.      Pupils: Pupils are equal, round, and reactive to light.   Cardiovascular:      Rate and Rhythm: Normal rate and regular rhythm.   Pulmonary:      Effort: Pulmonary effort is normal. No respiratory distress.   Abdominal:      General: Abdomen is flat. There is no distension.   Musculoskeletal:         General: No swelling or deformity.      Cervical back: Normal range of motion and neck supple.   Skin:     General: Skin is dry.      Coloration: Skin is not jaundiced.   Neurological:      General: No focal deficit present.      Mental Status: She is alert and oriented to person, place, and time.   Psychiatric:         Mood and Affect: Mood normal.         Thought Content: Thought content normal.         Vital Signs  ED Triage Vitals   Temperature Pulse Respirations Blood Pressure SpO2   07/28/24 1830 07/28/24 1830 07/28/24 1830 07/28/24 1830 07/28/24 1830   98 °F (36.7 °C) 74 21 (!) 210/88 100 %      Temp Source Heart Rate Source Patient Position - Orthostatic VS BP Location FiO2 (%)   07/29/24 0715 -- 07/29/24 0715 07/29/24 0715 --   Oral  Sitting Right arm       Pain Score       07/28/24 2049       No Pain           Vitals:    07/29/24 2307 07/29/24 2325 07/30/24 0305 07/30/24 0700   BP:  154/98 (!) 183/81 141/77   Pulse: 96 96 94 77   Patient Position - Orthostatic VS:    Sitting         Visual Acuity  Visual Acuity      Flowsheet Row Most Recent Value   L Pupil Size (mm) 3   R Pupil Size (mm) 3   L Pupil Shape Other (Comment)   R Pupil Shape Round            ED Medications  Medications   sodium chloride 0.9 % bolus 1,000  mL (1,000 mL Intravenous New Bag 7/28/24 1947)       Diagnostic Studies  Results Reviewed       Procedure Component Value Units Date/Time    Hemoglobin A1c w/EAG Estimation [[id number]] Collected: 07/29/24 0446    Lab Status: Final result Specimen: Blood from Arm, Right Updated: 07/29/24 0839     Hemoglobin A1C 5.5 %       mg/dl     Lipid Panel with Direct LDL reflex [[id number]]  (Abnormal) Collected: 07/29/24 0446    Lab Status: Final result Specimen: Blood from Arm, Right Updated: 07/29/24 0519     Cholesterol 117 mg/dL      Triglycerides 84 mg/dL      HDL, Direct 41 mg/dL      LDL Calculated 59 mg/dL     Basic metabolic panel [[id number]]  (Abnormal) Collected: 07/29/24 0446    Lab Status: Final result Specimen: Blood from Arm, Right Updated: 07/29/24 0519     Sodium 140 mmol/L      Potassium 4.0 mmol/L      Chloride 110 mmol/L      CO2 25 mmol/L      ANION GAP 5 mmol/L      BUN 19 mg/dL      Creatinine 1.28 mg/dL      Glucose 89 mg/dL      Glucose, Fasting 89 mg/dL      Calcium 8.7 mg/dL      eGFR 39 ml/min/1.73sq m     Narrative:      National Kidney Disease Foundation guidelines for Chronic Kidney Disease (CKD):     Stage 1 with normal or high GFR (GFR > 90 mL/min/1.73 square meters)    Stage 2 Mild CKD (GFR = 60-89 mL/min/1.73 square meters)    Stage 3A Moderate CKD (GFR = 45-59 mL/min/1.73 square meters)    Stage 3B Moderate CKD (GFR = 30-44 mL/min/1.73 square meters)    Stage 4 Severe CKD (GFR = 15-29 mL/min/1.73 square meters)    Stage 5 End Stage CKD (GFR <15 mL/min/1.73 square meters)  Note: GFR calculation is accurate only with a steady state creatinine    FLU/RSV/COVID - if FLU/RSV clinically relevant [[id number]]  (Normal) Collected: 07/28/24 1832    Lab Status: Final result Specimen: Nares from Nose Updated: 07/28/24 1917     SARS-CoV-2 Negative     INFLUENZA A PCR Negative     INFLUENZA B PCR Negative     RSV PCR Negative    Narrative:      FOR PEDIATRIC PATIENTS - copy/paste COVID Guidelines  URL to browser: [email or web address]    SARS-CoV-2 assay is a Nucleic Acid Amplification assay intended for the  qualitative detection of nucleic acid from SARS-CoV-2 in nasopharyngeal  swabs. Results are for the presumptive identification of SARS-CoV-2 RNA.    Positive results are indicative of infection with SARS-CoV-2, the virus  causing COVID-19, but do not rule out bacterial infection or co-infection  with other viruses. Laboratories within the [place] and its  territories are required to report all positive results to the appropriate  public health authorities. Negative results do not preclude SARS-CoV-2  infection and should not be used as the sole basis for treatment or other  patient management decisions. Negative results must be combined with  clinical observations, patient history, and epidemiological information.  This test has not been FDA cleared or approved.    This test has been authorized by FDA under an Emergency Use Authorization  (EUA). This test is only authorized for the duration of time the  declaration that circumstances exist justifying the authorization of the  emergency use of an in vitro diagnostic tests for detection of SARS-CoV-2  virus and/or diagnosis of COVID-19 infection under section 564(b)(1) of  the Act, 21 U.S.C. 360bbb-3(b)(1), unless the authorization is terminated  or revoked sooner. The test has been validated but independent review by FDA  and CLIA is pending.    Test performed using [employer] GeneXpert: This RT-PCR assay targets N2,  a region unique to SARS-CoV-2. A conserved region in the E-gene was chosen  for pan-Sarbecovirus detection which includes SARS-CoV-2.    According to CMS-2020-01-R, this platform meets the definition of high-throughput technology.    TSH, 3rd generation with Free T4 reflex [[id number]]  (Normal) Collected: 07/28/24 1832    Lab Status: Final result Specimen: Blood from Arm, Right Updated:  07/28/24 1916     TSH 3RD GENERATON 2.135 uIU/mL     Urine Microscopic [[id number]]  (Normal) Collected: 07/28/24 1852    Lab Status: Final result Specimen: Urine, Clean Catch Updated: 07/28/24 1908     RBC, UA 0-1 /hpf      WBC, UA 0-1 /hpf      Epithelial Cells Occasional /hpf      Bacteria, UA Occasional /hpf     Comprehensive metabolic panel [[id number]]  (Abnormal) Collected: 07/28/24 1832    Lab Status: Final result Specimen: Blood from Arm, Right Updated: 07/28/24 1904     Sodium 135 mmol/L      Potassium 4.0 mmol/L      Chloride 102 mmol/L      CO2 24 mmol/L      ANION GAP 9 mmol/L      BUN 22 mg/dL      Creatinine 1.50 mg/dL      Glucose 92 mg/dL      Calcium 9.2 mg/dL      AST 25 U/L      ALT 14 U/L      Alkaline Phosphatase 52 U/L      Total Protein 7.2 g/dL      Albumin 4.0 g/dL      Total Bilirubin 0.41 mg/dL      eGFR 32 ml/min/1.73sq m     Narrative:      National Kidney Disease Foundation guidelines for Chronic Kidney Disease (CKD):     Stage 1 with normal or high GFR (GFR > 90 mL/min/1.73 square meters)    Stage 2 Mild CKD (GFR = 60-89 mL/min/1.73 square meters)    Stage 3A Moderate CKD (GFR = 45-59 mL/min/1.73 square meters)    Stage 3B Moderate CKD (GFR = 30-44 mL/min/1.73 square meters)    Stage 4 Severe CKD (GFR = 15-29 mL/min/1.73 square meters)    Stage 5 End Stage CKD (GFR <15 mL/min/1.73 square meters)  Note: GFR calculation is accurate only with a steady state creatinine    Magnesium [[id number]]  (Normal) Collected: 07/28/24 1832    Lab Status: Final result Specimen: Blood from Arm, Right Updated: 07/28/24 1904     Magnesium 2.0 mg/dL     UA w Reflex to Microscopic w Reflex to Culture [[id number]]  (Abnormal) Collected: 07/28/24 1852    Lab Status: Final result Specimen: Urine, Clean Catch Updated: 07/28/24 1858     Color, UA Straw     Clarity, UA Clear     Specific Gravity, UA 1.010     pH, UA 6.5     Leukocytes, UA Trace     Nitrite, UA Negative     Protein, UA Negative mg/dl      Glucose,  UA Negative mg/dl      Ketones, UA Negative mg/dl      Urobilinogen, UA 0.2 E.U./dl      Bilirubin, UA Negative     Occult Blood, UA Trace-Intact    Protime-INR [[id number]]  (Normal) Collected: 07/28/24 1832    Lab Status: Final result Specimen: Blood from Arm, Right Updated: 07/28/24 1855     Protime 13.5 seconds      INR 1.01    APTT [[id number]]  (Normal) Collected: 07/28/24 1832    Lab Status: Final result Specimen: Blood from Arm, Right Updated: 07/28/24 1855     PTT 31 seconds     CBC and differential [[id number]]  (Abnormal) Collected: 07/28/24 1832    Lab Status: Final result Specimen: Blood from Arm, Right Updated: 07/28/24 1839     WBC 9.71 Thousand/uL      RBC 3.81 Million/uL      Hemoglobin 10.1 g/dL      Hematocrit 32.5 %      MCV 85 fL      MCH 26.5 pg      MCHC 31.1 g/dL      RDW 13.5 %      MPV 10.0 fL      Platelets 207 Thousands/uL      nRBC 0 /100 WBCs      Segmented % 72 %      Immature Grans % 0 %      Lymphocytes % 17 %      Monocytes % 8 %      Eosinophils Relative 3 %      Basophils Relative 0 %      Absolute Neutrophils 6.99 Thousands/µL      Absolute Immature Grans 0.02 Thousand/uL      Absolute Lymphocytes 1.61 Thousands/µL      Absolute Monocytes 0.76 Thousand/µL      Eosinophils Absolute 0.30 Thousand/µL      Basophils Absolute 0.03 Thousands/µL                    VAS carotid complete study   Final Result by Jaciel Tariq MD (07/30 1900)      MRI brain wo contrast   Final Result by Gene Vitale MD (07/29 1426)   Addendum (preliminary) 2 of 2 by Gene Vitale MD (07/29 1426)   ADDENDUM:         The results of this addendum were communicated to Dr. Claude Myrick on    7/29/2024 at approximately 2:00 PM.            Final      Normal.      Workstation performed: [id number]         CT head without contrast   Final Result by Chet Johnson MD (07/28 1907)      No acute intracranial abnormality.                  Workstation performed: [id number]                     Procedures  Procedures         ED Course                                 SBIRT 22yo+      Flowsheet Row Most Recent Value   Initial Alcohol Screen: US AUDIT-C     1. How often do you have a drink containing alcohol? 0 Filed at: 07/28/2024 1825   2. How many drinks containing alcohol do you have on a typical day you are drinking?  0 Filed at: 07/28/2024 1825   3a. Male UNDER 65: How often do you have five or more drinks on one occasion? 0 Filed at: 07/28/2024 1825   3b. FEMALE Any Age, or MALE 65+: How often do you have 4 or more drinks on one occassion? 0 Filed at: 07/28/2024 1825   Audit-C Score 0 Filed at: 07/28/2024 1825   MARY ANNE: How many times in the past year have you...    Used an illegal drug or used a prescription medication for non-medical reasons? Never Filed at: 07/28/2024 1825                      Medical Decision Making  79-year-old female presents with confusion over last several days.  Has had similar episodes in the past.  Also describes generalized headache.  Daughter reports slurred speech and confusion regarding certain events.    No focal deficits on exam, stuttered speech no obvious slurring or focal deficits.  Possible CVA however unclear onset, possible exacerbation of chronic dementia/sundowning.    Labs unremarkable for electrolyte abnormalities or renal failure.  CT scan ordered to look for hemorrhage or subacute infarct.  Viewed and interpreted independent by me, no acute disease.  Formal read was appreciated.  After discussion with daughter and family will keep patient overnight for observation however at some point during her ED stay she did return to her baseline.    Amount and/or Complexity of Data Reviewed  Independent Historian: caregiver  Labs: ordered. Decision-making details documented in ED Course.  Radiology: ordered and independent interpretation performed. Decision-making details documented in ED Course.    Risk  Decision regarding hospitalization.                  Disposition  Final diagnoses:   Acute encephalopathy   Stroke-like symptom   Cognitive decline     Time reflects when diagnosis was documented in both MDM as applicable and the Disposition within this note       Time User Action Codes Description Comment    7/28/2024  8:01 PM Daniella Disla Add [G93.40] Acute encephalopathy     7/28/2024  8:01 PM Daniella Disla Add [R29.90] Stroke-like symptom     7/28/2024  8:03 PM Daniella Disla Modify [G93.40] Acute encephalopathy     7/28/2024  8:03 PM Daniella Disla Modify [R29.90] Stroke-like symptom     7/30/2024 10:21 AM Kirk Myrick Add [R41.89] Cognitive decline           ED Disposition       ED Disposition   Admit    Condition   Stable    Date/Time   Sun Jul 28, 2024 1944    Comment   Case was discussed with gómez and the patient's admission status was agreed to be Admission Status: observation status to the service of Dr. magaña .               Follow-up Information       Follow up With Specialties Details Why Contact Info    Marycarmen Calvillo MD Family Medicine Follow up Please follow-up as previously scheduled [facility] [address] [phone]      Area of Aging  Follow up Call the Area of Aging to set up delivery of meals to the home. [phone]            Discharge Medication List as of 7/30/2024 10:33 AM        START taking these medications    Details   donepezil (ARICEPT) 5 mg tablet Take 1 tablet (5 mg total) by mouth daily at bedtime, Starting Tue 7/30/2024, Until Thu 8/29/2024, Normal           CONTINUE these medications which have NOT CHANGED    Details   Cholecalciferol (VITAMIN D3) 1,000 units tablet Take 1,000 Units by mouth daily, Historical Med      atorvastatin (LIPITOR) 40 mg tablet TAKE 1 TABLET BY MOUTH DAILY, Starting Mon 5/13/2024, Normal      busPIRone (BUSPAR) 5 mg tablet Take 1 tablet (5 mg total) by mouth 3 (three) times a day, Starting Mon 3/18/2024, Normal      co-enzyme Q-10 100 mg capsule  Take by mouth daily, Historical Med      famotidine (PEPCID) 20 mg tablet Take 1 tablet (20 mg total) by mouth daily at bedtime, Starting Tue 4/9/2024, Normal      !! ferrous sulfate 324 (65 Fe) mg Take 1 tablet (324 mg total) by mouth daily before breakfast, Starting Wed 5/22/2024, Normal      !! ferrous sulfate 324 (65 Fe) mg take 1 tablet by mouth once daily before breakfast, Normal      metoprolol tartrate (LOPRESSOR) 25 mg tablet Take 1 tablet (25 mg total) by mouth in the morning, Starting Mon 2/12/2024, Normal      pantoprazole (PROTONIX) 40 mg tablet Take 1 tablet (40 mg total) by mouth 2 (two) times a day, Starting Tue 4/9/2024, Normal      vitamin B-12 (VITAMIN B-12) 1,000 mcg tablet Take 1,000 mcg by mouth daily, Historical Med      traZODone (DESYREL) 50 mg tablet Take 1 tablet (50 mg total) by mouth daily at bedtime, Starting Mon 5/13/2024, Normal       !! - Potential duplicate medications found. Please discuss with provider.          Outpatient Discharge Orders   Discharge Diet     Activity as tolerated     Call provider for:  persistent nausea or vomiting     Call provider for:  severe uncontrolled pain     Call provider for: active or persistent bleeding     Call provider for:  difficulty breathing, headache or visual disturbances     Call provider for:  persistent dizziness or light-headedness     Call provider for:  extreme fatigue     No dressing needed       PDMP Review         Value Time User    PDMP Reviewed  Yes 5/13/2024  3:54 PM Marycarmen Calvillo MD            ED Provider  Electronically Signed by             Christian Persaud DO  08/04/24 6881

## 2024-07-29 ENCOUNTER — APPOINTMENT (OUTPATIENT)
Dept: NON INVASIVE DIAGNOSTICS | Facility: HOSPITAL | Age: 79
DRG: 057 | End: 2024-07-29
Payer: MEDICARE

## 2024-07-29 ENCOUNTER — APPOINTMENT (OUTPATIENT)
Dept: MRI IMAGING | Facility: HOSPITAL | Age: 79
DRG: 057 | End: 2024-07-29
Payer: MEDICARE

## 2024-07-29 LAB
ANION GAP SERPL CALCULATED.3IONS-SCNC: 5 MMOL/L (ref 4–13)
AORTIC ROOT: 3.4 CM
APICAL FOUR CHAMBER EJECTION FRACTION: 69 %
ASCENDING AORTA: 3.3 CM
AV LVOT MEAN GRADIENT: 2 MMHG
AV LVOT PEAK GRADIENT: 3 MMHG
BSA FOR ECHO PROCEDURE: 1.5 M2
BUN SERPL-MCNC: 19 MG/DL (ref 5–25)
CALCIUM SERPL-MCNC: 8.7 MG/DL (ref 8.4–10.2)
CHLORIDE SERPL-SCNC: 110 MMOL/L (ref 96–108)
CHOLEST SERPL-MCNC: 117 MG/DL
CO2 SERPL-SCNC: 25 MMOL/L (ref 21–32)
CREAT SERPL-MCNC: 1.28 MG/DL (ref 0.6–1.3)
DOP CALC LVOT PEAK VEL VTI: 21.86 CM
DOP CALC LVOT PEAK VEL: 0.89 M/S
E WAVE DECELERATION TIME: 202 MS
E/A RATIO: 1.04
EST. AVERAGE GLUCOSE BLD GHB EST-MCNC: 111 MG/DL
FRACTIONAL SHORTENING: 29 (ref 28–44)
GFR SERPL CREATININE-BSD FRML MDRD: 39 ML/MIN/1.73SQ M
GLUCOSE P FAST SERPL-MCNC: 89 MG/DL (ref 65–99)
GLUCOSE SERPL-MCNC: 89 MG/DL (ref 65–140)
HBA1C MFR BLD: 5.5 %
HDLC SERPL-MCNC: 41 MG/DL
INTERVENTRICULAR SEPTUM IN DIASTOLE (PARASTERNAL SHORT AXIS VIEW): 0.8 CM
INTERVENTRICULAR SEPTUM: 0.8 CM (ref 0.6–1.1)
LAAS-AP4: 11.2 CM2
LDLC SERPL CALC-MCNC: 59 MG/DL (ref 0–100)
LEFT ATRIUM SIZE: 2.6 CM
LEFT INTERNAL DIMENSION IN SYSTOLE: 3.2 CM (ref 2.1–4)
LEFT VENTRICULAR INTERNAL DIMENSION IN DIASTOLE: 4.5 CM (ref 3.5–6)
LEFT VENTRICULAR POSTERIOR WALL IN END DIASTOLE: 0.8 CM
LEFT VENTRICULAR STROKE VOLUME: 52 ML
LVSV (TEICH): 52 ML
MV E'TISSUE VEL-SEP: 10 CM/S
MV PEAK A VEL: 0.91 M/S
MV PEAK E VEL: 95 CM/S
MV STENOSIS PRESSURE HALF TIME: 59 MS
MV VALVE AREA P 1/2 METHOD: 3.73
POTASSIUM SERPL-SCNC: 4 MMOL/L (ref 3.5–5.3)
RIGHT ATRIUM AREA SYSTOLE A4C: 10.2 CM2
RIGHT VENTRICLE ID DIMENSION: 2.8 CM
SINOTUBULAR JUNCTION: 2.3 CM
SL CV LV EF: 60
SL CV PED ECHO LEFT VENTRICLE DIASTOLIC VOLUME (MOD BIPLANE) 2D: 94 ML
SL CV PED ECHO LEFT VENTRICLE SYSTOLIC VOLUME (MOD BIPLANE) 2D: 42 ML
SL CV SINUS OF VALSALVA 2D: 3.1 CM
SODIUM SERPL-SCNC: 140 MMOL/L (ref 135–147)
STJ: 2.3 CM
TR MAX PG: 22 MMHG
TR PEAK VELOCITY: 2.3 M/S
TRICUSPID ANNULAR PLANE SYSTOLIC EXCURSION: 2.2 CM
TRICUSPID VALVE PEAK REGURGITATION VELOCITY: 2.33 M/S
TRIGL SERPL-MCNC: 84 MG/DL

## 2024-07-29 PROCEDURE — 99205 OFFICE O/P NEW HI 60 MIN: CPT | Performed by: STUDENT IN AN ORGANIZED HEALTH CARE EDUCATION/TRAINING PROGRAM

## 2024-07-29 PROCEDURE — 80061 LIPID PANEL: CPT | Performed by: PHYSICIAN ASSISTANT

## 2024-07-29 PROCEDURE — 93880 EXTRACRANIAL BILAT STUDY: CPT

## 2024-07-29 PROCEDURE — 70551 MRI BRAIN STEM W/O DYE: CPT

## 2024-07-29 PROCEDURE — 99232 SBSQ HOSP IP/OBS MODERATE 35: CPT | Performed by: HOSPITALIST

## 2024-07-29 PROCEDURE — 80048 BASIC METABOLIC PNL TOTAL CA: CPT | Performed by: PHYSICIAN ASSISTANT

## 2024-07-29 PROCEDURE — 93306 TTE W/DOPPLER COMPLETE: CPT | Performed by: INTERNAL MEDICINE

## 2024-07-29 PROCEDURE — 93306 TTE W/DOPPLER COMPLETE: CPT

## 2024-07-29 PROCEDURE — 97167 OT EVAL HIGH COMPLEX 60 MIN: CPT

## 2024-07-29 PROCEDURE — 83036 HEMOGLOBIN GLYCOSYLATED A1C: CPT | Performed by: PHYSICIAN ASSISTANT

## 2024-07-29 PROCEDURE — 97163 PT EVAL HIGH COMPLEX 45 MIN: CPT

## 2024-07-29 RX ADMIN — ATORVASTATIN CALCIUM 40 MG: 40 TABLET, FILM COATED ORAL at 17:47

## 2024-07-29 RX ADMIN — HEPARIN SODIUM 5000 UNITS: 5000 INJECTION, SOLUTION INTRAVENOUS; SUBCUTANEOUS at 15:17

## 2024-07-29 RX ADMIN — Medication 1000 UNITS: at 08:24

## 2024-07-29 RX ADMIN — BUSPIRONE HYDROCHLORIDE 5 MG: 5 TABLET ORAL at 20:41

## 2024-07-29 RX ADMIN — FAMOTIDINE 10 MG: 20 TABLET, FILM COATED ORAL at 20:41

## 2024-07-29 RX ADMIN — Medication 100 MG: at 08:24

## 2024-07-29 RX ADMIN — CYANOCOBALAMIN TAB 500 MCG 1000 MCG: 500 TAB at 08:24

## 2024-07-29 RX ADMIN — BUSPIRONE HYDROCHLORIDE 5 MG: 5 TABLET ORAL at 08:24

## 2024-07-29 RX ADMIN — PANTOPRAZOLE SODIUM 40 MG: 40 TABLET, DELAYED RELEASE ORAL at 08:24

## 2024-07-29 RX ADMIN — FERROUS SULFATE TAB 325 MG (65 MG ELEMENTAL FE) 325 MG: 325 (65 FE) TAB at 08:24

## 2024-07-29 RX ADMIN — TRAZODONE HYDROCHLORIDE 50 MG: 50 TABLET ORAL at 20:41

## 2024-07-29 RX ADMIN — PANTOPRAZOLE SODIUM 40 MG: 40 TABLET, DELAYED RELEASE ORAL at 17:47

## 2024-07-29 RX ADMIN — HEPARIN SODIUM 5000 UNITS: 5000 INJECTION, SOLUTION INTRAVENOUS; SUBCUTANEOUS at 23:26

## 2024-07-29 RX ADMIN — HEPARIN SODIUM 5000 UNITS: 5000 INJECTION, SOLUTION INTRAVENOUS; SUBCUTANEOUS at 05:22

## 2024-07-29 NOTE — PHYSICAL THERAPY NOTE
PHYSICAL THERAPY EVALUATION  NAME:  Anthony Slater  DATE: 07/29/24    AGE:   79 y.o.  Mrn:   [id number]  ADMIT DX:  Confused [R41.0]  Acute encephalopathy [G93.40]  Stroke-like symptom [R29.90]  Headache [R51.9]  Problem List:   Patient Active Problem List   Diagnosis    Angiodysplasia of gastrointestinal tract    Obstructive sleep apnea syndrome    Anxiety state    Coronary arteriosclerosis    Gastric antral vascular ectasia    Endometrial cancer (HCC)    Drug-induced osteonecrosis of jaw (HCC)    Hyperlipidemia    Migraine    Osteoporosis    Thyroid nodule    Chronic left shoulder pain    Stage 3a chronic kidney disease (HCC)    Gastroesophageal reflux disease    Diverticular disease of colon    Iron deficiency anemia    Moderate protein-calorie malnutrition (HCC)    Memory loss due to medical condition    Weight loss    Acute encephalopathy       Past Medical History  Past Medical History:   Diagnosis Date    Anxiety     Arthritis     Benign hypertension     Chronic pain disorder     back    COPD (chronic obstructive pulmonary disease) (HCC)     Coronary artery disease     medical management    CPAP (continuous positive airway pressure) dependence     Diabetes (HCC)     Endometrial adenocarcinoma (HCC)     Epilepsy (HCC)     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    History of transfusion     Hypertension     Mixed hyperlipidemia     DAVID on CPAP     Shortness of breath     triggered by anxiety    Watermelon stomach     Wears dentures        Past Surgical History  Past Surgical History:   Procedure Laterality Date    BREAST BIOPSY Right 02/24/2015    BREAST BIOPSY Left     ? year    CARDIAC CATHETERIZATION  02/07/2014    EF 65%, mid LAD 60% stenosis and D2 70% stenosis.  Medical management.    CATARACT EXTRACTION Bilateral     COLONOSCOPY      CYSTOSCOPY  N/A 07/31/2023    Procedure: CYSTOSCOPY;  Surgeon: Nadir Ash MD;  [facility]: [facility];  Service: Gynecology Oncology    EGD      JOINT REPLACEMENT Right     RIGHT ELBOW    OOPHORECTOMY      not sure which one was removed    GA LAPS TOTAL HYSTERECT 250 GM/< W/RMVL TUBE/OVARY N/A 07/31/2023    Procedure: ROBOTHYSTERECTOMY, BILATERAL SALPINGO-OOPHORECTOMY, PELVIC SENTINEL NODE BIOPSIES;  Surgeon: Nadir Ash MD;  Location[facility];  Service: Gynecology Oncology    ROTATOR CUFF REPAIR Right     US GUIDED THYROID BIOPSY  12/04/2020       Length Of Stay: 0  Performed at least 2 patient identifiers during session: Name and ID bracelet     07/29/24 1033   PT Last Visit   PT Visit Date 07/29/24   Note Type   Note type Evaluation   Pain Assessment   Pain Assessment Tool 0-10   Pain Score No Pain   Restrictions/Precautions   Weight Bearing Precautions Per Order No   Other Precautions Chair Alarm;Bed Alarm;Fall Risk;Telemetry   Home Living   Type of Home House   Home Layout One level;Performs ADLs on one level;Able to live on main level with bedroom/bathroom;Stairs to enter with rails;Laundry in basement  (1 + 2 BENTON, full flight to basement)   Bathroom Shower/Tub Tub/shower unit   Bathroom Toilet Raised   Bathroom Equipment Shower chair;Grab bars in shower   Home Equipment Walker;Cane  (no AD used at baseline)   Prior Function   Level of [place] Independent with ADLs;Independent with functional mobility;Independent with IADLS  ( completes laundry)   Lives With Spouse   Receives Help From Family   IADLs Family/Friend/Other provides transportation;Independent with meal prep;Family/Friend/Other provides medication management   Falls in the last 6 months 1 to 4  (1x)   Vocational Retired   General   Family/Caregiver Present No   Cognition   Overall Cognitive Status Impaired   Arousal/Participation Alert   Attention Within functional limits   Orientation Level Oriented X4   Memory Decreased short term  memory   Following Commands Follows one step commands without difficulty   RUE Assessment   RUE Assessment WFL  (4/5)   LUE Assessment   LUE Assessment WFL  (4/5)   RLE Assessment   RLE Assessment WFL   LLE Assessment   LLE Assessment WFL   Vision-Basic Assessment   Current Vision Wears glasses only for reading   Coordination   Sensation WFL   Bed Mobility   Supine to Sit 5  Supervision   Additional items HOB elevated   Additional Comments pt reported dizziness with transitional movement; BP sitting 146/82 standing 159/83   Transfers   Sit to Stand   (CGA)   Stand to Sit 5  Supervision   Additional items Armrests   Ambulation/Elevation   Gait pattern Improper Weight shift;Decreased foot clearance;Excessively slow;Short stride   Gait Assistance 5  Supervision  (CGA)   Additional items Verbal cues   Assistive Device None   Distance 120 ft   Balance   Static Sitting Good   Dynamic Sitting Fair +   Static Standing Fair -   Dynamic Standing Fair -   Ambulatory Fair -   Endurance Deficit   Endurance Deficit Yes   Endurance Deficit Description dizziness   Activity Tolerance   Activity Tolerance   (Dizziness)   Assessment   Prognosis Good   Assessment Pt is 79 y.o. female seen for PT evaluation s/p admit to [facility] on 7/28/2024 w/ Acute encephalopathy. PT consulted to assess pt's functional mobility and d/c needs. Order placed for PT eval and tx, w/ up and OOB as tolerated order. Pt agreeable to PT  session upon arrival, pt found supine in bed.  PTA, pt was independent w/ all functional mobility w/ no AD, has 3 BENTON, lives w/ spouse in 1 level home, and retired.  Pt to benefit from continued PT tx to address deficits and maximize level of functional independent mobility and consistency. Upon conclusion pt  seated in recliner. Complexity: Comorbidities affecting pt's physical performance at time of assessment include: COPD, DM, and anxiety. Personal factors affecting pt at time of IE include: advanced age,  ambulating with assistive device, and steps to enter home. Please find objective findings from PT assessment regarding body systems outlined above with impairments and limitations including decreased endurance, gait deviations, and fall risk.  Pt's clinical presentation is currently unstable/unpredictable seen in pt's presentation of abnormal urine levels, reports of dizziness with activity, and confusion. The patient's [facility] Basic Mobility Inpatient Short Form Raw Score is 19.  Based on patient presentations and impairments, pt would most appropriately benefit from Level 3 resource intensity upon discharge. Please also refer to the recommendation of the Physical Therapist for safe discharge planning. RN verbalized pt appropriate for PT session. Pt seen as a co-eval with OT due to the patient's co-morbidities, clinically unstable presentation, and present impairments which are a regression from the patient's baseline.   Barriers to Discharge None   Goals   Patient Goals to walk   LTG Expiration Date 08/08/24   Long Term Goal #1 Pt will: Perform bed mobility tasks to modified I to improve ease of bed mobility. Perform transfers to modified I to improve ease of transfers. Perform ambulation with MI and RW for 250 feet to increase Indep in home environment. Increase dynamic standing balance to F+ to decrease fall risk. Increase OOB activity tolerance to 10 minutes without s/s of exertion to decrease fall risk. Navigate up and down steps with MI so patient can enter and exit home.   Plan   Treatment/Interventions Functional transfer training;Therapeutic exercise;Endurance training;Gait training;Bed mobility;Equipment eval/education   PT Frequency 3-5x/wk   Discharge Recommendation   Rehab Resource Intensity Level, PT III (Minimum Resource Intensity)   AM-PAC Basic Mobility Inpatient   Turning in Flat Bed Without Bedrails 4   Lying on Back to Sitting on Edge of Flat Bed Without Bedrails 3   Moving Bed to Chair 3    Standing Up From Chair Using Arms 3   Walk in Room 3   Climb 3-5 Stairs With Railing 3   Basic Mobility Inpatient Raw Score 19   Basic Mobility Standardized Score 42.48   [facility] Highest Level Of Mobility   -HL Goal 6: Walk 10 steps or more   -HLM Achieved 7: Walk 25 feet or more   Barthel Index   Grooming Score 5   Dressing Score 5   Toilet Use Score 5   Transfers (Bed/Chair) Score 10   Mobility (Level Surface) Score 0         Time In: 1015  Time Out: 1033  Total Evaluation Minutes: 18    Savanna Contreras, PT

## 2024-07-29 NOTE — PLAN OF CARE
Problem: OCCUPATIONAL THERAPY ADULT  Goal: Performs self-care activities at highest level of function for planned discharge setting.  See evaluation for individualized goals.  Description: Treatment Interventions: ADL retraining, Functional transfer training, UE strengthening/ROM, Endurance training, Cognitive reorientation, Patient/family training, Equipment evaluation/education, Compensatory technique education, Energy conservation, Activityengagement          See flowsheet documentation for full assessment, interventions and recommendations.   Note: Limitation: Decreased ADL status, Decreased UE strength, Decreased endurance, Decreased self-care trans, Decreased high-level ADLs  Prognosis: Fair  Assessment: Patient is a 79 y.o. female seen for OT evaluation s/p admit to  [facility] on 7/28/2024 w/Acute encephalopathy + commorbidities/PMHx (as listed in medical record) affecting patient's functional performance c ADL tasks at time of assessment. OT orders placed for evaluation and treatment to assess pt's ADLs, cognitive status + performance during functional tasks in order to formulate appropriate d/c recommendations.     Therapist performed at least two patient identifiers during session including name and wristband. Personal factors affecting patient at time of initial evaluation include: step(s) to enter environment, limited home support, advanced age, inability to perform IADLs, inability to perform ADLs, and inability to ambulate household distances.   Pt's clinical presentation is currently unstable/unpredictable given new onset deficits that effect pt's occupational performance and ability to safely return to OF including decrease activity tolerance, decrease standing balance, decrease performance during ADL tasks, decrease cognition, decrease generalized strength, decrease activity engagement, decrease performance during functional transfers, and high fall risk combined with medical  complications of hypertension , abnormal renal lab values, change in mental status, abnormal CBC, and need for input for mobility technique/safety. This evaluation required an extensive review of medical and/or therapy records and additional review of physical, cognitive and psychosocial history related to functional performance. Based upon functional performance deficits and assessments, this evaluation has been identified as a  high complexity evaluation.      Patient to benefit from continued Occupational Therapy treatment while in the hospital to address aforementioned deficits and maximize level of functional independence with ADLs and functional mobility. Pt currently requires A to facilitate appropriate d/c plan.     Rehab Resource Intensity Level, OT: III (Minimum Resource Intensity)           62 yo male, hx of uc, recently admitted for flare on abx/steroids at that time, presents with n/v and dysphagia.

## 2024-07-29 NOTE — SPEECH THERAPY NOTE
Speech Language/Pathology  Consult received.  Records reviewed.  Pt admitted c symptoms concerning for CVA/TIA.  Pt passed RN Dysphagia Assessment.  Communication deficits  and Swallowing difficulties denied. CT head without contrast no acute intracranial abnormality.  No additional inpatient Speech Pathology evaluation appears indicated at this time.  Please re-consult if additional concerns arise. Thank you.

## 2024-07-29 NOTE — PLAN OF CARE
Problem: PHYSICAL THERAPY ADULT  Goal: Performs mobility at highest level of function for planned discharge setting.  See evaluation for individualized goals.  Description: Treatment/Interventions: Functional transfer training, Therapeutic exercise, Endurance training, Gait training, Bed mobility, Equipment eval/education          See flowsheet documentation for full assessment, interventions and recommendations.  7/29/2024 1443 by Savanna Contreras, PT  Outcome: Progressing  Note: Prognosis: Good     Assessment: Pt is 79 y.o. female seen for PT evaluation s/p admit to [facility] on 7/28/2024 w/ Acute encephalopathy. PT consulted to assess pt's functional mobility and d/c needs. Order placed for PT eval and tx, w/ up and OOB as tolerated order. Pt agreeable to PT  session upon arrival, pt found supine in bed.  PTA, pt was independent w/ all functional mobility w/ no AD, has 3 BNETON, lives w/ spouse in 1 level home, and retired.  Pt to benefit from continued PT tx to address deficits and maximize level of functional independent mobility and consistency. Upon conclusion pt  seated in recliner. Complexity: Comorbidities affecting pt's physical performance at time of assessment include: COPD, DM, and anxiety. Personal factors affecting pt at time of IE include: advanced age, ambulating with assistive device, and steps to enter home. Please find objective findings from PT assessment regarding body systems outlined above with impairments and limitations including decreased endurance, gait deviations, and fall risk.  Pt's clinical presentation is currently unstable/unpredictable seen in pt's presentation of abnormal urine levels, reports of dizziness with activity, and confusion. The patient's AM-PAC Basic Mobility Inpatient Short Form Raw Score is 19.  Based on patient presentations and impairments, pt would most appropriately benefit from Level 3 resource intensity upon discharge. Please also refer to the  recommendation of the Physical Therapist for safe discharge planning. RN verbalized pt appropriate for PT session. Pt seen as a co-eval with OT due to the patient's co-morbidities, clinically unstable presentation, and present impairments which are a regression from the patient's baseline.  Barriers to Discharge: None     Rehab Resource Intensity Level, PT: III (Minimum Resource Intensity)    See flowsheet documentation for full assessment.     7/29/2024 1437 by Savanna Contreras, PT  Outcome: Progressing  Note:                Rehab Resource Intensity Level, PT: III (Minimum Resource Intensity)    See flowsheet documentation for full assessment.

## 2024-07-29 NOTE — PHYSICAL THERAPY NOTE
PHYSICAL THERAPY EVALUATION  NAME:  Anthony Slater  DATE: 07/29/24    AGE:   79 y.o.  Mrn:   [id number]  ADMIT DX:  Confused [R41.0]  Acute encephalopathy [G93.40]  Stroke-like symptom [R29.90]  Headache [R51.9]  Problem List:   Patient Active Problem List   Diagnosis    Angiodysplasia of gastrointestinal tract    Obstructive sleep apnea syndrome    Anxiety state    Coronary arteriosclerosis    Gastric antral vascular ectasia    Endometrial cancer (HCC)    Drug-induced osteonecrosis of jaw (HCC)    Hyperlipidemia    Migraine    Osteoporosis    Thyroid nodule    Chronic left shoulder pain    Stage 3a chronic kidney disease (HCC)    Gastroesophageal reflux disease    Diverticular disease of colon    Iron deficiency anemia    Moderate protein-calorie malnutrition (HCC)    Memory loss due to medical condition    Weight loss    Acute encephalopathy       Past Medical History  Past Medical History:   Diagnosis Date    Anxiety     Arthritis     Benign hypertension     Chronic pain disorder     back    COPD (chronic obstructive pulmonary disease) (HCC)     Coronary artery disease     medical management    CPAP (continuous positive airway pressure) dependence     Diabetes (HCC)     Endometrial adenocarcinoma (HCC)     Epilepsy (HCC)     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    History of transfusion     Hypertension     Mixed hyperlipidemia     DAVID on CPAP     Shortness of breath     triggered by anxiety    Watermelon stomach     Wears dentures        Past Surgical History  Past Surgical History:   Procedure Laterality Date    BREAST BIOPSY Right 02/24/2015    BREAST BIOPSY Left     ? year    CARDIAC CATHETERIZATION  02/07/2014    EF 65%, mid LAD 60% stenosis and D2 70% stenosis.  Medical management.    CATARACT EXTRACTION Bilateral     COLONOSCOPY      CYSTOSCOPY  N/A 07/31/2023    Procedure: CYSTOSCOPY;  Surgeon: Nadir Ash MD;  Location: [facility];  Service: Gynecology Oncology    EGD      JOINT REPLACEMENT Right     RIGHT ELBOW    OOPHORECTOMY      not sure which one was removed    MA LAPS TOTAL HYSTERECT 250 GM/< W/RMVL TUBE/OVARY N/A 07/31/2023    Procedure: ROBOTHYSTERECTOMY, BILATERAL SALPINGO-OOPHORECTOMY, PELVIC SENTINEL NODE BIOPSIES;  Surgeon: Nadir Ash MD;  Location[facility];  Service: Gynecology Oncology    ROTATOR CUFF REPAIR Right     US GUIDED THYROID BIOPSY  12/04/2020       Length Of Stay: 0  Performed at least 2 patient identifiers during session: Name and ID bracelet       07/29/24 1033   PT Last Visit   PT Visit Date 07/29/24   Note Type   Note type Evaluation   Pain Assessment   Pain Assessment Tool 0-10   Pain Score No Pain   Restrictions/Precautions   Weight Bearing Precautions Per Order No   Other Precautions Chair Alarm;Bed Alarm;Fall Risk;Telemetry   Home Living   Type of Home House   Home Layout One level;Performs ADLs on one level;Able to live on main level with bedroom/bathroom;Stairs to enter with rails;Laundry in basement  (1 + 2 BENTON, full flight to basement)   Bathroom Shower/Tub Tub/shower unit   Bathroom Toilet Raised   Bathroom Equipment Shower chair;Grab bars in shower   Home Equipment Walker;Cane  (no AD used at baseline)   Prior Function   Level of [place] Independent with ADLs;Independent with functional mobility;Independent with IADLS  ( completes laundry)   Lives With Spouse   Receives Help From Family   IADLs Family/Friend/Other provides transportation;Independent with meal prep;Family/Friend/Other provides medication management   Falls in the last 6 months 1 to 4  (1x)   Vocational Retired   General   Family/Caregiver Present No   Cognition   Overall Cognitive Status Impaired   Arousal/Participation Alert   Attention Within functional limits   Orientation Level Oriented X4   Memory Decreased short  term memory   Following Commands Follows one step commands without difficulty   RUE Assessment   RUE Assessment WFL  (4/5)   LUE Assessment   LUE Assessment WFL  (4/5)   RLE Assessment   RLE Assessment WFL   LLE Assessment   LLE Assessment WFL   Vision-Basic Assessment   Current Vision Wears glasses only for reading   Coordination   Sensation WFL   Bed Mobility   Supine to Sit 5  Supervision   Additional items HOB elevated   Additional Comments pt reported dizziness with transitional movement; BP sitting 146/82 standing 159/83   Transfers   Sit to Stand   (CGA)   Stand to Sit 5  Supervision   Additional items Armrests   Ambulation/Elevation   Gait pattern Improper Weight shift;Decreased foot clearance;Excessively slow;Short stride   Gait Assistance 5  Supervision  (CGA)   Additional items Verbal cues   Assistive Device None   Distance 120 ft   Balance   Static Sitting Good   Dynamic Sitting Fair +   Static Standing Fair -   Dynamic Standing Fair -   Ambulatory Fair -   Endurance Deficit   Endurance Deficit Yes   Endurance Deficit Description dizziness   Activity Tolerance   Activity Tolerance   (Dizziness)   Goals   LTG Expiration Date 08/08/24   Long Term Goal #1 Pt will: Perform bed mobility tasks to modified I to improve ease of bed mobility. Perform transfers to modified I to improve ease of transfers. Perform ambulation with MI and RW for 250 feet to increase Indep in home environment. Increase dynamic standing balance to F+ to decrease fall risk. Increase OOB activity tolerance to 10 minutes without s/s of exertion to decrease fall risk. Navigate up and down steps with MI so patient can enter and exit home.   Plan   Treatment/Interventions Functional transfer training;Therapeutic exercise;Endurance training;Gait training;Bed mobility;Equipment eval/education   PT Frequency 3-5x/wk   Discharge Recommendation   Rehab Resource Intensity Level, PT III (Minimum Resource Intensity)   AM-PAC Basic Mobility Inpatient    Turning in Flat Bed Without Bedrails 4   Lying on Back to Sitting on Edge of Flat Bed Without Bedrails 3   Moving Bed to Chair 3   Standing Up From Chair Using Arms 3   Walk in Room 3   Climb 3-5 Stairs With Railing 3   Basic Mobility Inpatient Raw Score 19   Basic Mobility Standardized Score 42.48   [facility] Highest Level Of Mobility   -HLM Goal 6: Walk 10 steps or more   -HLM Achieved 7: Walk 25 feet or more   Barthel Index   Grooming Score 5   Dressing Score 5   Toilet Use Score 5   Transfers (Bed/Chair) Score 10   Mobility (Level Surface) Score 0   Assessment: Pt is 79 y.o. female seen for PT evaluation s/p admit to [facility] on 7/28/2024 w/ Acute encephalopathy. PT consulted to assess pt's functional mobility and d/c needs. Order placed for PT eval and tx, w/ up and OOB as tolerated order. Pt agreeable to PT  session upon arrival, pt found supine in bed.  PTA, pt was independent w/ all functional mobility w/ no AD, has 3 BENTON, lives w/ spouse in 1 level home, and retired.  Pt to benefit from continued PT tx to address deficits and maximize level of functional independent mobility and consistency. Upon conclusion pt  seated in recliner. Complexity: Comorbidities affecting pt's physical performance at time of assessment include: COPD, DM, and anxiety. Personal factors affecting pt at time of IE include: advanced age, ambulating with assistive device, and steps to enter home. Please find objective findings from PT assessment regarding body systems outlined above with impairments and limitations including decreased endurance, gait deviations, and fall risk.  Pt's clinical presentation is currently unstable/unpredictable seen in pt's presentation of abnormal urine levels, reports of dizziness with activity, and confusion. The patient's AM-PAC Basic Mobility Inpatient Short Form Raw Score is 19.  Based on patient presentations and impairments, pt would most appropriately benefit from Level 3 resource  intensity upon discharge. Please also refer to the recommendation of the Physical Therapist for safe discharge planning. RN verbalized pt appropriate for PT session. Pt seen as a co-eval with OT due to the patient's co-morbidities, clinically unstable presentation, and present impairments which are a regression from the patient's baseline.     Time In: 1015  Time Out: 1033  Total Evaluation Minutes: 18    Savanna Contreras, PT

## 2024-07-29 NOTE — OCCUPATIONAL THERAPY NOTE
Occupational Therapy Evaluation     Patient Name: Anthony Slater  Today's Date: 7/29/2024  Problem List  Principal Problem:    Acute encephalopathy  Active Problems:    Anxiety state    Hyperlipidemia    Stage 3a chronic kidney disease (HCC)    Gastroesophageal reflux disease    Iron deficiency anemia    Past Medical History  Past Medical History:   Diagnosis Date    Anxiety     Arthritis     Benign hypertension     Chronic pain disorder     back    COPD (chronic obstructive pulmonary disease) (HCC)     Coronary artery disease     medical management    CPAP (continuous positive airway pressure) dependence     Diabetes (HCC)     Endometrial adenocarcinoma (HCC)     Epilepsy (HCC)     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    History of transfusion     Hypertension     Mixed hyperlipidemia     DAVID on CPAP     Shortness of breath     triggered by anxiety    Watermelon stomach     Wears dentures      Past Surgical History  Past Surgical History:   Procedure Laterality Date    BREAST BIOPSY Right 02/24/2015    BREAST BIOPSY Left     ? year    CARDIAC CATHETERIZATION  02/07/2014    EF 65%, mid LAD 60% stenosis and D2 70% stenosis.  Medical management.    CATARACT EXTRACTION Bilateral     COLONOSCOPY      CYSTOSCOPY N/A 07/31/2023    Procedure: CYSTOSCOPY;  Surgeon: Nadir Ash MD;  Location: [facility];  Service: Gynecology Oncology    EGD      JOINT REPLACEMENT Right     RIGHT ELBOW    OOPHORECTOMY      not sure which one was removed    GA LAPS TOTAL HYSTERECT 250 GM/< W/RMVL TUBE/OVARY N/A 07/31/2023    Procedure: ROBOTHYSTERECTOMY, BILATERAL SALPINGO-OOPHORECTOMY, PELVIC SENTINEL NODE BIOPSIES;  Surgeon: Nadir Ash MD;  Location: [facility];  Service: Gynecology Oncology    ROTATOR CUFF REPAIR Right     US GUIDED THYROID BIOPSY  12/04/2020          07/29/24 0844   OT Last Visit   OT Visit Date 07/29/24   Note Type   Note type Evaluation   Additional Comments Nsg staff verbally cleared pt for OT evaluation.  Pt received  supine in bed c HOB semi-elevated on this date reporting no pain + is agreeable to OT session @ this time. @ exit, pt remains in  seated in bedside recliner c all lines intact, all needs met, call bell in hand + nsg aware of location/disposition.   Pain Assessment   Pain Assessment Tool 0-10   Pain Score No Pain   Restrictions/Precautions   Other Precautions Chair Alarm;Bed Alarm;Fall Risk;Telemetry   Home Living   Type of Home House  (([place]   Home Layout One level;Performs ADLs on one level;Able to live on main level with bedroom/bathroom;Stairs to enter with rails;Laundry in basement  (1 + 2 [place], full flight to basement)   Bathroom Shower/Tub Tub/shower unit   Bathroom Toilet Raised   Bathroom Equipment Shower chair;Grab bars in shower   Bathroom Accessibility Accessible   Home Equipment Walker;Cane  (None used @ baseline)   Prior Function   Level of [place] Independent with ADLs;Independent with functional mobility;Independent with IADLS  ( completes laundry)   Lives With Spouse   Receives Help From Family   IADLs Family/Friend/Other provides transportation;Independent with meal prep;Family/Friend/Other provides medication management   Falls in the last 6 months 1 to 4  (1x)   Vocational Retired   Lifestyle   Autonomy Pt lives in  1 story home c  + @ baseline, performs ADLs  I'ly, IADLs with A + fxl mobility I'ly without AD. (-) . Pt is retired.   Reciprocal Relationships    Service to Others Retired   ADL   Eating Assistance 7  Independent   Grooming Assistance 7  Independent   UB Bathing Assistance 7  Independent   LB Bathing Assistance 5  Supervision/Setup   UB Dressing Assistance 5  Supervision/Setup   LB Dressing Assistance 5  Supervision/Setup   Toileting Assistance  4  Minimal  Assistance   Bed Mobility   Supine to Sit 5  Supervision   Additional items HOB elevated   Additional Comments DNT sit-sup; pt transitioned from EOB-bedside recliner.   Transfers   Sit to Stand   (CGA)   Stand to Sit 5  Supervision   Additional items Armrests   Additional Comments Dizziness reported t/o evaluation : 146/82 seated, 159/83 standing   Functional Mobility   Additional Comments Pt performed short distance ADL related fxl mobility in room/hallway c CGA simulating toileting distances.   No overt LOB demonstrated + pt denies pain /  denies SOB/ moderate dizziness during transitional movements. Reports feeling minimally below baseline c abilities related to ADL-focused fxl mobility. G safety awareness noted while navigating t/o room. Transitions to bedside recliner for remainder of session.  (Shortened step length + increased time to perform fxl mobility)   Balance   Static Sitting Good   Dynamic Sitting Fair +   Static Standing Fair -   Dynamic Standing Fair -   Ambulatory Fair -   Activity Tolerance   Activity Tolerance   (Dizziness)   Medical Staff Made Aware PT/OT co-eval on this date d/t medical complexity, ambulatory dysfunction c high fall risk, various impeding lines + requirement for skilled interdisciplinary analysis of appropriate d/c recommendations. PT/OT POC/goals I'ly determined per respective discipline. ([place])   Nurse Made Aware Medical staff made aware of current fxn, recommendations for d/c planning, fall risk + pt's location upon exit. ([place])   RUE Assessment   RUE Assessment WFL  (4/5)   LUE Assessment   LUE Assessment WFL  (4/5)   Hand Function   Gross Motor Coordination Functional   Fine Motor Coordination Functional   Vision-Basic Assessment   Current Vision Wears glasses only for reading   Psychosocial   Psychosocial (WDL) WDL   Cognition   Overall Cognitive Status Impaired   Arousal/Participation Alert;Responsive   Attention Within functional limits   Orientation Level Oriented X4    Memory Decreased short term memory   Following Commands Follows one step commands without difficulty   Assessment   Limitation Decreased ADL status;Decreased UE strength;Decreased endurance;Decreased self-care trans;Decreased high-level ADLs   Prognosis Fair   Assessment Patient is a 79 y.o. female seen for OT evaluation s/p admit to  [facility] on 7/28/2024 w/Acute encephalopathy + commorbidities/PMHx (as listed in medical record) affecting patient's functional performance c ADL tasks at time of assessment. OT orders placed for evaluation and treatment to assess pt's ADLs, cognitive status + performance during functional tasks in order to formulate appropriate d/c recommendations.     Therapist performed at least two patient identifiers during session including name and wristband. Personal factors affecting patient at time of initial evaluation include: step(s) to enter environment, limited home support, advanced age, inability to perform IADLs, inability to perform ADLs, and inability to ambulate household distances.   Pt's clinical presentation is currently unstable/unpredictable given new onset deficits that effect pt's occupational performance and ability to safely return to [facility] including decrease activity tolerance, decrease standing balance, decrease performance during ADL tasks, decrease cognition, decrease generalized strength, decrease activity engagement, decrease performance during functional transfers, and high fall risk combined with medical complications of hypertension , abnormal renal lab values, change in mental status, abnormal CBC, and need for input for mobility technique/safety. This evaluation required an extensive review of medical and/or therapy records and additional review of physical, cognitive and psychosocial history related to functional performance. Based upon functional performance deficits and assessments, this evaluation has been identified as a  high complexity  evaluation.      Patient to benefit from continued Occupational Therapy treatment while in the hospital to address aforementioned deficits and maximize level of functional independence with ADLs and functional mobility. Pt currently requires A to facilitate appropriate d/c plan.   Plan   Treatment Interventions ADL retraining;Functional transfer training;UE strengthening/ROM;Endurance training;Cognitive reorientation;Patient/family training;Equipment evaluation/education;Compensatory technique education;Energy conservation;Activityengagement   Goal Expiration Date 08/08/24   OT Treatment Day 0   OT Frequency 2-3x/wk   Discharge Recommendation   Rehab Resource Intensity Level, OT III (Minimum Resource Intensity)   -PAC Daily Activity Inpatient   Lower Body Dressing 3   Bathing 3   Toileting 3   Upper Body Dressing 4   Grooming 4   Eating 4   Daily Activity Raw Score 21   Daily Activity Standardized Score (Calc for Raw Score >=11) 44.27   AM-PAC Applied Cognition Inpatient   Following a Speech/Presentation 4   Understanding Ordinary Conversation 4   Taking Medications 2   Remembering Where Things Are Placed or Put Away 3   Remembering List of 4-5 Errands 2   Taking Care of Complicated Tasks 2   Applied Cognition Raw Score 17   Applied Cognition Standardized Score 36.52   Barthel Index   Feeding 10   Bathing 0   Grooming Score 5   Dressing Score 5   Bladder Score 10   Bowels Score 10   Toilet Use Score 5   Transfers (Bed/Chair) Score 10   Mobility (Level Surface) Score 0   Stairs Score 0   Barthel Index Score 55   End of Consult   Patient Position at End of Consult Bedside chair;All needs within reach   Nurse Communication Nurse aware of consult   The patient's raw score on the AM-PAC Daily Activity inpatient short form is 21, standardized score is 44.27, greater than 39.4. Please refer to the recommendation of the Occupational Therapist for safe DC planning.    Resource Intensity Recommendation: Level III (Minimum  Resource Intensity)        GOALS:    *ADL transfers with (I) for inc'd independence with ADLs/purposeful tasks    *UB ADL with (I) for inc'd independence with self cares    *LB ADL with (I) using AE prn for inc'd independence with self cares    *Toileting with (I) for clothing management and hygiene for return to PLOF with personal care    *Increase stand tolerance x 10  m for inc'd tolerance with standing purposeful tasks    *Participate in 10m UE therex to increase overall stamina/activity tolerance for purposeful tasks    *Bed mobility- (I) for inc'd independence to manage own comfort and initiate EOB & OOB purposeful tasks    *Patient will verbalize 3 safety awareness/ principles to prevent falls in the home setting.     *Patient will verbalize and demonstrate use of energy conservation/deep breathing techniques and work simplification skills during functional activities with no verbal cues.     *Patient will increase OOB/sitting tolerance to 2-4 hours per day to increase participation in self-care and leisure tasks with no s/s of exertion.     *Patient will engage in ongoing cognitive assessment to assist with safe discharge planning/recommendations.     *Pt will verbalize and demonstrate understanding of post-op movement precautions 100% (if applicable) in tx sessions for increased safety and functional mobility.      *Pt will demonstrate use of long handled AE (if appropriate) during 100% of tx sessions for increased ADL safety and independence following D/C     Ranjana Nagy OTR/YUSUF

## 2024-07-29 NOTE — PLAN OF CARE
Problem: Neurological Deficit  Goal: Neurological status is stable or improving  Description: Interventions:  - Monitor and assess patient's level of consciousness, motor function, sensory function, and level of assistance needed for ADLs.   - Monitor and report changes from baseline. Collaborate with interdisciplinary team to initiate plan and implement interventions as ordered.   - Provide and maintain a safe environment.  - Consider seizure precautions.  - Consider fall precautions.  - Consider aspiration precautions.  - Consider bleeding precautions.  7/29/2024 1205 by Chip Cabrera RN  Outcome: Progressing  7/29/2024 0906 by Chip Cabrera RN  Outcome: Progressing     Problem: Activity Intolerance/Impaired Mobility  Goal: Mobility/activity is maintained at optimum level for patient  Description: Interventions:  - Assess and monitor patient  barriers to mobility and need for assistive/adaptive devices.  - Assess patient's emotional response to limitations.  - Collaborate with interdisciplinary team and initiate plans and interventions as ordered.  - Encourage independent activity per ability.  - Maintain proper body alignment.  - Perform active/passive rom as tolerated/ordered.  - Plan activities to conserve energy.  - Turn patient as appropriate  7/29/2024 1205 by Chip Cabrera RN  Outcome: Progressing  7/29/2024 0906 by Chip Cabrera RN  Outcome: Progressing     Problem: Communication Impairment  Goal: Ability to express needs and understand communication  Description: Assess patient's communication skills and ability to understand information.  Patient will demonstrate use of effective communication techniques, alternative methods of communication and understanding even if not able to speak.     - Encourage communication and provide alternate methods of communication as needed.  - Collaborate with case management/ for discharge needs.  - Include patient/family/caregiver in decisions  related to communication.  7/29/2024 1205 by Chip Cabrera RN  Outcome: Progressing  7/29/2024 0906 by Chip Cabrera RN  Outcome: Progressing     Problem: Potential for Aspiration  Goal: Non-ventilated patient's risk of aspiration is minimized  Description: Assess and monitor vital signs, respiratory status, and labs (WBC).  Monitor for signs of aspiration (tachypnea, cough, rales, wheezing, cyanosis, fever).    - Assess and monitor patient's ability to swallow.  - Place patient up in chair to eat if possible.  - HOB up at 90 degrees to eat if unable to get patient up into chair.  - Supervise patient during oral intake.   - Instruct patient/ family to take small bites.  - Instruct patient/ family to take small single sips when taking liquids.  - Follow patient-specific strategies generated by speech pathologist.  7/29/2024 1205 by Chip Cabrera RN  Outcome: Progressing  7/29/2024 0906 by Chip Cabrera RN  Outcome: Progressing  Goal: Ventilated patient's risk of aspiration is minimized  Description: Assess and monitor vital signs, respiratory status, airway cuff pressure, and labs (WBC).  Monitor for signs of aspiration (tachypnea, cough, rales, wheezing, cyanosis, fever).    - Elevate head of bed 30 degrees if patient has tube feeding.  - Monitor tube feeding.  7/29/2024 1205 by Chip Cabrera RN  Outcome: Progressing  7/29/2024 0906 by Chip Cabrera RN  Outcome: Progressing     Problem: Nutrition  Goal: Nutrition/Hydration status is improving  Description: Monitor and assess patient's nutrition/hydration status for malnutrition (ex- brittle hair, bruises, dry skin, pale skin and conjunctiva, muscle wasting, smooth red tongue, and disorientation). Collaborate with interdisciplinary team and initiate plan and interventions as ordered.  Monitor patient's weight and dietary intake as ordered or per policy. Utilize nutrition screening tool and intervene per policy. Determine patient's food preferences and provide  high-protein, high-caloric foods as appropriate.     - Assist patient with eating.  - Allow adequate time for meals.  - Encourage patient to take dietary supplement as ordered.  - Collaborate with clinical nutritionist.  - Include patient/family/caregiver in decisions related to nutrition.  7/29/2024 1205 by Chip Cabrera RN  Outcome: Progressing  7/29/2024 0906 by Chip Cabrera RN  Outcome: Progressing

## 2024-07-29 NOTE — PROGRESS NOTES
[facility]  Progress Note  Name: Anthony Slater I  MRN: [id number]  Unit/Bed#: -01 I Date of Admission: 7/28/2024   Date of Service: 7/29/2024 I Hospital Day: 0    Assessment & Plan   * Acute encephalopathy  Assessment & Plan  Initial symptoms included headache, confusion, and dysarthria  Patient was admitted to be ruled out for the possibility of a stroke  Underlying etiology at this point was most likely a hypertensive encephalopathy  Patient is currently back to baseline, as confirmed by her  who is bedside at the time of my evaluation  Workup thus far-  #1.  CT head-no acute intracranial abnormality  #2.  2D echocardiogram-EF of 60%, no regional wall motion abnormalities, no significant valvular abnormalities-see the full report for additional details  #3.  Lipid panel reviewed-for the most part at goal  #4.  ImK3z-0.5%  #5.  MRI brain has been ordered and yet to be completed  #6.  Will check bilateral carotid ultrasound testing  #7.  Appreciate neurology input  #8.  Status post a PT, OT, and speech evaluation    Neurology recommendations noted -patient would prefer not to take a daily aspirin in the setting of having GAVE disease  We will continue treatment with Lipitor  Anticipate discharge planning for tomorrow, after the MRI has been completed.  Since the patient will be here greater than 2 midnights, I will switch her from the observation to the inpatient classification        Stage 3a chronic kidney disease (HCC)  Assessment & Plan  Lab Results   Component Value Date    EGFR 39 07/29/2024    EGFR 32 07/28/2024    EGFR 40 07/12/2024    CREATININE 1.28 07/29/2024    CREATININE 1.50 (H) 07/28/2024    CREATININE 1.26 07/12/2024   Mild acute kidney injury was present on admission  Status post treatment with IV fluids  Creatinine is back down to 1.28  Avoid nephrotoxins, periods of hypotension  Continue close monitoring    Anxiety state  Assessment & Plan  Continue  BuSpar    Hyperlipidemia  Assessment & Plan  Lipid panel reviewed, HDL is slightly low.  Continue Lipitor    Gastroesophageal reflux disease  Assessment & Plan  Continue PPI-Protonix 40 mg p.o. twice daily  History of GAVE disease-H&H is stable    Iron deficiency anemia  Assessment & Plan  In the setting of having a history of Graves' disease  Continue ferrous sulfate               VTE Pharmacologic Prophylaxis: VTE Score: 8 High Risk (Score >/= 5) - Pharmacological DVT Prophylaxis Ordered: heparin. Sequential Compression Devices Ordered.    Mobility:   Basic Mobility Inpatient Raw Score: 20  -HL Goal: 6: Walk 10 steps or more  JH-HLM Achieved: 4: Move to chair/commode  JH-HLM Goal achieved. Continue to encourage appropriate mobility.    Patient Centered Rounds: I performed bedside rounds with nursing staff today.   Discussions with Specialists or Other Care Team Provider: Neurology, case management    Education and Discussions with Family / Patient: Updated  ( and daughter) at bedside.    Total Time Spent on Date of Encounter in care of patient: 40 mins. This time was spent on one or more of the following: performing physical exam; counseling and coordination of care; obtaining or reviewing history; documenting in the medical record; reviewing/ordering tests, medications or procedures; communicating with other healthcare professionals and discussing with patient's family/caregivers.    Current Length of Stay: 0 day(s)  Current Patient Status: Observation   Certification Statement: The patient, admitted on an observation basis, will now require > 2 midnight hospital stay due to need for bilateral carotid ultrasound Doppler testing, and an MRI  Discharge Plan: Anticipate discharge tomorrow to home.    Code Status: Level 1 - Full Code    Subjective:   Patient seen, looks and feels okay, feels better than prior to coming into the hospital.  The patient's , and daughter at bedside who also  reports that the patient looks a lot better    Objective:     Vitals:   Temp (24hrs), Av.8 °F (36.6 °C), Min:97.3 °F (36.3 °C), Max:98.2 °F (36.8 °C)    Temp:  [97.3 °F (36.3 °C)-98.2 °F (36.8 °C)] 97.8 °F (36.6 °C)  HR:  [69-90] 82  Resp:  [14-21] 18  BP: (138-210)/(72-92) 164/92  SpO2:  [95 %-100 %] 98 %  Body mass index is 22.11 kg/m².     Input and Output Summary (last 24 hours):     Intake/Output Summary (Last 24 hours) at 2024 1204  Last data filed at 2024 1104  Gross per 24 hour   Intake 476 ml   Output 700 ml   Net -224 ml       Physical Exam:   Physical Exam  Constitutional:       General: She is not in acute distress.     Appearance: Normal appearance. She is normal weight. She is not ill-appearing.   HENT:      Head: Normocephalic and atraumatic.      Nose: Nose normal.      Mouth/Throat:      Mouth: Mucous membranes are moist.   Eyes:      Extraocular Movements: Extraocular movements intact.      Pupils: Pupils are equal, round, and reactive to light.   Cardiovascular:      Rate and Rhythm: Normal rate and regular rhythm.      Pulses: Normal pulses.      Heart sounds: Normal heart sounds. No murmur heard.     No friction rub. No gallop.   Pulmonary:      Effort: Pulmonary effort is normal. No respiratory distress.      Breath sounds: Normal breath sounds. No wheezing, rhonchi or rales.   Abdominal:      General: There is no distension.      Palpations: Abdomen is soft. There is no mass.      Tenderness: There is no abdominal tenderness.      Hernia: No hernia is present.   Musculoskeletal:         General: No swelling or tenderness. Normal range of motion.      Cervical back: Normal range of motion and neck supple. No rigidity.      Right lower leg: No edema.      Left lower leg: No edema.   Skin:     General: Skin is warm.      Capillary Refill: Capillary refill takes less than 2 seconds.      Findings: No erythema or rash.   Neurological:      General: No focal deficit present.       Mental Status: She is alert and oriented to person, place, and time. Mental status is at baseline.      Cranial Nerves: No cranial nerve deficit.      Motor: No weakness.   Psychiatric:         Mood and Affect: Mood normal.         Behavior: Behavior normal.          Additional Data:     Labs:  Results from last 7 days   Lab Units 07/28/24  1832   WBC Thousand/uL 9.71   HEMOGLOBIN g/dL 10.1*   HEMATOCRIT % 32.5*   PLATELETS Thousands/uL 207   SEGS PCT % 72   LYMPHO PCT % 17   MONO PCT % 8   EOS PCT % 3     Results from last 7 days   Lab Units 07/29/24  0446 07/28/24  1832   SODIUM mmol/L 140 135   POTASSIUM mmol/L 4.0 4.0   CHLORIDE mmol/L 110* 102   CO2 mmol/L 25 24   BUN mg/dL 19 22   CREATININE mg/dL 1.28 1.50*   ANION GAP mmol/L 5 9   CALCIUM mg/dL 8.7 9.2   ALBUMIN g/dL  --  4.0   TOTAL BILIRUBIN mg/dL  --  0.41   ALK PHOS U/L  --  52   ALT U/L  --  14   AST U/L  --  25   GLUCOSE RANDOM mg/dL 89 92     Results from last 7 days   Lab Units 07/28/24  1832   INR  1.01         Results from last 7 days   Lab Units 07/29/24  0446   HEMOGLOBIN A1C % 5.5           Lines/Drains:  Invasive Devices       Peripheral Intravenous Line  Duration             Peripheral IV 07/28/24 Left;Proximal;Ventral (anterior) Forearm <1 day    Peripheral IV 07/28/24 Right;Ventral (anterior) Forearm <1 day                      Telemetry:  Telemetry Orders (From admission, onward)               24 Hour Telemetry Monitoring  Continuous x 24 Hours (Telem)        Question:  Reason for 24 Hour Telemetry  Answer:  TIA/Suspected CVA/ Confirmed CVA                     Telemetry Reviewed: Normal Sinus Rhythm  Indication for Continued Telemetry Use: No indication for continued use. Will discontinue.              Imaging: Reviewed radiology reports from this admission including: CT head    Recent Cultures (last 7 days):         Last 24 Hours Medication List:   Current Facility-Administered Medications   Medication Dose Route Frequency Provider Last  Rate    atorvastatin  40 mg Oral QPM Daniella Disla PA-C      busPIRone  5 mg Oral BID Daniella Disla PA-C      Cholecalciferol  1,000 Units Oral Daily Daniella Disla PA-C      co-enzyme Q-10  100 mg Oral Daily Daniella Disla PA-C      vitamin B-12  1,000 mcg Oral Daily Daniella Disla PA-C      famotidine  10 mg Oral HS Daniella Disla PA-C      ferrous sulfate  325 mg Oral Daily With Breakfast Daniella Disla PA-C      heparin (porcine)  5,000 Units Subcutaneous Q8H YANNICK Daniella Disla PA-C      hydrALAZINE  10 mg Intravenous Q6H PRN Daniella Disla PA-C      ondansetron  4 mg Intravenous Q6H PRN Daniella Disla PA-C      pantoprazole  40 mg Oral BID Daniella Disla PA-C      traZODone  50 mg Oral HS Daniella Disla PA-C          Today, Patient Was Seen By: Kirk Myrick MD    **Please Note: This note may have been constructed using a voice recognition system.**

## 2024-07-29 NOTE — ASSESSMENT & PLAN NOTE
Anthony Slater is a 79 y.o. right handed female with anxiety, hypertension, COPD, DAVID on CPAP, diabetes mellitus type 2, GERD who presents with memory issues and speech difficulty. Her symptoms could be hypertensive emergency, acute ischemic stroke, complicated migraines or TME. No signs of meningitis noted on exam or bloodwork. CTH without acute abnormality.     - Continue stroke pathway, MRI brain, Echo, A1c, lipid panel, telemetry. Obtain either CTA or carotid US for vessel imaging.   - Gradual reduction of blood pressure to normotension after 24 hours  - Recommend  ASA 81 mg daily  - Vascular risk factor management , A1c goal <6.5, LDL goal <70  - Migraine cocktail as needed (avoid NSAIDS, Triptans, steroids and DHE)   - PT/OT/ST

## 2024-07-29 NOTE — ASSESSMENT & PLAN NOTE
Initial symptoms included headache, confusion, and dysarthria  Patient was admitted to be ruled out for the possibility of a stroke  Underlying etiology at this point was most likely a hypertensive encephalopathy  Patient is currently back to baseline, as confirmed by her  who is bedside at the time of my evaluation  Workup thus far-  #1.  CT head-no acute intracranial abnormality  #2.  2D echocardiogram-EF of 60%, no regional wall motion abnormalities, no significant valvular abnormalities-see the full report for additional details  #3.  Lipid panel reviewed-for the most part at goal  #4.  KyE1n-4.5%  #5.  MRI brain has been ordered and yet to be completed  #6.  Will check bilateral carotid ultrasound testing  #7.  Appreciate neurology input  #8.  Status post a PT, OT, and speech evaluation    Neurology recommendations noted -patient would prefer not to take a daily aspirin in the setting of having GAVE disease  We will continue treatment with Lipitor  Anticipate discharge planning for tomorrow, after the MRI has been completed.  Since the patient will be here greater than 2 midnights, I will switch her from the observation to the inpatient classification

## 2024-07-29 NOTE — ASSESSMENT & PLAN NOTE
Lab Results   Component Value Date    EGFR 39 07/29/2024    EGFR 32 07/28/2024    EGFR 40 07/12/2024    CREATININE 1.28 07/29/2024    CREATININE 1.50 (H) 07/28/2024    CREATININE 1.26 07/12/2024   Mild acute kidney injury was present on admission  Status post treatment with IV fluids  Creatinine is back down to 1.28  Avoid nephrotoxins, periods of hypotension  Continue close monitoring

## 2024-07-29 NOTE — CONSULTS
TeleConsultation - Neurology   Anthony Slater 79 y.o. female MRN: [id number]  Unit/Bed#: -01 Encounter: [id number]      VIRTUAL CARE DOCUMENTATION:     1. This service was provided via Telemedicine using [employer]     2. Parties in the room with patient during teleconsult Patient only    3. Confidentiality My office door was closed     4. Participants No one else was in the room    5. Patient acknowledged consent and understanding of privacy and security of the  Telemedicine consult. I informed the patient that I have reviewed their record in Epic and presented the opportunity for them to ask any questions regarding the visit today.  The patient agreed to participate.    6. Time spent 60 mins       Assessment & Plan     * Acute encephalopathy  Assessment & Plan  Anthony Slater is a 79 y.o. right handed female with anxiety, hypertension, COPD, DAVID on CPAP, diabetes mellitus type 2, GERD who presents with memory issues and speech difficulty. Her symptoms could be hypertensive emergency, acute ischemic stroke, complicated migraines or TME. No signs of meningitis noted on exam or bloodwork. CTH without acute abnormality.     - Continue stroke pathway, MRI brain, Echo, A1c, lipid panel, telemetry. Obtain either CTA or carotid US for vessel imaging.   - Gradual reduction of blood pressure to normotension after 24 hours  - Recommend  ASA 81 mg daily  - Vascular risk factor management , A1c goal <6.5, LDL goal <70  - Migraine cocktail as needed (avoid NSAIDS, Triptans, steroids and DHE)   - PT/OT/ST    Hyperlipidemia  Assessment & Plan  Continue home statin        Anthony Slater will need follow up in in 6 weeks with neurovascular attending or advance practitioner. She will not require outpatient neurological testing.    History of Present Illness     Reason for Consult / Principal Problem: Language issues and memory issues  Hx and PE limited by: memory issues  HPI: Anthony Slater is a 79 y.o. right  handed female with anxiety, hypertension, COPD, DAVID on CPAP, diabetes mellitus type 2, GERD who presents with memory issues.  She was trying to make food and she could not remember who to make it.   She is feeling better now and she is almost back to baseline Her  and daughter can describe it better. She never loss consciousness. She is unable to communicate her thoughts but she is unable to understand her daughter saying to go to [facility]. Had a headache and it is improving with medication now.   Around paulie, she got COVID and she got medications and did well initially. She fell in the bathroom on the floor and unclear what happened at that time.  They thought she passed out from the medicine or infection.         Inpatient consult to Neurology  Consult performed by: Zuleika Bowers MD  Consult ordered by: Daniella Disla PA-C         Review of Systems  Constitutional symptoms: weakness, no fever, no chills, no sweats.   Skin symptoms: no rash.   Eye:  no vision changes/disturbances, Blurry vision  ENMT:   no dizziness  Respiratory symptoms: no shortness of breath, no cough.   Cardiovascular symptoms: no chest pain.   Gastrointestinal symptoms: no nausea, no vomiting, no diarrhea.   Hema/Lymph:    no problems  Endocrine:    no Cold intolerance, Heat intolerance  Immunologic:    no problems  Musculoskeletal symptoms: no back pain.  Integumentary:    no problems   Neurologic symptoms: Confusion, headache, no dizziness, no weakness, no altered level of consciousness, no numbness, no tingling. 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    History of transfusion     Hypertension     Mixed hyperlipidemia     DAVID on CPAP     Shortness of breath     triggered by anxiety    Watermelon stomach     Wears dentures      Past Surgical History:   Procedure Laterality Date    BREAST BIOPSY Right 02/24/2015    BREAST BIOPSY Left     ? year    CARDIAC CATHETERIZATION  02/07/2014    EF 65%, mid LAD 60% stenosis and D2 70% stenosis.  Medical management.    CATARACT EXTRACTION Bilateral     COLONOSCOPY      CYSTOSCOPY N/A 07/31/2023    Procedure: CYSTOSCOPY;  Surgeon: Nadir Ash MD;  Location: [facility];  Service: Gynecology Oncology    EGD      JOINT REPLACEMENT Right     RIGHT ELBOW    OOPHORECTOMY      not sure which one was removed    NE LAPS TOTAL HYSTERECT 250 GM/< W/RMVL TUBE/OVARY N/A 07/31/2023    Procedure: ROBOTHYSTERECTOMY, BILATERAL SALPINGO-OOPHORECTOMY, PELVIC SENTINEL NODE BIOPSIES;  Surgeon: Nadir Ash MD;  Location: [facility];  Service: Gynecology Oncology    ROTATOR CUFF REPAIR Right     US GUIDED THYROID BIOPSY  12/04/2020     Social History   Social History     Substance and Sexual Activity   Alcohol Use Not Currently     Social History     Substance and Sexual Activity   Drug Use Never     E-Cigarette/Vaping    E-Cigarette Use Never User      E-Cigarette/Vaping Substances    Nicotine No     THC No     CBD No     Flavoring No     Other No     Unknown No      Social History     Tobacco Use   Smoking Status Never    Passive exposure: Never   Smokeless Tobacco Never     Family History:   Family History   Problem Relation Age of Onset    Heart disease Mother     Coronary artery disease Sister         history of CABG    No Known  Problems Father     No Known Problems Daughter     No Known Problems Sister     No Known Problems Sister     No Known Problems Sister     No Known Problems Daughter        Review of previous medical records was  completed.     Meds/Allergies   all current active meds have been reviewed, current meds:   Current Facility-Administered Medications   Medication Dose Route Frequency    atorvastatin (LIPITOR) tablet 40 mg  40 mg Oral QPM    busPIRone (BUSPAR) tablet 5 mg  5 mg Oral BID    Cholecalciferol (VITAMIN D3) tablet 1,000 Units  1,000 Units Oral Daily    co-enzyme Q-10 capsule 100 mg  100 mg Oral Daily    cyanocobalamin (VITAMIN B-12) tablet 1,000 mcg  1,000 mcg Oral Daily    famotidine (PEPCID) tablet 10 mg  10 mg Oral HS    ferrous sulfate tablet 325 mg  325 mg Oral Daily With Breakfast    heparin (porcine) subcutaneous injection 5,000 Units  5,000 Units Subcutaneous Q8H YANNICK    hydrALAZINE (APRESOLINE) injection 10 mg  10 mg Intravenous Q6H PRN    ondansetron (ZOFRAN) injection 4 mg  4 mg Intravenous Q6H PRN    pantoprazole (PROTONIX) EC tablet 40 mg  40 mg Oral BID    traZODone (DESYREL) tablet 50 mg  50 mg Oral HS   , and PTA meds:   Prior to Admission Medications   Prescriptions Last Dose Informant Patient Reported? Taking?   Cholecalciferol (VITAMIN D3) 1,000 units tablet   Yes Yes   Sig: Take 1,000 Units by mouth daily   atorvastatin (LIPITOR) 40 mg tablet  Self No No   Sig: TAKE 1 TABLET BY MOUTH DAILY   busPIRone (BUSPAR) 5 mg tablet  Self No No   Sig: Take 1 tablet (5 mg total) by mouth 3 (three) times a day   Patient taking differently: Take 5 mg by mouth 2 (two) times a day   co-enzyme Q-10 100 mg capsule  Self Yes No   Sig: Take by mouth daily   famotidine (PEPCID) 20 mg tablet  Self No No   Sig: Take 1 tablet (20 mg total) by mouth daily at bedtime   ferrous sulfate 324 (65 Fe) mg  Self No No   Sig: Take 1 tablet (324 mg total) by mouth daily before breakfast   ferrous sulfate 324 (65 Fe) mg  Self No  "No   Sig: take 1 tablet by mouth once daily before breakfast   metoprolol tartrate (LOPRESSOR) 25 mg tablet  Self No No   Sig: Take 1 tablet (25 mg total) by mouth in the morning   pantoprazole (PROTONIX) 40 mg tablet  Self No No   Sig: Take 1 tablet (40 mg total) by mouth 2 (two) times a day   traZODone (DESYREL) 50 mg tablet  Self No No   Sig: Take 1 tablet (50 mg total) by mouth daily at bedtime   vitamin B-12 (VITAMIN B-12) 1,000 mcg tablet  Self Yes No   Sig: Take 1,000 mcg by mouth daily      Facility-Administered Medications: None       Allergies   Allergen Reactions    Advil [Ibuprofen]     Aspirin Other (See Comments)     gi bleed    Caspofungin Other (See Comments)    Hydrocodone     Pravastatin GI Intolerance    Tramadol GI Intolerance       Objective   Vitals:Blood pressure 165/79, pulse 78, temperature 98.2 °F (36.8 °C), temperature source Oral, resp. rate 18, height 5' 1\" (1.549 m), weight 53.1 kg (117 lb), SpO2 95%.,Body mass index is 22.11 kg/m².    Intake/Output Summary (Last 24 hours) at 7/29/2024 0941  Last data filed at 7/29/2024 0841  Gross per 24 hour   Intake 476 ml   Output 400 ml   Net 76 ml       Invasive Devices:   Invasive Devices       Peripheral Intravenous Line  Duration             Peripheral IV 07/28/24 Left;Proximal;Ventral (anterior) Forearm <1 day    Peripheral IV 07/28/24 Right;Ventral (anterior) Forearm <1 day                    Physical Exam  Modified PE as this is a video consultation:  Gen:   NAD.  HEENT:  No Septal deviation EOMI NCAT.  Resp:  Symmetric chest rise and patient in no obvious respiratory distress  MSK: ROM normal  Skin: No rash noted in visualized portion of this exam    Neurologic Exam  Awake, alert, oriented x 4 name, age, month ,year. No aphasia or dysarthria confusion noted.  Patient is able to follow 2 and 3 step commands with ease  CN 2-12 grossly intact, EOMI,  no facial asymmetry with eye brow raise or smile,  including finger rub symmetric on both " sides- instructed pt on how to do this, V1-3 done with help of patient touching her own face in those distributions as instructed normal to LT, hearing intact to conversation,  no tongue deviation, symmetric shoulder shrug and side bending and neck rotation. No neck stiffness.   Motor:  Intact antigravity x 4 extremities. No Pronator drift noted.  Sensation: Intact to light touch, instructed patient on how to do this in all extremities proximal and distal.  Cerebellar: No dysmetria b/l with FNF testing.   Gait:  not tested due to clinical.     Lab Results: I have personally reviewed pertinent reports.  , CBC:   Results from last 7 days   Lab Units 07/28/24  1832   WBC Thousand/uL 9.71   RBC Million/uL 3.81   HEMOGLOBIN g/dL 10.1*   HEMATOCRIT % 32.5*   MCV fL 85   PLATELETS Thousands/uL 207   , BMP/CMP:   Results from last 7 days   Lab Units 07/29/24  0446 07/28/24  1832   SODIUM mmol/L 140 135   POTASSIUM mmol/L 4.0 4.0   CHLORIDE mmol/L 110* 102   CO2 mmol/L 25 24   BUN mg/dL 19 22   CREATININE mg/dL 1.28 1.50*   CALCIUM mg/dL 8.7 9.2   AST U/L  --  25   ALT U/L  --  14   ALK PHOS U/L  --  52   EGFR ml/min/1.73sq m 39 32   , HgBA1C:   Results from last 7 days   Lab Units 07/29/24  0446   HEMOGLOBIN A1C % 5.5   , Lipid Profile:   Results from last 7 days   Lab Units 07/29/24  0446   HDL mg/dL 41*   LDL CALC mg/dL 59   TRIGLYCERIDES mg/dL 84     Imaging Studies: I have personally reviewed pertinent reports.   and I have personally reviewed pertinent films in PACS  EKG, Pathology, and Other Studies: I have personally reviewed pertinent reports.    VTE Prophylaxis: Heparin    Code Status: Level 1 - Full Code    Counseling / Coordination of Care  Total time spent today 60 minutes. Greater than 50% of total time was spent with the patient and / or family counseling and / or coordination of care. A description of the counseling / coordination of care:    in caring for this patient including Diagnostic results, Prognosis,  Risks and benefits of tx options, Instructions for management, Documenting in the medical record, Reviewing / ordering tests, medicine, procedures, and communicating with other healthcare professionals .

## 2024-07-30 ENCOUNTER — TRANSITIONAL CARE MANAGEMENT (OUTPATIENT)
Dept: FAMILY MEDICINE CLINIC | Facility: CLINIC | Age: 79
End: 2024-07-30

## 2024-07-30 ENCOUNTER — OFFICE VISIT (OUTPATIENT)
Dept: FAMILY MEDICINE CLINIC | Facility: CLINIC | Age: 79
End: 2024-07-30
Payer: MEDICARE

## 2024-07-30 VITALS
HEIGHT: 61 IN | WEIGHT: 113 LBS | SYSTOLIC BLOOD PRESSURE: 150 MMHG | BODY MASS INDEX: 21.34 KG/M2 | OXYGEN SATURATION: 99 % | HEART RATE: 89 BPM | DIASTOLIC BLOOD PRESSURE: 78 MMHG

## 2024-07-30 VITALS
TEMPERATURE: 97.8 F | HEIGHT: 61 IN | OXYGEN SATURATION: 99 % | RESPIRATION RATE: 17 BRPM | WEIGHT: 117 LBS | BODY MASS INDEX: 22.09 KG/M2 | SYSTOLIC BLOOD PRESSURE: 141 MMHG | HEART RATE: 77 BPM | DIASTOLIC BLOOD PRESSURE: 77 MMHG

## 2024-07-30 DIAGNOSIS — D64.9 ANEMIA, UNSPECIFIED TYPE: ICD-10-CM

## 2024-07-30 DIAGNOSIS — R63.4 WEIGHT LOSS: ICD-10-CM

## 2024-07-30 DIAGNOSIS — N18.31 STAGE 3A CHRONIC KIDNEY DISEASE (HCC): Chronic | ICD-10-CM

## 2024-07-30 DIAGNOSIS — E53.8 VITAMIN B12 DEFICIENCY: ICD-10-CM

## 2024-07-30 DIAGNOSIS — R41.89 COGNITIVE DECLINE: Primary | ICD-10-CM

## 2024-07-30 DIAGNOSIS — D64.9 ANEMIA, UNSPECIFIED TYPE: Primary | ICD-10-CM

## 2024-07-30 DIAGNOSIS — R53.82 CHRONIC FATIGUE: ICD-10-CM

## 2024-07-30 DIAGNOSIS — F51.04 PSYCHOPHYSIOLOGICAL INSOMNIA: ICD-10-CM

## 2024-07-30 DIAGNOSIS — F41.1 ANXIETY STATE: Chronic | ICD-10-CM

## 2024-07-30 PROBLEM — R41.3 MEMORY LOSS DUE TO MEDICAL CONDITION: Status: RESOLVED | Noted: 2024-04-02 | Resolved: 2024-07-30

## 2024-07-30 LAB
ANION GAP SERPL CALCULATED.3IONS-SCNC: 8 MMOL/L (ref 4–13)
BASOPHILS # BLD AUTO: 0.03 THOUSANDS/ÂΜL (ref 0–0.1)
BASOPHILS NFR BLD AUTO: 0 % (ref 0–1)
BUN SERPL-MCNC: 20 MG/DL (ref 5–25)
CALCIUM SERPL-MCNC: 9.3 MG/DL (ref 8.4–10.2)
CHLORIDE SERPL-SCNC: 106 MMOL/L (ref 96–108)
CO2 SERPL-SCNC: 25 MMOL/L (ref 21–32)
CREAT SERPL-MCNC: 1.34 MG/DL (ref 0.6–1.3)
EOSINOPHIL # BLD AUTO: 0.14 THOUSAND/ÂΜL (ref 0–0.61)
EOSINOPHIL NFR BLD AUTO: 1 % (ref 0–6)
ERYTHROCYTE [DISTWIDTH] IN BLOOD BY AUTOMATED COUNT: 13.8 % (ref 11.6–15.1)
GFR SERPL CREATININE-BSD FRML MDRD: 37 ML/MIN/1.73SQ M
GLUCOSE SERPL-MCNC: 101 MG/DL (ref 65–140)
HCT VFR BLD AUTO: 32.7 % (ref 34.8–46.1)
HGB BLD-MCNC: 10.4 G/DL (ref 11.5–15.4)
IMM GRANULOCYTES # BLD AUTO: 0.03 THOUSAND/UL (ref 0–0.2)
IMM GRANULOCYTES NFR BLD AUTO: 0 % (ref 0–2)
LYMPHOCYTES # BLD AUTO: 1.47 THOUSANDS/ÂΜL (ref 0.6–4.47)
LYMPHOCYTES NFR BLD AUTO: 15 % (ref 14–44)
MCH RBC QN AUTO: 26.7 PG (ref 26.8–34.3)
MCHC RBC AUTO-ENTMCNC: 31.8 G/DL (ref 31.4–37.4)
MCV RBC AUTO: 84 FL (ref 82–98)
MONOCYTES # BLD AUTO: 0.75 THOUSAND/ÂΜL (ref 0.17–1.22)
MONOCYTES NFR BLD AUTO: 8 % (ref 4–12)
NEUTROPHILS # BLD AUTO: 7.31 THOUSANDS/ÂΜL (ref 1.85–7.62)
NEUTS SEG NFR BLD AUTO: 76 % (ref 43–75)
NRBC BLD AUTO-RTO: 0 /100 WBCS
PLATELET # BLD AUTO: 225 THOUSANDS/UL (ref 149–390)
PMV BLD AUTO: 9.6 FL (ref 8.9–12.7)
POTASSIUM SERPL-SCNC: 3.9 MMOL/L (ref 3.5–5.3)
RBC # BLD AUTO: 3.89 MILLION/UL (ref 3.81–5.12)
SODIUM SERPL-SCNC: 139 MMOL/L (ref 135–147)
WBC # BLD AUTO: 9.73 THOUSAND/UL (ref 4.31–10.16)

## 2024-07-30 PROCEDURE — 80048 BASIC METABOLIC PNL TOTAL CA: CPT | Performed by: HOSPITALIST

## 2024-07-30 PROCEDURE — 99496 TRANSJ CARE MGMT HIGH F2F 7D: CPT | Performed by: FAMILY MEDICINE

## 2024-07-30 PROCEDURE — 93880 EXTRACRANIAL BILAT STUDY: CPT | Performed by: SURGERY

## 2024-07-30 PROCEDURE — 99239 HOSP IP/OBS DSCHRG MGMT >30: CPT | Performed by: HOSPITALIST

## 2024-07-30 PROCEDURE — 85025 COMPLETE CBC W/AUTO DIFF WBC: CPT | Performed by: HOSPITALIST

## 2024-07-30 RX ORDER — TRAZODONE HYDROCHLORIDE 50 MG/1
TABLET ORAL
Qty: 90 TABLET | Refills: 1 | Status: SHIPPED | OUTPATIENT
Start: 2024-07-30

## 2024-07-30 RX ORDER — DONEPEZIL HYDROCHLORIDE 5 MG/1
5 TABLET, FILM COATED ORAL
Qty: 30 TABLET | Refills: 0 | Status: SHIPPED | OUTPATIENT
Start: 2024-07-30 | End: 2024-08-29

## 2024-07-30 RX ADMIN — Medication 100 MG: at 08:05

## 2024-07-30 RX ADMIN — Medication 1000 UNITS: at 08:06

## 2024-07-30 RX ADMIN — PANTOPRAZOLE SODIUM 40 MG: 40 TABLET, DELAYED RELEASE ORAL at 08:05

## 2024-07-30 RX ADMIN — FERROUS SULFATE TAB 325 MG (65 MG ELEMENTAL FE) 325 MG: 325 (65 FE) TAB at 08:05

## 2024-07-30 RX ADMIN — BUSPIRONE HYDROCHLORIDE 5 MG: 5 TABLET ORAL at 08:06

## 2024-07-30 RX ADMIN — CYANOCOBALAMIN TAB 500 MCG 1000 MCG: 500 TAB at 08:06

## 2024-07-30 NOTE — ASSESSMENT & PLAN NOTE
Lab Results   Component Value Date    EGFR 37 07/30/2024    EGFR 39 07/29/2024    EGFR 32 07/28/2024    CREATININE 1.34 (H) 07/30/2024    CREATININE 1.28 07/29/2024    CREATININE 1.50 (H) 07/28/2024   Mild acute kidney injury was present on admission  Status post treatment with IV fluids  Creatinine is back down to 1.28  Baseline creatinine is 1.2-1.4  Patient will need continued periodic outpatient BMP monitoring via her PCP

## 2024-07-30 NOTE — ASSESSMENT & PLAN NOTE
Lab Results   Component Value Date    EGFR 37 07/30/2024    EGFR 39 07/29/2024    EGFR 32 07/28/2024    CREATININE 1.34 (H) 07/30/2024    CREATININE 1.28 07/29/2024    CREATININE 1.50 (H) 07/28/2024   Encouraged good hydration!!

## 2024-07-30 NOTE — PROGRESS NOTES
"Transition of Care Visit  Name: Anthony Slater      : 1945      MRN: [id number]  Encounter Provider: Marycarmen Calvillo MD  Encounter Date: 2024   Encounter department: [facility] PRIMARY CARE    Assessment & Plan   1. Cognitive decline  Assessment & Plan:  Discussed Aricept with  pros and cons with patient and family.  She  has been begun on a very low dose therefore will monitor on this dose for the next 3 to 4 weeks.  Also will await geriatric evaluation.  B12 level was ordered Ross office visit in 3 to 4 weeks  Orders:  -     Ambulatory Referral to Senior Care; Future  2. Chronic fatigue  Assessment & Plan:  Discussed with patient I think her symptoms are multifactorial due to both medications as well as her anemia.  I did encourage no naps throughout the day and may be sleeping a little less amount of hours during the night.  Will attempt to taper off her trazadone - for now trying 1/2 pill hs  3. Anxiety state  Assessment & Plan:  Chronic problem for this patient and per daughter the BuSpar seems to \"calm her\".  I did discuss this is one of the medications that can make her more tired however since helping her anxiety the time being will not alter the dosage.  I did discuss possible change of this medication to an SSRI to also address her underlying depression, will hold any meds changes since just beginning Aricept.  Also referral was made to geriatrics and I would like their opinion regarding this medication.  4. Anemia, unspecified type  Assessment & Plan:  Per recent labs fairly stable.  I did discuss with patient this is one of the reasons for her ongoing fatigue and of course we will continue to monitor this blood work closely  Orders:  -     CBC and differential; Future  5. Stage 3a chronic kidney disease (HCC)  Assessment & Plan:  Lab Results   Component Value Date    EGFR 37 2024    EGFR 39 2024    EGFR 32 2024    CREATININE 1.34 (H) 2024    " "CREATININE 1.28 07/29/2024    CREATININE 1.50 (H) 07/28/2024   Encouraged good hydration!!  Orders:  -     Basic metabolic panel; Future  6. Weight loss  Assessment & Plan:  According to family appetite is somewhat better.  Daughter will be in touch with Meals [employer] Wheels and I will attempt to place a referral and of course we will continue to monitor her weight closely.  He does attempt to make an occasional Ensure  7. Vitamin B12 deficiency  -     Vitamin B12; Future         History of Present Illness     Transitional Care Management Review:   Anthony Slater is a 79 y.o. female here for TCM follow up.     During the TCM phone call patient stated:  TCM Call     Date and time call was made  7/30/2024 11:47 AM    Hospital care reviewed  Records reviewed    Patient was hospitial[facility] at  [facility]    Date of Admission  07/28/24    Date of discharge  07/30/24    Diagnosis  Acute encephalopathy    Disposition  Home    Were the patients medications reviewed and updated  Yes    Current Symptoms  None      TCM Call     Should patient be enrolled in anticoag monitoring?  No    Scheduled for follow up?  Yes    Did you obtain your prescribed medications  Yes    Do you need help managing your prescriptions or medications  No    Is transportation to your appointment needed  No    I have advised the patient to call PCP with any new or worsening symptoms  Cecile Andsilviakaitis RMA    Living Arrangements  Alone    Support System  None    Are you recieving any outpatient services  No    Are you recieving home care services  No    Are you using any community resources  No    Current waiver services  No    Have you fallen in the last 12 months  No    Interperter language line needed  No    Counseling  Patient        HPI  Review of Systems  Objective     /78 (BP Location: Left arm, Patient Position: Sitting, Cuff Size: Standard)   Pulse 89   Ht 5' 1\" (1.549 m)   Wt 51.3 kg (113 lb)   SpO2 99%   BMI 21.35 kg/m² " "    Physical Exam  Medications have been reviewed by provider in current encounter    Administrative Statements   Ambulatory Visit  Name: Anthony Slater      : 1945      MRN: [id number]  Encounter Provider: Marycarmen Calvillo MD  Encounter Date: 2024   Encounter department: [facility] PRIMARY CARE    Assessment & Plan   1. Cognitive decline  Assessment & Plan:  Discussed Aricept with  pros and cons with patient and family.  She  has been begun on a very low dose therefore will monitor on this dose for the next 3 to 4 weeks.  Also will await geriatric evaluation.  B12 level was ordered [facility] office visit in 3 to 4 weeks  Orders:  -     Ambulatory Referral to Senior Care; Future  2. Chronic fatigue  Assessment & Plan:  Discussed with patient I think her symptoms are multifactorial due to both medications as well as her anemia.  I did encourage no naps throughout the day and may be sleeping a little less amount of hours during the night.  Will attempt to taper off her trazadone - for now trying 1/2 pill hs  3. Anxiety state  Assessment & Plan:  Chronic problem for this patient and per daughter the BuSpar seems to \"calm her\".  I did discuss this is one of the medications that can make her more tired however since helping her anxiety the time being will not alter the dosage.  I did discuss possible change of this medication to an SSRI to also address her underlying depression, will hold any meds changes since just beginning Aricept.  Also referral was made to geriatrics and I would like their opinion regarding this medication.  4. Anemia, unspecified type  Assessment & Plan:  Per recent labs fairly stable.  I did discuss with patient this is one of the reasons for her ongoing fatigue and of course we will continue to monitor this blood work closely  Orders:  -     CBC and differential; Future  5. Stage 3a chronic kidney disease (HCC)  Assessment & Plan:  Lab Results   Component Value Date    " EGFR 37 07/30/2024    EGFR 39 07/29/2024    EGFR 32 07/28/2024    CREATININE 1.34 (H) 07/30/2024    CREATININE 1.28 07/29/2024    CREATININE 1.50 (H) 07/28/2024   Encouraged good hydration!!  Orders:  -     Basic metabolic panel; Future  6. Weight loss  Assessment & Plan:  According to family appetite is somewhat better.  Daughter will be in touch with Me[employer] Wheels and I will attempt to place a referral and of course we will continue to monitor her weight closely.  He does attempt to make an occasional Ensure  7. Vitamin B12 deficiency  -     Vitamin B12; Future     History of Present Illness     Patient initially scheduled for wellness exam today but now is to transition into care due to recent hospitalization from July 28 through July 30; admitted with confusion.  Workup initially was to rule out stroke which was ruled out; other labs and workup including an echocardiogram and a neuro exam failing to reveal any abnormalities.  She was noticed to have significant sundowning during her hospital stay and after workup revealed no other etiology of her.  Of confusion it was felt she had underlying cognitive decline and dementia.  She was discharged on a small amount of Aricept and referral made to geriatrics.  Her  and daughter are with her today.  As has been noted over quite some.  At times she waxes and wanes as far as memory loss and forgetfulness.  Today she cannot figure out how to put her seatbelt on.  Has been is requesting a form for handicap placard - he did bring partially filled out form.  Daughter is requesting Meals on Wheels since patient now having problems cooking and gets very frustrated when she is not sure what to make her have to make it.  Has been is monitoring her at all times and is taking care of her medications. Mickey states she does use her IPAD - doing word puzzles and some games ; really does not read   Easily becomes frustrated when unable to remember   has been chronically  "anxious and more so with recent forgetfulness    Patient's main complaint today is that of being fatigued and with no pep.  She states she sleeps from 10 PM to 8A, awakening currently for voiding however usually pretty easily falling back to sleep.  She tries not to nap during the day and has been tries to take keep her active; then take lots of walks .  Her medications were reviewed and without change other than the addition of Aricept.  We have been concerned about her weight although recently her appetite has improved and she is without GI symptoms.        Review of Systems    Objective     /78 (BP Location: Left arm, Patient Position: Sitting, Cuff Size: Standard)   Pulse 89   Ht 5' 1\" (1.549 m)   Wt 51.3 kg (113 lb)   SpO2 99%   BMI 21.35 kg/m²     Physical Exam  Constitutional:       Appearance: Normal appearance.   Neurological:      General: No focal deficit present.      Mental Status: She is alert. She is disoriented.   Psychiatric:      Comments: As always patient with flat affect.  She speaks very quietly but appropriate with distinct speaking she looks to her  for lots of answers to questions I ask   she admits to being very anxious and daughter feels she is also depressed and frustrated due to her chronic and worsening medical problems.       Administrative Statements           "

## 2024-07-30 NOTE — ASSESSMENT & PLAN NOTE
Discussed Aricept with  pros and cons with patient and family.  She  has been begun on a very low dose therefore will monitor on this dose for the next 3 to 4 weeks.  Also will await geriatric evaluation.  B12 level was ordered Ross office visit in 3 to 4 weeks

## 2024-07-30 NOTE — ASSESSMENT & PLAN NOTE
Initial symptoms included headache, confusion, and dysarthria  Underlying etiology at this point was most likely a hypertensive encephalopathy versus cognitive decline/new diagnosis of Alzheimer's dementia  Discharge blood pressure is 141/77  Patient is currently back to baseline, as confirmed by her  who is bedside at the time of my evaluation  Workup thus far-  #1.  CT head-no acute intracranial abnormality  #2.  2D echocardiogram-EF of 60%, no regional wall motion abnormalities, no significant valvular abnormalities-see the full report for additional details  #3.  Lipid panel reviewed-for the most part at goal  #4.  VcN6u-2.5%  #5.  MRI brain -grossly within normal limits  #6.  Bilateral carotid ultrasound testing-grossly within normal limits  #7.  Appreciate neurology input  #8.  Status post a PT, OT, and speech evaluation    Suspect that the patient's diagnosis is advancing cognitive decline/Alzheimer's dementia -see below  Patient is otherwise medically stable for discharge  Outpatient follow-up with PCP

## 2024-07-30 NOTE — ASSESSMENT & PLAN NOTE
"Chronic problem for this patient and per daughter the BuSpar seems to \"calm her\".  I did discuss this is one of the medications that can make her more tired however since helping her anxiety the time being will not alter the dosage.  I did discuss possible change of this medication to an SSRI to also address her underlying depression, will hold any meds changes since just beginning Aricept.  Also referral was made to geriatrics and I would like their opinion regarding this medication.  "

## 2024-07-30 NOTE — ASSESSMENT & PLAN NOTE
Discussed with patient I think her symptoms are multifactorial due to both medications as well as her anemia.  I did encourage no naps throughout the day and may be sleeping a little less amount of hours during the night.  Will attempt to taper off her trazadone - for now trying 1/2 pill hs

## 2024-07-30 NOTE — DISCHARGE SUMMARY
[facility]  Discharge- Anthony Slater 1945, 79 y.o. female MRN: [id number]  Unit/Bed#: [facility]-01 Encounter: [id number]  Primary Care Provider: Marycarmen Calvillo MD   Date and time admitted to hospital: 7/28/2024  6:10 PM    * Acute encephalopathy  Assessment & Plan  Initial symptoms included headache, confusion, and dysarthria  Underlying etiology at this point was most likely a hypertensive encephalopathy versus cognitive decline/new diagnosis of Alzheimer's dementia  Discharge blood pressure is 141/77  Patient is currently back to baseline, as confirmed by her  who is bedside at the time of my evaluation  Workup thus far-  #1.  CT head-no acute intracranial abnormality  #2.  2D echocardiogram-EF of 60%, no regional wall motion abnormalities, no significant valvular abnormalities-see the full report for additional details  #3.  Lipid panel reviewed-for the most part at goal  #4.  QrB1n-2.5%  #5.  MRI brain -grossly within normal limits  #6.  Bilateral carotid ultrasound testing-grossly within normal limits  #7.  Appreciate neurology input  #8.  Status post a PT, OT, and speech evaluation    Suspect that the patient's diagnosis is advancing cognitive decline/Alzheimer's dementia -see below  Patient is otherwise medically stable for discharge  Outpatient follow-up with PCP        Cognitive decline  Assessment & Plan  Patient was restless overnight, and was noted to have a lot of sundowning type behavior -suggestive of cognitive decline/Alzheimer's dementia  This possibility was reviewed with both the patient's , and daughter-they are both in agreement that the patient probably has Alzheimer's dementia.  The  reports that he witnessed the same thing with his father who had Alzheimer's dementia and reports that he has noticed that the patient has had a cognitive decline over the past year.  The daughter goes on to say that the patient sometimes calls her at 3:30 in  the morning and a panicked and anxious state.  Patient was given a prescription for Aricept  Patient will follow-up in the outpatient setting with her PCP  Additionally, an ambulatory referral was provided for geriatrics    Stage 3a chronic kidney disease (HCC)  Assessment & Plan  Lab Results   Component Value Date    EGFR 37 07/30/2024    EGFR 39 07/29/2024    EGFR 32 07/28/2024    CREATININE 1.34 (H) 07/30/2024    CREATININE 1.28 07/29/2024    CREATININE 1.50 (H) 07/28/2024   Mild acute kidney injury was present on admission  Status post treatment with IV fluids  Creatinine is back down to 1.28  Baseline creatinine is 1.2-1.4  Patient will need continued periodic outpatient BMP monitoring via her PCP    Anxiety state  Assessment & Plan  Continue BuSpar post discharge    Hyperlipidemia  Assessment & Plan  Lipid panel reviewed, HDL is slightly low.  Continue Lipitor post discharge    Gastroesophageal reflux disease  Assessment & Plan  DC home on preadmit meds at preadmit doses  Patient has a known history of GAVE disease-will need continued periodic outpatient follow-up with her PCP    Iron deficiency anemia  Assessment & Plan  In the setting of having a history of GAVE disease  Continue ferrous sulfate post discharge        Medical Problems       Resolved Problems  Date Reviewed: 7/28/2024   None       Discharging Physician / Practitioner: Kirk Myrick MD  PCP: Marycarmen Calvillo MD  Admission Date:   Admission Orders (From admission, onward)       Ordered        07/29/24 1528  INPATIENT ADMISSION  Once            07/28/24 2000  Place in Observation  Once                          Discharge Date: 07/30/24    Consultations During Hospital Stay:  Neurology    Procedures Performed:   None    Significant Findings / Test Results:   MRI brain-1. No acute infarction, intracranial hemorrhage or mass effect. 2. Mild, chronic microangiopathy.   CT head-no acute intracranial abnormality  2D echocardiogram-EF of 60%,  no regional wall motion abnormalities, no valvular abnormalities-please refer to the full report for additional details  Bilateral carotid ultrasound Doppler testing-negative for stenosis    Incidental Findings:   None    Test Results Pending at Discharge (will require follow up):   None     Outpatient Tests Requested:  None    Complications: None    Reason for Admission: Encephalopathy/strokelike symptoms    Hospital Course:   Anthony Slater is a 79 y.o. female patient who originally presented to the hospital on 7/28/2024 due to confusion.  Please refer to the initial history and physical examination completed by Daniella champagne PA-C for the initial presenting features and complaints.  In brief, the patient is a pleasant 79-year-old female, who was admitted to the hospital for further evaluation of confusion.  Initial concerns were for the possibility of a stroke and or a hypertensive encephalopathy.  She was admitted under the stroke pathway.  Imaging performed on this admission is resulted as outlined above.  Ultimately, when all of a sudden done, was deemed that the patient has a new diagnosis of cognitive decline/Alzheimer's dementia as the explanation for her arrival symptoms.  She was given a new prescription for Aricept.  She was deemed medically stable for discharge on 7/30/2024, and will follow-up in the near future in the outpatient setting with her PCP, and the patient was also given an ambulatory referral to meet with geriatrics.  With the exception of adding Aricept, no other changes made to any of her preadmission medications, and/or to the preadmission doses since an acute CVA was ruled out.  Please refer to the assessment/plan portion of this discharge summary as outlined above for additional details regarding her stay here.    Please see above list of diagnoses and related plan for additional information.     Condition at Discharge: fair    Discharge Day Visit / Exam:   Subjective: Patient  "seen, resting in bed, is calm and comfortable at time of discharge  Vitals: Blood Pressure: 141/77 (07/30/24 0700)  Pulse: 77 (07/30/24 0700)  Temperature: 97.8 °F (36.6 °C) (07/30/24 0700)  Temp Source: Oral (07/30/24 0700)  Respirations: 17 (07/30/24 0700)  Height: 5' 1\" (154.9 cm) (07/29/24 0805)  Weight - Scale: 53.1 kg (117 lb) (07/29/24 0805)  SpO2: 99 % (07/30/24 0816)  Exam:   Physical Exam  Constitutional:       General: She is not in acute distress.     Appearance: Normal appearance. She is normal weight. She is not ill-appearing.   HENT:      Head: Normocephalic and atraumatic.      Nose: Nose normal.      Mouth/Throat:      Mouth: Mucous membranes are moist.   Eyes:      Extraocular Movements: Extraocular movements intact.      Pupils: Pupils are equal, round, and reactive to light.   Cardiovascular:      Rate and Rhythm: Normal rate and regular rhythm.      Pulses: Normal pulses.      Heart sounds: Normal heart sounds. No murmur heard.     No friction rub. No gallop.   Pulmonary:      Effort: Pulmonary effort is normal. No respiratory distress.      Breath sounds: Normal breath sounds. No wheezing, rhonchi or rales.   Abdominal:      General: There is no distension.      Palpations: Abdomen is soft. There is no mass.      Tenderness: There is no abdominal tenderness.      Hernia: No hernia is present.   Musculoskeletal:         General: No swelling or tenderness. Normal range of motion.      Cervical back: Normal range of motion and neck supple. No rigidity.      Right lower leg: No edema.      Left lower leg: No edema.   Skin:     General: Skin is warm.      Capillary Refill: Capillary refill takes less than 2 seconds.      Findings: No erythema or rash.   Neurological:      General: No focal deficit present.      Mental Status: She is alert and oriented to person, place, and time. Mental status is at baseline.      Cranial Nerves: No cranial nerve deficit.      Motor: No weakness.      Comments: " Forgetful, no gross focal deficits   Psychiatric:         Mood and Affect: Mood normal.         Behavior: Behavior normal.          Discussion with Family: Updated  ( and daughter) at bedside.    Discharge instructions/Information to patient and family:   See after visit summary for information provided to patient and family.      Provisions for Follow-Up Care:  See after visit summary for information related to follow-up care and any pertinent home health orders.      Mobility at time of Discharge:   Basic Mobility Inpatient Raw Score: 19  JH-HLM Goal: 6: Walk 10 steps or more  JH-HLM Achieved: 8: Walk 250 feet ot more  HLM Goal achieved. Continue to encourage appropriate mobility.     Disposition:   Home    Planned Readmission: None     Discharge Statement:  I spent 45 minutes discharging the patient. This time was spent on the day of discharge. I had direct contact with the patient on the day of discharge. Greater than 50% of the total time was spent examining patient, answering all patient questions, arranging and discussing plan of care with patient as well as directly providing post-discharge instructions.  Additional time then spent on discharge activities.    Discharge Medications:  See after visit summary for reconciled discharge medications provided to patient and/or family.      **Please Note: This note may have been constructed using a voice recognition system**

## 2024-07-30 NOTE — PLAN OF CARE
Problem: Neurological Deficit  Goal: Neurological status is stable or improving  Description: Interventions:  - Monitor and assess patient's level of consciousness, motor function, sensory function, and level of assistance needed for ADLs.   - Monitor and report changes from baseline. Collaborate with interdisciplinary team to initiate plan and implement interventions as ordered.   - Provide and maintain a safe environment.  - Consider seizure precautions.  - Consider fall precautions.  - Consider aspiration precautions.  - Consider bleeding precautions.  7/30/2024 1027 by oRdy Lockhart  Outcome: Adequate for Discharge  7/30/2024 0825 by Rody Lockhart  Outcome: Progressing     Problem: Activity Intolerance/Impaired Mobility  Goal: Mobility/activity is maintained at optimum level for patient  Description: Interventions:  - Assess and monitor patient  barriers to mobility and need for assistive/adaptive devices.  - Assess patient's emotional response to limitations.  - Collaborate with interdisciplinary team and initiate plans and interventions as ordered.  - Encourage independent activity per ability.  - Maintain proper body alignment.  - Perform active/passive rom as tolerated/ordered.  - Plan activities to conserve energy.  - Turn patient as appropriate  7/30/2024 1027 by Rody Lockhart  Outcome: Adequate for Discharge  7/30/2024 0825 by Rody Lockhart  Outcome: Progressing     Problem: Communication Impairment  Goal: Ability to express needs and understand communication  Description: Assess patient's communication skills and ability to understand information.  Patient will demonstrate use of effective communication techniques, alternative methods of communication and understanding even if not able to speak.     - Encourage communication and provide alternate methods of communication as needed.  - Collaborate with case management/ for discharge needs.  - Include patient/family/caregiver in decisions  related to communication.  7/30/2024 1027 by Rody Lockhart  Outcome: Adequate for Discharge  7/30/2024 0825 by Rody Lockhart  Outcome: Progressing     Problem: Potential for Aspiration  Goal: Non-ventilated patient's risk of aspiration is minimized  Description: Assess and monitor vital signs, respiratory status, and labs (WBC).  Monitor for signs of aspiration (tachypnea, cough, rales, wheezing, cyanosis, fever).    - Assess and monitor patient's ability to swallow.  - Place patient up in chair to eat if possible.  - HOB up at 90 degrees to eat if unable to get patient up into chair.  - Supervise patient during oral intake.   - Instruct patient/ family to take small bites.  - Instruct patient/ family to take small single sips when taking liquids.  - Follow patient-specific strategies generated by speech pathologist.  7/30/2024 1027 by Rody Lockhart  Outcome: Adequate for Discharge  7/30/2024 0825 by Rody Lockhart  Outcome: Progressing     Problem: Nutrition  Goal: Nutrition/Hydration status is improving  Description: Monitor and assess patient's nutrition/hydration status for malnutrition (ex- brittle hair, bruises, dry skin, pale skin and conjunctiva, muscle wasting, smooth red tongue, and disorientation). Collaborate with interdisciplinary team and initiate plan and interventions as ordered.  Monitor patient's weight and dietary intake as ordered or per policy. Utilize nutrition screening tool and intervene per policy. Determine patient's food preferences and provide high-protein, high-caloric foods as appropriate.     - Assist patient with eating.  - Allow adequate time for meals.  - Encourage patient to take dietary supplement as ordered.  - Collaborate with clinical nutritionist.  - Include patient/family/caregiver in decisions related to nutrition.  7/30/2024 1027 by Rody Lockhart  Outcome: Adequate for Discharge  7/30/2024 0825 by Rody Lockhart  Outcome: Progressing     Problem: Prexisting or High Potential  for Compromised Skin Integrity  Goal: Skin integrity is maintained or improved  Description: INTERVENTIONS:  - Identify patients at risk for skin breakdown  - Assess and monitor skin integrity  - Assess and monitor nutrition and hydration status  - Monitor labs   - Assess for incontinence   - Turn and reposition patient  - Assist with mobility/ambulation  - Relieve pressure over bony prominences  - Avoid friction and shearing  - Provide appropriate hygiene as needed including keeping skin clean and dry  - Evaluate need for skin moisturizer/barrier cream  - Collaborate with interdisciplinary team   - Patient/family teaching  - Consider wound care consult   7/30/2024 1027 by Rody Lockhart  Outcome: Adequate for Discharge  7/30/2024 0825 by Rody Lockhart  Outcome: Progressing     Problem: RESPIRATORY - ADULT  Goal: Achieves optimal ventilation and oxygenation  Description: INTERVENTIONS:  - Assess for changes in respiratory status  - Assess for changes in mentation and behavior  - Position to facilitate oxygenation and minimize respiratory effort  - Oxygen administered by appropriate delivery if ordered  - Initiate smoking cessation education as indicated  - Encourage broncho-pulmonary hygiene including cough, deep breathe, Incentive Spirometry  - Assess the need for suctioning and aspirate as needed  - Assess and instruct to report SOB or any respiratory difficulty  - Respiratory Therapy support as indicated  Outcome: Adequate for Discharge     Problem: PAIN - ADULT  Goal: Verbalizes/displays adequate comfort level or baseline comfort level  Description: Interventions:  - Encourage patient to monitor pain and request assistance  - Assess pain using appropriate pain scale  - Administer analgesics based on type and severity of pain and evaluate response  - Implement non-pharmacological measures as appropriate and evaluate response  - Consider cultural and social influences on pain and pain management  - Notify  physician/advanced practitioner if interventions unsuccessful or patient reports new pain  Outcome: Adequate for Discharge     Problem: INFECTION - ADULT  Goal: Absence or prevention of progression during hospitalization  Description: INTERVENTIONS:  - Assess and monitor for signs and symptoms of infection  - Monitor lab/diagnostic results  - Monitor all insertion sites, i.e. indwelling lines, tubes, and drains  - Monitor endotracheal if appropriate and nasal secretions for changes in amount and color  - [place] appropriate cooling/warming therapies per order  - Administer medications as ordered  - Instruct and encourage patient and family to use good hand hygiene technique  - Identify and instruct in appropriate isolation precautions for identified infection/condition  Outcome: Adequate for Discharge     Problem: SAFETY ADULT  Goal: Patient will remain free of falls  Description: INTERVENTIONS:  - Educate patient/family on patient safety including physical limitations  - Instruct patient to call for assistance with activity   - Consult OT/PT to assist with strengthening/mobility   - Keep Call bell within reach  - Keep bed low and locked with side rails adjusted as appropriate  - Keep care items and personal belongings within reach  - Initiate and maintain comfort rounds  - Make Fall Risk Sign visible to staff  - Offer Toileting every 2 Hours, in advance of need  - Initiate/Maintain alarms  - Obtain necessary fall risk management equipment:   - Apply yellow socks and bracelet for high fall risk patients  - Consider moving patient to room near nurses station  Outcome: Adequate for Discharge     Problem: DISCHARGE PLANNING  Goal: Discharge to home or other facility with appropriate resources  Description: INTERVENTIONS:  - Identify barriers to discharge w/patient and caregiver  - Arrange for needed discharge resources and transportation as appropriate  - Identify discharge learning needs (meds, wound care, etc.)  -  Refer to Case Management Department for coordinating discharge planning if the patient needs post-hospital services based on physician/advanced practitioner order or complex needs related to functional status, cognitive ability, or social support system  Outcome: Adequate for Discharge     Problem: Knowledge Deficit  Goal: Patient/family/caregiver demonstrates understanding of disease process, treatment plan, medications, and discharge instructions  Description: Complete learning assessment and assess knowledge base.  Interventions:  - Provide teaching at level of understanding  - Provide teaching via preferred learning methods  Outcome: Adequate for Discharge     Problem: METABOLIC, FLUID AND ELECTROLYTES - ADULT  Goal: Glucose maintained within target range  Description: INTERVENTIONS:  - Monitor Blood Glucose as ordered  - Assess for signs and symptoms of hyperglycemia and hypoglycemia  - Administer ordered medications to maintain glucose within target range  - Assess nutritional intake and initiate nutrition service referral as needed  Outcome: Adequate for Discharge

## 2024-07-30 NOTE — CASE MANAGEMENT
Case Management Assessment & Discharge Planning Note    Patient name Anthony Slater  Location /-01 MRN [id number]  : 1945 Date 2024       Current Admission Date: 2024  Current Admission Diagnosis:Acute encephalopathy   Patient Active Problem List    Diagnosis Date Noted Date Diagnosed    Acute encephalopathy 2024     Moderate protein-calorie malnutrition (HCC) 2024     Memory loss due to medical condition 2024     Weight loss 2024     Iron deficiency anemia 2024     Gastroesophageal reflux disease 01/15/2024     Stage 3a chronic kidney disease (HCC) 2024     Chronic left shoulder pain 2023     Endometrial cancer (HCC) 2023     Angiodysplasia of gastrointestinal tract 06/15/2023     Thyroid nodule 2021    Drug-induced osteonecrosis of jaw (HCC) 2018    Migraine 2017    Obstructive sleep apnea syndrome 2014    Anxiety state 2014    Coronary arteriosclerosis 2014    Gastric antral vascular ectasia 2014    Hyperlipidemia 2014    Osteoporosis 2014    Diverticular disease of colon 2014       LOS (days): 0  Geometric Mean LOS (GMLOS) (days):   Days to GMLOS:     OBJECTIVE:    Risk of Unplanned Readmission Score: 17.32         Current admission status: Inpatient  Referral Reason: Other (d/c planning)    Preferred Pharmacy:   [facility] - [place], [place] - [address]  [address]  [facility] [address]  [facility] [address]  Phone: [phone] Fax: [phone]    [id number] - [place], [place] - [address] [address]  Phone: [phone] Fax: [phone]    Primary Care Provider: Marycarmen Calvillo MD    Primary Insurance: MEDICARE  Secondary Insurance: AARP    ASSESSMENT:  Active Health Care Proxies        [facility] - Spouse   Primary Phone: [phone] (Home)                 Advance Directives  Does patient have a Health Care POA?: No  Was patient offered paperwork?: Yes (declined paperwork)  Does patient have Advance Directives?: No  Was patient offered paperwork?: Yes (declined paperwork)         Readmission Root Cause  30 Day Readmission: No    Patient Information  Admitted from:: Home  Mental Status: Alert  During Assessment patient was accompanied by: Not accompanied during assessment  Assessment information provided by:: Patient, Spouse  Primary Caregiver: Self  Support Systems: Spouse/significant other  County [place] Residence: [facility] [place] do you live in?: [place]  Home entry access options. Select all that apply.: Stairs  Number of steps to enter home.: 3  Do the steps have railings?: Yes  Type of Current Residence: [facility] (12 basement steps to do the laundry)  Living Arrangements: Lives w/ Spouse/significant other  Is patient a ?: No    Activities of Daily Living Prior to Admission  Functional Status: Independent  Completes ADLs independently?: Yes  Ambulates independently?: Yes  Does patient use assisted devices?: No  Does patient currently own DME?: Yes  What DME does the patient currently own?: Straight Cane, Walker, Shower Chair, CPAP, Other (Comment) (does not use the cpap- bp cuff)  Does patient have a history of Outpatient Therapy (PT/OT)?: Yes ([address])  Does the patient have a history of Short-Term Rehab?: No  Does patient have a history of HHC?: Yes ([facility])  Does patient currently have HHC?: No         Patient Information Continued  Income Source: Pension/[facility]  Does patient have prescription coverage?: Yes (mail order  [facility])  Does patient receive dialysis treatments?: No  Does patient have a history of substance abuse?: No  Does patient have a history of Mental Health Diagnosis?: No         Means of Transportation  Means  of Transport to Appts:: Family transport      Social Determinants of Health (SDOH)      Flowsheet Row Most Recent Value   Housing Stability    In the last 12 months, was there a time when you were not able to pay the mortgage or rent on time? N   In the past 12 months, how many times have you moved where you were living? 0   At any time in the past 12 months, were you homeless or living in a shelter (including now)? N   Transportation Needs    In the past 12 months, has lack of transportation kept you from medical appointments or from getting medications? no   In the past 12 months, has lack of transportation kept you from meetings, work, or from getting things needed for daily living? No   Food Insecurity    Within the past 12 months, you worried that your food would run out before you got the money to buy more. Never true   Within the past 12 months, the food you bought just didn't last and you didn't have money to get more. Never true   Utilities    In the past 12 months has the electric, gas, oil, or water company threatened to shut off services in your home? No            DISCHARGE DETAILS:    Discharge planning discussed with:: mayurien & spouse was called 13:37pm  Freedom of Choice: Yes  Comments - Freedom of Choice: pt/to & speed eval  CM contacted family/caregiver?: Yes             Contacts  Patient Contacts: Yoni Slater  Relationship to Patient:: Family (spouse)  Contact Method: Phone  Phone Number: [phone]  Reason/Outcome: Discharge Planning         DME Referral Provided  Referral made for DME?: No    Other Referral/Resources/Interventions Provided:  Referral Comments: pt/to/speech mahad    Would you like to participate in our [facility] service program?  : No - Declined    Treatment Team Recommendation:  (d/c plan tbd- spouse)

## 2024-07-30 NOTE — ASSESSMENT & PLAN NOTE
DC home on preadmit meds at preadmit doses  Patient has a known history of GAVE disease-will need continued periodic outpatient follow-up with her PCP

## 2024-07-30 NOTE — PLAN OF CARE
Problem: Neurological Deficit  Goal: Neurological status is stable or improving  Description: Interventions:  - Monitor and assess patient's level of consciousness, motor function, sensory function, and level of assistance needed for ADLs.   - Monitor and report changes from baseline. Collaborate with interdisciplinary team to initiate plan and implement interventions as ordered.   - Provide and maintain a safe environment.  - Consider fall precautions.  - Consider aspiration precautions.  - Consider bleeding precautions.  Outcome: Progressing     Problem: Communication Impairment  Goal: Ability to express needs and understand communication  Description: Assess patient's communication skills and ability to understand information.  Patient will demonstrate use of effective communication techniques, alternative methods of communication and understanding even if not able to speak.     - Encourage communication and provide alternate methods of communication as needed.  - Collaborate with case management/ for discharge needs.  - Include patient/family/caregiver in decisions related to communication.  Outcome: Progressing     Problem: Potential for Aspiration  Goal: Non-ventilated patient's risk of aspiration is minimized  Description: Assess and monitor vital signs, respiratory status, and labs (WBC).  Monitor for signs of aspiration (tachypnea, cough, rales, wheezing, cyanosis, fever).    - Assess and monitor patient's ability to swallow.  - Place patient up in chair to eat if possible.  - HOB up at 90 degrees to eat if unable to get patient up into chair.  - Supervise patient during oral intake.   - Instruct patient/ family to take small bites.  - Instruct patient/ family to take small single sips when taking liquids.  - Follow patient-specific strategies generated by speech pathologist.  Outcome: Progressing

## 2024-07-30 NOTE — DISCHARGE INSTR - AVS FIRST PAGE
Dear Anthony Slater,     It was our pleasure to care for you here at [facility].  It is our hope that we were always able to exceed the expected standards for your care during your stay.  You were hospitalized due to strokelike symptoms.  You were cared for on the medical/surgical floor by Kirk Myrick MD with the [facility] who covers for your primary care physician (PCP), Marycarmen Calvillo MD, while you were hospitalized.  You were additionally seen by the [facility].  If you have any questions or concerns related to this hospitalization, you may contact us at .  For follow up as well as any medication refills, we recommend that you follow up with your primary care physician.  A registered nurse will reach out to you by phone within a few days after your discharge to answer any additional questions that you may have after going home.  However, at this time we provide for you here, the most important instructions / recommendations at discharge:     Notable Medication Adjustments -   New prescription Aricept 5 mg take 1 tablet daily by mouth  Okay to continue all other preadmission medications at the preadmission doses  Testing Required after Discharge -   To be further determined in the near future in the outpatient setting by your PCP  Important follow up information -   Please follow-up with your primary care physician as previously scheduled  Other Instructions -   Please maintain a healthy diet  Please review this entire after visit summary as additional general instructions including medication list, appointments, activity, diet, any pertinent wound care, and other additional recommendations from your care team that may be provided for you.      Sincerely,     Kirk Myrick MD

## 2024-07-30 NOTE — ASSESSMENT & PLAN NOTE
Patient was restless overnight, and was noted to have a lot of sundowning type behavior -suggestive of cognitive decline/Alzheimer's dementia  This possibility was reviewed with both the patient's , and daughter-they are both in agreement that the patient probably has Alzheimer's dementia.  The  reports that he witnessed the same thing with his father who had Alzheimer's dementia and reports that he has noticed that the patient has had a cognitive decline over the past year.  The daughter goes on to say that the patient sometimes calls her at 3:30 in the morning and a panicked and anxious state.  Patient was given a prescription for Aricept  Patient will follow-up in the outpatient setting with her PCP  Additionally, an ambulatory referral was provided for geriatrics

## 2024-07-30 NOTE — ASSESSMENT & PLAN NOTE
According to family appetite is somewhat better.  Daughter will be in touch with Me[employer] Wheels and I will attempt to place a referral and of course we will continue to monitor her weight closely.  He does attempt to make an occasional Ensure

## 2024-07-30 NOTE — ASSESSMENT & PLAN NOTE
Per recent labs fairly stable.  I did discuss with patient this is one of the reasons for her ongoing fatigue and of course we will continue to monitor this blood work closely

## 2024-07-30 NOTE — CASE MANAGEMENT
Case Management Discharge Planning Note    Patient name Anthony Slater  Location /-01 MRN [id number]  : 1945 Date 2024       Current Admission Date: 2024  Current Admission Diagnosis:Acute encephalopathy   Patient Active Problem List    Diagnosis Date Noted Date Diagnosed    Cognitive decline 2024     Acute encephalopathy 2024     Moderate protein-calorie malnutrition (HCC) 2024     Memory loss due to medical condition 2024     Weight loss 2024     Iron deficiency anemia 2024     Gastroesophageal reflux disease 01/15/2024     Stage 3a chronic kidney disease (HCC) 2024     Chronic left shoulder pain 2023     Endometrial cancer (HCC) 2023     Angiodysplasia of gastrointestinal tract 06/15/2023     Thyroid nodule 2021    Drug-induced osteonecrosis of jaw (HCC) 2018    Migraine 2017    Obstructive sleep apnea syndrome 2014    Anxiety state 2014    Coronary arteriosclerosis 2014    Gastric antral vascular ectasia 2014    Hyperlipidemia 2014    Osteoporosis 2014    Diverticular disease of colon 2014       LOS (days): 1  Geometric Mean LOS (GMLOS) (days):   Days to GMLOS:     OBJECTIVE:  Risk of Unplanned Readmission Score: 18.41         Current admission status: Inpatient   Preferred Pharmacy:   [facility] - [place], [place] - [address]  [address]  [facility] [address]  [facility] [address]  Phone: [phone] Fax: [phone]    [id number] - [place], [place] - [address] [facility]  [address] [facility] [address]  Phone: [phone] Fax: [phone]    Primary Care Provider: Marycarmen Calvillo MD    Primary Insurance: MEDICARE  Secondary Insurance: AARP    DISCHARGE DETAILS:    Discharge planning discussed with:: patient & spouse &  Art at the bedside  Freedom of Choice: Yes  Comments - Freedom of Choice: family declined hhc -  pt & family are in agreement with the d/c & d/c plan  CM contacted family/caregiver?: Yes  Were Treatment Team discharge recommendations reviewed with patient/caregiver?: Yes  Did patient/caregiver verbalize understanding of patient care needs?: Yes  Were patient/caregiver advised of the risks associated with not following Treatment Team discharge recommendations?: Yes    Contacts  Patient Contacts: Yoni Slater  Relationship to Patient:: Family (spouse & daughter)  Contact Method: In Person  Reason/Outcome: Discharge Planning    Requested Home Health Care         Is the patient interested in HHC at discharge?: No    DME Referral Provided  Referral made for DME?: No    Other Referral/Resources/Interventions Provided:  Interventions: Meals on Wheels  Referral Comments: family has declined hhc - they  were made aware if they change their mind about hhc they just need to clal her pcp and they can order hhc for them- they are interested in meals at home- cm provided the phone # for the [facility] - theya re aware they will be assessed to see if they qualify for discounted meals and they will review meal plans    Would you like to participate in our [facility] service program?  : No - Declined    Treatment Team Recommendation: Home (home with spouse & outpt follow up- family)  Discharge Destination Plan:: Home (home with spouse & outpt follow up- family)  Transport at Discharge : Automobile, Family                       Accompanied by: Family member           Family notified:: family at the bedside

## 2024-07-30 NOTE — NURSING NOTE
Discharge instructions reviewed with pt, spouse, and daughter.  All questions answered.  Left in stable condition with all belongings.

## 2024-07-30 NOTE — NURSING NOTE
Pt wandering the unit and attempting to go in other pt's rooms.  Unable to redirect.  No longer appropriate for virtual one to one.  MD aware.  New order for in person one to one noted.

## 2024-08-08 ENCOUNTER — TELEPHONE (OUTPATIENT)
Age: 79
End: 2024-08-08

## 2024-08-08 NOTE — TELEPHONE ENCOUNTER
Pt's  says that pt is having headaches since starting donepezil. He says that she is also having very dark stool but he is not sure if that is due to her diet or her iron pills. He would like to know if it is ok for pt to take tylenol for headaches.

## 2024-08-10 ENCOUNTER — APPOINTMENT (OUTPATIENT)
Dept: LAB | Facility: HOSPITAL | Age: 79
End: 2024-08-10
Payer: MEDICARE

## 2024-08-10 DIAGNOSIS — D64.9 ANEMIA, UNSPECIFIED TYPE: ICD-10-CM

## 2024-08-10 DIAGNOSIS — E53.8 VITAMIN B12 DEFICIENCY: ICD-10-CM

## 2024-08-10 DIAGNOSIS — N18.31 STAGE 3A CHRONIC KIDNEY DISEASE (HCC): Chronic | ICD-10-CM

## 2024-08-10 LAB
ANION GAP SERPL CALCULATED.3IONS-SCNC: 8 MMOL/L (ref 4–13)
BASOPHILS # BLD AUTO: 0.04 THOUSANDS/ÂΜL (ref 0–0.1)
BASOPHILS NFR BLD AUTO: 0 % (ref 0–1)
BUN SERPL-MCNC: 24 MG/DL (ref 5–25)
CALCIUM SERPL-MCNC: 9.4 MG/DL (ref 8.4–10.2)
CHLORIDE SERPL-SCNC: 104 MMOL/L (ref 96–108)
CO2 SERPL-SCNC: 25 MMOL/L (ref 21–32)
CREAT SERPL-MCNC: 1.39 MG/DL (ref 0.6–1.3)
EOSINOPHIL # BLD AUTO: 0.21 THOUSAND/ÂΜL (ref 0–0.61)
EOSINOPHIL NFR BLD AUTO: 2 % (ref 0–6)
ERYTHROCYTE [DISTWIDTH] IN BLOOD BY AUTOMATED COUNT: 14.4 % (ref 11.6–15.1)
GFR SERPL CREATININE-BSD FRML MDRD: 35 ML/MIN/1.73SQ M
GLUCOSE SERPL-MCNC: 147 MG/DL (ref 65–140)
HCT VFR BLD AUTO: 33.5 % (ref 34.8–46.1)
HGB BLD-MCNC: 10.2 G/DL (ref 11.5–15.4)
IMM GRANULOCYTES # BLD AUTO: 0.06 THOUSAND/UL (ref 0–0.2)
IMM GRANULOCYTES NFR BLD AUTO: 1 % (ref 0–2)
IRON SATN MFR SERPL: 8 % (ref 15–50)
IRON SERPL-MCNC: 30 UG/DL (ref 50–212)
LYMPHOCYTES # BLD AUTO: 1.22 THOUSANDS/ÂΜL (ref 0.6–4.47)
LYMPHOCYTES NFR BLD AUTO: 10 % (ref 14–44)
MCH RBC QN AUTO: 26.1 PG (ref 26.8–34.3)
MCHC RBC AUTO-ENTMCNC: 30.4 G/DL (ref 31.4–37.4)
MCV RBC AUTO: 86 FL (ref 82–98)
MONOCYTES # BLD AUTO: 0.61 THOUSAND/ÂΜL (ref 0.17–1.22)
MONOCYTES NFR BLD AUTO: 5 % (ref 4–12)
NEUTROPHILS # BLD AUTO: 9.61 THOUSANDS/ÂΜL (ref 1.85–7.62)
NEUTS SEG NFR BLD AUTO: 82 % (ref 43–75)
NRBC BLD AUTO-RTO: 0 /100 WBCS
PLATELET # BLD AUTO: 291 THOUSANDS/UL (ref 149–390)
PMV BLD AUTO: 10.9 FL (ref 8.9–12.7)
POTASSIUM SERPL-SCNC: 3.5 MMOL/L (ref 3.5–5.3)
RBC # BLD AUTO: 3.91 MILLION/UL (ref 3.81–5.12)
SODIUM SERPL-SCNC: 137 MMOL/L (ref 135–147)
TIBC SERPL-MCNC: 375 UG/DL (ref 250–450)
UIBC SERPL-MCNC: 345 UG/DL (ref 155–355)
WBC # BLD AUTO: 11.75 THOUSAND/UL (ref 4.31–10.16)

## 2024-08-10 PROCEDURE — 85025 COMPLETE CBC W/AUTO DIFF WBC: CPT

## 2024-08-10 PROCEDURE — 82607 VITAMIN B-12: CPT

## 2024-08-10 PROCEDURE — 80048 BASIC METABOLIC PNL TOTAL CA: CPT

## 2024-08-10 PROCEDURE — 83550 IRON BINDING TEST: CPT

## 2024-08-10 PROCEDURE — 36415 COLL VENOUS BLD VENIPUNCTURE: CPT

## 2024-08-10 PROCEDURE — 82728 ASSAY OF FERRITIN: CPT

## 2024-08-10 PROCEDURE — 83540 ASSAY OF IRON: CPT

## 2024-08-11 LAB
FERRITIN SERPL-MCNC: 21 NG/ML (ref 11–307)
VIT B12 SERPL-MCNC: 1156 PG/ML (ref 180–914)

## 2024-08-12 ENCOUNTER — TELEPHONE (OUTPATIENT)
Age: 79
End: 2024-08-12

## 2024-08-12 DIAGNOSIS — D64.9 ANEMIA, UNSPECIFIED TYPE: Primary | ICD-10-CM

## 2024-08-12 DIAGNOSIS — D50.0 IRON DEFICIENCY ANEMIA DUE TO CHRONIC BLOOD LOSS: ICD-10-CM

## 2024-08-15 ENCOUNTER — RA CDI HCC (OUTPATIENT)
Dept: OTHER | Facility: HOSPITAL | Age: 79
End: 2024-08-15

## 2024-08-22 ENCOUNTER — OFFICE VISIT (OUTPATIENT)
Dept: FAMILY MEDICINE CLINIC | Facility: CLINIC | Age: 79
End: 2024-08-22
Payer: MEDICARE

## 2024-08-22 VITALS
SYSTOLIC BLOOD PRESSURE: 158 MMHG | OXYGEN SATURATION: 99 % | TEMPERATURE: 96.7 F | HEIGHT: 61 IN | WEIGHT: 112.8 LBS | BODY MASS INDEX: 21.3 KG/M2 | DIASTOLIC BLOOD PRESSURE: 86 MMHG | HEART RATE: 81 BPM

## 2024-08-22 DIAGNOSIS — R63.4 WEIGHT LOSS: ICD-10-CM

## 2024-08-22 DIAGNOSIS — R41.89 COGNITIVE DECLINE: Primary | ICD-10-CM

## 2024-08-22 DIAGNOSIS — R03.0 ELEVATED BLOOD PRESSURE READING IN OFFICE WITHOUT DIAGNOSIS OF HYPERTENSION: ICD-10-CM

## 2024-08-22 DIAGNOSIS — R53.82 CHRONIC FATIGUE: ICD-10-CM

## 2024-08-22 DIAGNOSIS — D50.8 OTHER IRON DEFICIENCY ANEMIA: Chronic | ICD-10-CM

## 2024-08-22 DIAGNOSIS — G43.009 MIGRAINE WITHOUT AURA AND WITHOUT STATUS MIGRAINOSUS, NOT INTRACTABLE: ICD-10-CM

## 2024-08-22 PROBLEM — G93.40 ACUTE ENCEPHALOPATHY: Status: RESOLVED | Noted: 2024-07-28 | Resolved: 2024-08-22

## 2024-08-22 PROCEDURE — 99214 OFFICE O/P EST MOD 30 MIN: CPT | Performed by: FAMILY MEDICINE

## 2024-08-22 PROCEDURE — G2211 COMPLEX E/M VISIT ADD ON: HCPCS | Performed by: FAMILY MEDICINE

## 2024-08-22 RX ORDER — DONEPEZIL HYDROCHLORIDE 5 MG/1
5 TABLET, FILM COATED ORAL
Qty: 30 TABLET | Refills: 3 | Status: SHIPPED | OUTPATIENT
Start: 2024-08-22 | End: 2024-08-27

## 2024-08-22 NOTE — ASSESSMENT & PLAN NOTE
CBC stable though iron profile was decreased therefore not taking iron twice daily without any GI complaints other than belching with food intake which has been occurring for before iron dosage was increased.  Will repeat labs in approximately 1 month     symptoms of belching apparently she does have follow-up with GI   further questioning she denies any nausea, vomiting, or indigestion.  Her bowel movements are dark in color but otherwise without diarrhea or constipation

## 2024-08-22 NOTE — ASSESSMENT & PLAN NOTE
Since has an appointment within the month with [facility] we will hold on any dosage adjustment of Aricept pending their evaluation.

## 2024-08-22 NOTE — ASSESSMENT & PLAN NOTE
Question if maybe due to elevated blood pressure. encouraged monitoring of blood pressure and to call me if greater than 140/90.  Reassured patient it is fine for her to take the Tylenol even with her other medicines just to limit to a max of 6 Tylenol daily.  Also a possible hypoglycemic events advised she decrease the sweets in her food and to eat 5-6 small meals a day then 3 larger meals.  The recent MRI of her brain was reviewed.   Has been did not feel her headaches have worsened since on Aricept

## 2024-08-22 NOTE — PROGRESS NOTES
Ambulatory Visit  Name: Anthony Slater      : 1945      MRN: [id number]  Encounter Provider: Marycarmen Calvillo MD  Encounter Date: 2024   Encounter department: [facility]    Assessment & Plan   1. Cognitive decline  Assessment & Plan:  Since has an appointment within the month with [facility] we will hold on any dosage adjustment of Aricept pending their evaluation.  Orders:  -     donepezil (ARICEPT) 5 mg tablet; Take 1 tablet (5 mg total) by mouth daily at bedtime  2. Migraine without aura and without status migrainosus, not intractable  Assessment & Plan:  Question if maybe due to elevated blood pressure. encouraged monitoring of blood pressure and to call me if greater than 140/90.  Reassured patient it is fine for her to take the Tylenol even with her other medicines just to limit to a max of 6 Tylenol daily.  Also a possible hypoglycemic events advised she decrease the sweets in her food and to eat 5-6 small meals a day then 3 larger meals.  The recent MRI of her brain was reviewed.   Has been did not feel her headaches have worsened since on Aricept  3. Other iron deficiency anemia  Assessment & Plan:  CBC stable though iron profile was decreased therefore not taking iron twice daily without any GI complaints other than belching with food intake which has been occurring for before iron dosage was increased.  Will repeat labs in approximately 1 month     symptoms of belching apparently she does have follow-up with GI   further questioning she denies any nausea, vomiting, or indigestion.  Her bowel movements are dark in color but otherwise without diarrhea or constipation  4. Chronic fatigue  Assessment & Plan:  Without change in sleeping well even with tapered dose we will therefore we will stop Tecile completely.  5. Weight loss  Assessment & Plan:  Stabilized at present    follow-up office visit will depend on evaluation by [facility]  6. Elevated blood pressure  "reading in office without diagnosis of hypertension  Assessment & Plan:  As noted encouraged monitoring and to call me if greater than 140/90       History of Present Illness     Patient is here for follow-up since we tapered her dose of Adderall to a half a pill at bedtime.  He has really noticed no less fatigue and is sleeping the same stating she wakes up 3-4 times during the night to void but easily falls back to sleep sleeping from 11 or 12:00 PM to 9 AM.  He is trying not to nap throughout the day.  She is now on Aricept for about a month so far without any change per .  She does have follow-up with [facility] oncologist the end of September.  She admits herself to being very forgetful which is frustrating for her.  Has been states she is also very confused, not remembering how to correctly use utensils and misplacing objects.    She is complaining of intermittent headaches some associated with nausea and visual changes and usually relieved with Tylenol although  says she is always leery about taking Tylenol with other medications.  Daughters have noted headaches seem to be associated with patient complaining of being cold without shakes or chills.  They are wondering if she could be having a low sugar attack.  She denies the headaches awakening her from sleep        Review of Systems    Objective     /86 (BP Location: Left arm, Patient Position: Sitting, Cuff Size: Adult)   Pulse 81   Temp (!) 96.7 °F (35.9 °C) (Tympanic)   Ht 5' 1\" (1.549 m)   Wt 51.2 kg (112 lb 12.8 oz)   SpO2 99%   BMI 21.31 kg/m²     Physical Exam  Constitutional:       Appearance: Normal appearance.   Cardiovascular:      Rate and Rhythm: Normal rate and regular rhythm.      Pulses: Normal pulses.      Heart sounds: Normal heart sounds.   Musculoskeletal:      Right lower leg: No edema.      Left lower leg: No edema.   Neurological:      General: No focal deficit present.      Mental Status: She is disoriented. "   Psychiatric:      Comments: Patient is quiet.  Often looks to her  for answers to questions I ask her.       Administrative Statements

## 2024-08-22 NOTE — ASSESSMENT & PLAN NOTE
Without change in sleeping well even with tapered dose we will therefore we will stop Tecile completely.

## 2024-08-24 ENCOUNTER — NURSE TRIAGE (OUTPATIENT)
Dept: OTHER | Facility: OTHER | Age: 79
End: 2024-08-24

## 2024-08-24 NOTE — TELEPHONE ENCOUNTER
"Reason for Disposition  • Systolic BP  >= 180 OR Diastolic >= 110    Answer Assessment - Initial Assessment Questions  1. BLOOD PRESSURE: \"What is the blood pressure?\" \"Did you take at least two measurements 5 minutes apart?\"  This morning: 160/71, pulse 65  This afternoon at 1500 190/85, pulse 87        2. ONSET: \"When did you take your blood pressure?\"      Thursday     3. HOW: \"How did you obtain the blood pressure?\" (e.g., visiting nurse, automatic home BP monitor)      Automatic home cuff    4. HISTORY: \"Do you have a history of high blood pressure?\"      No history of high blood pressure    5. MEDICATIONS: \"Are you taking any medications for blood pressure?\" \"Have you missed any doses recently?\"      Lopressor 25mg     6. OTHER SYMPTOMS: \"Do you have any symptoms?\" (e.g., headache, chest pain, blurred vision, difficulty breathing, weakness)      Headache intermittently prior to Thursday - helped with Tylenol; denies CP, shortness of breath, weakness      Doctor was concerned at appointment on Thursday because blood pressure was elevated.    Protocols used: High Blood Pressure-ADULT-AH    "

## 2024-08-24 NOTE — TELEPHONE ENCOUNTER
Per on-call provider, patient should not double up Metoprolol tonight; should start twice daily dose tomorrow morning.

## 2024-08-24 NOTE — TELEPHONE ENCOUNTER
Per on call provider, increase 25mg Metoprolol to twice daily. Monitor blood pressure and pulse daily and follow up in 7-10 days    Patient usually takes in the evening; secure chat message sent to determine if extra dose should be taken today.

## 2024-08-24 NOTE — TELEPHONE ENCOUNTER
"Regarding: /85  ----- Message from Isabella CARTER sent at 8/24/2024  3:20 PM EDT -----  \"My wife BP is high and I was told to call if it is. Her BP is 190/85\"    "

## 2024-08-26 DIAGNOSIS — R41.89 COGNITIVE DECLINE: ICD-10-CM

## 2024-08-27 RX ORDER — DONEPEZIL HYDROCHLORIDE 5 MG/1
5 TABLET, FILM COATED ORAL
Qty: 90 TABLET | Refills: 1 | Status: SHIPPED | OUTPATIENT
Start: 2024-08-27

## 2024-09-01 DIAGNOSIS — K31.811 GASTROINTESTINAL HEMORRHAGE ASSOCIATED WITH ANGIODYSPLASIA OF STOMACH AND DUODENUM: ICD-10-CM

## 2024-09-01 RX ORDER — PANTOPRAZOLE SODIUM 40 MG/1
40 TABLET, DELAYED RELEASE ORAL 2 TIMES DAILY
Qty: 180 TABLET | Refills: 1 | Status: SHIPPED | OUTPATIENT
Start: 2024-09-01

## 2024-09-09 ENCOUNTER — CONSULT (OUTPATIENT)
Age: 79
End: 2024-09-09
Payer: MEDICARE

## 2024-09-09 VITALS
TEMPERATURE: 98 F | OXYGEN SATURATION: 97 % | BODY MASS INDEX: 20.88 KG/M2 | DIASTOLIC BLOOD PRESSURE: 78 MMHG | SYSTOLIC BLOOD PRESSURE: 140 MMHG | WEIGHT: 110.6 LBS | HEART RATE: 69 BPM | HEIGHT: 61 IN

## 2024-09-09 DIAGNOSIS — R26.2 AMBULATORY DYSFUNCTION: ICD-10-CM

## 2024-09-09 DIAGNOSIS — R41.89 COGNITIVE DECLINE: ICD-10-CM

## 2024-09-09 DIAGNOSIS — K21.00 GASTROESOPHAGEAL REFLUX DISEASE WITH ESOPHAGITIS, UNSPECIFIED WHETHER HEMORRHAGE: Chronic | ICD-10-CM

## 2024-09-09 DIAGNOSIS — I10 ESSENTIAL HYPERTENSION: ICD-10-CM

## 2024-09-09 DIAGNOSIS — E78.2 MIXED HYPERLIPIDEMIA: Primary | Chronic | ICD-10-CM

## 2024-09-09 DIAGNOSIS — Z71.89 ADVANCED CARE PLANNING/COUNSELING DISCUSSION: ICD-10-CM

## 2024-09-09 DIAGNOSIS — R54 FRAILTY SYNDROME IN GERIATRIC PATIENT: ICD-10-CM

## 2024-09-09 DIAGNOSIS — D50.8 OTHER IRON DEFICIENCY ANEMIA: Chronic | ICD-10-CM

## 2024-09-09 DIAGNOSIS — N18.31 STAGE 3A CHRONIC KIDNEY DISEASE (HCC): Chronic | ICD-10-CM

## 2024-09-09 DIAGNOSIS — M81.0 AGE-RELATED OSTEOPOROSIS WITHOUT CURRENT PATHOLOGICAL FRACTURE: ICD-10-CM

## 2024-09-09 DIAGNOSIS — F41.1 ANXIETY STATE: Chronic | ICD-10-CM

## 2024-09-09 PROCEDURE — 99205 OFFICE O/P NEW HI 60 MIN: CPT | Performed by: INTERNAL MEDICINE

## 2024-09-09 NOTE — PROGRESS NOTES
[facility]  [address], [address]  [place] [address]  [phone]    Social Work Intake    Anthony Slater presents today for a geriatric assessment, accompanied by  & daughter.     Social/Family History   Marital status:                                        Spouse's Name:  Yoni     Does patient have children? Yes How Many? 2  (If yes, where do the children live?) both close by  Family members assisting patient at home:  & daughters assist   Are any relationships causing problems right now: No   Who is available to provide care in case of illness or crisis (please specify relation to patient / level of care able to provide)? Yoni        Employment and Education   Education: Highest Level of Education: High School Graduate    Currently Employed: No    Retired: Yes                        Date of assisted? 2007    Type of employment: Sales/   : No       Lifestyle/Community Services   Clubs and Organizations: [facility]   Major life events in past two years (ex: assisted, death in family, major illness etc.): Had a fall/covid and hospitalized    Have you ever used a home care agency, meal delivery service, or respite care?: [facility] homecare after being hospitalized   Services that assessment team feels would be beneficial to patient/family: LSW provided the following resources:  -[employer]:Guardianship information  -Adult Day center list  -[employer] Brochure  -How to apply for waiver services  -Homecare/AAA/waiver list     Financial   Total Monthly income: $900     Source of income: Social security   Meet current needs?  Combined     Funds available for home care? Yes     Funds available for nursing home? No    Do you rent or own your home? Own       End-Of-Life Checklist   Have wishes or desires for end-of-life care been discussed? No POA or living will, provided information on guardianship      For all patients, we encourage seeing a  " to establish a Financial Power of , a Health Care Power of , and an Advanced Directive (living will). Particularly for patients experiencing memory concerns, we strongly recommend that this is completed as soon as possible.       Safety Assessment   Is the patient still driving? No    Is the patient taking medications as prescribed? Yes     Is there a system in place for medication management?  provides medications  Are firearms or weapons in the home? Guns in the home but unloaded  Recommendations per [employer]: removing them from the home, keep ammunition stored separately, encourage patient to consider \"gifting\" them, if necessary, ask local law enforcement for assistance in removing the firearms from the home. The family may receive compensation from a gun buy-back program.    Does the patient live alone? Lives with   What is the layout of the home? Single story ranch home   Do you feel comfortable leaving the person home alone? Not left alone  Have you noticed any burned pans or other signs of issues with the stove or other appliances?  Has left stove on,  monitors    Do you have any concerns about the person’s cooking or eating habits?  Not eating enough, family/friends provide meals, and will have meals that will be starting to be delivered from [employer]    Are there working smoke detectors and fire extinguishers in the home? Yes    Have you thought about when it will no longer be safe for the patient to live alone? Remain in the home     MoCA score: 8/30  "

## 2024-09-09 NOTE — PROGRESS NOTES
[facility]  Consultation  [address], [address]  [place] [address] [phone]      NAME: Anthony Slater  AGE: 79 y.o. SEX: female    DATE OF ENCOUNTER: 9/9/2024    Assessment and Plan     1. Cognitive decline/dementia  -MoCA today 8/30 with deficits in executive function, visual-spatial, draw clock, language, abstraction, delayed recall and orientation  -Progressive dementia, moderate, most likely Alzheimer's type (FAST stage V)  -Patient needs assistance with some ADLs and dependent with IADLs.  She needs 24/7 support.  -Patient is currently on Aricept 5 mg at bedtime, discussed benefits and side effects with family, continue current dose and likely will discontinue next visit  -Recommend melatonin 3 mg at bedtime for insomnia  -Patient advised to remain active physically, mentally and socially  -Maintain chronic conditions under control continue following with PCP  -OT referral for cognitive and speech to therapy  -Driving safety issue: Patient does not drive, continue with driving cessation  -Decision-making: likely limited for complex medical or financial decisions. Could consider neuropsychology assessment for capacity if needed.   -Caregiver review:  is the main caregiver, discussed patient with social service who met with family and community resources and for provided.  -ACP review: Patient does not have a living will or POA  -Care conference and 4-6 weeks discussed diagnosis, management and prognosis    2. Ambulatory dysfunction  -TUG 22nd  -Patient is high fall risk  -Referred to PT to increase muscle strength and gait stability  -Fall precautions    3. Frailty syndrome in geriatric patient  -Multifactorial, advanced age and underlying progressive dementia  -Patient will benefit from physical therapy  -Continue supportive care and maintain chronic conditions under control  -Nutrition supplement as needed    4. Mixed hyperlipidemia  -Currently on atorvastatin  -Managed  by PCP    5. Other iron deficiency anemia  -Takes iron supplement    6. Anxiety state  -Currently on BuSpar 5 mg twice daily  -Managed by PCP    7. Stage 3a chronic kidney disease (HCC)  -Recent GFR 35  -Maintain adequate hydration  -Avoid nephrotoxic agents    8. Gastroesophageal reflux disease with esophagitis, unspecified whether hemorrhage  -Currently on Protonix    9. Age-related osteoporosis without current pathological fracture  -Vitamin D supplement    10. Essential hypertension  -BP stable  -Continue metoprolol  -Continue following with PCP    11. Advanced care planning/counseling discussion  -Patient does not have a living wIll or POA    Chief Complaint     Chief Complaint   Patient presents with    Memory Loss     Here with Beverley (daughter) and Cristobal ()     Patient is here for a geriatric assessment with memory issues as a concern     History of Present Illness     Anthony Slater who is a 79 y.o. female was seen in Geriatric consultation today for memory difficulties and was accompanied by Yoni () and Beverley (daughter).  Per family patient's memory difficulties started 1 year ago and have gotten progressively worse in the past few months.  She is forgetful and repetitive.  She has difficulties with names and finding words.  She was started on Aricept 5 mg at bedtime a couple months ago by her PCP.  Patient needs assistance with some ADLs (getting dressed and showering).  She is dependent with IADLs.   states that patient does not cook as much as she used to.  She is having difficulties following simple recipes.  She has left the stove on when cooking.  She gets up at night a couple of times to go to the bathroom.  Patient does not wander.  Reports a fall in December, however she does not use assistive devices for ambulation.  She has urinary incontinence and uses pads.  She stopped driving 2-3 years ago.    No history of hearing loss, visual/auditory hallucinations, depression or  behavior issues.      The following portions of the patient's history were reviewed and updated as appropriate: allergies, current medications, past family history, past medical history, past social history, past surgical history and problem list.     FUNCTIONAL STATUS:  ADLs  Does patient require assistance with:  Bathing: Yes  Dressing: Yes  Transferring: No  Continence: No  Toileting: No  Feeding: No     IADLs  Dose patient require assistance with:  Telephone: Yes  Shopping: Yes  Food Preparation: Yes  Housekeeping: Yes  Laundry: Yes  Transportation: Yes  Medications: Yes  Finances: Yes     NEUROPSYCH SYMPTOMS:  Does patient get angry or hostile?  Resist care from others?  No  Does patient see or hear things that no one else can see or hear?  No  Does patient act impatient and cranky? Does mood frequently change for no apparent reason?  No  Does patient act suspicious without good reason (example: believes that others are stealing from him or her, or planning to harm him or her in some way)?  No  Does patient less interested in his or her usual activities or in the activities and plans of others?  No  Does patient have trouble sleeping at night?  Yes  Dose patient have abnormal movements while asleep?  No     SAFETY:  Hearing and vision issue: Yes, wears glasses no  Any gait or balance disorder: Yes  Uses: cane or walker: No  Any falls in the last year: Yes  Any history of wandering: No  Are there firearms or guns in the home: Yes  Does patient drive: No  Any driving accidents or citations in the last year: No  Any concerns about patient's ability to drive: No     ACP REVIEW:  Does patient have POA: No  Does patient have a Living will: No  Any legal assistance needed for healthcare planning?:  Yes    Review of Systems     Constitutional: Negative for activity change.   HENT: Negative for hearing loss, trouble swallowing.    Eyes: Negative for visual disturbance.   Respiratory: Negative for choking and shortness  "of breath.    Gastrointestinal: Negative for nausea.   Genitourinary: Negative for dysuria and frequency.   Musculoskeletal: Negative for back pain and neck pain.   Neurological: Negative for dizziness, facial asymmetry, speech difficulty, weakness and light-headedness.   Psychiatric/Behavioral: Negative for behavioral problems and confusion.      Objective     /78 (BP Location: Left arm, Patient Position: Sitting, Cuff Size: Adult)   Pulse 69   Temp 98 °F (36.7 °C) (Temporal)   Ht 5' 1.2\" (1.554 m)   Wt 50.2 kg (110 lb 9.6 oz)   SpO2 97%   BMI 20.76 kg/m²     Physical Exam  Vitals and nursing note reviewed.   Constitutional:       Appearance: Normal appearance  HENT:      Head: Normocephalic and atraumatic.   Ears:     External ear normal.   Eyes:      Extraocular Movements: Extraocular movements intact.      Conjunctiva/sclera: Conjunctivae normal.      Pupils: Pupils are equal, round, and reactive to light.   Mouth/Throat:     Oropharynx is clear and moist. No oropharyngeal exudate.   Neck:    Normal range of motion. Neck supple. No JVD present. No tracheal deviation present. No thyromegaly present.   Cardiovascular:      Rate and Rhythm: Normal rate.      Pulses: Normal pulses.      Heart sounds: Normal heart sounds. No murmur heard.  Pulmonary:      Effort: Pulmonary effort is normal. No respiratory distress.      Breath sounds: Normal breath sounds. Breath sounds: No wheezing or rales.  Abdominal:      General: Abdomen is flat.      Palpations: Abdomen is soft.   Musculoskeletal:      Cervical back: Normal range of motion and neck supple.   Skin:     General: Skin is warm and dry.   Neurological:      Mental Status: alert and oriented to self     Cranial Nerves: No cranial nerve deficit, dysarthria or facial asymmetry.      Sensory: Sensation is intact.      Motor: No weakness, or atrophy      Gait: Gait is intact.      MoCA 8/30    TUG 20s   Psychiatric:     Mood and Affect: Mood normal.     " Behavior: Behavior normal.     Pertinent Laboratory/Diagnostic Studies:  I have personally reviewed blood work from 8/10/2024 including B12 1156  BMP: , K3.5, BUN 24, creatinine 1.39 and GFR 35  CBC: WBC 11.75, Hb 10.2, HCT 33.5 and platelets 291  Blood work from 7/12/2024 including TSH 2.90    I have personally reviewed MRI brain from 7/29/2024 which revealed mild chronic angiopathic change    I have spent a total time of 65 minutes on 9/9/24 in caring for this patient including Prognosis, Risks and benefits of tx options, Instructions for management, Patient and family education, Importance of tx compliance, Risk factor reductions, Impressions, Counseling / Coordination of care, Documenting in the medical record, Reviewing / ordering tests, medicine, and Obtaining / reviewing history, and communicating with other healthcare professionals.

## 2024-09-11 DIAGNOSIS — R11.0 NAUSEA: ICD-10-CM

## 2024-09-11 RX ORDER — FAMOTIDINE 20 MG/1
20 TABLET, FILM COATED ORAL
Qty: 90 TABLET | Refills: 1 | Status: SHIPPED | OUTPATIENT
Start: 2024-09-11

## 2024-09-18 NOTE — PROGRESS NOTES
"PT Evaluation     Today's date: 24  Patient name: Anthony Slater  : 1945  MRN: [id number]  Referring provider: Rosamaria Silva,*  Dx:   Encounter Diagnosis     ICD-10-CM    1. Ambulatory dysfunction  R26.2                      Assessment  Impairments: abnormal or restricted ROM, abnormal movement, activity intolerance, impaired physical strength and lacks appropriate home exercise program    Assessment details: Anthony Slater is a 79 y.o. female presenting to outpatient physical therapy with noted impairments including pain, impaired soft tissue mobility, reduced range of motion, reduced strength, reduced postural awareness, and reduced activity tolerance. Signs and symptoms at present are consistent with referring diagnosis of amb dysfunction. Due to noted impairments, the patient's present functional limitations include difficulty with ADLs with increased need for assistance, reliance on medication and/or modalities for pain relief, reduced tolerance for functional mobility and activity, and difficulty completing HH responsibilities. Patient to benefit from skilled outpatient physical therapy 2x/week for 8 weeks in order to reduce pain, maximize pain free range of motion, increase strength and stability, and improve functional mobility/functional activity in order to maximize return to prior level of function with reduced limitations. Home exercise program was provided and all questions answered to patient's level of satisfaction. Thank you for your referral.          Goals  STGs to be achieved in 6 weeks:  1. Pt to demonstrate reduced subjective pain rating \"at worst\" by at least 2-3 points from Initial Eval in order to allow for reduced pain with ADLs and improved functional activity tolerance.   2. Pt to demonstrate improved TUG time and 5xSTS tests by at least 5 sec to demonstrate improved balance  3. Pt to demonstrate increased MMT of BLEs by at least 1/2-1 grade in order to improve " safety and stability with ADLs and functional mobility.     LTGs to be achieved in 6-12 weeks:  1. Pt will be I with HEP in order to continue to improve quality of life and independence and reduce risk for re-injury.   2. Pt to remain free from falls t/o her course of PT..   3. Pt to demonstrate improved function as noted by achieving or exceeding predicted score on FOTO outcomes assessment tool.       Plan  Patient would benefit from: skilled physical therapy  Other planned modality interventions: Modalities prn for symptom management    Planned therapy interventions: manual therapy, neuromuscular re-education, therapeutic activities, therapeutic exercise, strengthening, stretching and home exercise program    Frequency: 2x week  Duration in weeks: 8  Plan of Care beginning date: 9/19/2024  Plan of Care expiration date: 11/14/2024  Treatment plan discussed with: PTA and patient        Subjective Evaluation    History of Present Illness  Mechanism of injury: Pt notes she is having diff walking and loses her balance easily since she had Covid in January of 2024.. Pt accompanied by her  as pt is poor historian with cognitive deficits due to dementia. Pt had a fall in Jan 2024 when she had Covid and one other episode of near syncope. Pt hs diff getting in and out of tub but is able to dress indep. Pt is no longer cooking and cleaning.  Pt's  notes pt requires constant supervision.  Pt has lost weight recently.           Recurrent probem    Patient Goals  Patient goals for therapy: improved balance and increased strength  Patient goal: prevent falls  Pain  Progression: no change    Social Support  Steps to enter house: yes  2  Stairs in house: yes (to basement laundry)   Lives in: one-story house  Lives with: spouse    Employment status: not working  Treatments  Previous treatment: home therapy, occupational therapy and physical therapy (early this year)  Current treatment: physical  therapy        Objective     Strength/Myotome Testing     Left Hip   Planes of Motion   Flexion: 4-  Abduction: 4    Right Hip   Planes of Motion   Flexion: 4-  Abduction: 4    Left Knee   Flexion: 4  Extension: 4    Right Knee   Flexion: 4  Extension: 5    Left Ankle/Foot   Dorsiflexion: 4    Right Ankle/Foot   Dorsiflexion: 4  Neuro Exam:     Coordination   Heel to shin: left WNL and right WNL  Finger to nose: left WNL and right WNL  Rapid alternating movements: LE WNL and UE WNL    Transfers   Sit to stand: independent     Functional outcomes   5x sit to stand: 24.4 w/o UEs (seconds)  2 minute walk test: 277 feet  TU w/o AD's (seconds)             Precautions: acute encephalopathy, HTN, chronic pain disorder,     Re-eval Date: 10/14/24    Date        Visit Count 1       FOTO Comp        Pain In        Pain Out              Manuals                                        Neuro Re-Ed        Sidestepping  On mat        HKM        Low hurdles        STS w/bolster        Tandem amb on aeromat        Backwd amb        5xSTS TT  24 sec       Ther Ex        Nustep        Leg press        Knee ext mach        Knee flex mach        bridges        Standing 3 way SLR                                                        Ther Activity                        Gait Training        2 min walk test 277 feet       TUG test 22 sec       Modalities

## 2024-09-19 ENCOUNTER — EVALUATION (OUTPATIENT)
Dept: PHYSICAL THERAPY | Facility: CLINIC | Age: 79
End: 2024-09-19
Payer: MEDICARE

## 2024-09-19 DIAGNOSIS — R26.2 AMBULATORY DYSFUNCTION: Primary | ICD-10-CM

## 2024-09-19 PROCEDURE — 97163 PT EVAL HIGH COMPLEX 45 MIN: CPT | Performed by: PHYSICAL MEDICINE & REHABILITATION

## 2024-09-19 PROCEDURE — 97112 NEUROMUSCULAR REEDUCATION: CPT | Performed by: PHYSICAL MEDICINE & REHABILITATION

## 2024-09-25 ENCOUNTER — OFFICE VISIT (OUTPATIENT)
Dept: PHYSICAL THERAPY | Facility: CLINIC | Age: 79
End: 2024-09-25
Payer: MEDICARE

## 2024-09-25 DIAGNOSIS — R26.2 AMBULATORY DYSFUNCTION: Primary | ICD-10-CM

## 2024-09-25 PROCEDURE — 97110 THERAPEUTIC EXERCISES: CPT

## 2024-09-25 NOTE — PROGRESS NOTES
"Daily Note     Today's date: 2024  Patient name: Anthony Slater  : 1945  MRN: [id number]  Referring provider: Rosamaria Silva,*  Dx:   Encounter Diagnosis     ICD-10-CM    1. Ambulatory dysfunction  R26.2                      Subjective:  Pt. states she's \"a little tired today\", and just developed a headache shortly before coming here for her PT appt.   Says she just ate, and notes a lot of times she gets a headache after eating certain foods.    Objective: See treatment diary below  **Skilled convers. t/o session  15 min    Assessment:  Tolerated treatment Fair+ overall. Headache sx less by end of treatment session. Patient would benefit from continued PT.      Plan: Continue per plan of care.                  Precautions: acute encephalopathy, HTN, chronic pain disorder,     Re-eval Date: 10/14/24    Date       Visit Count 1 2      FOTO Comp        Pain In  \"Headache\" and a little tired      Pain Out  HA sx less            Manuals .                                      Neuro Re-Ed .      Sidestepping  On mat        HKM        Low hurdles        STS w/bolster  **w/o bolster  1x5 VC's      Tandem amb on aeromat        Backwd amb        5xSTS TT  24 sec       Ther Ex  9.25      Nustep  **L1 x 8 min        Leg press  **15# 3x10        Knee ext mach  **11# 3x10      Knee flex mach  **11# 3x10      bridges        Standing 3 way SLR  **B 1x10 ea VC's                                **Skilled convers. t/o session  15 min                      Ther Activity                        Gait Training        2 min walk test 277 feet       TUG test 22 sec       Modalities                             "

## 2024-09-26 NOTE — PROGRESS NOTES
"Daily Note     Today's date: 2024  Patient name: Anthony Slater  : 1945  MRN: [id number]  Referring provider: Rosamaria Silva,*  Dx:   Encounter Diagnosis     ICD-10-CM    1. Ambulatory dysfunction  R26.2                      Subjective:  Pt. states her \"Legs feel tired\".      Objective: See treatment diary below      Assessment: Tolerated treatment  Fair+/Fairly Well overall.  VC's required for most exer to be performed.       Patient would benefit from continued PT.      Plan: Progress treatment as tolerated.             Precautions: acute encephalopathy, HTN, chronic pain disorder,     Re-eval Date: 10/14/24    Date  9.     Visit Count 1 2 3     FOTO Comp        Pain In  \"Headache\" and a little tired \"Legs feel tired\"    0/10     Pain Out  HA sx less \"A little more tired\"    0/10           Manuals . 9.27                                     Neuro Re-Ed  9.     Sidestepping  On mat   **side step along bar at mirror   3 trials,  B UE hold     HKM   **B alt 1x10       Low hurdles        STS w/bolster  **w/o bolster  1x5 VC's w/o bolster  1x7 VC's     Tandem amb on aeromat        Backwd amb        5xSTS TT  24 sec       Ther Ex  .     Nustep  **L1 x 8 min   L1 x 9 min     Leg press  **15# 3x10   15# 3x10     Knee ext mach  **11# 3x10 11# 3x10     Knee flex mach  **11# 3x10 11# 3x10     bridges        Standing 3 way SLR  **B 1x10 ea VC's B 1x10 ea VC's                               **Skilled convers. t/o session  15 min Skilled convers. during session  10 min                     Ther Activity                        Gait Training        2 min walk test 277 feet       TUG test 22 sec       Modalities                               "

## 2024-09-27 ENCOUNTER — OFFICE VISIT (OUTPATIENT)
Dept: PHYSICAL THERAPY | Facility: CLINIC | Age: 79
End: 2024-09-27
Payer: MEDICARE

## 2024-09-27 DIAGNOSIS — R26.2 AMBULATORY DYSFUNCTION: Primary | ICD-10-CM

## 2024-09-27 PROCEDURE — 97112 NEUROMUSCULAR REEDUCATION: CPT

## 2024-09-27 PROCEDURE — 97110 THERAPEUTIC EXERCISES: CPT

## 2024-09-27 PROCEDURE — 97535 SELF CARE MNGMENT TRAINING: CPT

## 2024-10-01 ENCOUNTER — OFFICE VISIT (OUTPATIENT)
Dept: PHYSICAL THERAPY | Facility: CLINIC | Age: 79
End: 2024-10-01
Payer: MEDICARE

## 2024-10-01 DIAGNOSIS — R26.2 AMBULATORY DYSFUNCTION: Primary | ICD-10-CM

## 2024-10-01 PROCEDURE — 97112 NEUROMUSCULAR REEDUCATION: CPT

## 2024-10-01 PROCEDURE — 97110 THERAPEUTIC EXERCISES: CPT

## 2024-10-01 NOTE — PROGRESS NOTES
"Daily Note     Today's date: 10/1/2024  Patient name: Anthony Slater  : 1945  MRN: [id number]  Referring provider: Rosamaria Silva,*  Dx:   Encounter Diagnosis     ICD-10-CM    1. Ambulatory dysfunction  R26.2                      Subjective:   Pt. States she's glad to be here at therapy. Presents as more spry today.         Objective: See treatment diary below      Assessment: Tolerated treatment  Fairly Well overall . Patient would benefit from continued PT.      Plan: Progress treatment as tolerated.               Precautions: acute encephalopathy, HTN, chronic pain disorder,     Re-eval Date: 10/14/24    Date  9. 10.1    Visit Count 1 2 3 4    FOTO Comp        Pain In  \"Headache\" and a little tired \"Legs feel tired\"    0/10 No c/o    Pain Out  HA sx less \"A little more tired\"    0/10 Legs get a little tired towards end of session.           Manuals . 9. 10.1                                    Neuro Re-Ed  9. 10.1    Sidestepping  On mat   **side step along bar at mirror   3 trials,  B UE hold side step along bar at mirror   4 trials,  B UE hold    HKM   **B alt 1x10   B alt 1x10    Low hurdles        STS w/bolster  **w/o bolster  1x5 VC's w/o bolster  1x7 VC's w/o bolster  1x7 VC's    Tandem amb on aeromat        Backwd amb        5xSTS TT  24 sec       Ther Ex   9. 10.1    Nustep  **L1 x 8 min   L1 x 9 min L1 x 10 min    Leg press  **15# 3x10   15# 3x10 15# 4x10    Knee ext mach  **11# 3x10 11# 3x10 11# 4x10    Knee flex mach  **11# 3x10 11# 3x10 11# 4x10    bridges        Standing 3 way SLR  **B 1x10 ea VC's B 1x10 ea VC's B 1x15 ea VC's                              **Skilled convers. t/o session  15 min Skilled convers. during session  10 min                     Ther Activity                        Gait Training        2 min walk test 277 feet       TUG test 22 sec       Modalities                                 "

## 2024-10-03 ENCOUNTER — OFFICE VISIT (OUTPATIENT)
Dept: PHYSICAL THERAPY | Facility: CLINIC | Age: 79
End: 2024-10-03
Payer: MEDICARE

## 2024-10-03 DIAGNOSIS — R26.2 AMBULATORY DYSFUNCTION: Primary | ICD-10-CM

## 2024-10-03 PROCEDURE — 97110 THERAPEUTIC EXERCISES: CPT

## 2024-10-03 PROCEDURE — 97112 NEUROMUSCULAR REEDUCATION: CPT

## 2024-10-03 NOTE — PROGRESS NOTES
"Daily Note     Today's date: 10/3/2024  Patient name: Anthony Slater  : 1945  MRN: [id number]  Referring provider: Rosamaria Silva,*  Dx:   Encounter Diagnosis     ICD-10-CM    1. Ambulatory dysfunction  R26.2                      Subjective:   Pt. states she's \"more tired today, than usual\".       Objective: See treatment diary below      Assessment: Tolerated treatment Fair+/Fairly Well overall.  Patient would benefit from continued PT.  Consistent VC's and/or MC's required for most exercises to be performed.     Plan: Continue per plan of care.            Precautions: acute encephalopathy, HTN, chronic pain disorder,     Re-eval Date: 10/14/24    Date  10.1 10.3   Visit Count 1 2 3 4 5   FOTO Comp        Pain In  \"Headache\" and a little tired \"Legs feel tired\"    0/10 No c/o \"more tired today, than usual\"   Pain Out  HA sx less \"A little more tired\"    0/10 Legs get a little tired towards end of session.  \"more tired\"         Manuals  9. 10.1 10.3                                   Neuro Re-Ed . 10.1 10.3   Sidestepping  On mat   **side step along bar at mirror   3 trials,  B UE hold side step along bar at mirror   4 trials,  B UE hold side step along bar at mirror   4 trials,  B UE hold  VC's   HKM   **B alt 1x10   B alt 1x10 B alt 1x15   Low hurdles        STS w/bolster  **w/o bolster  1x5 VC's w/o bolster  1x7 VC's w/o bolster  1x7 VC's w/o bolster  1x8 VC's   Tandem amb on aeromat        Backwd amb        5xSTS TT  24 sec       Ther Ex   9. 10.1 10.3   Nustep  **L1 x 8 min   L1 x 9 min L1 x 10 min L1 x 10 min   Leg press  **15# 3x10   15# 3x10 15# 4x10 20# 4x10  VC/MC's   Knee ext mach  **11# 3x10 11# 3x10 11# 4x10 11# 4x10  VC/MC's   Knee flex mach  **11# 3x10 11# 3x10 11# 4x10 11# 4x10  VC/MC's   bridges        Standing 3 way SLR  **B 1x10 ea VC's B 1x10 ea VC's B 1x15 ea VC's B 1x15 ea VC's                             **Skilled convers. t/o " session  15 min Skilled convers. during session  10 min                     Ther Activity                        Gait Training        2 min walk test 277 feet       TUG test 22 sec       Modalities

## 2024-10-07 ENCOUNTER — OFFICE VISIT (OUTPATIENT)
Dept: PHYSICAL THERAPY | Facility: CLINIC | Age: 79
End: 2024-10-07
Payer: MEDICARE

## 2024-10-07 DIAGNOSIS — R26.2 AMBULATORY DYSFUNCTION: Primary | ICD-10-CM

## 2024-10-07 PROCEDURE — 97110 THERAPEUTIC EXERCISES: CPT

## 2024-10-07 PROCEDURE — 97112 NEUROMUSCULAR REEDUCATION: CPT

## 2024-10-07 NOTE — PROGRESS NOTES
"Daily Note     Today's date: 10/7/2024  Patient name: Anthony Slater  : 1945  MRN: [id number]  Referring provider: Rosamaria Silva,*  Dx:   Encounter Diagnosis     ICD-10-CM    1. Ambulatory dysfunction  R26.2                      Subjective:  Pt's only comment is she's \"TIRED\".       Objective: See treatment diary below      Assessment: Tolerated treatment Fairly Well with performance of exer.  Just requires reminder for number of reps to perform. VC's/MC's needed at times, as well.  Patient would benefit from continued PT.      Plan: Continue per plan of care.            Precautions: acute encephalopathy, HTN, chronic pain disorder,     Re-eval Date: 10/14/24    Date 10.7 9. 9.27 10.1 10.3   Visit Count 6 2 3 4 5   FOTO Comp        Pain In \"TIRED\" \"Headache\" and a little tired \"Legs feel tired\"    0/10 No c/o \"more tired today, than usual\"   Pain Out A little less tired HA sx less \"A little more tired\"    0/10 Legs get a little tired towards end of session.  \"more tired\"         Manuals 10.7 9.25 9.27 10.1 10.3                                   Neuro Re-Ed 10.7 9.25 9.27 10.1 10.3   Sidestepping  On mat side step along bar at mirror   5 trials,  B UE hold  VC's  **side step along bar at mirror   3 trials,  B UE hold side step along bar at mirror   4 trials,  B UE hold side step along bar at mirror   4 trials,  B UE hold  VC's   HKM B alt 1x15  **B alt 1x10   B alt 1x10 B alt 1x15   Low hurdles        STS w/bolster w/o bolster  1x8 VC's **w/o bolster  1x5 VC's w/o bolster  1x7 VC's w/o bolster  1x7 VC's w/o bolster  1x8 VC's   Tandem amb on aeromat        Backwd amb        5xSTS        Ther Ex 10.7 9.25 9.27 10.1 10.3   Nustep L2 x 10 min **L1 x 8 min   L1 x 9 min L1 x 10 min L1 x 10 min   Leg press 20# 4x10  VC/MC's **15# 3x10   15# 3x10 15# 4x10 20# 4x10  VC/MC's   Knee ext mach 11# 3x15  VC/MC's **11# 3x10 11# 3x10 11# 4x10 11# 4x10  VC/MC's   Knee flex mach 11# 3x15  VC/MC's **11# 3x10 11# " 3x10 11# 4x10 11# 4x10  VC/MC's   bridges        Standing 3 way SLR B 2x10 ea VC's **B 1x10 ea VC's B 1x10 ea VC's B 1x15 ea VC's B 1x15 ea VC's                             **Skilled convers. t/o session  15 min Skilled convers. during session  10 min                     Ther Activity                        Gait Training        2 min walk test 277 feet       TUG test 22 sec       Modalities

## 2024-10-08 ENCOUNTER — EVALUATION (OUTPATIENT)
Dept: OCCUPATIONAL THERAPY | Facility: CLINIC | Age: 79
End: 2024-10-08
Payer: MEDICARE

## 2024-10-08 DIAGNOSIS — R41.89 COGNITIVE DECLINE: ICD-10-CM

## 2024-10-08 DIAGNOSIS — G31.84 MILD COGNITIVE IMPAIRMENT: Primary | ICD-10-CM

## 2024-10-08 PROCEDURE — 97166 OT EVAL MOD COMPLEX 45 MIN: CPT

## 2024-10-08 PROCEDURE — 97530 THERAPEUTIC ACTIVITIES: CPT

## 2024-10-08 NOTE — PROGRESS NOTES
Occupational Therapy Neuro (Dementia/Cognitive) Evaluation:    Today's Date: 10/8/2024  Patient Name: Anthony Slater  : 1945  MRN: [id number]  Referring Provider: Rosamaria Silva,*  Dx: Mild cognitive impairment [G31.84]    Active Problem List:   Patient Active Problem List   Diagnosis    Angiodysplasia of gastrointestinal tract    Obstructive sleep apnea syndrome    Anxiety state    Coronary arteriosclerosis    Gastric antral vascular ectasia    Endometrial cancer (HCC)    Drug-induced osteonecrosis of jaw (HCC)    Hyperlipidemia    Migraine    Osteoporosis    Thyroid nodule    Chronic left shoulder pain    Stage 3a chronic kidney disease (HCC)    Gastroesophageal reflux disease    Diverticular disease of colon    Iron deficiency anemia    Moderate protein-calorie malnutrition (HCC)    Weight loss    Cognitive decline    Chronic fatigue    Elevated blood pressure reading in office without diagnosis of hypertension     Past Medical Hx:   Past Medical History:   Diagnosis Date    Acute encephalopathy 2024    Anxiety     Arthritis     Benign hypertension     Chronic pain disorder     back    COPD (chronic obstructive pulmonary disease) (HCC)     Coronary artery disease     medical management    CPAP (continuous positive airway pressure) dependence     Diabetes (HCC)     Endometrial adenocarcinoma (HCC)     Epilepsy (HCC)     Gastrointestinal hemorrhage associated with angiodysplasia of stomach and duodenum 2024    GERD (gastroesophageal reflux disease)     History of echocardiogram 2014    EF 55%, mild MR.    History of transfusion     Hypertension     Mixed hyperlipidemia     DAVID on CPAP     Shortness of breath     triggered by anxiety    Watermelon stomach     Wears dentures      Past Surgical Hx:   Past Surgical History:   Procedure Laterality Date    BREAST BIOPSY Right 2015    BREAST BIOPSY Left     ? year    CARDIAC CATHETERIZATION  2014    EF 65%, mid LAD 60%  "stenosis and D2 70% stenosis.  Medical management.    CATARACT EXTRACTION Bilateral     COLONOSCOPY      CYSTOSCOPY N/A 2023    Procedure: CYSTOSCOPY;  Surgeon: Nadir Ash MD;  Location: [facility];  Service: Gynecology Oncology    EGD      JOINT REPLACEMENT Right     RIGHT ELBOW    OOPHORECTOMY      not sure which one was removed    KS LAPS TOTAL HYSTERECT 250 GM/< W/RMVL TUBE/OVARY N/A 2023    Procedure: ROBOTHYSTERECTOMY, BILATERAL SALPINGO-OOPHORECTOMY, PELVIC SENTINEL NODE BIOPSIES;  Surgeon: Nadir Ash MD;  Location: [facility];  Service: Gynecology Oncology    ROTATOR CUFF REPAIR Right     US GUIDED THYROID BIOPSY  2020        Pain Levels:   Restin    With Activity:  0    Subjective/Patient Goal: \"I am here for help\"    History of Present Illness:  Pt is a 79 y.o. female seen for OT eval s/p Mild cognitive impairment [G31.84] referred to [facility] s/p  dx'd w/ , comorbidities as listed above. Patient had recent appointment with Geriatric medicine and was referred to OT services for cognitive therapy. Patient is expected to return at the end of this month. Patient had a fall at the beginning of the year which is suspected to be the result of her cognitive decline.       Objective  Impairments Section:   1. Concussion Cognitive Checklist: completed on 10/08/2024 (27 items)  *Patient indicated that she is experiencing difficulties in the following areas:    Memory: Remembering what people have told you, Remembering peoples' names, Learning new things, Remembering the date/day of the week, and Keeping track of your appointments    Attention: Easily distracted, Keeping your attention/concentrating on a task, Dividing your attention (i.e., multi-tasking), and Losing your train of thought    Processing: Processing new information , Following directions, and Responding to questions in a timely manner     Executive Functions: Organizing your thoughts, Planning daily tasks, " Self-correcting or recognizing mistakes, Initiating/starting tasks, and Sequencing activities (such as cooking or following a recipe)    Communication: Word finding in conversation and Expressing thoughts and ideas fluently    Visual: Change in handwriting (i.e., sloppier)    Emotional: Personality changes, Increased anxiety, Increased depression, Easily agitated or irritable, and Sleep changes    Increased Sensitivities to: Lighting, Noise, and Smell     2. Contextual Memory Test (CMT)    CONTEXTUAL MEMORY TEST (CMT)       Immediate Recall 5/20     Delayed Recall 3/20     Visual Recognition 20/20 with 0 errors = 20/20                3. Mini Mental State Examination (MMSE)    SCORE DEFICIT RANGE Comments                                         COGNITIVE FXN                    MMSE         Education Level = 12 years     Orientation 1/5     Orientation 5/5     Registration 3/3     Attention and Calculation 0/5     Recall 0/3     Language/Object Identification: 2/2     Language/Repeat: 1/1     Direction Following: 3/3     Receptive Language  1/1     Expressive Language 1/1     Copying 1/1                                        Total Score 18/30 Moderate               Assessment/Plan  Occupational Therapy Skilled Analysis Assessment and Plan of Care:  Pt is currently demonstrating the following occupational deficits: limited 2* visual immediate memory recall, delayed memory recall, attention, processing and executive function. Based on the aforementioned OT evaluation, functional performance deficits, and assessments, pt has been identified as a moderate complexity evaluation. Pt to continue to benefit from outpatient skilled OT services to address the following goals 1-2x/wk  for 4 weeks to focus on pt/caregiver education, increase memory deficits, increase cognitive function, orientation and alertness to improve overall QOL.      Goals:  Short term goals=Long term goals:  1. Pt demonstrate an increase in cognitive  function as evident by an increase in MMSE to 24/30. - Not Met    2. Pt consistently report A&O x 4 75% of the time. - Not Met    3. Pt will be educated on memory techniques and provided a workbook of strategies. - Not Met    4. Pt and caregiver will be educated on compensatory adaptive devices and DME available to improve independence with ADL/leisure tasks I.e. Life alert, pill box, door locks, security system, wander guard/chair alarm, BSC, grab bars, caregiver burden/burnout, memory aides, etc. - Not Met    5. Pt will report an improve in IADLs without previously needed assistance from caregiver. - Not Met    6. Pt will demonstrate an improvement in immediate recall to 4 words in reverse and forward order. - Not Met    7. Pt will increase immediate memory recall as evident by an increase in CMT score to 12/20. - Not Met    8. Pt will increase delayed memory recall as evident by an increase in CMT score to 10/20. - Not Met    9. Pt will report decreased selected symptoms on concussion checklist. - Not Met    PLANNED THERAPY INTERVENTIONS  Internal and external memory aides  Multimatrix for saccades/ visual clutter/attention  Hypersensitivity strategies education  Multi-modal environment  Oculomotor control:  saccades, con/divergence  Conv./div. Dynamic tasks  Temporal Awareness: clock constructions   Memory and mental manipulation  Visual processing with immediate recall  Memory retention with immediate and delayed recall  Colleen alejandra scanning sheets  Visual scanning tasks, to R   Auditory direction following  Clock construction  Functional IADL tasks    INTERVENTION COMMENTS:  Diagnosis: Mild cognitive impairment [G31.84]    Thank you for the consult!  Bi Prabhakar MS OTR/L  Facility Director [place]       Precautions: Concussion  Initial Evaluation completed on: 10/08/2024  Re-Evaluation needs to be completed before: 11/08/2024  Insurance: Medicare       FOTO COUNT N/A       Re-Eval 11/08/2024        Manuals 10/08/2024                       Neuro Re-Ed  10/08/2024                       Ther Ex 10/08/2024                               Ther Activity 10/08/2024       Rush Hour        Mastermind        Word Recall  Forward  Backwards        Divided Attention Worksheet         Chart Memory        Dig It Memory        Multi-Matrix        Word Recall WS w/ Memory Manipulation        Memory Manipulation        Memory Packet HEP       Paragraph Retention WS        Divided Attention Alternating Scattegories WS        [place] Brain Box        Bandits Memory Mix Up                Modalities 10/08/2024

## 2024-10-09 ENCOUNTER — OFFICE VISIT (OUTPATIENT)
Age: 79
End: 2024-10-09
Payer: MEDICARE

## 2024-10-09 VITALS
HEIGHT: 61 IN | WEIGHT: 107.8 LBS | OXYGEN SATURATION: 99 % | TEMPERATURE: 98 F | SYSTOLIC BLOOD PRESSURE: 130 MMHG | DIASTOLIC BLOOD PRESSURE: 82 MMHG | BODY MASS INDEX: 20.35 KG/M2 | HEART RATE: 68 BPM

## 2024-10-09 DIAGNOSIS — K31.811 GASTROINTESTINAL HEMORRHAGE ASSOCIATED WITH ANGIODYSPLASIA OF STOMACH AND DUODENUM: Primary | ICD-10-CM

## 2024-10-09 DIAGNOSIS — R14.2 BELCHING: ICD-10-CM

## 2024-10-09 DIAGNOSIS — D50.0 IRON DEFICIENCY ANEMIA DUE TO CHRONIC BLOOD LOSS: ICD-10-CM

## 2024-10-09 DIAGNOSIS — R11.0 NAUSEA: ICD-10-CM

## 2024-10-09 DIAGNOSIS — Z12.11 SCREENING FOR COLON CANCER: ICD-10-CM

## 2024-10-09 PROCEDURE — 99214 OFFICE O/P EST MOD 30 MIN: CPT | Performed by: NURSE PRACTITIONER

## 2024-10-09 RX ORDER — FAMOTIDINE 20 MG/1
20 TABLET, FILM COATED ORAL
Qty: 90 TABLET | Refills: 3 | Status: SHIPPED | OUTPATIENT
Start: 2024-10-09

## 2024-10-09 RX ORDER — FERROUS SULFATE 324(65)MG
324 TABLET, DELAYED RELEASE (ENTERIC COATED) ORAL 2 TIMES DAILY
Qty: 180 TABLET | Refills: 1 | Status: SHIPPED | OUTPATIENT
Start: 2024-10-09

## 2024-10-09 RX ORDER — PANTOPRAZOLE SODIUM 40 MG/1
40 TABLET, DELAYED RELEASE ORAL DAILY
Qty: 90 TABLET | Refills: 3 | Status: SHIPPED | OUTPATIENT
Start: 2024-10-09

## 2024-10-09 RX ORDER — SIMETHICONE 180 MG
180 CAPSULE ORAL 2 TIMES DAILY
Qty: 60 CAPSULE | Refills: 5 | Status: SHIPPED | OUTPATIENT
Start: 2024-10-09

## 2024-10-09 RX ORDER — FERROUS SULFATE 324(65)MG
324 TABLET, DELAYED RELEASE (ENTERIC COATED) ORAL
Qty: 90 TABLET | Refills: 1 | Status: SHIPPED | OUTPATIENT
Start: 2024-10-09 | End: 2024-10-09

## 2024-10-09 NOTE — PROGRESS NOTES
Ambulatory Visit  Name: Anthony Slater      : 1945      MRN: [id number]  Encounter Provider: DOTTIE Jackson  Encounter Date: 10/9/2024   Encounter department: [facility]    Assessment & Plan  Gastrointestinal hemorrhage associated with angiodysplasia of stomach and duodenum  Improved/Stable  While the patient and her  were in the office today, I discussed with them that since they feel that the GERD and nausea symptoms have improved and are stable, at this point I feel it would be in her best long-term interest to try to decrease her Protonix down to just 40 mg daily and continue the famotidine 20 mg at bedtime for the next 6 months and see how she does.  Our eventual goal would be to see if we could decrease her Protonix down to 20 mg as I feel it is in her best long-term interest to be on the least on a medication possible.  The patient and her  were agreeable and verbalized understanding.  Orders:    pantoprazole (PROTONIX) 40 mg tablet; Take 1 tablet (40 mg total) by mouth daily    famotidine (PEPCID) 20 mg tablet; Take 1 tablet (20 mg total) by mouth daily at bedtime    Belching  With regards to continued belching, I do feel that Gas-X or Beano would be helpful, however, I did give a prescription for prescription strength Gas-X 180 mg, 1 pill twice a day to see if that helps with the belching and gas symptoms.  If for some reason her insurance does not cover it I have instructed them to buy over-the-counter Gas-X and take that twice a day.  The patient and his wife are agreeable and verbalized an understanding.  Orders:    pantoprazole (PROTONIX) 40 mg tablet; Take 1 tablet (40 mg total) by mouth daily    famotidine (PEPCID) 20 mg tablet; Take 1 tablet (20 mg total) by mouth daily at bedtime    simethicone (MYLICON,GAS-X) 180 MG capsule; Take 1 capsule (180 mg total) by mouth 2 (two) times a day    Nausea  Improved/Stable  Orders:     pantoprazole (PROTONIX) 40 mg tablet; Take 1 tablet (40 mg total) by mouth daily    famotidine (PEPCID) 20 mg tablet; Take 1 tablet (20 mg total) by mouth daily at bedtime    Iron deficiency anemia due to chronic blood loss  Stable  Continue iron supplementation twice daily.  Proceed with blood work as ordered.  Orders:    ferrous sulfate 324 (65 Fe) mg; Take 1 tablet (324 mg total) by mouth 2 (two) times a day    Screening for colon cancer  We will hold off on any repeat colonoscopies until the recommended surveillance date unless the patient would start to develop red flag symptoms in the future.  The patient was agreeable and verbalized an understanding.  DUE: 4/5/29    Reviewed GI red flag symptoms with patient today.         The patient is to schedule a follow up office visit in 6 months, but understands to call or contact our offices with any issues before then or as needed.     History of Present Illness     Anthony Slater is a 79 y.o. female who presents for a follow-up office visit regarding her chronic GERD, nausea, belching, and iron deficiency anemia.  The patient and her  report that at this point they feel that her nausea and GERD symptoms have definitely improved and are stable, but she continues with the chronic and loud belching, definitely after eating or drinking, however, it can also be periodically throughout the day just out of the blue that she has a lot of the belching.    At her last office visit we had discussed the possibility of trying Gas-X or Beano and the patient and her  report that they did not remember that or did not remember seeing it in the after visit summary so they did not try the Gas-X or Beano.    The patient currently denies any reflux, nausea, dysphagia, vomiting, decreased appetite, unplanned weight loss, or abdominal pain.     The patient currently reports that they have a BM daily and reports that it is usually relieving, without any consistent  "diarrhea, nocturnal BMs, constipation, straining, melena or bloody stools. Last BM: This morning. Flatus: Yes, sometimes.    Meds: Tonics 40 mg twice daily and famotidine 20 mg at bedtime as well as iron supplementation twice daily.  Daily NSAID Use: Denied.    Imaging: (None):     Endoscopy History: EGD:  (3/21/24): Gastric antral vascular ectasia in the antrum and pylorus; tissue was ablated with argon plasma coagulation with 115 mm polyp in the body of the stomach removed. Path: Normal     COLONOSCOPY: (Unknown):      DUE: 4/5/29    History obtained from : patient and patient's Significant Other  Review of Systems        Objective     /82 (BP Location: Left arm, Patient Position: Sitting, Cuff Size: Standard)   Pulse 68   Temp 98 °F (36.7 °C) (Temporal)   Ht 5' 1\" (1.549 m)   Wt 48.9 kg (107 lb 12.8 oz)   SpO2 99%   BMI 20.37 kg/m²     Physical Exam  Abdomen soft, flat, nontender, with faint bowel sounds x 4.  No edema noted of the b/l lower extremities upon exam today. Skin is non-icteric.       "

## 2024-10-09 NOTE — ASSESSMENT & PLAN NOTE
With regards to continued belching, I do feel that Gas-X or Beano would be helpful, however, I did give a prescription for prescription strength Gas-X 180 mg, 1 pill twice a day to see if that helps with the belching and gas symptoms.  If for some reason her insurance does not cover it I have instructed them to buy over-the-counter Gas-X and take that twice a day.  The patient and his wife are agreeable and verbalized an understanding.  Orders:    pantoprazole (PROTONIX) 40 mg tablet; Take 1 tablet (40 mg total) by mouth daily    famotidine (PEPCID) 20 mg tablet; Take 1 tablet (20 mg total) by mouth daily at bedtime    simethicone (MYLICON,GAS-X) 180 MG capsule; Take 1 capsule (180 mg total) by mouth 2 (two) times a day

## 2024-10-09 NOTE — PATIENT INSTRUCTIONS
Decrease Protonix 40 mg, to 1 pill daily in AM.   Continue Famotidine 20 mg, 1 pill daily at bed time.   Simethicone (GAS X over there counter), 1 pill 2 x daily.   Continue to watch for red flag symptoms.   Schedule a f/u OV in 6 months.     We did review GI red flag symptoms, including, but not limited to: chronic nausea, vomiting, diarrhea, chills, fever, and unintentional weight loss and should call or contact our office with any changes or concerns. I reviewed with the patient that if they notice any blood while vomiting or in their stool they should contact or office or go to the nearest emergency room for immediate evaluation. The patient was agreeable and verbalized an understanding.

## 2024-10-09 NOTE — ASSESSMENT & PLAN NOTE
Stable  Continue iron supplementation twice daily.  Proceed with blood work as ordered.  Orders:    ferrous sulfate 324 (65 Fe) mg; Take 1 tablet (324 mg total) by mouth 2 (two) times a day

## 2024-10-09 NOTE — ASSESSMENT & PLAN NOTE
Improved/Stable  Orders:    pantoprazole (PROTONIX) 40 mg tablet; Take 1 tablet (40 mg total) by mouth daily    famotidine (PEPCID) 20 mg tablet; Take 1 tablet (20 mg total) by mouth daily at bedtime

## 2024-10-09 NOTE — ASSESSMENT & PLAN NOTE
Improved/Stable  While the patient and her  were in the office today, I discussed with them that since they feel that the GERD and nausea symptoms have improved and are stable, at this point I feel it would be in her best long-term interest to try to decrease her Protonix down to just 40 mg daily and continue the famotidine 20 mg at bedtime for the next 6 months and see how she does.  Our eventual goal would be to see if we could decrease her Protonix down to 20 mg as I feel it is in her best long-term interest to be on the least on a medication possible.  The patient and her  were agreeable and verbalized understanding.  Orders:    pantoprazole (PROTONIX) 40 mg tablet; Take 1 tablet (40 mg total) by mouth daily    famotidine (PEPCID) 20 mg tablet; Take 1 tablet (20 mg total) by mouth daily at bedtime

## 2024-10-10 ENCOUNTER — OFFICE VISIT (OUTPATIENT)
Dept: PHYSICAL THERAPY | Facility: CLINIC | Age: 79
End: 2024-10-10
Payer: MEDICARE

## 2024-10-10 DIAGNOSIS — R26.2 AMBULATORY DYSFUNCTION: Primary | ICD-10-CM

## 2024-10-10 PROCEDURE — 97110 THERAPEUTIC EXERCISES: CPT

## 2024-10-10 PROCEDURE — 97112 NEUROMUSCULAR REEDUCATION: CPT

## 2024-10-10 NOTE — PROGRESS NOTES
"Daily Note     Today's date: 10/10/2024  Patient name: Anthony Slater  : 1945  MRN: [id number]  Referring provider: Rosamaria Silva,*  Dx:   Encounter Diagnosis     ICD-10-CM    1. Ambulatory dysfunction  R26.2           Start Time: 1430  Stop Time: 1515  Total time in clinic (min): 45 minutes    Subjective: Patient reports no new complaints.  Patient states she still just feels tired.      Objective: See treatment diary below      Assessment: Tolerated treatment well.   Patient participated in skilled PT session focused on balance, gait, and endurance.  Patient able to complete exercise program all but the Machine LAQ, HSC due to experiencing some pain in B/L temporal area.  Patient experience some lightheadedness at end of session.  Spoke with  at end of session, which he states that she does experience that same pain home.  Educated patient that she should tell the treating therapist if she experiences the pain next session.  Patient would continue to benefit from skilled PT interventions to address deficits with balance, gait, and endurance. Patient demonstrated fatigue post treatment      Plan: Continue per plan of care.      Precautions: acute encephalopathy, HTN, chronic pain disorder,     Re-eval Date: 10/14/24    Date 10.7 10/10 9.27 10.1 10.3   Visit Count 6 7 3 4 5   FOTO Comp        Pain In \"TIRED\" Just tired \"Legs feel tired\"    0/10 No c/o \"more tired today, than usual\"   Pain Out A little less tired Temporal pain B/L with lightheadedness \"A little more tired\"    0/10 Legs get a little tired towards end of session.  \"more tired\"         Manuals 10.7 10/10 9.27 10.1 10.3                                   Neuro Re-Ed 10.7 10/10 9.27 10.1 10.3   Sidestepping  On mat side step along bar at mirror   5 trials,  B UE hold  VC's Side stepping along bar @ mirror B/L UE support x 5 laps **side step along bar at mirror   3 trials,  B UE hold side step along bar at mirror   4 trials,  B " UE hold side step along bar at mirror   4 trials,  B UE hold  VC's   HKM B alt 1x15 B/L alt 15x **B alt 1x10   B alt 1x10 B alt 1x15   Low hurdles        STS w/bolster w/o bolster  1x8 VC's W/o bolster 10x  v.c. for posture w/o bolster  1x7 VC's w/o bolster  1x7 VC's w/o bolster  1x8 VC's   Tandem amb on aeromat        Backwd amb        5xSTS        Ther Ex 10.7 10/10 9.27 10.1 10.3   Nustep L2 x 10 min L2 x 10 mins L1 x 9 min L1 x 10 min L1 x 10 min   Leg press 20# 4x10  VC/MC's 20# 3x10 temporal pain  15# 3x10 15# 4x10 20# 4x10  VC/MC's   Knee ext mach 11# 3x15  VC/MC's Held  11# 3x10 11# 4x10 11# 4x10  VC/MC's   Knee flex mach 11# 3x15  VC/MC's Held   11# 3x10 11# 4x10 11# 4x10  VC/MC's   bridges        Standing 3 way SLR B 2x10 ea VC's 2x10 ea B/L B 1x10 ea VC's B 1x15 ea VC's B 1x15 ea VC's     HR 20x                         Skilled convers. during session  10 min                     Ther Activity                        Gait Training        2 min walk test 277 feet       TUG test 22 sec       Modalities

## 2024-10-14 ENCOUNTER — OFFICE VISIT (OUTPATIENT)
Dept: PHYSICAL THERAPY | Facility: CLINIC | Age: 79
End: 2024-10-14
Payer: MEDICARE

## 2024-10-14 DIAGNOSIS — R26.2 AMBULATORY DYSFUNCTION: Primary | ICD-10-CM

## 2024-10-14 PROCEDURE — 97112 NEUROMUSCULAR REEDUCATION: CPT

## 2024-10-14 PROCEDURE — 97110 THERAPEUTIC EXERCISES: CPT

## 2024-10-14 NOTE — PROGRESS NOTES
"Daily Note     Today's date: 10/14/2024  Patient name: Anthony Slater  : 1945  MRN: [id number]  Referring provider: Rosamaria Silva,*  Dx:   Encounter Diagnosis     ICD-10-CM    1. Ambulatory dysfunction  R26.2                      Subjective:  Pt. States, \"Not a good day....Didn't sleep well\".      Objective: See treatment diary below      Assessment: Tolerated treatment Fairly Well with performance of ther exer/NM Re-Ed.  Patient would benefit from continued PT.    Less VC/MC's required today during treatment session.       Plan: Continue per plan of care.            Precautions: acute encephalopathy, HTN, chronic pain disorder,     Re-eval Date: 10/14/24    Date 10.7 10/10 10.14 10.1 10.3   Visit Count 6 7 8 4 5   FOTO Comp        Pain In \"TIRED\" Just tired 0/10    \"Not a good day....Didn't sleep well\" No c/o \"more tired today, than usual\"   Pain Out A little less tired Temporal pain B/L with lightheadedness \"Tired\"     Legs get a little tired towards end of session.  \"more tired\"         Manuals 10.7 10/10 10.14 10.1 10.3                                   Neuro Re-Ed 10.7 10/10 10. 10.1 10.3   Sidestepping  On mat side step along bar at mirror   5 trials,  B UE hold  VC's Side stepping along bar @ mirror B/L UE support x 5 laps Side stepping along bar @ mirror B/L UE support x 6 laps side step along bar at mirror   4 trials,  B UE hold side step along bar at mirror   4 trials,  B UE hold  VC's   HKM B alt 1x15 B/L alt 15x B alt 2x10   B alt 1x10 B alt 1x15   Low hurdles        STS w/bolster w/o bolster  1x8 VC's W/o bolster 10x  v.c. for posture w/o bolster  1x10 VC's w/o bolster  1x7 VC's w/o bolster  1x8 VC's   Tandem amb on aeromat        Backwd amb        5xSTS        Ther Ex 10.7 10/10 10.14 10.1 10.3   Nustep L2 x 10 min L2 x 10 mins L2 x 10 min L1 x 10 min L1 x 10 min   Leg press 20# 4x10  VC/MC's 20# 3x10 temporal pain  20# 4x10 15# 4x10 20# 4x10  VC/MC's   Knee ext mach 11# " 3x15  VC/MC's Held   11# 4x10 11# 4x10  VC/MC's   Knee flex mach 11# 3x15  VC/MC's Held    11# 4x10 11# 4x10  VC/MC's   bridges        Standing 3 way SLR B 2x10 ea VC's 2x10 ea B/L B 2x10 ea VC's B 1x15 ea VC's B 1x15 ea VC's     HR 20x                         Skilled convers. during session  10 min                     Ther Activity                        Gait Training        2 min walk test 277 feet       TUG test 22 sec       Modalities

## 2024-10-15 NOTE — PROGRESS NOTES
Daily Note     Today's date: 10/15/2024  Patient name: Anthony Slater  : 1945  MRN: [id number]  Referring provider: Rosamaria Silva,*  Dx:   Encounter Diagnosis     ICD-10-CM    1. Cognitive decline  R41.89                      Subjective: I like to knit, do puzzles, go for walks.      Objective: See treatment diary below      Assessment: Tolerated treatment well. Patient demonstrated fatigue post treatment, exhibited good technique with therapeutic exercises, and would benefit from continued OT. Pt completed bandits mixed up memory, pt provided 4 and 5 pictures. Pt able to recall missing picture 90% of time. Pt required minimal vc's.      Plan: Continue per plan of care.  Progress treatment as tolerated.       FOTO COUNT N/A       Re-Eval 2024       Manuals 10/08/2024 10/16/2024                      Neuro Re-Ed  10/08/2024 10/16/2024                      Ther Ex 10/08/2024 10/16/2024                              Ther Activity 10/08/2024 10/16/2024      Rush Hour        Mastermind        Word Recall  Forward  Backwards        Divided Attention Worksheet         Chart Memory        Dig It Memory        Multi-Matrix        Word Recall WS w/ Memory Manipulation        Memory Manipulation        Memory Packet HEP       Paragraph Retention WS        Divided Attention Alternating Scattegories WS        [place] Brain Box        Bandits Memory Mix Up  4 and 5 pictures              Modalities 10/08/2024 10/16/2024

## 2024-10-16 ENCOUNTER — OFFICE VISIT (OUTPATIENT)
Dept: OCCUPATIONAL THERAPY | Facility: CLINIC | Age: 79
End: 2024-10-16
Payer: MEDICARE

## 2024-10-16 DIAGNOSIS — R41.89 COGNITIVE DECLINE: Primary | ICD-10-CM

## 2024-10-16 PROCEDURE — 97530 THERAPEUTIC ACTIVITIES: CPT

## 2024-10-17 ENCOUNTER — OFFICE VISIT (OUTPATIENT)
Dept: PHYSICAL THERAPY | Facility: CLINIC | Age: 79
End: 2024-10-17
Payer: MEDICARE

## 2024-10-17 DIAGNOSIS — R26.2 AMBULATORY DYSFUNCTION: Primary | ICD-10-CM

## 2024-10-17 PROCEDURE — 97112 NEUROMUSCULAR REEDUCATION: CPT

## 2024-10-17 PROCEDURE — 97110 THERAPEUTIC EXERCISES: CPT

## 2024-10-17 NOTE — PROGRESS NOTES
"Daily Note     Today's date: 10/17/2024  Patient name: Anthony Slater  : 1945  MRN: [id number]  Referring provider: Rosamaria Silva,*  Dx:   Encounter Diagnosis     ICD-10-CM    1. Ambulatory dysfunction  R26.2                      Subjective:   Pt. states she wishes she had more energy.       Objective: See treatment diary below      Assessment: Tolerated treatment Fair+ overall.  Required VC's/MC's t/o treatment session.  Seemed to tire more easily today.  Noted light headed-ness (minor)  after performing side stepping ar bar.  Resolved after sitting and resting.    Patient would benefit from continued PT.      Plan: Continue per plan of care.              Precautions: acute encephalopathy, HTN, chronic pain disorder,     Re-eval Date: 10/14/24    Date 10.7 10/10 10. 10 10.3   Visit Count 6 7 8 9 5   FOTO Comp        Pain In \"TIRED\" Just tired 0/10    \"Not a good day....Didn't sleep well\" 0/10 \"more tired today, than usual\"   Pain Out A little less tired Temporal pain B/L with lightheadedness \"Tired\"     0/10 \"more tired\"         Manuals 10.7 10/10 10.14 10.17 10.3                                   Neuro Re-Ed 10.7 10/10 10. 10.17 10.3   Sidestepping  On mat side step along bar at mirror   5 trials,  B UE hold  VC's Side stepping along bar @ mirror B/L UE support x 5 laps Side stepping along bar @ mirror B/L UE support x 6 laps side step along bar at mirror   6 trials,  B UE hold/support side step along bar at mirror   4 trials,  B UE hold  VC's   HKM B alt 1x15 B/L alt 15x B alt 2x10   B alt 2x10 B alt 1x15   Low hurdles        STS w/bolster w/o bolster  1x8 VC's W/o bolster 10x  v.c. for posture w/o bolster  1x10 VC's w/o bolster  1x10 VC's w/o bolster  1x8 VC's   Tandem amb on aeromat        Backwd amb        5xSTS        Ther Ex 10.7 10/10 10.14 10.17 10.3   Nustep L2 x 10 min L2 x 10 mins L2 x 10 min L3 x 10 min L1 x 10 min   Leg press 20# 4x10  VC/MC's 20# 3x10 temporal pain  20# " "4x10 20# 4x10 20# 4x10  VC/MC's   Knee ext mach 11# 3x15  VC/MC's Held   \"Too tired\" 11# 4x10  VC/MC's   Knee flex mach 11# 3x15  VC/MC's Held    \"Too tired\" 11# 4x10  VC/MC's   bridges        Standing 3 way SLR B 2x10 ea VC's 2x10 ea B/L B 2x10 ea VC's B 2x10 ea VC's B 1x15 ea VC's     HR 20x                         Skilled convers. during session  10 min                     Ther Activity                        Gait Training        2 min walk test 277 feet       TUG test 22 sec       Modalities                                           "

## 2024-10-18 NOTE — PROGRESS NOTES
[facility]  [address], [address]  [place] [address]  [phone]    Care Conference    NAME: Anthony Slater  AGE: 79 y.o. SEX: female  YOB: 1945  DATE OF ASSESSMENT: 09/09/24  DATE OF CONFERENCE: 10/28/24    Family Present: Yoni () and Art (daughter)  Staff Present: Rosamaria Silva MD, ISABEL Huerta    Patient / Family Goals of Care: Review cognitive decline and associated symptoms, discuss treatment options and care planning.     Medical Concerns (Current/Historical): mixed hyperlipidemia, other iron deficiency anemia, anxiety state, stage 3a chronic kidney disease, gastroesophageal reflux disease with esophagitis, unspecified whether hemorrhage, age-related osteoporosis without current pathological fracture, essential hypertension, advanced care planning/counseling discussion    Geriatric Syndromes/Age Related Syndromes: Ambulatory dysfunction, frailty syndrome in geriatric patient    Neuropsychological  Progressive dementia, moderate, most likely Alzheimer's type  Gerardo Cognitive Assessment: 8/30    Decision-making capacity: likely limited for complex medical or financial decisions. Could consider neuropsychology assessment for capacity if needed.   Staging: FAST stage V  Driving safety: Patient does not drive, continue with driving cessation   Caregiver review:  is the main caregiver, discussed patient with social service who met with family and community resources and for provided.  ACP review: Patient has a living will and POA    Remain active physically, mentally and socially  Pharmaceutical and non-pharmaceutical interventions discussed. Risks and benefits of medications discussed at length.  Patient is currently taking Aricept 5 mg daily, family states no improvement noticed.  Patient and family agreed to wean off Aricept.  Patient will take 1 tablet every other day for 1 week then discontinue.  Maintain chronic  conditions under control  Repeat cognitive assessment in 6 months    Diagnostic Studies  I have personally reviewed blood work from 8/10/2024 including B12 1156  BMP: , K3.5, BUN 24, creatinine 1.39 and GFR 35  CBC: WBC 11.75, Hb 10.2, HCT 33.5 and platelets 291  Blood work from 7/12/2024 including TSH 2.90  I have personally reviewed MRI from 7/29/2024 which revealed mild chronic angiopathy  Physical Finding Impacting Function   Timed Up and Go Test: 22 seconds   Fall Risk: High   Activities of Daily Living: assistance   Instrumental Activities of Daily Living: Dependent    Encourage appropriate footwear at all times  Review fall risk prevention tips and adjust within the home environment as needed    Medications Reviewed   Medications seem appropriate for present conditions  Patient is currently on Aricept 5 mg at bedtime, discussed benefits and side effects with family and agreed to wean off as mentioned above.  Recommend melatonin 3 mg at bedtime for insomnia    Check with PCP before using over the counter medications  Avoid over the counter medications that can affect cognition (e.g., Benadryl, Tylenol PM)   Avoid NSAIDs due to risk of GI bleed and renal impairment    Other Findings   Overall health  BMI: 20.37 kg/m2  Maintain well-balanced diet  Continue following with primary care physician regularly  Ambulatory dysfunction  TUG 22nd  Patient is high fall risk  Referred to PT to increase muscle strength and gait stability  Fall precautions  Frailty syndrome in geriatric patient  Multifactorial, advanced age and underlying progressive dementia  Patient will benefit from physical therapy  Continue supportive care and maintain chronic conditions under control  Nutrition supplement as needed   Mixed hyperlipidemia  Currently on atorvastatin  Managed by PCP  Other iron deficiency anemia  Takes iron supplement  Anxiety state  Currently on BuSpar 5 mg twice daily  Managed by PCP  Stage 3a chronic kidney  disease  Recent GFR 35  Maintain adequate hydration  Avoid nephrotoxic agents  Gastroesophageal reflux disease with esophagitis, unspecified whether hemorrhage  Currently on Protonix   Age-related osteoporosis without current pathological fracture  Vitamin D supplement   Essential hypertension  BP stable  Continue metoprolol  Continue following with PCP  Vertigo  Chronic  Advised to resume PT for vertigo    Advanced care planning/counseling discussion  patient has a living wIll and POA in place    Recommended Health Maintenance   Immunizations, if not contraindicated:   Influenza vaccine yearly  Pneumo vaccine every 5 years (65 years and over)  Shingles vaccine  COVID-19 vaccine    Social / Safety Concerns  Consider an Adult Day Program for positive socialization, physical exercise, cognitive stimulation and family respite  Consider a home care aide to assist with daily care needs  Stay in touch with family and friends  Plan self-care activities for your mental well-being each week  Recommend review of fall risk prevention tips  Recommend use of fall precautions including fall alert device  Consider assistance for medication administration such as blister packaging or use of an automated pill dispenser  Recommend contacting your local [facility] Agency on Aging for possible eligible programs such as OPTIONS, Caregiver Support Program, or WAIVER    Long Term Care Issues  Maintain updated advance directives and provide a copy to your primary care provider  Consider caregiver support groups and educational resources through the Alzheimer's Association; access Alzheimer's Association 24/7 Helpline at 1-245.270.8407  [facility] does offer a monthly caregiver support group. If interested, please speak with a .  Utilize reorientation and redirection as needed (dependent on situation)  Educational information provided  Recommended literature: 36 Hour Day, Untangling Alzheimer's, Learning  to Speak Alzheimer's    Patient and family verbalized understanding of above care plan.    For care coordination purposes, this care plan will be shared with your primary care provider. With any questions, please contact our office at [phone].     Chief Complaint     Chief Complaint   Patient presents with    Care Conference      Patient is here today for family conference to discuss results and recommendations for memory loss.     History of Present Illness     Anthony Slater is a 79 y.o. y.o. female presents for family conference to review memory loss and associated symptoms, discuss treatment options and care planning.Patient's daughter and  were also in attendance. Initial comprehensive geriatric assessment completed on 9/9/2024, please refer to initial consultation for full details and history. MoCA 8/30 at that time.     Patient reports feeling well and denies any medication changes since last visit. Patient denies any recent falls, hospitalization or any other acute complaints.    The following portions of the patient's history were reviewed and updated as appropriate: allergies, current medications, past family history, past medical history, past social history, past surgical history and problem list.     Review of Systems     Constitutional: Negative for activity change.   HENT: Negative for hearing loss, trouble swallowing.    Eyes: Negative for visual disturbance.   Respiratory: Negative for choking and shortness of breath.    Gastrointestinal: Negative for nausea.   Genitourinary: Negative for dysuria and frequency.   Musculoskeletal: Negative for back pain and neck pain.   Neurological: Negative for dizziness, facial asymmetry, speech difficulty, weakness and light-headedness.   Psychiatric/Behavioral: Negative for behavioral problems and confusion.      Objective     /72 (BP Location: Left arm, Patient Position: Sitting, Cuff Size: Standard)   Pulse 75   Temp 97.6 °F (36.4 °C)  "(Temporal)   Ht 5' 1\" (1.549 m)   Wt 48 kg (105 lb 12.8 oz)   SpO2 98%   BMI 19.99 kg/m²     Physical Exam  Vitals and nursing note reviewed.   Constitutional:   General: Patient is not in acute distress.  Appearance: Normal appearance. Patient is well-developed. Patient is not diaphoretic.   HENT:   Head: Normocephalic.   Ears: External ear normal.   Nose: Nose normal.   Mouth/Throat: Oropharynx is clear and moist. No oropharyngeal exudate.   Eyes: Pupils are equal, round, and reactive to light. Conjunctivae and EOM are normal.   Neck: Normal range of motion. Neck supple. No JVD present. No tracheal deviation present. No thyromegaly present.   Cardiovascular:   Rate and Rhythm: Normal rate.   Heart sounds: No murmur heard.  No friction rub. No gallop.   Pulmonary:   Effort: Pulmonary effort is normal. No respiratory distress.   Breath sounds: Normal breath sounds. No wheezing or rales.   Abdominal:   General: Bowel sounds are normal. There is no distension.   Palpations: Abdomen is soft.   Tenderness: There is no abdominal tenderness.   Musculoskeletal:   General: Normal range of motion, no edema, or tenderness.  Skin:  General: Skin is warm and dry.   Neurological:   General: No focal deficit present.   Mental Status: Patient is alert and oriented to self and person. Mental status is at baseline.   Psychiatric:   Mood and Affect: Mood normal.   Behavior: Behavior normal.     "

## 2024-10-22 ENCOUNTER — OFFICE VISIT (OUTPATIENT)
Dept: PHYSICAL THERAPY | Facility: CLINIC | Age: 79
End: 2024-10-22
Payer: MEDICARE

## 2024-10-22 DIAGNOSIS — R26.2 AMBULATORY DYSFUNCTION: Primary | ICD-10-CM

## 2024-10-22 PROCEDURE — 97112 NEUROMUSCULAR REEDUCATION: CPT

## 2024-10-22 PROCEDURE — 97110 THERAPEUTIC EXERCISES: CPT

## 2024-10-22 NOTE — PROGRESS NOTES
"Daily Note     Today's date: 10/22/2024  Patient name: Anthony Slater  : 1945  MRN: [id number]  Referring provider: Rosamaria Silva,*  Dx:   Encounter Diagnosis     ICD-10-CM    1. Ambulatory dysfunction  R26.2                      Subjective:   Pt.s  response when asked how she is feeling today is, \" I'm Doin' \".  Denies any pain at this present time.     Objective: See treatment diary below      Assessment: Tolerated treatment  Fairly Well with performance of exer.  VC's/MC's required for some exer.    \"A little tired\" by end of treatment session.  Rest periods provided for pt t/o session.  Patient would benefit from continued PT.      Plan: Continue per plan of care.              Precautions: acute encephalopathy, HTN, chronic pain disorder,     Re-eval Date: 10/14/24    Date 10.7 10/10 10.14 10.17 10.22   Visit Count 6 7 8 9 10   FOTO Comp        Pain In \"TIRED\" Just tired 0/10    \"Not a good day....Didn't sleep well\" 0/10 0/10   Pain Out A little less tired Temporal pain B/L with lightheadedness \"Tired\"     0/10 0/10  \"A little tired\"         Manuals 10.7 10/10 10.14 10.17 10.22                                   Neuro Re-Ed 10.7 10/10 10.14 10.17 10.22   Sidestepping  On mat side step along bar at mirror   5 trials,  B UE hold  VC's Side stepping along bar @ mirror B/L UE support x 5 laps Side stepping along bar @ mirror B/L UE support x 6 laps side step along bar at mirror   6 trials,  B UE hold/support side step along bar at mirror   6 trials,  B UE hold  VC's   HKM B alt 1x15 B/L alt 15x B alt 2x10   B alt 2x10 B alt 2x10   Low hurdles        STS w/bolster w/o bolster  1x8 VC's W/o bolster 10x  v.c. for posture w/o bolster  1x10 VC's w/o bolster  1x10 VC's w/o bolster  2x5 VC's   Tandem amb on aeromat        Backwd amb             **Toe Tap on first step   B Alt 1x10   5xSTS        Ther Ex 10.7 10/10 10.14 10.17 10.22   Nustep L2 x 10 min L2 x 10 mins L2 x 10 min L3 x 10 min L3 x 10 min " "  Leg press 20# 4x10  VC/MC's 20# 3x10 temporal pain  20# 4x10 20# 4x10 resume   Knee ext mach 11# 3x15  VC/MC's Held   \"Too tired\" 11# 2x10  VC/MC's   Knee flex mach 11# 3x15  VC/MC's Held    \"Too tired\" 11# 2x15  VC/MC's   bridges        Standing 3 way SLR B 2x10 ea VC's 2x10 ea B/L B 2x10 ea VC's B 2x10 ea VC's B 2x10 ea VC's     HR 20x                         Skilled convers. during session  10 min                     Ther Activity                        Gait Training        2 min walk test 277 feet       TUG test 22 sec       Modalities                                             "

## 2024-10-22 NOTE — PROGRESS NOTES
Daily Note     Today's date: 10/22/2024  Patient name: Anthony Slater  : 1945  MRN: [id number]  Referring provider: Rosamaria Silva,*  Dx:   Encounter Diagnosis     ICD-10-CM    1. Cognitive decline  R41.89       2. Mild cognitive impairment  G31.84                      Subjective: I didn't do too much today.      Objective: See treatment diary below      Assessment: Tolerated treatment well. Patient demonstrated fatigue post treatment, exhibited good technique with therapeutic exercises, and would benefit from continued OT. Pt completed sequencing puzzle number 1-26 with no difficulty. Pt then completed dig it, pt given 3 colored objects to remember and find among other colored objects. Pt required 2 vc's for to recall 2 of 6 items. Pt used repeated repetition  strategy. Pt then given 6 non colored objects to remember and find, pt use categorization strategy. Pt required 2 vc's to find 2 out of 6 items.       Plan: Continue per plan of care.  Progress treatment as tolerated.       FOTO COUNT N/A       Re-Eval 2024       Manuals 10/08/2024 10/16/2024 10/23/2024                     Neuro Re-Ed  10/08/2024 10/16/2024 10/23/2024                     Ther Ex 10/08/2024 10/16/2024 10/23/204                             Ther Activity 10/08/2024 10/16/2024 10/23/2024     Rush Hour        Mastermind        Word Recall  Forward  Backwards        Divided Attention Worksheet         Chart Memory        Dig It Memory   3 colored items on card x 2  6 items non colored     Multi-Matrix        Word Recall WS w/ Memory Manipulation        Memory Manipulation        Memory Packet HEP       Paragraph Retention WS        Divided Attention Alternating Scattegories WS        Around the [place] Brain Box        Bandits Memory Mix Up  4 and 5 pictures         Sequencing puzzle     Modalities 10/08/2024 10/16/2024 10/23/2024

## 2024-10-23 ENCOUNTER — TELEPHONE (OUTPATIENT)
Dept: FAMILY MEDICINE CLINIC | Facility: CLINIC | Age: 79
End: 2024-10-23

## 2024-10-23 ENCOUNTER — OFFICE VISIT (OUTPATIENT)
Dept: OCCUPATIONAL THERAPY | Facility: CLINIC | Age: 79
End: 2024-10-23
Payer: MEDICARE

## 2024-10-23 DIAGNOSIS — G31.84 MILD COGNITIVE IMPAIRMENT: ICD-10-CM

## 2024-10-23 DIAGNOSIS — R41.89 COGNITIVE DECLINE: Primary | ICD-10-CM

## 2024-10-23 PROCEDURE — 97530 THERAPEUTIC ACTIVITIES: CPT

## 2024-10-24 ENCOUNTER — OFFICE VISIT (OUTPATIENT)
Dept: PHYSICAL THERAPY | Facility: CLINIC | Age: 79
End: 2024-10-24
Payer: MEDICARE

## 2024-10-24 DIAGNOSIS — R26.2 AMBULATORY DYSFUNCTION: Primary | ICD-10-CM

## 2024-10-24 PROCEDURE — 97110 THERAPEUTIC EXERCISES: CPT

## 2024-10-24 PROCEDURE — 97535 SELF CARE MNGMENT TRAINING: CPT

## 2024-10-24 NOTE — PROGRESS NOTES
"Daily Note     Today's date: 10/24/2024  Patient name: Anthony Slater  : 1945  MRN: [id number]  Referring provider: Rosamaria Silva,*  Dx:   Encounter Diagnosis     ICD-10-CM    1. Ambulatory dysfunction  R26.2                      Subjective: Pt's response to how she's doing today at this current time is, \"confused\".  Pt's spouse requests to attend pt session with his wife today. PTA answered any questions or concerns presented by pt and her  re: treatment exer/activities and overall POC.       Objective: See treatment diary below  *Skilled conversation with pt./pt's spouse 20 min      Assessment: Tolerated treatment Fairly Well with actual performance of exer.  Pt. did however, have some difficulty with maintaining conversation today, as she got lost in the midst at times with her responses.  Also Pt. did note she had some light-headed sensation while sitting still on side of mat table. Pt's  says she experiences this @ home as well at times, but feels she's not really dizzy or light-headed but rather effects from her cognitive impairment.  Patient would benefit from continued PT.        Plan: Continue per plan of care.              Precautions: acute encephalopathy, HTN, chronic pain disorder,     Re-eval Date: 10/14/24    Date 10.24 10/10 10.14 10.17 10.22   Visit Count 11 7 8 9 10   FOTO Comp        Pain In 0/10  \"Confused\" Just tired 0/10    \"Not a good day....Didn't sleep well\" 0/10 0/10   Pain Out 0/10      \"A little better\"   re: confusion Temporal pain B/L with lightheadedness \"Tired\"     0/10 0/10  \"A little tired\"         Manuals 10.24 10/10 10.14 10.17 10.22                                   Neuro Re-Ed 10.24 10/10 10.14 10.17 10.22   Sidestepping  On mat  Side stepping along bar @ mirror B/L UE support x 5 laps Side stepping along bar @ mirror B/L UE support x 6 laps side step along bar at mirror   6 trials,  B UE hold/support side step along bar at mirror   6 " "trials,  B UE hold  VC's   HKM B alt 2x15  *seated B/L alt 15x B alt 2x10   B alt 2x10 B alt 2x10   Low hurdles        STS w/bolster  W/o bolster 10x  v.c. for posture w/o bolster  1x10 VC's w/o bolster  1x10 VC's w/o bolster  2x5 VC's   Tandem amb on aeromat        Backwd amb             **Toe Tap on first step   B Alt 1x10   5xSTS        Ther Ex 10.24 10/10 10.14 10.17 10.22   Nustep L3 x 10 min L2 x 10 mins L2 x 10 min L3 x 10 min L3 x 10 min   Leg press *pt declined 20# 3x10 temporal pain  20# 4x10 20# 4x10 resume   Knee ext mach *pt. deferred Held   \"Too tired\" 11# 2x10  VC/MC's   Knee flex mach *pt. deferred Held    \"Too tired\" 11# 2x15  VC/MC's   bridges        Standing 3 way SLR  2x10 ea B/L B 2x10 ea VC's B 2x10 ea VC's B 2x10 ea VC's    B 30x   HR 20x       **LAQ  B 3x10        B TR/HR 30x                10.24        Skilled convers. during session  20 min  Skilled convers. during session  10 min              **seated ABD w/ TB 3x/10/5\"        **seated ADD w/ Ball 3x/10/5\"       Ther Activity                        Gait Training        2 min walk test        TUG test        Modalities                                               "

## 2024-10-28 ENCOUNTER — OFFICE VISIT (OUTPATIENT)
Age: 79
End: 2024-10-28
Payer: MEDICARE

## 2024-10-28 VITALS
HEART RATE: 75 BPM | OXYGEN SATURATION: 98 % | HEIGHT: 61 IN | BODY MASS INDEX: 19.98 KG/M2 | TEMPERATURE: 97.6 F | SYSTOLIC BLOOD PRESSURE: 140 MMHG | DIASTOLIC BLOOD PRESSURE: 72 MMHG | WEIGHT: 105.8 LBS

## 2024-10-28 DIAGNOSIS — F41.1 ANXIETY STATE: Chronic | ICD-10-CM

## 2024-10-28 DIAGNOSIS — D50.8 OTHER IRON DEFICIENCY ANEMIA: Chronic | ICD-10-CM

## 2024-10-28 DIAGNOSIS — E78.2 MIXED HYPERLIPIDEMIA: Chronic | ICD-10-CM

## 2024-10-28 DIAGNOSIS — Z71.89 ADVANCED CARE PLANNING/COUNSELING DISCUSSION: ICD-10-CM

## 2024-10-28 DIAGNOSIS — K21.00 GASTROESOPHAGEAL REFLUX DISEASE WITH ESOPHAGITIS, UNSPECIFIED WHETHER HEMORRHAGE: Chronic | ICD-10-CM

## 2024-10-28 DIAGNOSIS — F02.B0 MODERATE EARLY ONSET ALZHEIMER'S DEMENTIA WITHOUT BEHAVIORAL DISTURBANCE, PSYCHOTIC DISTURBANCE, MOOD DISTURBANCE, OR ANXIETY (HCC): Primary | ICD-10-CM

## 2024-10-28 DIAGNOSIS — R54 FRAILTY SYNDROME IN GERIATRIC PATIENT: ICD-10-CM

## 2024-10-28 DIAGNOSIS — G30.0 MODERATE EARLY ONSET ALZHEIMER'S DEMENTIA WITHOUT BEHAVIORAL DISTURBANCE, PSYCHOTIC DISTURBANCE, MOOD DISTURBANCE, OR ANXIETY (HCC): Primary | ICD-10-CM

## 2024-10-28 DIAGNOSIS — R26.2 AMBULATORY DYSFUNCTION: ICD-10-CM

## 2024-10-28 DIAGNOSIS — N18.31 STAGE 3A CHRONIC KIDNEY DISEASE (HCC): Chronic | ICD-10-CM

## 2024-10-28 DIAGNOSIS — M81.0 AGE-RELATED OSTEOPOROSIS WITHOUT CURRENT PATHOLOGICAL FRACTURE: ICD-10-CM

## 2024-10-28 PROCEDURE — 99483 ASSMT & CARE PLN PT COG IMP: CPT | Performed by: INTERNAL MEDICINE

## 2024-10-28 NOTE — PROGRESS NOTES
"[facility]  [address], [address]  [place] [address] [phone]    Care Conference: Resources Provided    MSW participated in today's conference and provided the following resources, along with a copy of care plan:  [place] [phone]    General Information  - 10 Ways to Love Your Brain  - Memory loss ladder  - Packet with Alzheimer's Association information including: definition of dementia, communication tips for different stages, common behaviors and response strategies  - NIH SARAH \"Now What? Next Steps After an Alzheimer's Diagnosis\"    Caregiver Support  - Flyer for [facility] Virtual Caregiver Support Group (meeting 2x per month by Zoom)  - Alzheimer's Association Rx Pad (guide to website and 24/7 helpline, 1-925.745.7821)    Safety  - CDC fall prevention / home safety checklist    Community Resources  - [facility]ging in Place Resource Guide (contains information about higher levels of care, homecare, etc.)  - [employer] [facility]: What is Dementia & Where to Begin brochure & Resource Guide  - [facility] brochure  - Adult Day Center list    Other  - [facility] Care: Caregiver Website QR code to scan for resources/education  - 101 Things to Do  - Therapeutic Fibbing article        Resources provided at initial assessment: [place]: Guardianship information, Adult Day Center  list, [place] brochure, How to Apply For Waiver Services, Home care/Waiver/AAA  "

## 2024-10-28 NOTE — PROGRESS NOTES
[facility]  [address], [address]  [place] [address]  [phone]    Care Conference    NAME: Anthony Slater  AGE: 79 y.o. SEX: female  YOB: 1945  DATE OF ASSESSMENT: 09/09/24  DATE OF CONFERENCE: 10/28/24    Family Present: ***  Staff Present: Rosamaria Silva MD    Patient / Family Goals of Care: Review cognitive decline and associated symptoms, discuss treatment options and care planning.     Medical Concerns (Current/Historical): mixed hyperlipidemia, other iron deficiency anemia, anxiety state, stage 3a chronic kidney disease, gastroesophageal reflux disease with esophagitis, unspecified whether hemorrhage, age-related osteoporosis without current pathological fracture, essential hypertension, advanced care planning/counseling discussion    Geriatric Syndromes/Age Related Syndromes: Ambulatory dysfunction, frailty syndrome in geriatric patient    Neuropsychological  Progressive dementia, moderate, most likely Alzheimer's type  Gerardo Cognitive Assessment: 8/30    Decision-making capacity: likely limited for complex medical or financial decisions. Could consider neuropsychology assessment for capacity if needed.   Staging: FAST stage V  Driving safety: Patient does not drive, continue with driving cessation   Caregiver review:  is the main caregiver, discussed patient with social service who met with family and community resources and for provided.  ACP review: Patient does not have a living will or POA    Remain active physically, mentally and socially  Pharmaceutical and non-pharmaceutical interventions discussed. Risks and benefits of medications discussed at length. Family agreed on non-pharmacologic management and not to start medications at this time.   Engage in cognitively challenging exercises such as crosswords and puzzles  Maintain chronic conditions under control  Repeat cognitive assessment in 6 months    Diagnostic Studies  I have  personally reviewed blood work from 8/10/2024 including B12 1156  BMP: , K3.5, BUN 24, creatinine 1.39 and GFR 35  CBC: WBC 11.75, Hb 10.2, HCT 33.5 and platelets 291  Blood work from 7/12/2024 including TSH 2.90  I have personally reviewed MRI from 7/29/2024 which revealed mild chronic angiopathy  Physical Finding Impacting Function   Timed Up and Go Test: 22 seconds   Fall Risk: High   Activities of Daily Living: assistance   Instrumental Activities of Daily Living: Dependent    Encourage appropriate footwear at all times  Review fall risk prevention tips and adjust within the home environment as needed    Medications Reviewed   Medications seem appropriate for present conditions  Patient is currently on Aricept 5 mg at bedtime, discussed benefits and side effects with family, continue current dose and likely will discontinue next visit  Recommend melatonin 3 mg at bedtime for insomnia    Check with PCP before using over the counter medications  Avoid over the counter medications that can affect cognition (e.g., Benadryl, Tylenol PM)   Avoid NSAIDs due to risk of GI bleed and renal impairment    Other Findings   Overall health  BMI: 20.37 kg/m2  Maintain well-balanced diet  Continue following with primary care physician regularly  Ambulatory dysfunction  TUG 22nd  Patient is high fall risk  Referred to PT to increase muscle strength and gait stability  Fall precautions  Frailty syndrome in geriatric patient  Multifactorial, advanced age and underlying progressive dementia  Patient will benefit from physical therapy  Continue supportive care and maintain chronic conditions under control  Nutrition supplement as needed   Mixed hyperlipidemia  Currently on atorvastatin  Managed by PCP  Other iron deficiency anemia  Takes iron supplement  Anxiety state  Currently on BuSpar 5 mg twice daily  Managed by PCP  Stage 3a chronic kidney disease  Recent GFR 35  Maintain adequate hydration  Avoid nephrotoxic  agents  Gastroesophageal reflux disease with esophagitis, unspecified whether hemorrhage  Currently on Protonix   Age-related osteoporosis without current pathological fracture  Vitamin D supplement   Essential hypertension  BP stable  Continue metoprolol  Continue following with PCP  Advanced care planning/counseling discussion  patient does not have a living wIll or POA       Recommended Health Maintenance   Immunizations, if not contraindicated:   Influenza vaccine yearly  Pneumo vaccine every 5 years (65 years and over)  Shingles vaccine  COVID-19 vaccine    Social / Safety Concerns  Consider an Adult Day Program for positive socialization, physical exercise, cognitive stimulation and family respite  Consider a home care aide to assist with daily care needs  Stay in touch with family and friends  Plan self-care activities for your mental well-being each week  Recommend review of fall risk prevention tips  Recommend use of fall precautions including fall alert device  Consider assistance for medication administration such as blister packaging or use of an automated pill dispenser  Recommend contacting your local [facility] on Aging for possible eligible programs such as OPTIONS, Caregiver Support Program, or WAIVER    Long Term Care Issues  Maintain updated advance directives and provide a copy to your primary care provider  Consider caregiver support groups and educational resources through the Alzheimer's Association; access Alzheimer's Association 24/7 Helpline at 1-697.380.6517  [facility] does offer a monthly caregiver support group. If interested, please speak with a .  Utilize reorientation and redirection as needed (dependent on situation)  Educational information provided  Recommended literature: 36 Hour Day, Untangling Alzheimer's, Learning to Speak Alzheimer's    Patient and family verbalized understanding of above care plan.    For care coordination  purposes, this care plan will be shared with your primary care provider. With any questions, please contact our office at [phone].

## 2024-10-29 ENCOUNTER — OFFICE VISIT (OUTPATIENT)
Dept: PHYSICAL THERAPY | Facility: CLINIC | Age: 79
End: 2024-10-29
Payer: MEDICARE

## 2024-10-29 DIAGNOSIS — R26.2 AMBULATORY DYSFUNCTION: Primary | ICD-10-CM

## 2024-10-29 PROCEDURE — 97110 THERAPEUTIC EXERCISES: CPT | Performed by: PHYSICAL MEDICINE & REHABILITATION

## 2024-10-29 PROCEDURE — 97112 NEUROMUSCULAR REEDUCATION: CPT | Performed by: PHYSICAL MEDICINE & REHABILITATION

## 2024-10-29 NOTE — PROGRESS NOTES
"Daily Note     Today's date: 10/29/2024  Patient name: Anthony Slater  : 1945  MRN: [id number]  Referring provider: Rsoamaria Silva,*  Dx:   Encounter Diagnosis     ICD-10-CM    1. Ambulatory dysfunction  R26.2                      Subjective: Pt has no complaints at this time. Notes lightheadedness at end of tx.       Objective: See treatment diary below      Assessment: Tolerated treatment well. Patient demonstrated fatigue post treatment, exhibited good technique with therapeutic exercises, and would benefit from continued PT  Pt has diff following cues but is physically capable of performing all ex as requested.       Plan: Continue per plan of care.      Precautions: acute encephalopathy, HTN, chronic pain disorder,     Re-eval Date: 10/14/24    Date 10.24 10/29 10.14 10.17 10.22   Visit Count 11 12 8 9 10   FOTO Comp        Pain In 0/10  \"Confused\"  0/10    \"Not a good day....Didn't sleep well\" 0/10 0/10   Pain Out 0/10      \"A little better\"   re: confusion  \"Tired\"     0/10 0/10  \"A little tired\"         Manuals 10.24 10/29 10.14 10.17 10                                   Neuro Re-Ed 10.24 10/29 10.14 10.17 10.   Sidestepping  On mat  Side stepping along bar @ // bars B/L UE support x 5 laps Side stepping along bar @ mirror B/L UE support x 6 laps side step along bar at mirror   6 trials,  B UE hold/support side step along bar at mirror   6 trials,  B UE hold  VC's   HKM B alt 2x15  *seated B alt 5 laps at bar by mirror B alt 2x10   B alt 2x10 B alt 2x10   Low hurdles        STS w/bolster  W/small red bolster 10x  v.c. for technique w/o bolster  1x10 VC's w/o bolster  1x10 VC's w/o bolster  2x5 VC's   Tandem amb on aeromat        Backwd amb             **Toe Tap on first step   B Alt 1x10   5xSTS        Ther Ex 10.24 10/29 10.14 10.17 10.   Nustep L3 x 10 min L3 x 10 mins L2 x 10 min L3 x 10 min L3 x 10 min   Leg press *pt declined 40# 2x10  20# 4x10 20# 4x10 resume   Knee ext mach " "*pt. deferred Held   \"Too tired\" 11# 2x10  VC/MC's   Knee flex mach *pt. deferred Held    \"Too tired\" 11# 2x15  VC/MC's   bridges        Standing 3 way SLR  2x10 ea B/L B 2x10 ea VC's B 2x10 ea VC's B 2x10 ea VC's    B 30x         LAQ **LAQ  B 3x10 B 30x      B TR/HRs B TR/HR 30x                10.24        Skilled convers. during session  20 min  Skilled convers. during session  10 min              **seated ABD w/ TB 3x/10/5\"        **seated ADD w/ Ball 3x/10/5\"       Ther Activity                        Gait Training        2 min walk test        TUG test        Modalities                                                 "

## 2024-10-30 ENCOUNTER — OFFICE VISIT (OUTPATIENT)
Dept: OCCUPATIONAL THERAPY | Facility: CLINIC | Age: 79
End: 2024-10-30
Payer: MEDICARE

## 2024-10-30 ENCOUNTER — TELEPHONE (OUTPATIENT)
Age: 79
End: 2024-10-30

## 2024-10-30 DIAGNOSIS — R41.89 COGNITIVE DECLINE: Primary | ICD-10-CM

## 2024-10-30 PROCEDURE — 97530 THERAPEUTIC ACTIVITIES: CPT

## 2024-10-30 NOTE — PROGRESS NOTES
Daily Note     Today's date: 10/30/2024  Patient name: Anthony Slater  : 1945  MRN: [id number]  Referring provider: Rosamaria Silva,*  Dx:   Encounter Diagnosis     ICD-10-CM    1. Cognitive decline  R41.89                      Subjective: I think I know why I was busy the other day.      Objective: See treatment diary below      Assessment: Tolerated treatment well. Patient completed  looking chart, provided pt with a card with 3 objects on it. Pt used strategy of repeated repetition to try and remember said items. Pt then was to look for 3 objects amongst other objects. Pt required minimal vc's for item location on the first 4 cards, the last card pt required mod vc's to remember and find items.      Plan: Continue per plan of care.  Progress treatment as tolerated.       FOTO COUNT N/A       Re-Eval 2024       Manuals 10/08/2024 10/16/2024 10/23/2024 10/30/2024                    Neuro Re-Ed  10/08/2024 10/16/2024 10/23/2024 10/30/2024                    Ther Ex 10/08/2024 10/16/2024 10/23/204 10/30/2024                            Ther Activity 10/08/2024 10/16/2024 10/23/2024 10/30/2024    Rush Hour        Mastermind        Word Recall  Forward  Backwards        Divided Attention Worksheet         Chart Memory    5 cards    Dig It Memory   3 colored items on card x 2  6 items non colored     Multi-Matrix        Word Recall WS w/ Memory Manipulation        Memory Manipulation        Memory Packet HEP       Paragraph Retention WS        Divided Attention Alternating Scattegories WS        Around the World Brain Box        Bandits Memory Mix Up  4 and 5 pictures         Sequencing puzzle     Modalities 10/08/2024 10/16/2024 10/23/2024 10/30/2024

## 2024-10-30 NOTE — TELEPHONE ENCOUNTER
stated would like doctor Mack opinion pt has appt this Friday.cordelia is concern due to pt behavior if its a good idea to keep eye doctor appt can doctor mack call cordelia he would like to let eye doctor know.what is he going to do.[phone]

## 2024-10-31 ENCOUNTER — OFFICE VISIT (OUTPATIENT)
Dept: PHYSICAL THERAPY | Facility: CLINIC | Age: 79
End: 2024-10-31
Payer: MEDICARE

## 2024-10-31 DIAGNOSIS — R26.2 AMBULATORY DYSFUNCTION: Primary | ICD-10-CM

## 2024-10-31 DIAGNOSIS — I25.10 CORONARY ARTERY DISEASE INVOLVING NATIVE CORONARY ARTERY OF NATIVE HEART WITHOUT ANGINA PECTORIS: ICD-10-CM

## 2024-10-31 PROCEDURE — 97112 NEUROMUSCULAR REEDUCATION: CPT | Performed by: PHYSICAL MEDICINE & REHABILITATION

## 2024-10-31 PROCEDURE — 97110 THERAPEUTIC EXERCISES: CPT | Performed by: PHYSICAL MEDICINE & REHABILITATION

## 2024-10-31 RX ORDER — METOPROLOL TARTRATE 25 MG/1
25 TABLET, FILM COATED ORAL DAILY
Qty: 10 TABLET | Refills: 3 | Status: SHIPPED | OUTPATIENT
Start: 2024-10-31 | End: 2024-11-04

## 2024-10-31 NOTE — PROGRESS NOTES
"Daily Note     Today's date: 10/31/2024  Patient name: Anthony Slater  : 1945  MRN: [id number]  Referring provider: Rosamaria Silva,*  Dx:   Encounter Diagnosis     ICD-10-CM    1. Ambulatory dysfunction  R26.2                      Subjective: Pt states she was doing okay today, but now she is getting tired. States she was making beds and doing the dishes this morning.      Objective: See treatment diary below      Assessment: Tolerated treatment well. Patient demonstrated fatigue post treatment and would benefit from continued PT  Pt is completing ther ex with vc's for proper execution and to complete reps count. Pt is completing all ex well w/o LOB although does exhibit fatigue after standing ex. Pt's deficits appear to more cognitive than physical and may benefit from increased cognitive therapy.      Plan: Continue per plan of care.      Precautions: acute encephalopathy, HTN, chronic pain disorder,     Re-eval Date: 10/14/24    Date 10.24 10/29 10/31     Visit Count 11 12 13     FOTO Comp        Pain In 0/10  \"Confused\"       Pain Out 0/10      \"A little better\"   re: confusion                 Manuals 10.24 10/29 10/31                                     Neuro Re-Ed 10.24 10/29 10/31     Sidestepping  On mat  Side stepping along bar @ // bars B/L UE support x 5 laps Side stepping along bar @ mirror B/L UE support x 6 laps     HKM B alt 2x15  *seated B alt 5 laps at bar by mirror B alt 5 laps at bar by mirror       Low hurdles        STS w/bolster  W/small red bolster 10x  v.c. for technique w/small red bolster  1x10 VC's     Tandem amb on aeromat        Backwd amb                5xSTS        Ther Ex 10.24 10/29      Nustep L3 x 10 min L3 x 10 mins L3 x 10 min     Leg press *pt declined 40# 2x10  40# 2x10     Knee ext mach *pt. deferred Held  11#  20x     Knee flex mach *pt. deferred Held   11#  20x     bridges        Standing 3 way SLR  2x10 ea B/L B 2x10 ea VC's      B 30x         LAQ " "**LAQ  B 3x10 B 30x      B TR/HRs B TR/HR 30x                10.24        Skilled convers. during session  20 min                **seated ABD w/ TB 3x/10/5\"        **seated ADD w/ Ball 3x/10/5\"       Ther Activity                        Gait Training        2 min walk test        TUG test        Modalities                                                   "

## 2024-10-31 NOTE — TELEPHONE ENCOUNTER
Spoke with  is going to PT and OT without much problems other than increased anxiety.  She does not get agitated or angry.  Her appointment tomorrow is with Dr. Nguyen for routine checkup.  I advise she should be fine to make sure she takes her BuSpar as well as her a.m. medications before the appointment.

## 2024-11-03 DIAGNOSIS — I25.10 CORONARY ARTERY DISEASE INVOLVING NATIVE CORONARY ARTERY OF NATIVE HEART WITHOUT ANGINA PECTORIS: ICD-10-CM

## 2024-11-04 RX ORDER — METOPROLOL TARTRATE 25 MG/1
25 TABLET, FILM COATED ORAL EVERY MORNING
Qty: 90 TABLET | Refills: 1 | Status: SHIPPED | OUTPATIENT
Start: 2024-11-04 | End: 2024-11-07 | Stop reason: SDUPTHER

## 2024-11-05 ENCOUNTER — OFFICE VISIT (OUTPATIENT)
Dept: PHYSICAL THERAPY | Facility: CLINIC | Age: 79
End: 2024-11-05
Payer: MEDICARE

## 2024-11-05 DIAGNOSIS — R26.2 AMBULATORY DYSFUNCTION: Primary | ICD-10-CM

## 2024-11-05 PROCEDURE — 97112 NEUROMUSCULAR REEDUCATION: CPT | Performed by: PHYSICAL MEDICINE & REHABILITATION

## 2024-11-05 PROCEDURE — 97110 THERAPEUTIC EXERCISES: CPT | Performed by: PHYSICAL MEDICINE & REHABILITATION

## 2024-11-05 NOTE — PROGRESS NOTES
Daily Note     Today's date: 2024  Patient name: Anthony Slater  : 1945  MRN: [id number]  Referring provider: Rosamaria Silva,*  Dx:   Encounter Diagnosis     ICD-10-CM    1. Cognitive decline  R41.89       2. Mild cognitive impairment  G31.84                      Subjective: I feel off balance today.      Objective: See treatment diary below      Assessment: Tolerated treatment well. Patient completed pattern sequencing puzzle, pt required min vc's for first two cards for task completion. Pt completed last two cards with no difficulty or errors. Pt completed all around the world with max difficulty. Pt unable to answer questions correctly regarding card even with vc's.      Plan: Continue per plan of care.  Progress treatment as tolerated.       FOTO COUNT N/A       Re-Eval 2024       Manuals 10/08/2024 10/16/2024 10/23/2024 10/30/2024 2024                   Neuro Re-Ed  10/08/2024 10/16/2024 10/23/2024 10/30/2024 2024                   Ther Ex 10/08/2024 10/16/2024 10/23/204 10/30/2024 2024                           Ther Activity 10/08/2024 10/16/2024 10/23/2024 10/30/2024 2024   Rush Hour        Mastermind        Word Recall  Forward  Backwards        Divided Attention Worksheet         Chart Memory    5 cards    Dig It Memory   3 colored items on card x 2  6 items non colored     Multi-Matrix        Word Recall WS w/ Memory Manipulation        Memory Manipulation        Memory Packet HEP       Paragraph Retention WS        Divided Attention Alternating Scattegories WS        Around the [place] Brain Box     [facility]   Bandits Memory Mix Up  4 and 5 pictures         Sequencing puzzle  Pattern puzzle  Cards 12,13,15,16   Modalities 10/08/2024 10/16/2024 10/23/2024 10/30/2024 2024

## 2024-11-05 NOTE — PROGRESS NOTES
"Daily Note     Today's date: 2024  Patient name: Anthony Slater  : 1945  MRN: [id number]  Referring provider: Rosamaria Silva,*  Dx:   Encounter Diagnosis     ICD-10-CM    1. Ambulatory dysfunction  R26.2                      Subjective:  Pt notes she is not having any issues other than her memory and cognition.      Objective: See treatment diary below      Assessment: Tolerated treatment well. Patient demonstrated fatigue post treatment, exhibited good technique with therapeutic exercises, and would benefit from continued PT  Discussion with pt's  to reduce PT to 1x/week and increase OT to 2x/week as biggest deficits lie with cognitive abilities.  Pt's  receptive to this change in treatment as is pt. Schedule adjuted to accommodate same.      Plan: Continue per plan of care.      Precautions: acute encephalopathy, HTN, chronic pain disorder,     Re-eval Date: 10/14/24    Date 10.24 10/29 10/31 11/5    Visit Count 11 12 13 14    FOTO Comp        Pain In 0/10  \"Confused\"       Pain Out 0/10      \"A little better\"   re: confusion                 Manuals 10.24 10/29 10/31 11/5                                    Neuro Re-Ed 10.24 10/29 10/31 11/5    Sidestepping  On mat  Side stepping along bar @ // bars B/L UE support x 5 laps Side stepping along bar @ mirror B/L UE support x 6 laps Side stepping along bar @ mirror   0 UE support x 6 laps    HKM B alt 2x15  *seated B alt 5 laps at bar by mirror B alt 5 laps at bar by mirror   B alt 6 laps at bar by mirror    Low hurdles   4 hurdles x4     STS w/bolster  W/small red bolster 10x  v.c. for technique w/small red bolster  1x10 VC's Red foam roll  10x    Tandem amb on aeromat        Backwd amb                5xSTS        Ther Ex 10.24 10/29      Nustep L3 x 10 min L3 x 10 mins L3 x 10 min L3  10'    Leg press *pt declined 40# 2x10  40# 2x10 45#  20x    Knee ext mach *pt. deferred Held  11#  20x 11#  20x    Knee flex mach *pt. deferred " "Held   11#  20x 11#  20x    bridges    15    Standing 3 way SLR  2x10 ea B/L B 2x10 ea VC's B 20x ea   Vc's for technique              LAQ **LAQ  B 3x10 B 30x      B TR/HRs B TR/HR 30x                10.24        Skilled convers. during session  20 min                **seated ABD w/ TB 3x/10/5\"        **seated ADD w/ Ball 3x/10/5\"       Ther Activity                        Gait Training        2 min walk test        TUG test        Modalities                                                     "

## 2024-11-06 ENCOUNTER — OFFICE VISIT (OUTPATIENT)
Dept: OCCUPATIONAL THERAPY | Facility: CLINIC | Age: 79
End: 2024-11-06
Payer: MEDICARE

## 2024-11-06 DIAGNOSIS — G31.84 MILD COGNITIVE IMPAIRMENT: ICD-10-CM

## 2024-11-06 DIAGNOSIS — R41.89 COGNITIVE DECLINE: Primary | ICD-10-CM

## 2024-11-06 PROCEDURE — 97530 THERAPEUTIC ACTIVITIES: CPT

## 2024-11-07 ENCOUNTER — OFFICE VISIT (OUTPATIENT)
Dept: PHYSICAL THERAPY | Facility: CLINIC | Age: 79
End: 2024-11-07
Payer: MEDICARE

## 2024-11-07 DIAGNOSIS — R26.2 AMBULATORY DYSFUNCTION: Primary | ICD-10-CM

## 2024-11-07 DIAGNOSIS — I25.10 CORONARY ARTERY DISEASE INVOLVING NATIVE CORONARY ARTERY OF NATIVE HEART WITHOUT ANGINA PECTORIS: ICD-10-CM

## 2024-11-07 PROCEDURE — 97112 NEUROMUSCULAR REEDUCATION: CPT

## 2024-11-07 PROCEDURE — 97110 THERAPEUTIC EXERCISES: CPT

## 2024-11-07 RX ORDER — METOPROLOL TARTRATE 25 MG/1
25 TABLET, FILM COATED ORAL EVERY 12 HOURS SCHEDULED
Qty: 10 TABLET | Refills: 3 | Status: CANCELLED | OUTPATIENT
Start: 2024-11-07

## 2024-11-07 RX ORDER — METOPROLOL TARTRATE 25 MG/1
25 TABLET, FILM COATED ORAL EVERY MORNING
Qty: 100 TABLET | Refills: 1 | Status: SHIPPED | OUTPATIENT
Start: 2024-11-07

## 2024-11-07 NOTE — TELEPHONE ENCOUNTER
Reason for call:   [x] Refill   [] Prior Auth  [] Other:     Office:   [x] PCP/Provider -   [] Specialty/Provider -     Medication: Metoprolol Tartrate    Dose/Frequency: 25 mg    Quantity: 100 tablets    Pharmacy: [facility] - [address]      Does the patient have enough for 3 days?   [] Yes   [] No - Send as HP to POD

## 2024-11-07 NOTE — PROGRESS NOTES
"Daily Note     Today's date: 2024  Patient name: Anthony Slater  : 1945  MRN: [id number]  Referring provider: Rosamaria Silva,*  Dx:   Encounter Diagnosis     ICD-10-CM    1. Ambulatory dysfunction  R26.2                      Subjective:  Pt. states she's \"tired and cold\" today. Notes she hasn't been dizzy since stopping one of her meds that her MD felt was causing  her to feel light-headed/dizzy.       Objective: See treatment diary below      Assessment: Tolerated treatment  Fairly Well/Well overall.    Pt. noted to present more spry today. Patient would benefit from continued PT.      Plan: Continue per plan of care.              Precautions: acute encephalopathy, HTN, chronic pain disorder,     Re-eval Date: 10/14/24    Date 10.24 10/29 10/31 11/5 11.7   Visit Count 11 12 13 14 15   FOTO Comp        Pain In 0/10  \"Confused\"    0/10  \"tired and cold\"       Pain Out 0/10      \"A little better\"   re: confusion        0/10         Manuals 10.24 10/29 10/31 115 11.7                                   Neuro Re-Ed 10.24 10/29 10/31 11/5 11.7   Sidestepping  On mat  Side stepping along bar @ // bars B/L UE support x 5 laps Side stepping along bar @ mirror B/L UE support x 6 laps Side stepping along bar @ mirror   0 UE support x 6 laps Side stepping along bar @ mirror   1 UE support x 7 laps   HKM B alt 2x15  *seated B alt 5 laps at bar by mirror B alt 5 laps at bar by mirror   B alt 6 laps at bar by mirror B alt 6 laps at bar by mirror   Low hurdles   4 hurdles x4     STS w/bolster  W/small red bolster 10x  v.c. for technique w/small red bolster  1x10 VC's Red foam roll  10x Red foam roll  10x   Tandem amb on aeromat        Backwd amb                5xSTS        Ther Ex 10.24 10/29   11.7   Nustep L3 x 10 min L3 x 10 mins L3 x 10 min L3  10' L4  10'   Leg press *pt declined 40# 2x10  40# 2x10 45#  20x 45#  30x   Knee ext mach *pt. deferred Held  11#  20x 11#  20x 11#  30x   Knee flex mach *pt. " "deferred Held   11#  20x 11#  20x 11#  30x   bridges    15    Standing 3 way SLR  2x10 ea B/L B 2x10 ea VC's B 20x ea   Vc's for technique B 25x ea   Vc's for technique             LAQ **LAQ  B 3x10 B 30x      B TR/HRs B TR/HR 30x                10.24        Skilled convers. during session  20 min                **seated ABD w/ TB 3x/10/5\"    seated ABD w/ TB 3x/10/5\"    **seated ADD w/ Ball 3x/10/5\"    seated ADD w/ Ball 3x/10/5   Ther Activity                        Gait Training        2 min walk test        TUG test        Modalities                                                       "

## 2024-11-07 NOTE — TELEPHONE ENCOUNTER
Pt  calling back stating that he doesn't not need the short term supply sent. Just the refill to [facility]

## 2024-11-07 NOTE — TELEPHONE ENCOUNTER
NOT A DUPLICATE... script that was sent on 11/4 was sent to the wrong pharmacy. Patient  need long term supply sent to [facility] and short term supply [facility].

## 2024-11-11 ENCOUNTER — OFFICE VISIT (OUTPATIENT)
Dept: FAMILY MEDICINE CLINIC | Facility: CLINIC | Age: 79
End: 2024-11-11
Payer: MEDICARE

## 2024-11-11 VITALS
BODY MASS INDEX: 20.62 KG/M2 | SYSTOLIC BLOOD PRESSURE: 140 MMHG | HEIGHT: 61 IN | DIASTOLIC BLOOD PRESSURE: 82 MMHG | WEIGHT: 109.2 LBS | HEART RATE: 73 BPM | OXYGEN SATURATION: 99 % | TEMPERATURE: 98.3 F

## 2024-11-11 DIAGNOSIS — Z23 ENCOUNTER FOR IMMUNIZATION: Primary | ICD-10-CM

## 2024-11-11 PROCEDURE — G0008 ADMIN INFLUENZA VIRUS VAC: HCPCS | Performed by: NURSE PRACTITIONER

## 2024-11-11 PROCEDURE — G0439 PPPS, SUBSEQ VISIT: HCPCS | Performed by: NURSE PRACTITIONER

## 2024-11-11 PROCEDURE — 90662 IIV NO PRSV INCREASED AG IM: CPT | Performed by: NURSE PRACTITIONER

## 2024-11-11 NOTE — PROGRESS NOTES
Ambulatory Visit  Name: Anthony Slater      : 1945      MRN: [id number]  Encounter Provider: DOTTIE Denson  Encounter Date: 2024   Encounter department: [facility] PRIMARY CARE    Assessment & Plan  Encounter for immunization    Orders:    influenza vaccine, high-dose, PF 0.5 mL (Fluzone High Dose)      Depression Screening and Follow-up Plan: Patient was screened for depression during today's encounter. They screened negative with a PHQ-2 score of 2.      Preventive health issues were discussed with patient, and age appropriate screening tests were ordered as noted in patient's After Visit Summary. Personalized health advice and appropriate referrals for health education or preventive services given if needed, as noted in patient's After Visit Summary.    History of Present Illness     Patient is here with .  Here for Medicare wellness.  She does have a diagnosis of dementia.  He feels she is basically stable.  She is at the point now where she needs supervision.  He had meals brought in through the [employer] program and with also the help of their daughters.  They did stop Aricept.  They did not feel like it was adding to anything as far as her memory and she did have a tremor from it.  She does have therapy in the home both OT and PT.  She has chronic vertigo.  She had been resistant to the vertigo exercises lately.  She wonders if she should restart them and maybe they would help her symptoms.  But is not sure if that is something that they would be able to do but he will look into it.  Does not appear to have any chest pain or shortness of breath with exertion.  They to try and go outside and walk when they can with the colder weather that may become more difficult.       Patient Care Team:  Akosua Schwab DO as PCP - General (Family Medicine)  Alma Cox MD (Radiation Oncology)  Nadir Ash MD (Gynecologic Oncology)    Review of Systems  Medical  History Reviewed by provider this encounter:       Annual Wellness Visit Questionnaire   Anthony is here for her Subsequent Wellness visit.     Health Risk Assessment:   Patient rates overall health as fair. Patient feels that their physical health rating is slightly worse. Patient is dissatisfied with their life. Eyesight was rated as same. Hearing was rated as slightly worse. Patient feels that their emotional and mental health rating is slightly worse. Patients states they are sometimes angry. Patient states they are often unusually tired/fatigued. Pain experienced in the last 7 days has been some. Patient's pain rating has been 5/10. Patient states that she has experienced no weight loss or gain in last 6 months.     Depression Screening:   PHQ-2 Score: 2      Fall Risk Screening:   In the past year, patient has experienced: history of falling in past year    Number of falls: 1  Injured during fall?: Yes    Feels unsteady when standing or walking?: Yes    Worried about falling?: Yes      Urinary Incontinence Screening:   Patient has leaked urine accidently in the last six months.     Home Safety:  Patient has trouble with stairs inside or outside of their home. Patient has working smoke alarms and has no working carbon monoxide detector. Home safety hazards include: none.     Nutrition:   Current diet is Regular and Limited junk food.     Medications:   Patient is currently taking over-the-counter supplements. OTC medications include: see medication list. Patient is not able to manage medications.     Activities of Daily Living (ADLs)/Instrumental Activities of Daily Living (IADLs):   Walk and transfer into and out of bed and chair?: Yes  Dress and groom yourself?: Yes    Bathe or shower yourself?: No    Feed yourself? Yes  Do your laundry/housekeeping?: No  Manage your money, pay your bills and track your expenses?: No  Make your own meals?: No    Do your own shopping?: No    Previous Hospitalizations:   Any  hospitalizations or ED visits within the last 12 months?: Yes    How many hospitalizations have you had in the last year?: 1-2    Advance Care Planning:   Living will: Yes    Durable POA for healthcare: Yes    Advanced directive: Yes    Advanced directive counseling given: Yes    ACP document given: Yes    Patient declined ACP directive: No    End of Life Decisions reviewed with patient: Yes    Provider agrees with end of life decisions: Yes      PREVENTIVE SCREENINGS      Cardiovascular Screening:    General: Screening Not Indicated and History Lipid Disorder      Diabetes Screening:     General: Screening Current      Breast Cancer Screening:     General: Screening Current      Cervical Cancer Screening:    General: Screening Not Indicated      Osteoporosis Screening:    General: Screening Not Indicated and History Osteoporosis      Lung Cancer Screening:     General: Screening Not Indicated    Screening, Brief Intervention, and Referral to Treatment (SBIRT)    Screening      Single Item Drug Screening:  How often have you used an illegal drug (including marijuana) or a prescription medication for non-medical reasons in the past year? never    Single Item Drug Screen Score: 0  Interpretation: Negative screen for possible drug use disorder    Social Determinants of Health     Food Insecurity: No Food Insecurity (11/11/2024)    Hunger Vital Sign     Worried About Running Out of Food in the Last Year: Never true     Ran Out of Food in the Last Year: Never true   Transportation Needs: No Transportation Needs (11/11/2024)    PRAPARE - Transportation     Lack of Transportation (Medical): No     Lack of Transportation (Non-Medical): No   Housing Stability: Low Risk  (11/11/2024)    Housing Stability Vital Sign     Unable to Pay for Housing in the Last Year: No     Number of Times Moved in the Last Year: 0     Homeless in the Last Year: No   Utilities: Not At Risk (11/11/2024)    [facility] Utilities     Threatened with loss of  "utilities: No     No results found.    Objective     /82 (BP Location: Left arm, Patient Position: Sitting, Cuff Size: Adult)   Pulse 73   Temp 98.3 °F (36.8 °C) (Tympanic)   Ht 5' 1\" (1.549 m)   Wt 49.5 kg (109 lb 3.2 oz)   SpO2 99%   BMI 20.63 kg/m²     Physical Exam  Constitutional:       Appearance: Normal appearance.   Cardiovascular:      Rate and Rhythm: Normal rate and regular rhythm.   Pulmonary:      Effort: Pulmonary effort is normal.      Breath sounds: Normal breath sounds.   Abdominal:      Palpations: Abdomen is soft.   Musculoskeletal:         General: Normal range of motion.      Cervical back: Normal range of motion.   Skin:     General: Skin is warm.   Neurological:      Mental Status: She is alert.      Comments: Able to answer questions but other she looks to her  for him to answer.  Many questions related to routine and recent issues she forgets         "

## 2024-11-11 NOTE — PATIENT INSTRUCTIONS
Medicare Preventive Visit Patient Instructions  Thank you for completing your Welcome to Medicare Visit or Medicare Annual Wellness Visit today. Your next wellness visit will be due in one year (11/12/2025).  The screening/preventive services that you may require over the next 5-10 years are detailed below. Some tests may not apply to you based off risk factors and/or age. Screening tests ordered at today's visit but not completed yet may show as past due. Also, please note that scanned in results may not display below.  Preventive Screenings:  Service Recommendations Previous Testing/Comments   Colorectal Cancer Screening  * Colonoscopy    * Fecal Occult Blood Test (FOBT)/Fecal Immunochemical Test (FIT)  * Fecal DNA/Cologuard Test  * Flexible Sigmoidoscopy Age: 45-75 years old   Colonoscopy: every 10 years (may be performed more frequently if at higher risk)  OR  FOBT/FIT: every 1 year  OR  Cologuard: every 3 years  OR  Sigmoidoscopy: every 5 years  Screening may be recommended earlier than age 45 if at higher risk for colorectal cancer. Also, an individualized decision between you and your healthcare provider will decide whether screening between the ages of 76-85 would be appropriate. Colonoscopy: Not on file  FOBT/FIT: Not on file  Cologuard: Not on file  Sigmoidoscopy: Not on file          Breast Cancer Screening Age: 40+ years old  Frequency: every 1-2 years  Not required if history of left and right mastectomy Mammogram: 07/05/2023    Screening Current   Cervical Cancer Screening Between the ages of 21-29, pap smear recommended once every 3 years.   Between the ages of 30-65, can perform pap smear with HPV co-testing every 5 years.   Recommendations may differ for women with a history of total hysterectomy, cervical cancer, or abnormal pap smears in past. Pap Smear: Not on file    Screening Not Indicated   Hepatitis C Screening Once for adults born between 1945 and 1965  More frequently in patients at high  risk for Hepatitis C Hep C Antibody: Not on file        Diabetes Screening 1-2 times per year if you're at risk for diabetes or have pre-diabetes Fasting glucose: 89 mg/dL (7/29/2024)  A1C: 5.5 % (7/29/2024)  Screening Current   Cholesterol Screening Once every 5 years if you don't have a lipid disorder. May order more often based on risk factors. Lipid panel: 07/29/2024    Screening Not Indicated  History Lipid Disorder     Other Preventive Screenings Covered by Medicare:  Abdominal Aortic Aneurysm (AAA) Screening: covered once if your at risk. You're considered to be at risk if you have a family history of AAA.  Lung Cancer Screening: covers low dose CT scan once per year if you meet all of the following conditions: (1) Age 55-77; (2) No signs or symptoms of lung cancer; (3) Current smoker or have quit smoking within the last 15 years; (4) You have a tobacco smoking history of at least 20 pack years (packs per day multiplied by number of years you smoked); (5) You get a written order from a healthcare provider.  Glaucoma Screening: covered annually if you're considered high risk: (1) You have diabetes OR (2) Family history of glaucoma OR (3)  aged 50 and older OR (4)  American aged 65 and older  Osteoporosis Screening: covered every 2 years if you meet one of the following conditions: (1) You're estrogen deficient and at risk for osteoporosis based off medical history and other findings; (2) Have a vertebral abnormality; (3) On glucocorticoid therapy for more than 3 months; (4) Have primary hyperparathyroidism; (5) On osteoporosis medications and need to assess response to drug therapy.   Last bone density test (DXA Scan): Not on file.  HIV Screening: covered annually if you're between the age of 15-65. Also covered annually if you are younger than 15 and older than 65 with risk factors for HIV infection. For pregnant patients, it is covered up to 3 times per  pregnancy.    Immunizations:  Immunization Recommendations   Influenza Vaccine Annual influenza vaccination during flu season is recommended for all persons aged >= 6 months who do not have contraindications   Pneumococcal Vaccine   * Pneumococcal conjugate vaccine = PCV13 (Prevnar 13), PCV15 (Vaxneuvance), PCV20 (Prevnar 20)  * Pneumococcal polysaccharide vaccine = PPSV23 (Pneumovax) Adults 19-63 yo with certain risk factors or if 65+ yo  If never received any pneumonia vaccine: recommend Prevnar 20 (PCV20)  Give PCV20 if previously received 1 dose of PCV13 or PPSV23   Hepatitis B Vaccine 3 dose series if at intermediate or high risk (ex: diabetes, end stage renal disease, liver disease)   Respiratory syncytial virus (RSV) Vaccine - COVERED BY MEDICARE PART D  * RSVPreF3 (Arexvy) CDC recommends that adults 60 years of age and older may receive a single dose of RSV vaccine using shared clinical decision-making (SCDM)   Tetanus (Td) Vaccine - COST NOT COVERED BY MEDICARE PART B Following completion of primary series, a booster dose should be given every 10 years to maintain immunity against tetanus. Td may also be given as tetanus wound prophylaxis.   Tdap Vaccine - COST NOT COVERED BY MEDICARE PART B Recommended at least once for all adults. For pregnant patients, recommended with each pregnancy.   Shingles Vaccine (Shingrix) - COST NOT COVERED BY MEDICARE PART B  2 shot series recommended in those 19 years and older who have or will have weakened immune systems or those 50 years and older     Health Maintenance Due:  There are no preventive care reminders to display for this patient.  Immunizations Due:      Topic Date Due   • Influenza Vaccine (1) 09/01/2024     Advance Directives   What are advance directives?  Advance directives are legal documents that state your wishes and plans for medical care. These plans are made ahead of time in case you lose your ability to make decisions for yourself. Advance directives  can apply to any medical decision, such as the treatments you want, and if you want to donate organs.   What are the types of advance directives?  There are many types of advance directives, and each state has rules about how to use them. You may choose a combination of any of the following:  Living will:  This is a written record of the treatment you want. You can also choose which treatments you do not want, which to limit, and which to stop at a certain time. This includes surgery, medicine, IV fluid, and tube feedings.   Durable power of  for healthcare (DPAHC):  This is a written record that states who you want to make healthcare choices for you when you are unable to make them for yourself. This person, called a proxy, is usually a family member or a friend. You may choose more than 1 proxy.  Do not resuscitate (DNR) order:  A DNR order is used in case your heart stops beating or you stop breathing. It is a request not to have certain forms of treatment, such as CPR. A DNR order may be included in other types of advance directives.  Medical directive:  This covers the care that you want if you are in a coma, near death, or unable to make decisions for yourself. You can list the treatments you want for each condition. Treatment may include pain medicine, surgery, blood transfusions, dialysis, IV or tube feedings, and a ventilator (breathing machine).  Values history:  This document has questions about your views, beliefs, and how you feel and think about life. This information can help others choose the care that you would choose.  Why are advance directives important?  An advance directive helps you control your care. Although spoken wishes may be used, it is better to have your wishes written down. Spoken wishes can be misunderstood, or not followed. Treatments may be given even if you do not want them. An advance directive may make it easier for your family to make difficult choices about your care.    Fall Prevention    Fall prevention  includes ways to make your home and other areas safer. It also includes ways you can move more carefully to prevent a fall. Health conditions that cause changes in your blood pressure, vision, or muscle strength and coordination may increase your risk for falls. Medicines may also increase your risk for falls if they make you dizzy, weak, or sleepy.   Fall prevention tips:   Stand or sit up slowly.    Use assistive devices as directed.    Wear shoes that fit well and have soles that .    Wear a personal alarm.    Stay active.    Manage your medical conditions.    Home Safety Tips:  Add items to prevent falls in the bathroom.    Keep paths clear.    Install bright lights in your home.    Keep items you use often on shelves within reach.    Paint or place reflective tape on the edges of your stairs.    Urinary Incontinence   Urinary incontinence (UI)  is when you lose control of your bladder. UI develops because your bladder cannot store or empty urine properly. The 3 most common types of UI are stress incontinence, urge incontinence, or both.  Medicines:   May be given to help strengthen your bladder control. Report any side effects of medication to your healthcare provider.  Do pelvic muscle exercises often:  Your pelvic muscles help you stop urinating. Squeeze these muscles tight for 5 seconds, then relax for 5 seconds. Gradually work up to squeezing for 10 seconds. Do 3 sets of 15 repetitions a day, or as directed. This will help strengthen your pelvic muscles and improve bladder control.  Train your bladder:  Go to the bathroom at set times, such as every 2 hours, even if you do not feel the urge to go. You can also try to hold your urine when you feel the urge to go. For example, hold your urine for 5 minutes when you feel the urge to go. As that becomes easier, hold your urine for 10 minutes.   Self-care:   Keep a UI record.  Write down how often you leak urine and how  much you leak. Make a note of what you were doing when you leaked urine.  Drink liquids as directed. You may need to limit the amount of liquid you drink to help control your urine leakage. Do not drink any liquid right before you go to bed. Limit or do not have drinks that contain caffeine or alcohol.   Prevent constipation.  Eat a variety of high-fiber foods. Good examples are high-fiber cereals, beans, vegetables, and whole-grain breads. Walking is the best way to trigger your intestines to have a bowel movement.  Exercise regularly and maintain a healthy weight.  Weight loss and exercise will decrease pressure on your bladder and help you control your leakage.   Use a catheter as directed  to help empty your bladder. A catheter is a tiny, plastic tube that is put into your bladder to drain your urine.   Go to behavior therapy as directed.  Behavior therapy may be used to help you learn to control your urge to urinate.     © Copyright [employer] 2018 Information is for End User's use only and may not be sold, redistributed or otherwise used for commercial purposes. All illustrations and images included in CareNotes® are the copyrighted property of [employer].A[employer]M., Inc. or [employer]

## 2024-11-12 ENCOUNTER — OFFICE VISIT (OUTPATIENT)
Dept: GYNECOLOGIC ONCOLOGY | Facility: CLINIC | Age: 79
End: 2024-11-12
Payer: MEDICARE

## 2024-11-12 ENCOUNTER — APPOINTMENT (OUTPATIENT)
Dept: PHYSICAL THERAPY | Facility: CLINIC | Age: 79
End: 2024-11-12
Payer: MEDICARE

## 2024-11-12 VITALS
OXYGEN SATURATION: 100 % | SYSTOLIC BLOOD PRESSURE: 160 MMHG | HEART RATE: 76 BPM | WEIGHT: 109 LBS | RESPIRATION RATE: 18 BRPM | DIASTOLIC BLOOD PRESSURE: 90 MMHG | TEMPERATURE: 98.4 F | BODY MASS INDEX: 20.58 KG/M2 | HEIGHT: 61 IN

## 2024-11-12 DIAGNOSIS — C54.1 ENDOMETRIAL CANCER (HCC): Primary | ICD-10-CM

## 2024-11-12 PROCEDURE — G2211 COMPLEX E/M VISIT ADD ON: HCPCS | Performed by: OBSTETRICS & GYNECOLOGY

## 2024-11-12 PROCEDURE — 99212 OFFICE O/P EST SF 10 MIN: CPT | Performed by: OBSTETRICS & GYNECOLOGY

## 2024-11-12 PROCEDURE — 99459 PELVIC EXAMINATION: CPT | Performed by: OBSTETRICS & GYNECOLOGY

## 2024-11-12 NOTE — PROGRESS NOTES
Assessment/Plan:    Problem List Items Addressed This Visit          Genitourinary    Endometrial cancer (HCC) - Primary     I have independently obtained history from patient today.  Pelvic exam performed.    Patient has no evidence of disease.  She will return to the office in 6 months for routine surveillance visit.  Patient  will contact us sooner if she has any new symptoms.              CHIEF COMPLAINT:   Follow-up for endometrial cancer.    Problem:  Cancer Staging   Endometrial cancer (HCC)  Staging form: Corpus Uteri - Carcinoma, AJCC 8th Edition  - Clinical stage from 7/31/2023: FIGO Stage IB (cT1b, cN0(sn), cM0) - Signed by DOTTIE Umaña on 8/29/2023        Previous therapy:  Oncology History   Endometrial cancer ([facility])   7/31/2023 -  Cancer Staged    Staging form: Corpus Uteri - Carcinoma, AJCC 8th Edition  - Clinical stage from 7/31/2023: FIGO Stage IB (cT1b, cN0(sn), cM0) - Signed by DOTTIE Umaña on 8/29/2023  Stage prefix: Initial diagnosis  Method of lymph node assessment: [place] lymph node biopsy  Histologic grade (G): G1  Histologic grading system: 3 grade system       8/29/2023 Initial Diagnosis    Endometrial cancer (HCC)     10/2/2023 - 10/16/2023 Radiation      Plan ID Energy Fractions Dose per Fraction (cGy) Dose Correction (cGy) Total Dose Delivered (cGy) Elapsed Days   Cyl 2.5 cm Ir 192 3 / 3 700 0 2,100 14      Treatment dates:  C1 HDR: 10/2/2023 - 10/16/2023               Patient ID: Anthony Slater is a 79 y.o. female  HPI  Patient with stage Ib high intermediate risk endometrial cancer.  Completed vaginal brachytherapy in October 2023.  Presents for follow-up.  Patient reports some vaginal bleeding.  However, she has worsening cognitive delay and  clarifies she uses pads for incontinence and not for bleeding.  Normal bowel function otherwise.  No other complaints.  ECOG performance that is 1-2.  The following portions of the patient's history were reviewed  "and updated as appropriate: allergies, current medications, past family history, past medical history, past social history, past surgical history, and problem list.    Review of Systems  Per HPI.  Current Outpatient Medications   Medication Sig Dispense Refill    atorvastatin (LIPITOR) 40 mg tablet TAKE 1 TABLET BY MOUTH DAILY 90 tablet 1    busPIRone (BUSPAR) 5 mg tablet Take 1 tablet (5 mg total) by mouth 3 (three) times a day (Patient taking differently: Take 5 mg by mouth 2 (two) times a day) 360 tablet 5    Cholecalciferol (VITAMIN D3) 1,000 units tablet Take 1,000 Units by mouth daily      co-enzyme Q-10 100 mg capsule Take by mouth daily      famotidine (PEPCID) 20 mg tablet Take 1 tablet (20 mg total) by mouth daily at bedtime 90 tablet 3    ferrous sulfate 324 (65 Fe) mg Take 1 tablet (324 mg total) by mouth 2 (two) times a day 180 tablet 1    metoprolol tartrate (LOPRESSOR) 25 mg tablet Take 1 tablet (25 mg total) by mouth every morning 100 tablet 1    pantoprazole (PROTONIX) 40 mg tablet Take 1 tablet (40 mg total) by mouth daily 90 tablet 3    simethicone (MYLICON,GAS-X) 180 MG capsule Take 1 capsule (180 mg total) by mouth 2 (two) times a day 60 capsule 5    vitamin B-12 (VITAMIN B-12) 1,000 mcg tablet Take 1,000 mcg by mouth daily      donepezil (ARICEPT) 5 mg tablet take 1 tablet by mouth once daily at bedtime (Patient not taking: Reported on 11/11/2024) 90 tablet 1     No current facility-administered medications for this visit.           Objective:    Blood pressure 160/90, pulse 76, temperature 98.4 °F (36.9 °C), resp. rate 18, height 5' 1\" (1.549 m), weight 49.4 kg (109 lb), SpO2 100%.  Body mass index is 20.6 kg/m².  Body surface area is 1.46 meters squared.    Physical Exam  Vitals reviewed. Exam conducted with a chaperone present.   Constitutional:       General: She is not in acute distress.     Appearance: Normal appearance. She is not toxic-appearing or diaphoretic.   Pulmonary:      Effort: " Pulmonary effort is normal.   Abdominal:      General: There is no distension.      Palpations: Abdomen is soft. There is no mass.      Tenderness: There is no abdominal tenderness. There is no guarding.      Hernia: No hernia is present.   Genitourinary:     Comments: Normal external female genitalia. Normal Bartholin's and [place]'s glands. Normal urethral meatus and no evidence of urethral discharge or masses.  Bladder without fullness mass or tenderness. Vagina is atrophic and foreshortened consistent with prior radiotherapy but without lesion or discharge. No significant pelvic organ prolapse noted.  Cervix and uterus are surgically absent.  Bimanual exam demonstrates no evidence of pelvic masses.  Anus without fissure of lesion.    Musculoskeletal:      Right lower leg: No edema.      Left lower leg: No edema.       Nadir Ash MD, FACOG, FACS  11/12/2024  1:53 PM

## 2024-11-12 NOTE — ASSESSMENT & PLAN NOTE
I have independently obtained history from patient today.  Pelvic exam performed.    Patient has no evidence of disease.  She will return to the office in 6 months for routine surveillance visit.  Patient  will contact us sooner if she has any new symptoms.

## 2024-11-13 ENCOUNTER — EVALUATION (OUTPATIENT)
Dept: OCCUPATIONAL THERAPY | Facility: CLINIC | Age: 79
End: 2024-11-13
Payer: MEDICARE

## 2024-11-13 DIAGNOSIS — R41.89 MODERATE COGNITIVE IMPAIRMENT: Primary | ICD-10-CM

## 2024-11-13 DIAGNOSIS — G31.84 MILD COGNITIVE IMPAIRMENT: ICD-10-CM

## 2024-11-13 PROCEDURE — 97530 THERAPEUTIC ACTIVITIES: CPT

## 2024-11-13 NOTE — PROGRESS NOTES
OCCUPATIONAL THERAPY PROGRESS NOTE      POC expires Auth Status Total   Visits  Start date  Expiration date PT/OT + Visit Limit? Co-Insurance   24 Not required BOMN 10/8/24  BOMN                                            Visit/Unit Tracking  AUTH Status: Not Required Date               Visits  Authed: N/A Used 1               Remaining                  PLAN OF CARE START: 24  PLAN OF CARE END: 2025  PROGRESS NOTE DUE: 2024 (SCHEDULED ON CALENDAR)  FREQUENCY: 1-2x week for 6 weeks   PRECAUTIONS: orientation, difficulty hearing   DIAGNOSIS: moderate cognitive impairment   VISITS: 6  Insurance: Medicare         Today's date: 2024  Patient name: Anthony Slater  : 1945  MRN: [id number]  Referring provider: Rosamaria Silva,*  Dx:   Encounter Diagnosis     ICD-10-CM    1. Mild cognitive impairment  G31.84               SKILLED ANALYSIS IE 10/8:  Pt is currently demonstrating the following occupational deficits: limited 2* visual immediate memory recall, delayed memory recall, attention, processing and executive function. Based on the aforementioned OT evaluation, functional performance deficits, and assessments, pt has been identified as a moderate complexity evaluation. Pt to continue to benefit from outpatient skilled OT services to address the following goals 1-2x/wk  for 4 weeks to focus on pt/caregiver education, increase memory deficits, increase cognitive function, orientation and alertness to improve overall QOL.     Re-Eval:   Pt presents to re-evaluation on 24 status post moderate cognitive decline. Pt reports impairments in remembering her daily routine, and medication management and still difficulty performing ADLs and IADLs. Pt continues to demonstrate impairments in EF skills, sustained attention, divided attention, immediate recall, delayed recall, processing speed, logical reasoning, and orientation. Pt continues to work towards STGs/LTGs. Pt  "would benefit from continued OT services focusing on impairments for 1-2x week for 6 weeks more weeks with focus on aforementioned deficits to maximize functional performance and improve QOL.  Pt educated on progress/therapy process and pt in understanding and agreement of recommendations. Recommending to continue HEP. Findings and recommendations discussed with pt, and they are in agreement. Educated pt on charges of insurance, assessments performed with standardized norms, POC, goal creation, and OP OT services.        Subjective    PATIENT GOAL: \"To remember things better\"    PAIN: 0    HISTORY OF PRESENT ILLNESS: Pt is a 79 y.o. female seen for OT eval s/p Mild cognitive impairment [G31.84] referred to [facility]. Patient had recent appointment with Geriatric medicine and was referred to OT services for cognitive therapy. Patient is expected to return at the end of this month. Patient had a fall at the beginning of the year which is suspected to be the result of her cognitive decline.       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    History of transfusion     Hypertension     Mixed hyperlipidemia     DAVID on CPAP     Shortness of breath     triggered by anxiety    Watermelon stomach     Wears dentures        Past Surgical Hx:   Past Surgical History:   Procedure Laterality Date    BREAST BIOPSY Right 02/24/2015    BREAST BIOPSY Left     " "? year    CARDIAC CATHETERIZATION  2014    EF 65%, mid LAD 60% stenosis and D2 70% stenosis.  Medical management.    CATARACT EXTRACTION Bilateral     COLONOSCOPY      CYSTOSCOPY N/A 2023    Procedure: CYSTOSCOPY;  Surgeon: Nadir Ash MD;  Location: [facility];  Service: Gynecology Oncology    EGD      JOINT REPLACEMENT Right     RIGHT ELBOW    OOPHORECTOMY      not sure which one was removed    AR LAPS TOTAL HYSTERECT 250 GM/< W/RMVL TUBE/OVARY N/A 2023    Procedure: ROBOTHYSTERECTOMY, BILATERAL SALPINGO-OOPHORECTOMY, PELVIC SENTINEL NODE BIOPSIES;  Surgeon: Nadir Ash MD;  Location: [facility];  Service: Gynecology Oncology    ROTATOR CUFF REPAIR Right     US GUIDED THYROID BIOPSY  2020          Objective    Impairment Observations:     [place] Cognitive Assessment (MoCA)  \"screen of cognitive abilities designed to detect mild cognitive dysfunction.\"  NORMS:   -Normal: 26+  -Mild Cognitive Impairment: 18-25   -Moderate Cognitive Impairment: 10-17  -Severe Cognitive Impairment: <10        STATUS ON IE: Not Tested Re-eval:                                          COGNITIVE FXN        VERSION  Version:8.2            MOCA        Education Level Less than 12 years (+1)     Visuospatial/executive functioning  2/5    Naming  3/3    Memory: 1st trial:  2/5   Memory: 2nd trial:   2/5   Attention/concentration   2/2   List of letters:     1/1    Serial Seven Subtraction:   1/3   Language/sentence repetition:   0/2   Language Fluency:     0/1    Abstract/Correlational Thinkin/2   Delayed Recall:   0/5   Orientation:   3/6   Memory Index Score   2/15                                      Total Score   (+1) 1430 Moderate Cognitive Impairment      Performed Mini Mental State Examination on IE (10/8):  moderate impairment noted     TRAIL MAKING TEST:   \"widely used test to assess executive function in patients with neuro injury. Successful performance of the TMT requires a " "variety of mental abilities including letter and number recognition mental flexibility, visual scanning, and motor function. Norms are based on age related scores\"     STATUS ON IE: Not Tested Status on Re-Eval: 11/13   TRAIL MAKING PART A     NORM: 58 seconds  1 minute 4 seconds    TRAIL MAKING PART B    NORM: 2 minutes 32 seconds   7 minutes 10 seconds with max vcs     CONTEXTUAL MEMORY TEST (1993 version):   \"assesses immediate and delayed recall of 20 objects related to a scene in children and adults with memory impairments\"   NORMS:   *not applicable to current in house version     TYPE OF RECALL STATUS ON IE: 10/8/24 Re-Eval: 11/13    Immediate recall 5/20 2/20     Delayed recall  3/20 0/20          SHORT TERM GOALS (2-3)    GOAL  STATUS ON IE  GOAL STATUS    Pt will increase MOCA score by (+2) to increase EF skills, logical thinking, and processing speed for increased involvement in IADLs/ADLs 14/30  Established    Pt will increase immediate recall on MOCA scoring 3/5 for both rounds for increased participation in community activities  2/5 Established    Pt will increase delayed recall on MOCA scoring 1/5  for increased participation in community activities  0/5 Established    Pt will increase logical/abstract thinking by scoring 2/2 on abstract thinking section of MOCA for increased participation in purposeful activities 1/2 Established    Pt will increase STM by scoring (+1) on the immediate recall of CMT for increased functioning with IADLs 2/20 Established    Pt will increase LTM by scoring (+1) on the delayed recall of CMT for increased functioning with IADLs 0/20 Established    LONG TERM GOALS( 4-6)    GOAL  STATUS ON IE  GOAL STATUS    Pt will increase MOCA score by (+3) to increase EF skills, logical thinking, and processing speed for increased involvement in IADLs/ADLs 14/30 Established    Pt will increase immediate recall on MOCA scoring 4/5 for both rounds for increased participation in community " activities  2/5 Established    Pt will increase delayed recall on MOCA scoring 2/5 for both rounds for increased participation in community activities  0/5 Established    Pt will increase STM by scoring (+3) on the immediate recall of CMT for increased functioning with IADLs 2/20 Established    Pt will increase LTM by scoring (+2) on the delayed recall of CMT for increased functioning with IADLs 0/20 Established    Pt will consistently report A&O x 4 75% of the time for increased safety awareness for improved health management.  Established    Pt and caregiver will be educated on compensatory adaptive devices and DME available to improve independence with ADL/leisure tasks I.e. Life alert, pill box, door locks, security system, wander guard/chair alarm, BSC, grab bars, caregiver burden/burnout, memory aides, etc.  Established

## 2024-11-14 ENCOUNTER — OFFICE VISIT (OUTPATIENT)
Dept: PHYSICAL THERAPY | Facility: CLINIC | Age: 79
End: 2024-11-14
Payer: MEDICARE

## 2024-11-14 DIAGNOSIS — R26.2 AMBULATORY DYSFUNCTION: Primary | ICD-10-CM

## 2024-11-14 PROCEDURE — 97140 MANUAL THERAPY 1/> REGIONS: CPT | Performed by: PHYSICAL MEDICINE & REHABILITATION

## 2024-11-14 PROCEDURE — 97110 THERAPEUTIC EXERCISES: CPT | Performed by: PHYSICAL MEDICINE & REHABILITATION

## 2024-11-14 NOTE — PROGRESS NOTES
"Daily Note     Today's date: 2024  Patient name: Anthony Slater  : 1945  MRN: [id number]  Referring provider: Rosamaria Silva,*  Dx:   Encounter Diagnosis     ICD-10-CM    1. Ambulatory dysfunction  R26.2                      Subjective: Pt states she is tired because she was up early to go to another appointment.      Objective: See treatment diary below      Assessment: Tolerated treatment well. Patient demonstrated fatigue post treatment, exhibited good technique with therapeutic exercises, and would benefit from continued PT  Requires vc's for TE and to remember count during reps of ex.      Plan: Continue per plan of care.      Precautions: acute encephalopathy, HTN, chronic pain disorder,     Re-eval Date: 10/14/24    Date 11/14 10/29 10/31 11/5 11.7   Visit Count 17 12 13 14 15   FOTO        Pain In 0/10    0/10  \"tired and cold\"       Pain Out         0/10         Manuals 11/14 10/29 10/31 11 11.7                                   Neuro Re-Ed 11/14 10/29 10/31 11 11.7   Sidestepping  On mat Floor 30' x 4 Side stepping along bar @ // bars B/L UE support x 5 laps Side stepping along bar @ mirror B/L UE support x 6 laps Side stepping along bar @ mirror   0 UE support x 6 laps Side stepping along bar @ mirror   1 UE support x 7 laps   HKM 6 laps in // bars B alt 5 laps at bar by mirror B alt 5 laps at bar by mirror   B alt 6 laps at bar by mirror B alt 6 laps at bar by mirror   Low hurdles   4 hurdles x4     STS w/bolster w/small red bolster  1x10 VC's W/small red bolster 10x  v.c. for technique w/small red bolster  1x10 VC's Red foam roll  10x Red foam roll  10x   Tandem amb on aeromat        Backwd amb        Amb w/HT/HN's 100' HT's  100' HN's       5xSTS        Ther Ex  10/29   11.7   Nustep L3 x 12 min L3 x 10 mins L3 x 10 min L3  10' L4  10'   Leg press 45# 30x 40# 2x10  40# 2x10 45#  20x 45#  30x   Knee ext mach 11#  30x Held  11#  20x 11#  20x 11#  30x   Knee flex mach 11#  30x " "Held   11#  20x 11#  20x 11#  30x   bridges    15    Standing 3 way SLR  2x10 ea B/L B 2x10 ea VC's B 20x ea   Vc's for technique B 25x ea   Vc's for technique             LAQ  B 30x      B TR/HRs                                             seated ABD w/ TB 3x/10/5\"        seated ADD w/ Ball 3x/10/5   Ther Activity                        Gait Training        2 min walk test        TUG test        Modalities                                                         "

## 2024-11-15 ENCOUNTER — OFFICE VISIT (OUTPATIENT)
Dept: OCCUPATIONAL THERAPY | Facility: CLINIC | Age: 79
End: 2024-11-15
Payer: MEDICARE

## 2024-11-15 DIAGNOSIS — G31.84 MILD COGNITIVE IMPAIRMENT: Primary | ICD-10-CM

## 2024-11-15 PROCEDURE — 97530 THERAPEUTIC ACTIVITIES: CPT

## 2024-11-15 NOTE — PROGRESS NOTES
Daily Note     Today's date: 11/15/2024  Patient name: Anthony Slater  : 1945  MRN: [id number]  Referring provider: Rosamaria Silva,*  Dx:   Encounter Diagnosis     ICD-10-CM    1. Mild cognitive impairment  G31.84       2. Moderate cognitive impairment  R41.89                      Subjective: I'm having a bad day.      Objective: See treatment diary below      Assessment: Tolerated treatment well. Patient completed a color by number worksheet. Pt had numerous coloring/numerical errors, using the wrong color. Pt able to recognize her errors. But continued to use wrong color. Pt somewhat preoccupied with the bad day she was having.      Plan: Continue per plan of care.  Progress treatment as tolerated.         FOTO COUNT N/A       Re-Eval 2024       Manuals 11/15/2024 2024  10/30/2024 2024                   Neuro Re-Ed  11/15/2024 2024  10/30/2024 2024                   Ther Ex 11/15/2024 2024  10/30/2024 2024                           Ther Activity 11/15/2024 2024  10/30/2024 2024   Rush Hour Card 1 4:29  Card 2 6:37       Mastermind        Word Recall  Forward  Backwards        Divided Attention Worksheet         Chart Memory    5 cards    Dig It Memory        Multi-Matrix        Word Recall WS w/ Memory Manipulation        Memory Manipulation        Memory Packet        Paragraph Retention WS        Divided Attention Alternating Scattegories WS        Around [place] Brain Box     [place]-   Bandits Memory Mix Up         Sequencing puzzle card 18, 9 Color by number ws   Pattern puzzle  Cards 12,13,15,16   Modalities 11/15/2024 2024  10/30/2024 2024

## 2024-11-15 NOTE — PROGRESS NOTES
Daily Note     Today's date: 11/15/2024  Patient name: Anthony Slater  : 1945  MRN: [id number]  Referring provider: Rosamaria Silva,*  Dx:   Encounter Diagnosis     ICD-10-CM    1. Mild cognitive impairment  G31.84                      Subjective: No new complaints      Objective: See treatment diary below      Assessment: Tolerated treatment well. Patient completed rush hour, pt demonstrated mod-max difficulty with problem solving required vc's for task completion. Pt then completed sequencing puzzles, pt did well with same requiring minimal assistance for task completion.      Plan: Continue per plan of care.  Progress treatment as tolerated.         FOTO COUNT N/A       Re-Eval 2024       Manuals 11/15/2024   10/30/2024 2024                   Neuro Re-Ed  11/15/2024   10/30/2024 2024                   Ther Ex 11/15/2024   10/30/2024 2024                           Ther Activity 11/15/2024   10/30/2024 2024   Rush Hour Card 1 4:29  Card 2 6:37       Mastermind        Word Recall  Forward  Backwards        Divided Attention Worksheet         Chart Memory    5 cards    Dig It Memory        Multi-Matrix        Word Recall WS w/ Memory Manipulation        Memory Manipulation        Memory Packet        Paragraph Retention WS        Divided Attention Alternating Scattegories WS        [place] Brain Box     [place]-   Bandits Memory Mix Up         Sequencing puzzle card 18, 9    Pattern puzzle  Cards 12,13,15,16   Modalities 11/15/2024   10/30/2024 2024

## 2024-11-18 ENCOUNTER — OFFICE VISIT (OUTPATIENT)
Dept: OCCUPATIONAL THERAPY | Facility: CLINIC | Age: 79
End: 2024-11-18
Payer: MEDICARE

## 2024-11-18 DIAGNOSIS — G31.84 MILD COGNITIVE IMPAIRMENT: Primary | ICD-10-CM

## 2024-11-18 DIAGNOSIS — R41.89 MODERATE COGNITIVE IMPAIRMENT: ICD-10-CM

## 2024-11-18 PROCEDURE — 97530 THERAPEUTIC ACTIVITIES: CPT

## 2024-11-19 NOTE — PROGRESS NOTES
Daily Note     Today's date: 2024  Patient name: Anthony Slater  : 1945  MRN: [id number]  Referring provider: Rosamaria Silva,*  Dx:   Encounter Diagnosis     ICD-10-CM    1. Mild cognitive impairment  G31.84       2. Moderate cognitive impairment  R41.89       3. Cognitive decline  R41.89                      Subjective: This is hard.      Objective: See treatment diary below      Assessment: Tolerated treatment well. Patient demonstrated fatigue post treatment, exhibited good technique with therapeutic exercises, and would benefit from continued OT.  Pt completed pixy cubes, pt demonstrated mod difficulty following directions. Pt required mod vc's for task completion and problem solving.      Plan: Continue per plan of care.  Progress treatment as tolerated.         FOTO COUNT N/A       Re-Eval 2024       Manuals 11/15/2024 2024 2024  2024                   Neuro Re-Ed  11/15/2024 2024 2024  2024                   Ther Ex 11/15/2024 2024 2024  2024                           Ther Activity 11/15/2024 2024 2024  2024   Rush Hour Card 1 4:29  Card 2 6:37       Mastermind        Word Recall  Forward  Backwards        Divided Attention Worksheet         Chart Memory        Dig It Memory        Multi-Matrix        Word Recall WS w/ Memory Manipulation        Memory Manipulation        Memory Packet        Pixy cubes   completed     Divided Attention Alternating Scattegories WS        Around [place] Brain Box     [place]-   Bandits Memory Mix Up         Sequencing puzzle card 18, 9 Color by number ws   Pattern puzzle  Cards 12,13,15,16   Modalities 11/15/2024 2024 2024  2024

## 2024-11-20 ENCOUNTER — OFFICE VISIT (OUTPATIENT)
Dept: OCCUPATIONAL THERAPY | Facility: CLINIC | Age: 79
End: 2024-11-20
Payer: MEDICARE

## 2024-11-20 DIAGNOSIS — R41.89 MODERATE COGNITIVE IMPAIRMENT: ICD-10-CM

## 2024-11-20 DIAGNOSIS — R41.89 COGNITIVE DECLINE: ICD-10-CM

## 2024-11-20 DIAGNOSIS — G31.84 MILD COGNITIVE IMPAIRMENT: Primary | ICD-10-CM

## 2024-11-20 PROCEDURE — 97530 THERAPEUTIC ACTIVITIES: CPT

## 2024-11-21 ENCOUNTER — OFFICE VISIT (OUTPATIENT)
Dept: PHYSICAL THERAPY | Facility: CLINIC | Age: 79
End: 2024-11-21
Payer: MEDICARE

## 2024-11-21 DIAGNOSIS — R26.2 AMBULATORY DYSFUNCTION: Primary | ICD-10-CM

## 2024-11-21 PROCEDURE — 97112 NEUROMUSCULAR REEDUCATION: CPT | Performed by: PHYSICAL MEDICINE & REHABILITATION

## 2024-11-21 PROCEDURE — 97110 THERAPEUTIC EXERCISES: CPT | Performed by: PHYSICAL MEDICINE & REHABILITATION

## 2024-11-21 NOTE — PROGRESS NOTES
"Daily Note     Today's date: 2024  Patient name: Anthony Slater  : 1945  MRN: [id number]  Referring provider: Rosamaria Silva,*  Dx:   Encounter Diagnosis     ICD-10-CM    1. Ambulatory dysfunction  R26.2                      Subjective: Pt states she feels she is getting better slowly.      Objective: See treatment diary below      Assessment: Tolerated treatment well. Patient demonstrated fatigue post treatment, exhibited good technique with therapeutic exercises, and would benefit from continued PT  VC's provided t/o tx for safety and technique. Min assist of 1 for standing on foam and performing toe taps.    Plan: Continue per plan of care.      Precautions: acute encephalopathy, HTN, chronic pain disorder,     Re-eval Date: 10/14/24    Date 11/14 11/21 10/31 11/5 11.7   Visit Count 17 18 13 14 15   FOTO        Pain In 0/10    0/10  \"tired and cold\"       Pain Out         0/10         Manuals 11/14 11/21 10/31 11/5 11.7                                   Neuro Re-Ed 11/14 11/21 10/31 11/5 11.7   Sidestepping  On mat Floor 30' x 4  Side stepping along bar @ mirror B/L UE support x 6 laps Side stepping along bar @ mirror   0 UE support x 6 laps Side stepping along bar @ mirror   1 UE support x 7 laps   HKM 6 laps in // bars B alt 6 laps in // bars B alt 5 laps at bar by mirror   B alt 6 laps at bar by mirror B alt 6 laps at bar by mirror   Low hurdles  4 hurdles  30' x 4 4 hurdles x4     STS w/bolster w/small red bolster  1x10 VC's W/large red bolster 10x  v.c. for technique w/small red bolster  1x10 VC's Red foam roll  10x Red foam roll  10x   Tandem amb on aeromat        Backwd amb        Amb w/HT/HN's 100' HT's  100' HN's       5xSTS        Ther Ex     11.7   Nustep L3 x 12 min L3 x 15 mins L3 x 10 min L3  10' L4  10'   Leg press 45# 30x 45# 3x10  40# 2x10 45#  20x 45#  30x   Knee ext mach 11#  30x 11# 30x 11#  20x 11#  20x 11#  30x   Knee flex mach 11#  30x 11#  30x   11#  20x 11#  20x 11# " " 30x   bridges    15    Standing 3 way SLR  2x10 ea B/L B 2x10 ea VC's B 20x ea   Vc's for technique B 25x ea   Vc's for technique   Stand foam  Tap 3 cones    5x L/R ea      LAQ        B TR/HRs                                             seated ABD w/ TB 3x/10/5\"        seated ADD w/ Ball 3x/10/5   Ther Activity                        Gait Training        2 min walk test        TUG test        Modalities                                                           "

## 2024-11-25 ENCOUNTER — OFFICE VISIT (OUTPATIENT)
Dept: OCCUPATIONAL THERAPY | Facility: CLINIC | Age: 79
End: 2024-11-25
Payer: MEDICARE

## 2024-11-25 DIAGNOSIS — R41.89 MODERATE COGNITIVE IMPAIRMENT: ICD-10-CM

## 2024-11-25 DIAGNOSIS — R41.89 COGNITIVE DECLINE: ICD-10-CM

## 2024-11-25 DIAGNOSIS — G31.84 MILD COGNITIVE IMPAIRMENT: Primary | ICD-10-CM

## 2024-11-25 PROCEDURE — 97530 THERAPEUTIC ACTIVITIES: CPT

## 2024-11-25 NOTE — PROGRESS NOTES
Daily Note     Today's date: 2024  Patient name: Anthony Slater  : 1945  MRN: [id number]  Referring provider: Rosamaria Silva,*  Dx:   Encounter Diagnosis     ICD-10-CM    1. Mild cognitive impairment  G31.84       2. Moderate cognitive impairment  R41.89       3. Cognitive decline  R41.89                    POC expires Auth Status Total   Visits  Start date  Expiration date PT/OT + Visit Limit? Co-Insurance   24 Not required BOMN 10/8/24   BOMN                                                                           Visit/Unit Tracking  AUTH Status: Not Required Date 11/13 11/15 11/18 1120                  Visits  Authed: N/A Used 1  2  3  4 5                   Remaining   9  8  7  6  5                    PLAN OF CARE START: 24  PLAN OF CARE END: 2025  PROGRESS NOTE DUE: 2024 (SCHEDULED ON CALENDAR)  FREQUENCY: 1-2x week for 6 weeks   PRECAUTIONS: orientation, difficulty hearing   DIAGNOSIS: moderate cognitive impairment   Insurance: Medicare     Subjective: I am used to coloring with crayons, maybe that's why I got those wrong.    Objective: See treatment diary below. Pt participated in skilled OT this date with focus to sustained attention, functional cognition, working/short term memory, FMC/FMS, and activity tolerance/endurance, for increased safety and engagement in ADL/IADL tasks.     Pt engaged in Color by Number task this date, with good understanding and accuracy of first number/color combination, however, difficulty carrying over into next 2 numbers. Focus of task to sustained attention, verbal direction follow, written direction follow, and functional cognition. Pt completed x 3 numbers, however only 1-3 completed correctly with no A for accuracy.     Assessment: Tolerated treatment well. Patient demonstrated fatigue post treatment, exhibited good technique with therapeutic exercises, and would benefit from continued OT.      Plan: Continue per plan of  care.  Progress treatment as tolerated.         FOTO COUNT N/A       Re-Eval 11/08/2024       Manuals 11/15/2024 11/18/2024 11/20/2024 11/25/24                    Neuro Re-Ed  11/15/2024 11/18/2024 11/20/2024                     Ther Ex 11/15/2024 11/18/2024 11/20/2024                             Ther Activity 11/15/2024 11/18/2024 11/20/2024 11/25/24    Rush Hour Card 1 4:29  Card 2 6:37       Mastermind        Word Recall  Forward  Backwards        Divided Attention Worksheet         Chart Memory        Dig It Memory        Multi-Matrix        Word Recall WS w/ Memory Manipulation        Memory Manipulation        Memory Packet        Pixy cubes   completed     Divided Attention Alternating Scattegories WS        [place] Brain Box        Bandits Memory Mix Up         Sequencing puzzle card 18, 9 Color by number ws  Thanksgiving Hat    Modalities 11/15/2024 11/18/2024 11/20/2024

## 2024-11-26 ENCOUNTER — OFFICE VISIT (OUTPATIENT)
Dept: PHYSICAL THERAPY | Facility: CLINIC | Age: 79
End: 2024-11-26
Payer: MEDICARE

## 2024-11-26 DIAGNOSIS — R26.2 AMBULATORY DYSFUNCTION: Primary | ICD-10-CM

## 2024-11-26 PROCEDURE — 97112 NEUROMUSCULAR REEDUCATION: CPT | Performed by: PHYSICAL MEDICINE & REHABILITATION

## 2024-11-26 PROCEDURE — 97110 THERAPEUTIC EXERCISES: CPT | Performed by: PHYSICAL MEDICINE & REHABILITATION

## 2024-11-26 NOTE — PROGRESS NOTES
Daily Note     Today's date: 2024  Patient name: Anthony Slater  : 1945  MRN: [id number]  Referring provider: Rosamaria Silva,*  Dx:   Encounter Diagnosis     ICD-10-CM    1. Ambulatory dysfunction  R26.2                      Subjective: Pt states she is feeling pretty good today.      Objective: See treatment diary below      Assessment: Tolerated treatment well. Patient demonstrated fatigue post treatment, exhibited good technique with therapeutic exercises, and would benefit from continued PT  Pt req'd min A for tandem amb on foam mat and for stepping over hurdles to maintain balance. Also during toe taps on cones.  Pt showing slow gains in balance and endurance but cognition remains more of an issue.    Plan: Continue per plan of care.      Precautions: acute encephalopathy, HTN, chronic pain disorder,     Re-eval Date: 10/14/24    Date      Visit Count 17 18 19     FOTO        Pain In 0/10       Pain Out                  Manuals                                      Neuro Re-Ed      Sidestepping  On mat Floor 30' x 4       HKM 6 laps in // bars B alt 6 laps in // bars 100' x 2       Low hurdles  4 hurdles  30' x 4 4 hurdles   Foam square and long  Aeromat  8 x 40'     STS w/bolster w/small red bolster  1x10 VC's W/large red bolster 10x  v.c. for technique w/large red bolster  1x10 VC's     Tandem amb on aeromat        Backwd amb        Amb w/HT/HN's 100' HT's  100' HN's       5xSTS        Ther Ex        Nustep L3 x 12 min L3 x 15 mins L4x 13 min     Leg press 45# 30x 45# 3x10  50# 2x10     Knee ext mach 11#  30x 11# 30x 11#  30x     Knee flex mach 11#  30x 11#  30x   11#  30x     bridges        Standing 3 way SLR  2x10 ea B/L      Stand foam  Tap 3 cones    5x L/R ea 5x L/R                                                                     Ther Activity        Stair climbing   4 stairs x 6  2 HRs, FOF             Gait Training        2 min  walk test        TUG test        Modalities

## 2024-11-27 ENCOUNTER — OFFICE VISIT (OUTPATIENT)
Dept: OCCUPATIONAL THERAPY | Facility: CLINIC | Age: 79
End: 2024-11-27
Payer: MEDICARE

## 2024-11-27 DIAGNOSIS — R41.89 MODERATE COGNITIVE IMPAIRMENT: Primary | ICD-10-CM

## 2024-11-27 PROCEDURE — 97530 THERAPEUTIC ACTIVITIES: CPT

## 2024-11-27 NOTE — PROGRESS NOTES
"Daily Note     Today's date: 2024  Patient name: Anthony Slater  : 1945  MRN: [id number]  Referring provider: Rosamaria Silva,*  Dx:   Encounter Diagnosis     ICD-10-CM    1. Moderate cognitive impairment  R41.89                      POC expires Auth Status Total   Visits  Start date  Expiration date PT/OT + Visit Limit? Co-Insurance   24 Not required BOMN 10/8/24   BOMN                                                                           Visit/Unit Tracking  AUTH Status: Not Required Date 11/13 11/15 11/18 1120                Visits  Authed: N/A Used 1  2  3  4 5  6                 Remaining   9  8  7  6  5  4                  PLAN OF CARE START: 24  PLAN OF CARE END: 2025  PROGRESS NOTE DUE: 2024 (SCHEDULED ON CALENDAR)  FREQUENCY: 1-2x week for 6 weeks   PRECAUTIONS: orientation, difficulty hearing   DIAGNOSIS: moderate cognitive impairment   Insurance: Medicare     Subjective: \"I brought back my coloring sheet so you can look it over \"    Objective: See treatment diary below. Pt participated in skilled OT this date with focus to sustained attention, functional cognition, working/short term memory, FMC/FMS, and activity tolerance/endurance, for increased safety and engagement in ADL/IADL tasks.     *Pixy cube design copy with grid lines focusing on sustained attention, functional cognition, and  skills. Pt required min vcs for problem solving and orientation.     *Thanksgiving word unscramble worksheet with 20 words focusing on functional cognition, problem solving, and sustained attention. Provided pt with problem solving strategies including crossing out letters as she writes the word out. Pt required mod vcs and category vcs for unscrambling words.     *Provided paulie decoding worksheet for HEP     Assessment: Tolerated treatment well. Focus of session on  skills, sustained attention, functional cognition, and STM/LTM. Pt demonstrated " decreased processing speed on all tasks and required increased time for task completion. Pt would benefit from continued OT services to address functional cognition, EF skills, sustained attention, divided attention, immediate recall, delayed recall, processing speed, logical reasoning, and orientation .     Plan: Continue per plan of care.  Progress treatment as tolerated.         FOTO COUNT N/A       Re-Eval 11/08/2024       Manuals 11/15/2024 11/18/2024 11/20/2024 11/25/24 11/27/2024                   Neuro Re-Ed  11/15/2024 11/18/2024 11/20/2024                     Ther Ex 11/15/2024 11/18/2024 11/20/2024                             Ther Activity 11/15/2024 11/18/2024 11/20/2024 11/25/24    [place] Card 1 4:29  Card 2 6:37       Mastermind        Word Recall  Forward  Backwards        Divided Attention Worksheet         Chart Memory        Dig It Memory        Multi-Matrix        Word Recall WS w/ Memory Manipulation        Memory Manipulation        Memory Packet        Pixy cubes   completed  Completed    Divided Attention Alternating Scattegories WS        [place] Brain Box        Bandits Memory Mix Up         Sequencing puzzle card 18, 9 Color by number ws  Thanksgiving Hat    Modalities 11/15/2024 11/18/2024 11/20/2024

## 2024-11-29 DIAGNOSIS — I25.10 CORONARY ARTERY DISEASE INVOLVING NATIVE CORONARY ARTERY OF NATIVE HEART WITHOUT ANGINA PECTORIS: ICD-10-CM

## 2024-11-29 DIAGNOSIS — E78.5 DYSLIPIDEMIA: ICD-10-CM

## 2024-11-29 RX ORDER — ATORVASTATIN CALCIUM 40 MG/1
40 TABLET, FILM COATED ORAL DAILY
Qty: 90 TABLET | Refills: 1 | Status: SHIPPED | OUTPATIENT
Start: 2024-11-29

## 2024-12-02 ENCOUNTER — OFFICE VISIT (OUTPATIENT)
Dept: OCCUPATIONAL THERAPY | Facility: CLINIC | Age: 79
End: 2024-12-02
Payer: MEDICARE

## 2024-12-02 DIAGNOSIS — R41.89 MODERATE COGNITIVE IMPAIRMENT: Primary | ICD-10-CM

## 2024-12-02 PROCEDURE — 97530 THERAPEUTIC ACTIVITIES: CPT

## 2024-12-02 NOTE — PROGRESS NOTES
"Daily Note     Today's date: 2024  Patient name: Anthony Slater  : 1945  MRN: [id number]  Referring provider: Rosamaria Silva,*  Dx:   Encounter Diagnosis     ICD-10-CM    1. Moderate cognitive impairment  R41.89                      POC expires Auth Status Total   Visits  Start date  Expiration date PT/OT + Visit Limit? Co-Insurance   24 Not required BOMN 10/8/24   BOMN                                                                           Visit/Unit Tracking  AUTH Status: Not Required Date 11/13 11/15 11/18 1120              Visits  Authed: N/A Used 1  2  3  4 5  6  7               Remaining   9  8  7  6  5  4  3                PLAN OF CARE START: 24  PLAN OF CARE END: 2025  PROGRESS NOTE DUE: 2024 (SCHEDULED ON CALENDAR)  FREQUENCY: 1-2x week for 6 weeks   PRECAUTIONS: orientation, difficulty hearing   DIAGNOSIS: moderate cognitive impairment   Insurance: Medicare     Subjective: \"Its forgetful time again\"~ in reference to being asked how her thanksgiving was     Objective: See treatment diary below. Pt participated in skilled OT this date with focus to sustained attention, functional cognition, working/short term memory, FMC/FMS, and activity tolerance/endurance, for increased safety and engagement in ADL/IADL tasks.     *I-trax ace level design copies finding where the two squares connect and completing puzzle design by placing I-trax beams over design card. Pt required min vcs for problem solving. Activity focused on  skills, following multi-step directions and functional cognition. Pt was able to self correct errors made with size of puzzle pieces.     *Card suite organization with category cue for suite of hearts. Activity focused on sustained attention, processing speed, and recall of directions. Pt required max vcs for recall of directions for category cue. Pt required mod vcs for problem solving for category. Provided pt with visual " reminder of category cue with good carryover.     *[place] decoding the message by problem solving what letter comes before the letter provided. Pt required max vcs for problem solving. Activity focused on sequencing, EF skills, sustained attention, and following multiple directions.        *Provided word search worksheet for HEP     Assessment: Tolerated treatment well. Focus of session on  skills, sustained attention, functional cognition, and STM/LTM. Pt demonstrated decreased processing speed on all tasks and required increased time for task completion. Pt completed all tasks in multimodal environment with moderate distractions. Pt would benefit from continued OT services to address functional cognition, EF skills, sustained attention, divided attention, immediate recall, delayed recall, processing speed, logical reasoning, and orientation.     Plan: Continue per plan of care.  Progress treatment as tolerated.

## 2024-12-04 ENCOUNTER — OFFICE VISIT (OUTPATIENT)
Dept: OCCUPATIONAL THERAPY | Facility: CLINIC | Age: 79
End: 2024-12-04
Payer: MEDICARE

## 2024-12-04 DIAGNOSIS — R41.89 MODERATE COGNITIVE IMPAIRMENT: Primary | ICD-10-CM

## 2024-12-04 PROCEDURE — 97530 THERAPEUTIC ACTIVITIES: CPT

## 2024-12-04 NOTE — PROGRESS NOTES
"Daily Note     Today's date: 2024  Patient name: Anthony Slater  : 1945  MRN: [id number]  Referring provider: Rosamaria Silva,*  Dx:   Encounter Diagnosis     ICD-10-CM    1. Moderate cognitive impairment  R41.89             Start Time: 1300  Stop Time: 1345  Total time in clinic (min): 45 minutes  POC expires Auth Status Total   Visits  Start date  Expiration date PT/OT + Visit Limit? Co-Insurance   24 Not required BOMN 10/8/24   BOMN                                                                           Visit/Unit Tracking  AUTH Status: Not Required Date 11/13 11/15 11/18 1120            Visits  Authed: N/A Used 1  2  3  4 5  6  7  8             Remaining   9  8  7  6  5  4  3  2              PLAN OF CARE START: 24  PLAN OF CARE END: 2025  PROGRESS NOTE DUE: 2024 (SCHEDULED ON )  FREQUENCY: 1-2x week for 6 weeks   PRECAUTIONS: orientation, difficulty hearing   DIAGNOSIS: moderate cognitive impairment   Insurance: Medicare     Finish [place] Mystery Picture Graph Next Session     Subjective: \"I'm embarrassed, I forgot how to do my homework\"    Objective: See treatment diary below. Pt participated in skilled OT this date with focus to sustained attention, functional cognition, working/short term memory, FMC/FMS, and activity tolerance/endurance, for increased safety and engagement in ADL/IADL tasks.     *[place] decoding the message by problem solving what letter comes before the letter provided. Pt was unable to complete for HEP and brought worksheet in today to finish. Provided pt with written alphabet to refer to. Pt able to complete independently with 1 error noted. Activity focused on sequencing, EF skills, sustained attention, and following multiple directions.      *Appies word unscramble focusing on EF skills, problem solving, and sustained attention. Pt had mod errors with spelling. Pt required mod vcs and prompts " provided for problem solving words.     *[employer] picture graph. Involved following color key and designated coordinations for corresponding colors to create a [place] image. Activity focused on following multi-step directions, EF and  skills, recall of directions and sustained attention. Reviewed directions and will finish following session.      *Provided crossword worksheet for HEP       Assessment: Tolerated treatment well. Focus of session on  skills, sustained attention, functional cognition, and STM/LTM. Pt demonstrated decreased processing speed on all tasks and required increased time for task completion. Pt completed all tasks in multimodal environment with minimal distraction noted to surroundings. Pt was able to complete holiday riddle worksheet independently displaying increased comprehension of directions compared to previous session. Pt would benefit from continued OT services to address functional cognition, EF skills, sustained attention, divided attention, immediate recall, delayed recall, processing speed, logical reasoning, and orientation.     Plan: Continue per plan of care.  Progress treatment as tolerated.

## 2024-12-05 ENCOUNTER — OFFICE VISIT (OUTPATIENT)
Dept: PHYSICAL THERAPY | Facility: CLINIC | Age: 79
End: 2024-12-05
Payer: MEDICARE

## 2024-12-05 DIAGNOSIS — R26.2 AMBULATORY DYSFUNCTION: Primary | ICD-10-CM

## 2024-12-05 PROCEDURE — 97110 THERAPEUTIC EXERCISES: CPT | Performed by: PHYSICAL MEDICINE & REHABILITATION

## 2024-12-05 PROCEDURE — 97530 THERAPEUTIC ACTIVITIES: CPT | Performed by: PHYSICAL MEDICINE & REHABILITATION

## 2024-12-05 NOTE — PROGRESS NOTES
Daily Note     Today's date: 2024  Patient name: Anthony Slater  : 1945  MRN: [id number]  Referring provider: Rosamaria Silva,*  Dx:   Encounter Diagnosis     ICD-10-CM    1. Ambulatory dysfunction  R26.2                      Subjective: Pt states she was okay until she went outside. Does not like the cold weather.      Objective: See treatment diary below      Assessment: Tolerated treatment fair. Patient demonstrated fatigue post treatment, exhibited good technique with therapeutic exercises, and would benefit from continued PT.  Pt very fatigued following nustep, did not stop at 10 min as instructed.  Pt c/o feeling dizzy when she was fatigued. Pt appeared more fatigued than usual today.      Plan: Continue per plan of care.      Precautions: acute encephalopathy, HTN, chronic pain disorder,     Re-eval Date: 10/14/24    Date 24    Visit Count 17 18 19 20    FOTO        Pain In 0/10       Pain Out                  Manuals                                     Neuro Re-Ed      Sidestepping  On mat Floor 30' x 4       HKM 6 laps in // bars B alt 6 laps in // bars 100' x 2       Low hurdles  4 hurdles  30' x 4 4 hurdles   Foam square and long  Aeromat  8 x 40'     STS w/bolster w/small red bolster  1x10 VC's W/large red bolster 10x  v.c. for technique w/large red bolster  1x10 VC's     Tandem amb on aeromat        Backwd amb        Amb w/HT/HN's 100' HT's  100' HN's       5xSTS        Ther Ex        Nustep L3 x 12 min L3 x 15 mins L4x 13 min L5  20 min    Leg press 45# 30x 45# 3x10  50# 2x10 40#  30x    Knee ext mach 11#  30x 11# 30x 11#  30x 11#  30x    Knee flex mach 11#  30x 11#  30x   11#  30x 11#  30x    bridges        Standing 3 way SLR  2x10 ea B/L  1 x 10    Stand foam  Tap 3 cones    5x L/R ea 5x L/R                                                                     Ther Activity        Stair climbing   4 stairs x 6  2 HRs,  FOF     Bathroom activities    8 min    Gait Training        2 min walk test        TUG test        Modalities

## 2024-12-09 ENCOUNTER — OFFICE VISIT (OUTPATIENT)
Dept: OCCUPATIONAL THERAPY | Facility: CLINIC | Age: 79
End: 2024-12-09
Payer: MEDICARE

## 2024-12-09 DIAGNOSIS — R41.89 MODERATE COGNITIVE IMPAIRMENT: Primary | ICD-10-CM

## 2024-12-09 PROCEDURE — 97530 THERAPEUTIC ACTIVITIES: CPT

## 2024-12-09 NOTE — PROGRESS NOTES
"Daily Note     Today's date: 2024  Patient name: Anthony Slater  : 1945  MRN: [id number]  Referring provider: Rosamaria Silva,*  Dx:   No diagnosis found.                 POC expires Auth Status Total   Visits  Start date  Expiration date PT/OT + Visit Limit? Co-Insurance   24 Not required BOMN 10/8/24   BOMN                                                                           Visit/Unit Tracking  AUTH Status: Not Required Date 11/13 11/15 11/18 1120          Visits  Authed: N/A Used 1  2  3  4 5  6  7  8  9           Remaining   9  8  7  6  5  4  3  2  1            PLAN OF CARE START: 24  PLAN OF CARE END: 2025  PROGRESS NOTE DUE: 2024 (SCHEDULED ON CALENDAR)  FREQUENCY: 1-2x week for 6 weeks   PRECAUTIONS: orientation, difficulty hearing   DIAGNOSIS: moderate cognitive impairment   Insurance: Medicare     Finish Mystery Picture Grid Next Session       Subjective: \"I'm confused on everything right now\" Pt's  reports he had to assist with homework. Pt reports she felt dizzy and was provided with cold water.     Objective: See treatment diary below. Pt participated in skilled OT this date with focus to sustained attention, functional cognition, working/short term memory, FMC/FMS, and activity tolerance/endurance, for increased safety and engagement in ADL/IADL tasks.     *[place] picture graph. Involved following color key and designated coordinations for corresponding colors to create a [employer] image. Activity focused on following multi-step directions, EF and  skills, recall of directions and sustained attention. Pt required color coding of each row. Pt required min vcs for sequencing and direction following.     *Simple multimatrix figure ground closure moving shape to open letter and category of animals provided for pt to name animals with corresponding letter. Activity focused on sequencing, word finding, divided " attention,  skills and following multistep directions. Pt required mod vcs and prompting for problem solving for animals category. Visual prompts provided. Down graded task to remove category prompt and further downgraded to matching shapes to design card for figure ground.       *Provided crossword worksheet for HEP       Assessment: Tolerated treatment well. Focus of session on  skills, sustained attention, functional cognition, and STM/LTM. Pt demonstrated decreased processing speed on all tasks and required increased time for task completion. Pt completed all tasks in multimodal environment with minimal distraction noted to surroundings. Pt reported dizziness decreased throughout session. Pt had challenges with multimatirx activity for  skills and sequencing of events and required task to be downgraded. Pt would benefit from continued OT services to address functional cognition, EF skills, sustained attention, divided attention, immediate recall, delayed recall, processing speed, logical reasoning, and orientation.     Plan: Continue per plan of care.  Progress treatment as tolerated.

## 2024-12-11 ENCOUNTER — EVALUATION (OUTPATIENT)
Dept: OCCUPATIONAL THERAPY | Facility: CLINIC | Age: 79
End: 2024-12-11
Payer: MEDICARE

## 2024-12-11 DIAGNOSIS — R41.89 MODERATE COGNITIVE IMPAIRMENT: Primary | ICD-10-CM

## 2024-12-11 PROCEDURE — 97530 THERAPEUTIC ACTIVITIES: CPT

## 2024-12-11 NOTE — PROGRESS NOTES
OCCUPATIONAL THERAPY PROGRESS NOTE    POC expires Auth Status Total   Visits  Start date  Expiration date PT/OT + Visit Limit? Co-Insurance   24 Not required BOMN 10/8/24   BOMN                                                                           Visit/Unit Tracking  AUTH Status: Not Required Date 11/13 11/15 11/18 1120        Visits  Authed: N/A Used 1  2  3  4 5  6  7  8  9  10         Remaining   9  8  7  6  5  4  3  2  1  0          PLAN OF CARE START: 24  PLAN OF CARE END: 24  PROGRESS NOTE DUE: 25 (scheduled on calendar)   FREQUENCY: 1-x week for 10 weeks   PRECAUTIONS: orientation, difficulty hearing   DIAGNOSIS: moderate cognitive impairment   Insurance: Medicare   MAINTENANCE PROGRAM     Future Interventions: Finish Mystery Picture Grid Next Session, Daily routines, memory book, safety awareness         Today's date: 2024  Patient name: Anthony Slater  : 1945  MRN: [id number]  Referring provider: Rosamaria Silva,*  Dx:   Encounter Diagnosis     ICD-10-CM    1. Moderate cognitive impairment  R41.89               SKILLED ANALYSIS IE 10/8:  Pt is currently demonstrating the following occupational deficits: limited 2* visual immediate memory recall, delayed memory recall, attention, processing and executive function. Based on the aforementioned OT evaluation, functional performance deficits, and assessments, pt has been identified as a moderate complexity evaluation. Pt to continue to benefit from outpatient skilled OT services to address the following goals 1-2x/wk  for 4 weeks to focus on pt/caregiver education, increase memory deficits, increase cognitive function, orientation and alertness to improve overall QOL.     Re-Eval:   Pt presents to re-evaluation on 24 status post moderate cognitive decline. Pt reports impairments in remembering her daily routine, and medication management and still difficulty  performing ADLs and IADLs. Pt continues to demonstrate impairments in EF skills, sustained attention, divided attention, immediate recall, delayed recall, processing speed, logical reasoning, and orientation. Pt continues to work towards STGs/LTGs. Pt would benefit from continued OT services focusing on impairments for 1-2x week for 6 weeks more weeks with focus on aforementioned deficits to maximize functional performance and improve QOL.  Pt educated on progress/therapy process and pt in understanding and agreement of recommendations. Recommending to continue HEP. Findings and recommendations discussed with pt, and they are in agreement. Educated pt on charges of insurance, assessments performed with standardized norms, POC, goal creation, and OP OT services.      Re-Eval: 12/11  Pt presents to re-evaluation on 12/11/24 status post moderate cognitive decline. As per interview, pt reports continued impairments with memory. Pt presents with sentence repetition and lack of safety awareness. Additionally pt presents with challenges with short term memory and long term memory affecting daily life and routines. Post assessments, pt demonstrating maintained cognition with logical reasoning, executive functioning, and STM and LTM. Pt is unable to recall her daily events. In the future recommend to start memory book and establish routines.  Pt has maintained progress and recommending to transition to a Maintenance Program. Pt presents with decreased cognitive functions due to effects of moderate cognitive impairment and dementia and demonstrates difficulty in daily tasks, including remembering recent events and completing health management. Skilled Maintenance occupational therapy is required to slow the patients functional decline due to dementia. Given the progressive/pathophysiology dementia nature of disease a maintenance program will help reduce the risk of further deterioration, loss of functional mobility or  "independence as well as decrease risk of secondary impairments form disease progress. Recommend OP OT 1x/wk for the next 10 weeks with focus on aforementioned deficits to maintain functional performance and improve QOL. Findings and recommendations discussed with pt, and they are in agreement. Educated pt on charges of insurance, skilled maintenance services, assessments performed with standardized norms, POC, goal creation, and OP OT services. Added maintenance goals for pt, see below.       Subjective    PATIENT GOAL: \"To remember things better\"    PAIN: 0    HISTORY OF PRESENT ILLNESS: Pt is a 79 y.o. female seen for OT eval s/p Mild cognitive impairment [G31.84] referred to [facility]. Patient had recent appointment with Geriatric medicine and was referred to OT services for cognitive therapy. Patient is expected to return at the end of this month. Patient had a fall at the beginning of the year which is suspected to be the result of her cognitive decline.       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    History of transfusion     Hypertension     Mixed hyperlipidemia     DAVID on CPAP     Shortness of breath     triggered by anxiety    Watermelon stomach     Wears dentures        Past Surgical Hx:   Past Surgical History:   Procedure Laterality Date    BREAST BIOPSY Right 02/24/2015    BREAST BIOPSY Left     ? year    CARDIAC " "CATHETERIZATION  2014    EF 65%, mid LAD 60% stenosis and D2 70% stenosis.  Medical management.    CATARACT EXTRACTION Bilateral     COLONOSCOPY      CYSTOSCOPY N/A 2023    Procedure: CYSTOSCOPY;  Surgeon: Nadir Ash MD;  Location: [facility];  Service: Gynecology Oncology    EGD      JOINT REPLACEMENT Right     RIGHT ELBOW    OOPHORECTOMY      not sure which one was removed    MS LAPS TOTAL HYSTERECT 250 GM/< W/RMVL TUBE/OVARY N/A 2023    Procedure: ROBOTHYSTERECTOMY, BILATERAL SALPINGO-OOPHORECTOMY, PELVIC SENTINEL NODE BIOPSIES;  Surgeon: Nadir Ash MD;  Location: [facility];  Service: Gynecology Oncology    ROTATOR CUFF REPAIR Right     US GUIDED THYROID BIOPSY  2020          Objective    Impairment Observations:     Gerardo Cognitive Assessment (MoCA)  \"screen of cognitive abilities designed to detect mild cognitive dysfunction.\"  NORMS:   -Normal: 26+  -Mild Cognitive Impairment: 18-25   -Moderate Cognitive Impairment: 10-17  -Severe Cognitive Impairment: <10        STATUS ON IE: Not Tested Re-eval:  Re-eval:                                          COGNITIVE FXN        VERSION  Version:8.2  Version:             MOCA         Education Level Less than 12 years (+1)      Visuospatial/executive functioning    2/   Naming  3/3  2/3   Memory: 1st trial:  2 25   Memory: 2nd trial:    3/5   Attention/concentration   2/2 1/2   List of letters:     1/1  0/1   Serial Seven Subtraction:   1/3 0/3   Language/sentence repetition:   0/2 0/2   Language Fluency:     0/1  0/1   Abstract/Correlational Thinkin/2 2/2   Delayed Recall:   0/5 0/5   Orientation:   3/6 3/6   Memory Index Score   2/15 3/15                                      Total Score   (+1)  Moderate Cognitive Impairment  (+1)  Moderate Cognitive Impairment      Performed Mini Mental State Examination on IE (10/8):  moderate impairment noted     TRAIL MAKING TEST:   \"widely used test " "to assess executive function in patients with neuro injury. Successful performance of the TMT requires a variety of mental abilities including letter and number recognition mental flexibility, visual scanning, and motor function. Norms are based on age related scores\"     STATUS ON IE: Not Tested Status on Re-Eval: 11/13 Status on Re-Eval: 12/11   TRAIL MAKING PART A     NORM: 58 seconds  1 minute 4 seconds  1 min 49 seconds 3 errors    TRAIL MAKING PART B    NORM: 2 minutes 32 seconds   7 minutes 10 seconds with max vcs Not Tested      CONTEXTUAL MEMORY TEST (1993 version):   \"assesses immediate and delayed recall of 20 objects related to a scene in children and adults with memory impairments\"   NORMS:   *not applicable to current in house version     TYPE OF RECALL STATUS ON IE: 10/8/24 Re-Eval: 11/13    Immediate recall 5/20 2/20     Delayed recall  3/20 0/20          MAINTENANCE GOALS    GOAL  STATUS ON IE  GOAL STATUS    Pt will maintain MOCA score of +/1 3 on MOCA increase MOCA for maintained EF skills, logical thinking, and processing speed for involvement in IADLs/ADLs 12/30  Established    Pt will maintain immediate recall on MOCA scoring 3/5 for both rounds for maintained participation in community activities  3/5 Established    Pt will maintain logical/abstract thinking by scoring 2/2 on abstract thinking section of MOCA for maintained participation in purposeful activities 2/2 Established    Pt will maintain STM by scoring (+/- 3) on the immediate recall of CMT for maintained functioning with IADLs 2/20 Established    Pt will maintain sustained attention on Trail Making Part A completing in (+/- 10 seconds) maintained attention to tasks for ADLs/IADLs  1 minute 4 seconds Established    Pt will consistently report A&O x 3 75% of the time for maintained safety awareness for improved health management.  Established    Pt and caregiver will be educated on compensatory adaptive devices and DME available to " improve independence with ADL/leisure tasks I.e. Life alert, pill box, door locks, security system, wander guard/chair alarm, BSC, grab bars, caregiver burden/burnout, memory aides, etc.  Established    Pt will develop a memory book and establish daily routines in OT sessions for maintained participation in social/leisure and ADLs   Established

## 2024-12-12 ENCOUNTER — EVALUATION (OUTPATIENT)
Dept: PHYSICAL THERAPY | Facility: CLINIC | Age: 79
End: 2024-12-12
Payer: MEDICARE

## 2024-12-12 DIAGNOSIS — R26.2 AMBULATORY DYSFUNCTION: Primary | ICD-10-CM

## 2024-12-12 PROCEDURE — 97110 THERAPEUTIC EXERCISES: CPT | Performed by: PHYSICAL MEDICINE & REHABILITATION

## 2024-12-12 NOTE — PROGRESS NOTES
PT Re-Evaluation  and PT Discharge    Today's date: 24  Patient name: Anthony Slater  : 1945  MRN: [id number]  Referring provider: Rosamaria Silva,*  Dx:   Encounter Diagnosis     ICD-10-CM    1. Ambulatory dysfunction  R26.2                        Assessment  Impairments: abnormal or restricted ROM, abnormal movement, activity intolerance, impaired physical strength and lacks appropriate home exercise program    Assessment details: Anthony Slater is a 79 y.o. female presenting to outpatient physical therapy with noted impairments including pain, impaired soft tissue mobility, reduced range of motion, reduced strength, reduced postural awareness, and reduced activity tolerance. Signs and symptoms at present are consistent with referring diagnosis of amb dysfunction. Due to noted impairments, the patient's present functional limitations include difficulty with ADLs with increased need for assistance, reliance on medication and/or modalities for pain relief, reduced tolerance for functional mobility and activity, and difficulty completing HH responsibilities. Patient to benefit from skilled outpatient physical therapy 2x/week for 8 weeks in order to reduce pain, maximize pain free range of motion, increase strength and stability, and improve functional mobility/functional activity in order to maximize return to prior level of function with reduced limitations. Home exercise program was provided and all questions answered to patient's level of satisfaction. Thank you for your referral.    24 UPDATE: Pt has been compliant with attendance for OPPT. She has made good gains infunctional outcome scores and in BLE strength and endurance.She appears to have achieved max expected gains for PT and she, with her , are in agreement with DC from PT at this time.  Pt will cont with OT to address cognition issues.          Goals  STGs to be achieved in 6 weeks:  1. Pt to demonstrate reduced  "subjective pain rating \"at worst\" by at least 2-3 points from Initial Eval in order to allow for reduced pain with ADLs and improved functional activity tolerance. MET  2. Pt to demonstrate improved TUG time and 5xSTS tests by at least 5 sec to demonstrate improved balance     MET  3. Pt to demonstrate increased MMT of BLEs by at least 1/2-1 grade in order to improve safety and stability with ADLs and functional mobility. MET    LTGs to be achieved in 6-12 weeks:  1. Pt will be I with HEP in order to continue to improve quality of life and independence and reduce risk for re-injury.  MET  2. Pt to remain free from falls t/o her course of PT..   MET        Plan  Patient would benefit from: skilled physical therapy  Other planned modality interventions: Modalities prn for symptom management    Planned therapy interventions: manual therapy, neuromuscular re-education, therapeutic activities, therapeutic exercise, strengthening, stretching and home exercise program    Frequency: 2x week  Duration in weeks: 8  Plan of Care beginning date: 11/14/2024  Plan of Care expiration date: 12/12/2024  Treatment plan discussed with: PTA and patient      Subjective Evaluation    History of Present Illness  Mechanism of injury: Pt notes she is having diff walking and loses her balance easily since she had Covid in January of 2024.. Pt accompanied by her  as pt is poor historian with cognitive deficits due to dementia. Pt had a fall in Jan 2024 when she had Covid and one other episode of near syncope. Pt hs diff getting in and out of tub but is able to dress indep. Pt is no longer cooking and cleaning.  Pt's  notes pt requires constant supervision.  Pt has lost weight recently.     12/12/24 UPDATE: Pt states she is tired today and less than enthused to participate in TE program and reassessment.  Pt remains confused with memory deficits and unreliable for status info.          Recurrent probem    Patient Goals  Patient " goals for therapy: improved balance and increased strength  Patient goal: prevent falls  Pain  Progression: no change    Social Support  Steps to enter house: yes  2  Stairs in house: yes (to basement laundry)   Lives in: one-story house  Lives with: spouse    Employment status: not working  Treatments  Previous treatment: home therapy, occupational therapy and physical therapy (early this year)  Current treatment: physical therapy      Objective     Strength/Myotome Testing     Left Hip   Planes of Motion   Flexion: 4  Abduction: 4+    Right Hip   Planes of Motion   Flexion: 4  Abduction: 4+    Left Knee   Flexion: 4+  Extension: 5    Right Knee   Flexion: 4+  Extension: 5    Left Ankle/Foot   Dorsiflexion: 4+    Right Ankle/Foot   Dorsiflexion: 4+  Neuro Exam:     Coordination   Heel to shin: left WNL and right WNL  Finger to nose: left WNL and right WNL  Rapid alternating movements: LE WNL and UE WNL    Transfers   Sit to stand: independent     Functional outcomes   5x sit to stand: 16 sec w/o UEs (seconds)  2 minute walk test: 310'  TU.8 (seconds)             Precautions: acute encephalopathy, HTN, chronic pain disorder,     Re-eval Date:   Date 24    Visit Count 17 18 19 20    FOTO        Pain In 0/10       Pain Out                  Manuals                                    Neuro Re-Ed      Sidestepping  On mat Floor 30' x 4       HKM 6 laps in // bars B alt 6 laps in // bars 100' x 2       Low hurdles  4 hurdles  30' x 4 4 hurdles   Foam square and long  Aeromat  8 x 40'     STS w/bolster w/small red bolster  1x10 VC's W/large red bolster 10x  v.c. for technique w/large red bolster  1x10 VC's     Tandem amb on aeromat        Backwd amb        Amb w/HT/HN's 100' HT's  100' HN's       5xSTS        Ther Ex        Nustep L3 x 12 min L3 x 15 mins L4x 13 min L5  20 min L5 10'   Leg press 45# 30x 45# 3x10  50# 2x10 40#  30x    Knee ext mach 11#   30x 11# 30x 11#  30x 11#  30x 11#  30x   Knee flex mach 11#  30x 11#  30x   11#  30x 11#  30x 11#  30x   bridges        Standing 3 way SLR  2x10 ea B/L  1 x 10    Stand foam  Tap 3 cones    5x L/R ea 5x L/R                                                                     Ther Activity        Stair climbing   4 stairs x 6  2 HRs, FOF     Bathroom activities    8 min    Gait Training        2 min walk test        TUG test        Modalities

## 2024-12-17 ENCOUNTER — OFFICE VISIT (OUTPATIENT)
Dept: OCCUPATIONAL THERAPY | Facility: CLINIC | Age: 79
End: 2024-12-17
Payer: MEDICARE

## 2024-12-17 DIAGNOSIS — R41.89 MODERATE COGNITIVE IMPAIRMENT: Primary | ICD-10-CM

## 2024-12-17 PROCEDURE — 97530 THERAPEUTIC ACTIVITIES: CPT

## 2024-12-17 NOTE — PROGRESS NOTES
"Daily Note     Today's date: 2024  Patient name: Anthony Slater  : 1945  MRN: [id number]  Referring provider: Rosamaria Silva,*  Dx:   Encounter Diagnosis     ICD-10-CM    1. Moderate cognitive impairment  R41.89                        POC expires Auth Status Total   Visits  Start date  Expiration date PT/OT + Visit Limit? Co-Insurance   24 Not required BOMN 10/8/24   BOMN                                                                           Visit/Unit Tracking  AUTH Status: Not Required Date                 Visits  Authed: N/A Used 1                  Remaining  9                   PLAN OF CARE START: 24  PLAN OF CARE END: 24  PROGRESS NOTE DUE: 25 (scheduled on calendar)   FREQUENCY: 1-x week for 10 weeks   PRECAUTIONS: orientation, difficulty hearing   DIAGNOSIS: moderate cognitive impairment   Insurance: Medicare   MAINTENANCE PROGRAM     Future Interventions: Finish Mystery Picture Grid Next Session, Daily routines, memory book, safety awareness       Subjective: \"I'm embarrassed because I couldn't finish my hw\"     Objective: See treatment diary below. Pt participated in skilled OT this date with focus to sustained attention, functional cognition, working/short term memory, long term memory, EF skills and activity tolerance/endurance, for increased safety and engagement in ADL/IADL tasks.     *Finished animal word unscramble provided for HEP. Pt able to complete 8 independently at home. Pt required max vcs and visual cues for remaining words. Provided pt with prompts for problem solving and sounding out first syllable of word. Activity focused on sustained attention,  skills and problem solving.     *Started pt's memory book today completing Favorite Things worksheet. Walked pt through different categories and assisted with brainstorming and providing options for each category. Provided assistance with spelling, and word finding.     *Provided pt with page " of memory book to complete for HEP      Assessment: Tolerated treatment well. Focus of session on  skills, sustained attention, functional cognition, and STM/LTM. Pt started memory book on today's date. Requested for pt to bring in binder next session to start to place worksheets in. Purpose of memory book to record important information, trigger memories, and start conversations. Memory books can improve quality of life by providing reassurance. It is a method to bond with loved ones and increase trust.  It aides with repeated questions and clearing up confusion. Pt demonstrated decreased processing speed during word unscramble. Pt would benefit from continued OT services to address functional cognition, EF skills, sustained attention, divided attention, immediate recall, delayed recall, processing speed, logical reasoning, and orientation.     Plan: Continue per plan of care.  Progress treatment as tolerated.

## 2024-12-18 ENCOUNTER — OFFICE VISIT (OUTPATIENT)
Dept: FAMILY MEDICINE CLINIC | Facility: CLINIC | Age: 79
End: 2024-12-18
Payer: MEDICARE

## 2024-12-18 VITALS
SYSTOLIC BLOOD PRESSURE: 140 MMHG | HEIGHT: 61 IN | DIASTOLIC BLOOD PRESSURE: 76 MMHG | WEIGHT: 105 LBS | TEMPERATURE: 96 F | BODY MASS INDEX: 19.83 KG/M2

## 2024-12-18 DIAGNOSIS — F02.B0 MODERATE EARLY ONSET ALZHEIMER'S DEMENTIA WITHOUT BEHAVIORAL DISTURBANCE, PSYCHOTIC DISTURBANCE, MOOD DISTURBANCE, OR ANXIETY (HCC): ICD-10-CM

## 2024-12-18 DIAGNOSIS — K21.00 GASTROESOPHAGEAL REFLUX DISEASE WITH ESOPHAGITIS, UNSPECIFIED WHETHER HEMORRHAGE: Chronic | ICD-10-CM

## 2024-12-18 DIAGNOSIS — R54 FRAILTY SYNDROME IN GERIATRIC PATIENT: ICD-10-CM

## 2024-12-18 DIAGNOSIS — I25.10 CORONARY ARTERIOSCLEROSIS: ICD-10-CM

## 2024-12-18 DIAGNOSIS — M87.180: ICD-10-CM

## 2024-12-18 DIAGNOSIS — K31.819 GASTRIC ANTRAL VASCULAR ECTASIA: ICD-10-CM

## 2024-12-18 DIAGNOSIS — G30.0 MODERATE EARLY ONSET ALZHEIMER'S DEMENTIA WITHOUT BEHAVIORAL DISTURBANCE, PSYCHOTIC DISTURBANCE, MOOD DISTURBANCE, OR ANXIETY (HCC): ICD-10-CM

## 2024-12-18 DIAGNOSIS — M81.0 AGE-RELATED OSTEOPOROSIS WITHOUT CURRENT PATHOLOGICAL FRACTURE: ICD-10-CM

## 2024-12-18 DIAGNOSIS — K31.811 GASTROINTESTINAL HEMORRHAGE ASSOCIATED WITH ANGIODYSPLASIA OF STOMACH AND DUODENUM: ICD-10-CM

## 2024-12-18 DIAGNOSIS — E78.2 MIXED HYPERLIPIDEMIA: Chronic | ICD-10-CM

## 2024-12-18 DIAGNOSIS — C54.1 ENDOMETRIAL CANCER (HCC): Primary | ICD-10-CM

## 2024-12-18 DIAGNOSIS — R41.89 COGNITIVE DECLINE: ICD-10-CM

## 2024-12-18 DIAGNOSIS — G47.33 OBSTRUCTIVE SLEEP APNEA SYNDROME: ICD-10-CM

## 2024-12-18 DIAGNOSIS — D50.8 OTHER IRON DEFICIENCY ANEMIA: Chronic | ICD-10-CM

## 2024-12-18 DIAGNOSIS — R14.2 BELCHING: ICD-10-CM

## 2024-12-18 DIAGNOSIS — N18.31 STAGE 3A CHRONIC KIDNEY DISEASE (HCC): Chronic | ICD-10-CM

## 2024-12-18 PROCEDURE — 99215 OFFICE O/P EST HI 40 MIN: CPT | Performed by: FAMILY MEDICINE

## 2024-12-18 PROCEDURE — G2211 COMPLEX E/M VISIT ADD ON: HCPCS | Performed by: FAMILY MEDICINE

## 2024-12-18 NOTE — ASSESSMENT & PLAN NOTE
Has orders for repeat CBC with iron panel.  Orders:  •  CBC and differential  •  Iron Panel (Includes Ferritin, Iron Sat%, Iron, and TIBC)

## 2024-12-18 NOTE — ASSESSMENT & PLAN NOTE
Lab Results   Component Value Date    EGFR 35 08/10/2024    EGFR 37 07/30/2024    EGFR 39 07/29/2024    CREATININE 1.39 (H) 08/10/2024    CREATININE 1.34 (H) 07/30/2024    CREATININE 1.28 07/29/2024   Trending GFR's.

## 2024-12-18 NOTE — PROGRESS NOTES
Name: Anthony Slater      : 1945      MRN: [id number]  Encounter Provider: Akosua Schwab DO  Encounter Date: 2024   Encounter department: [facility] PRIMARY CARE  :  Assessment & Plan  Endometrial cancer (HCC)  stage Ib high intermediate risk endometrial cancer.  Follows with gynecology.       Coronary arteriosclerosis  Currently on statin and beta-blocker.       Gastric antral vascular ectasia  GI following.       Gastrointestinal hemorrhage associated with angiodysplasia of stomach and duodenum  GI following.       Obstructive sleep apnea syndrome         Gastroesophageal reflux disease with esophagitis, unspecified whether hemorrhage  GI following, currently on famotidine 20 mg at bedtime and pantoprazole 40 mg in the morning.       Moderate early onset Alzheimer's dementia without behavioral disturbance, psychotic disturbance, mood disturbance, or anxiety (HCC)  Progressive.  Off Aricept now.  BuSpar for anxiety which may be related.  Following with geriatric med.       Age-related osteoporosis without current pathological fracture  Not currently treated with prescription, is taking vitamin D over-the-counter.       Stage 3a chronic kidney disease (HCC)  Lab Results   Component Value Date    EGFR 35 08/10/2024    EGFR 37 2024    EGFR 39 2024    CREATININE 1.39 (H) 08/10/2024    CREATININE 1.34 (H) 2024    CREATININE 1.28 2024   Trending GFR's.       Cognitive decline  Aggressive.       Mixed hyperlipidemia    Orders:  •  Comprehensive metabolic panel    Frailty syndrome in geriatric patient         Other iron deficiency anemia  Has orders for repeat CBC with iron panel.  Orders:  •  CBC and differential  •  Iron Panel (Includes Ferritin, Iron Sat%, Iron, and TIBC)    Drug-induced osteonecrosis of jaw (HCC)         Belching  Questionable dysphagia.  Certainly related to issues with her dentures but may be related to aggressive dementia/Alzheimer's.   We discussed possibly ordering a swallow study and referring to speech therapy today.  , Cristobal, to discuss with family and perhaps her occupational therapist as well.  He will call and leave a message if they wish to proceed.  Otherwise follow-up 4 months.              History of Present Illness     Establish care/transfer of care from Dr. Calvillo who is retiring.    Endometrial cancer --  stage Ib high intermediate risk endometrial cancer.  Completed vaginal brachytherapy. Sees gyn/onc routinely.     Coronary arteriosclerosis/HTN- on BB. Bps stable.     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 Still with eructation which has previously been discussed. Better with anti-gas meds.  On an acids as well.  But perhaps starting to choke on thicker textured foods?  History from patient rather unreliable.  Also with c/o issues with dentures, does not typically use her dentures as they do not fit well.  Difficulty chewing.    Moderate early onset alzheimer's dementia/cognitive decline-- sees geriatrician (only twice so far). 9/9/2024: [facility] 8/30 at that time.  Attending OT for cognitive therapy and ST, had MRI brain performed. Done with P, completed course. Has one OT visit per week now. Was on aricept  for about one month,  stopped med 2/2 tremulousness and no clinical benefit.  Family agreed.  Tremors seemed to improve, they were constant while on med. Still on buspar. Appetite is good despite some weight loss. Eats 3 meals a day, gets MOW. She no longer cooks or bakes. Takes Fe suppl BID plus B vit. Neither pt or  sleep well.  awakens with small bumps in the night afraid she has fallen. Occasionally wanders.     Age-related osteoporosis-- Vit D suppl.      Stage 3a chronic kidney disease-- GFR 30s.    Mixed hyperlipidemia-- on statin.     Anemia-- taking iron BID, has orders placed for CBC.       Review of  "Systems   Constitutional:  Positive for fatigue and unexpected weight change. Negative for fever.   Respiratory:  Negative for cough and shortness of breath.    Cardiovascular:  Negative for palpitations and leg swelling.   Gastrointestinal:  Negative for abdominal pain.   Psychiatric/Behavioral:  Positive for confusion, decreased concentration and sleep disturbance. Negative for agitation. The patient is nervous/anxious. The patient is not hyperactive.        Objective   /76 (BP Location: Left arm, Patient Position: Sitting, Cuff Size: Standard)   Temp (!) 96 °F (35.6 °C) (Tympanic)   Ht 5' 1\" (1.549 m)   Wt 47.6 kg (105 lb)   BMI 19.84 kg/m²      Physical Exam  Vitals and nursing note reviewed.   Constitutional:       General: She is not in acute distress.     Appearance: Normal appearance. She is normal weight. She is not ill-appearing.   HENT:      Head: Normocephalic.      Nose: Nose normal.      Mouth/Throat:      Mouth: Mucous membranes are moist.   Eyes:      Pupils: Pupils are equal, round, and reactive to light.   Cardiovascular:      Rate and Rhythm: Normal rate and regular rhythm.      Heart sounds: No murmur heard.  Pulmonary:      Effort: Pulmonary effort is normal.      Breath sounds: Normal breath sounds.   Musculoskeletal:      Cervical back: Normal range of motion and neck supple. No rigidity or tenderness.   Neurological:      General: No focal deficit present.      Mental Status: She is alert. Mental status is at baseline.   Psychiatric:         Mood and Affect: Mood normal.         Speech: Speech normal.         Behavior: Behavior normal. Behavior is not agitated.         Cognition and Memory: Cognition is impaired. She exhibits impaired recent memory.       Administrative Statements   I have spent a total time of 45 minutes in caring for this patient on the day of the visit/encounter including Counseling / Coordination of care, Documenting in the medical record, Reviewing / ordering " tests, medicine, procedures  , and Obtaining or reviewing history  .

## 2024-12-18 NOTE — ASSESSMENT & PLAN NOTE
Questionable dysphagia.  Certainly related to issues with her dentures but may be related to aggressive dementia/Alzheimer's.  We discussed possibly ordering a swallow study and referring to speech therapy today.  , Cristobal, to discuss with family and perhaps her occupational therapist as well.  He will call and leave a message if they wish to proceed.  Otherwise follow-up 4 months.

## 2024-12-18 NOTE — ASSESSMENT & PLAN NOTE
Progressive.  Off Aricept now.  BuSpar for anxiety which may be related.  Following with geriatric med.

## 2024-12-23 ENCOUNTER — OFFICE VISIT (OUTPATIENT)
Dept: OCCUPATIONAL THERAPY | Facility: CLINIC | Age: 79
End: 2024-12-23
Payer: MEDICARE

## 2024-12-23 DIAGNOSIS — R41.89 MODERATE COGNITIVE IMPAIRMENT: Primary | ICD-10-CM

## 2024-12-23 PROCEDURE — 97530 THERAPEUTIC ACTIVITIES: CPT

## 2024-12-23 NOTE — PROGRESS NOTES
"Daily Note     Today's date: 2024  Patient name: Anthony Slater  : 1945  MRN: [id number]  Referring provider: Rosamaria Silva,*  Dx:   Encounter Diagnosis     ICD-10-CM    1. Moderate cognitive impairment  R41.89                        POC expires Auth Status Total   Visits  Start date  Expiration date PT/OT + Visit Limit? Co-Insurance   24 Not required BOMN 10/8/24   BOMN                                                                           Visit/Unit Tracking  AUTH Status: Not Required Date                Visits  Authed: N/A Used 1 2                 Remaining  9 8                  PLAN OF CARE START: 24  PLAN OF CARE END: 24  PROGRESS NOTE DUE: 25 (scheduled on calendar)   FREQUENCY: 1-x week for 10 weeks   PRECAUTIONS: orientation, difficulty hearing   DIAGNOSIS: moderate cognitive impairment   Insurance: Medicare   MAINTENANCE PROGRAM     Future Interventions: Finish Mystery Picture Grid Next Session, Daily routines, memory book, safety awareness       Subjective: \"My PCP asked if I was doing speech in OT\"     Objective: See treatment diary below. Pt participated in skilled OT this date with focus to sustained attention, functional cognition, working/short term memory, long term memory, EF skills and activity tolerance/endurance, for increased safety and engagement in ADL/IADL tasks. Provided HEP worksheets to add to memory book.     TA:     *Pt and  talked with speech therapist for possible speech therapy services in the future for swallowing and language     *Reviewed last session HEP worksheet for memory book involving questions about pt's beliefs, interests and loves    *Grocery list activity finding the correct foam letter and placing it on the whiteboard and then going through a grocery list and naming items in alphabetical order. Activity focused on language fluency, recall, sequencing, visual perceptual skills and following multi-step " directions. Pt required mod vcs for sequencing, task initiation, and following directions.     *Visual image cards with pt naming objects she sees focusing on word finding for visual images. Provided pt with semantic feature analysis for prompting of words.     *Provided memory book worksheet for HEP involving pt's day/mood         Assessment: Tolerated treatment well. Focus of session on  skills, sustained attention, functional cognition, and STM/LTM. Pt continued memory book on today's date. Purpose of memory book to record important information, trigger memories, and start conversations. Memory books can improve quality of life by providing reassurance. It is a method to bond with loved ones and increase trust. It aides with repeated questions and clearing up confusion. Pt and  had conversation with speech therapist on today's date following up with PCP's recommendation for speech services. ST recommended for pt to complete modified barium swallow study first, and then schedule initial speech evaluation to address language, low tone, and swallowing. Pt would benefit from continued OT services to address functional cognition, EF skills, sustained attention, divided attention, immediate recall, delayed recall, processing speed, logical reasoning, and orientation.     Plan: Continue per plan of care.  Progress treatment as tolerated.  Try safety awareness cards in future sessions

## 2024-12-31 ENCOUNTER — OFFICE VISIT (OUTPATIENT)
Dept: OCCUPATIONAL THERAPY | Facility: CLINIC | Age: 79
End: 2024-12-31
Payer: MEDICARE

## 2024-12-31 DIAGNOSIS — R41.89 MODERATE COGNITIVE IMPAIRMENT: Primary | ICD-10-CM

## 2024-12-31 PROCEDURE — 97530 THERAPEUTIC ACTIVITIES: CPT

## 2024-12-31 NOTE — PROGRESS NOTES
"Daily Note     Today's date: 2024  Patient name: Anthony Slater  : 1945  MRN: [id number]  Referring provider: Rosamaria Silva,*  Dx:   Encounter Diagnosis     ICD-10-CM    1. Moderate cognitive impairment  R41.89                          POC expires Auth Status Total   Visits  Start date  Expiration date PT/OT + Visit Limit? Co-Insurance   24 Not required BOMN 10/8/24   BOMN                                                                           Visit/Unit Tracking  AUTH Status: Not Required Date               Visits  Authed: N/A Used 1 2 3                Remaining  9 8 7                 PLAN OF CARE START: 24  PLAN OF CARE END: 24  PROGRESS NOTE DUE: 25 (scheduled on calendar)   FREQUENCY: 1-x week for 10 weeks   PRECAUTIONS: orientation, difficulty hearing   DIAGNOSIS: moderate cognitive impairment   Insurance: Medicare   MAINTENANCE PROGRAM     Future Interventions: Finish Mystery Picture Grid Next Session, Daily routines, memory book, safety awareness       Subjective: \"I am out of it today and very tired\".     Objective: See treatment diary below. Pt participated in skilled OT this date with focus on sustained attention, functional cognition, working/short term memory, EF skills and activity tolerance/endurance, for increased safety and engagement in ADL/IADL tasks. PT continued to complete worksheets to add to memory book.     TA:     *Reviewed last session HEP worksheet for memory book involving patient's day.     *Completed worksheets for memory book including food diary and daily routine with positive affirmations. Had pt discuss activities she completed today and reviewed her daily routine.     *Completed family and personal history information worksheets to add to memory book (includes family members, favorite vacation spots, past schooling, past places she has lived, etc.) Discussed pt's answers at end.     *Worked on safety awareness cards " asking pt 2-3 questions per card to focus on safety awareness. Pt required prompting to assist with problem solving to reach logical answers.     *Provided pt with task to return next session with bible versus to discuss and add into memory book as it is a concept that provides her with purpose and meaning in her life.         Assessment: Tolerated treatment fair. Focus of session on working on memory book, sustained attention, long term memory and immediate memory. Pt continued memory book on today's date. Purpose of memory book to record important information, trigger memories, and start conversations. Memory books can improve quality of life by providing reassurance. It is a method to bond with loved ones and increase trust. It aides with repeated questions and clearing up confusion. Pt had challenges with recall of siblings names and daughters names. Provided her with homework to have  assist with recall of names for family tree. Additionally, provided pt with HEP to bring in favorite bible versus to add to memory book. Pt would benefit from continued OT services to address functional cognition, EF skills, sustained attention, divided attention, immediate recall, delayed recall, processing speed, logical reasoning, and orientation.     Plan: Continue per plan of care.  Progress treatment as tolerated.  Continue safety awareness cards in future sessions. Ask pt to bring in pictures of family and friends for memory book in future. Complete emergency contact sheet in future sessions.

## 2025-01-06 ENCOUNTER — TELEPHONE (OUTPATIENT)
Age: 80
End: 2025-01-06

## 2025-01-06 NOTE — TELEPHONE ENCOUNTER
would like to know if patient should take all morning medication before she gets her blood work done. Please advise.

## 2025-01-07 ENCOUNTER — OFFICE VISIT (OUTPATIENT)
Dept: OCCUPATIONAL THERAPY | Facility: CLINIC | Age: 80
End: 2025-01-07
Payer: MEDICARE

## 2025-01-07 DIAGNOSIS — R41.89 MODERATE COGNITIVE IMPAIRMENT: Primary | ICD-10-CM

## 2025-01-07 PROCEDURE — 97530 THERAPEUTIC ACTIVITIES: CPT

## 2025-01-07 NOTE — PROGRESS NOTES
"Daily Note     Today's date: 2025  Patient name: Anthony Slater  : 1945  MRN: [id number]  Referring provider: Rosamaria Silva,*  Dx:   Encounter Diagnosis     ICD-10-CM    1. Moderate cognitive impairment  R41.89                          POC expires Auth Status Total   Visits  Start date  Expiration date PT/OT + Visit Limit? Co-Insurance   24 Not required BOMN 10/8/24   BOMN                                                                           Visit/Unit Tracking  AUTH Status: Not Required Date             Visits  Authed: N/A Used 1 (Eval) 2 3 4 5              Remaining  9 8 7 6 5               PLAN OF CARE START: 24  PLAN OF CARE END: 24  PROGRESS NOTE DUE: 25 (scheduled on calendar)   FREQUENCY: 1-x week for 10 weeks   PRECAUTIONS: orientation, difficulty hearing   DIAGNOSIS: moderate cognitive impairment   Insurance: Medicare   MAINTENANCE PROGRAM     Future Interventions: Finish Mystery Picture Grid Next Session, Daily routines, memory book, safety awareness       Subjective: \"I'm not having a good day with names today for some reason\".     Objective: See treatment diary below. Pt participated in skilled OT this date with focus on sustained attention, functional cognition, working/short term memory, EF skills and activity tolerance/endurance, for increased safety and engagement in ADL/IADL tasks. PT continued to complete worksheets to add to memory book.     TA:   *Reviewed HEP from previous session of family and personal history information worksheets to add to memory book (includes family members, favorite vacation spots, past schooling, past places she has lived, etc.) Discussed pt's answers.     *Had pt bring in personal bible to pick out her favorite bible versus to complete worksheets to add to memory book. Had pt practice looking through the bible to find various verses focusing on direction following, EF skills, sequencing, and " working memory. Pt then worked with therapist on worksheet linking various emotions she feels throughout the day to bible versus that help to guide her and give her purpose. Activity focused client centered care completing activity that is meaningful and purposeful to client to add to memory book for increased occupational participation and role fulfillment.     *Started worksheet reviewing pt's favorite bible versus and compiling into list to add to her memory book for her to reflect back to. Pt completed reflection portion of worksheet with discussion with therapist on what each verse means to her and how it brings her life purpose. Activity focused on client centered occupational role fulfillment and sustained attention.         Assessment: Tolerated treatment fell. Focus of session on working on memory book, sustained attention, long term memory, occupational role fulfillment and immediate memory. Pt continued memory book on today's date. Purpose of memory book to record important information, trigger memories, and start conversations. Memory books can improve quality of life by providing reassurance. It is a method to bond with loved ones and increase trust. It aides with repeated questions and clearing up confusion. Pt had challenges with keeping place in book and utilized aide of highlighting verses to keep her spot on page. Activity brought pt [place] and stated she wants to use her bible more. Provided pt with HEP to bring in favorite bible versus next session to complete activity and continue to add to memory book. Pt would benefit from continued OT services to address functional cognition, EF skills, sustained attention, divided attention, immediate recall, delayed recall, processing speed, logical reasoning, and orientation.     Plan: Continue per plan of care.  Progress treatment as tolerated.  Continue safety awareness cards in future sessions. Ask pt to bring in pictures of family and friends for memory  book in future. Complete emergency contact sheet in future sessions. Next session continue to work on bible versus worksheets.

## 2025-01-13 ENCOUNTER — APPOINTMENT (OUTPATIENT)
Dept: LAB | Facility: HOSPITAL | Age: 80
End: 2025-01-13
Attending: FAMILY MEDICINE
Payer: MEDICARE

## 2025-01-13 LAB
ALBUMIN SERPL BCG-MCNC: 4 G/DL (ref 3.5–5)
ALP SERPL-CCNC: 56 U/L (ref 34–104)
ALT SERPL W P-5'-P-CCNC: 10 U/L (ref 7–52)
ANION GAP SERPL CALCULATED.3IONS-SCNC: 9 MMOL/L (ref 4–13)
AST SERPL W P-5'-P-CCNC: 17 U/L (ref 13–39)
BASOPHILS # BLD AUTO: 0.03 THOUSANDS/ΜL (ref 0–0.1)
BASOPHILS NFR BLD AUTO: 0 % (ref 0–1)
BILIRUB SERPL-MCNC: 0.41 MG/DL (ref 0.2–1)
BUN SERPL-MCNC: 26 MG/DL (ref 5–25)
CALCIUM SERPL-MCNC: 9 MG/DL (ref 8.4–10.2)
CHLORIDE SERPL-SCNC: 106 MMOL/L (ref 96–108)
CO2 SERPL-SCNC: 25 MMOL/L (ref 21–32)
CREAT SERPL-MCNC: 1.31 MG/DL (ref 0.6–1.3)
EOSINOPHIL # BLD AUTO: 0.17 THOUSAND/ΜL (ref 0–0.61)
EOSINOPHIL NFR BLD AUTO: 2 % (ref 0–6)
ERYTHROCYTE [DISTWIDTH] IN BLOOD BY AUTOMATED COUNT: 17.4 % (ref 11.6–15.1)
FERRITIN SERPL-MCNC: 11 NG/ML (ref 11–307)
GFR SERPL CREATININE-BSD FRML MDRD: 38 ML/MIN/1.73SQ M
GLUCOSE P FAST SERPL-MCNC: 80 MG/DL (ref 65–99)
HCT VFR BLD AUTO: 25.1 % (ref 34.8–46.1)
HGB BLD-MCNC: 7.2 G/DL (ref 11.5–15.4)
IMM GRANULOCYTES # BLD AUTO: 0.04 THOUSAND/UL (ref 0–0.2)
IMM GRANULOCYTES NFR BLD AUTO: 1 % (ref 0–2)
IRON SATN MFR SERPL: 5 % (ref 15–50)
IRON SERPL-MCNC: 19 UG/DL (ref 50–212)
LYMPHOCYTES # BLD AUTO: 1.07 THOUSANDS/ΜL (ref 0.6–4.47)
LYMPHOCYTES NFR BLD AUTO: 15 % (ref 14–44)
MCH RBC QN AUTO: 21.4 PG (ref 26.8–34.3)
MCHC RBC AUTO-ENTMCNC: 28.7 G/DL (ref 31.4–37.4)
MCV RBC AUTO: 75 FL (ref 82–98)
MONOCYTES # BLD AUTO: 0.52 THOUSAND/ΜL (ref 0.17–1.22)
MONOCYTES NFR BLD AUTO: 7 % (ref 4–12)
NEUTROPHILS # BLD AUTO: 5.21 THOUSANDS/ΜL (ref 1.85–7.62)
NEUTS SEG NFR BLD AUTO: 75 % (ref 43–75)
NRBC BLD AUTO-RTO: 0 /100 WBCS
PLATELET # BLD AUTO: 313 THOUSANDS/UL (ref 149–390)
PMV BLD AUTO: 11.5 FL (ref 8.9–12.7)
POTASSIUM SERPL-SCNC: 4 MMOL/L (ref 3.5–5.3)
PROT SERPL-MCNC: 6.7 G/DL (ref 6.4–8.4)
RBC # BLD AUTO: 3.36 MILLION/UL (ref 3.81–5.12)
SODIUM SERPL-SCNC: 140 MMOL/L (ref 135–147)
TIBC SERPL-MCNC: 403.2 UG/DL (ref 250–450)
TRANSFERRIN SERPL-MCNC: 288 MG/DL (ref 203–362)
UIBC SERPL-MCNC: 384 UG/DL (ref 155–355)
WBC # BLD AUTO: 7.04 THOUSAND/UL (ref 4.31–10.16)

## 2025-01-14 ENCOUNTER — OFFICE VISIT (OUTPATIENT)
Dept: OCCUPATIONAL THERAPY | Facility: CLINIC | Age: 80
End: 2025-01-14
Payer: MEDICARE

## 2025-01-14 ENCOUNTER — RESULTS FOLLOW-UP (OUTPATIENT)
Dept: FAMILY MEDICINE CLINIC | Facility: CLINIC | Age: 80
End: 2025-01-14

## 2025-01-14 DIAGNOSIS — D50.8 OTHER IRON DEFICIENCY ANEMIA: Primary | Chronic | ICD-10-CM

## 2025-01-14 DIAGNOSIS — R41.89 MODERATE COGNITIVE IMPAIRMENT: Primary | ICD-10-CM

## 2025-01-14 DIAGNOSIS — K31.811 GASTROINTESTINAL HEMORRHAGE ASSOCIATED WITH ANGIODYSPLASIA OF STOMACH AND DUODENUM: ICD-10-CM

## 2025-01-14 PROCEDURE — 97530 THERAPEUTIC ACTIVITIES: CPT

## 2025-01-14 NOTE — PROGRESS NOTES
"Daily Note     Today's date: 2025  Patient name: Anthony Slater  : 1945  MRN: [id number]  Referring provider: Rosamaria Silva,*  Dx:   Encounter Diagnosis     ICD-10-CM    1. Moderate cognitive impairment  R41.89                          POC expires Auth Status Total   Visits  Start date  Expiration date PT/OT + Visit Limit? Co-Insurance   24 Not required BOMN 10/8/24   BOMN                                                                           Visit/Unit Tracking  AUTH Status: Not Required Date            Visits  Authed: N/A Used 1 (Eval) 2 3 4 5 6             Remaining  9 8 7 6 5 4              PLAN OF CARE START: 24  PLAN OF CARE END: 24  PROGRESS NOTE DUE: 25 (scheduled on calendar)   FREQUENCY: 1-x week for 10 weeks   PRECAUTIONS: orientation, difficulty hearing   DIAGNOSIS: moderate cognitive impairment   Insurance: Medicare   MAINTENANCE PROGRAM     Future Interventions: Finish Mystery Picture Grid Next Session, Daily routines, memory book, safety awareness       Subjective: \"I't's been one of those days. The doctor told me I have anemia\".     Objective: See treatment diary below. Pt participated in skilled OT this date with focus on sustained attention, functional cognition, working/short term memory, EF skills and activity tolerance/endurance, for increased safety and engagement in ADL/IADL tasks. PT continued to complete worksheets to add to memory book.     TA:   *Had pt bring in personal bible to pick out her favorite bible versus to complete worksheets to add to memory book. Had pt practice looking through the bible to find various verses focusing on direction following, EF skills, sequencing, and working memory. Pt then worked with therapist on worksheet linking various emotions she feels throughout the day to bible versus that help to guide her and give her purpose. Activity focused client centered care completing activity " that is meaningful and purposeful to client to add to memory book for increased occupational participation and role fulfillment.     *Worksheet reviewing pt's favorite bible versus and compiling into list to add to her memory book for her to reflect back to. Pt completed reflection portion of worksheet with discussion with therapist on what each verse means to her and how it brings her life purpose. Activity focused on client centered occupational role fulfillment and sustained attention.         Assessment: Tolerated treatment well. Focus of session on working on memory book, sustained attention, long term memory, occupational role fulfillment and immediate memory. Pt continued memory book on today's date. Purpose of memory book to record important information, trigger memories, and start conversations. Memory books can improve quality of life by providing reassurance. It is a method to bond with loved ones and increase trust. It aides with repeated questions and clearing up confusion. Pt demonstrated increased cognitive functioning and direction following with searching for bible verses compared to previous session. Provided pt with HEP to continue to bring in bible for  sessions. Pt would benefit from continued OT services to address functional cognition, EF skills, sustained attention, divided attention, immediate recall, delayed recall, processing speed, logical reasoning, and orientation.     Plan: Continue per plan of care.  Progress treatment as tolerated.  Continue safety awareness cards in future sessions. Ask pt to bring in pictures of family and friends for memory book in future. Complete emergency contact sheet in future sessions. Next session continue to work on bible versus worksheets.

## 2025-01-15 NOTE — TELEPHONE ENCOUNTER
Pt spouse called, asking for number for hematology office. Provided him with phone number. Also was asking if blood work Monday had to be fasting. Informed him that she does not need to fast for these labs.

## 2025-01-20 ENCOUNTER — APPOINTMENT (OUTPATIENT)
Dept: LAB | Facility: HOSPITAL | Age: 80
End: 2025-01-20
Payer: MEDICARE

## 2025-01-20 DIAGNOSIS — D50.8 OTHER IRON DEFICIENCY ANEMIA: Chronic | ICD-10-CM

## 2025-01-20 DIAGNOSIS — K31.811 GASTROINTESTINAL HEMORRHAGE ASSOCIATED WITH ANGIODYSPLASIA OF STOMACH AND DUODENUM: ICD-10-CM

## 2025-01-20 LAB
BASOPHILS # BLD AUTO: 0.03 THOUSANDS/ΜL (ref 0–0.1)
BASOPHILS NFR BLD AUTO: 0 % (ref 0–1)
EOSINOPHIL # BLD AUTO: 0.09 THOUSAND/ΜL (ref 0–0.61)
EOSINOPHIL NFR BLD AUTO: 1 % (ref 0–6)
ERYTHROCYTE [DISTWIDTH] IN BLOOD BY AUTOMATED COUNT: 17.1 % (ref 11.6–15.1)
HCT VFR BLD AUTO: 26.5 % (ref 34.8–46.1)
HGB BLD-MCNC: 7.6 G/DL (ref 11.5–15.4)
IMM GRANULOCYTES # BLD AUTO: 0.04 THOUSAND/UL (ref 0–0.2)
IMM GRANULOCYTES NFR BLD AUTO: 1 % (ref 0–2)
LYMPHOCYTES # BLD AUTO: 1.32 THOUSANDS/ΜL (ref 0.6–4.47)
LYMPHOCYTES NFR BLD AUTO: 16 % (ref 14–44)
MCH RBC QN AUTO: 21.5 PG (ref 26.8–34.3)
MCHC RBC AUTO-ENTMCNC: 28.7 G/DL (ref 31.4–37.4)
MCV RBC AUTO: 75 FL (ref 82–98)
MONOCYTES # BLD AUTO: 0.86 THOUSAND/ΜL (ref 0.17–1.22)
MONOCYTES NFR BLD AUTO: 10 % (ref 4–12)
NEUTROPHILS # BLD AUTO: 5.94 THOUSANDS/ΜL (ref 1.85–7.62)
NEUTS SEG NFR BLD AUTO: 72 % (ref 43–75)
NRBC BLD AUTO-RTO: 0 /100 WBCS
PLATELET # BLD AUTO: 313 THOUSANDS/UL (ref 149–390)
PMV BLD AUTO: 10.6 FL (ref 8.9–12.7)
RBC # BLD AUTO: 3.53 MILLION/UL (ref 3.81–5.12)
RETICS # AUTO: NORMAL 10*3/UL (ref 14097–95744)
RETICS # CALC: 1.54 % (ref 0.37–1.87)
WBC # BLD AUTO: 8.28 THOUSAND/UL (ref 4.31–10.16)

## 2025-01-20 PROCEDURE — 85025 COMPLETE CBC W/AUTO DIFF WBC: CPT

## 2025-01-20 PROCEDURE — 36415 COLL VENOUS BLD VENIPUNCTURE: CPT

## 2025-01-20 PROCEDURE — 85045 AUTOMATED RETICULOCYTE COUNT: CPT

## 2025-01-21 ENCOUNTER — TELEPHONE (OUTPATIENT)
Dept: HEMATOLOGY ONCOLOGY | Facility: CLINIC | Age: 80
End: 2025-01-21

## 2025-01-21 ENCOUNTER — EVALUATION (OUTPATIENT)
Dept: OCCUPATIONAL THERAPY | Facility: CLINIC | Age: 80
End: 2025-01-21
Payer: MEDICARE

## 2025-01-21 ENCOUNTER — RESULTS FOLLOW-UP (OUTPATIENT)
Dept: FAMILY MEDICINE CLINIC | Facility: CLINIC | Age: 80
End: 2025-01-21

## 2025-01-21 DIAGNOSIS — R41.89 MODERATE COGNITIVE IMPAIRMENT: Primary | ICD-10-CM

## 2025-01-21 DIAGNOSIS — D50.8 OTHER IRON DEFICIENCY ANEMIA: Primary | Chronic | ICD-10-CM

## 2025-01-21 PROCEDURE — 97530 THERAPEUTIC ACTIVITIES: CPT

## 2025-01-21 NOTE — TELEPHONE ENCOUNTER
Spoke w/ pt and . Offered sooner virtual consults w/ Leah Davis or Nalini Keene. Pt declined virtual appt.

## 2025-01-21 NOTE — RESULT ENCOUNTER NOTE
Please notify patient's  anemia persists, slightly better versus 1 week ago.  Continue with same dosing of iron, keep her appointment with hematology.  I placed an order for a FIT stool test to detect whether she has bleeding in her gastrointestinal tract.  He can  a kit at our lab and return there.

## 2025-01-21 NOTE — PROGRESS NOTES
OCCUPATIONAL THERAPY PROGRESS NOTE    POC expires Auth Status Total   Visits  Start date  Expiration date PT/OT + Visit Limit? Co-Insurance   24 Not required BOMN    BOMN                                                                           Visit/Unit Tracking  AUTH Status: Not Required Date           Visits  Authed: N/A Used 1 (Eval) 2 3 4 5 6 1 (Re)            Remaining  9 8 7 6 5 4 9             PLAN OF CARE START: 24  PLAN OF CARE END: 24  PROGRESS NOTE DUE: 25 (scheduled on calendar)   FREQUENCY: 1-x week for 10 weeks   PRECAUTIONS: orientation, difficulty hearing   DIAGNOSIS: moderate cognitive impairment   Insurance: Medicare   MAINTENANCE PROGRAM     Future Interventions: Finish Mystery Picture Grid, Daily routines, memory book, safety awareness         Today's date: 2025  Patient name: Anthony Slater  : 1945  MRN: [id number]  Referring provider: Rosamaria Silva,*  Dx:   Encounter Diagnosis     ICD-10-CM    1. Moderate cognitive impairment  R41.89               SKILLED ANALYSIS IE 10/8:  Pt is currently demonstrating the following occupational deficits: limited 2* visual immediate memory recall, delayed memory recall, attention, processing and executive function. Based on the aforementioned OT evaluation, functional performance deficits, and assessments, pt has been identified as a moderate complexity evaluation. Pt to continue to benefit from outpatient skilled OT services to address the following goals 1-2x/wk  for 4 weeks to focus on pt/caregiver education, increase memory deficits, increase cognitive function, orientation and alertness to improve overall QOL.      Re-Eval:   Pt presents to re-evaluation on 24 status post moderate cognitive decline. As per interview, pt reports continued impairments with memory. Pt presents with sentence repetition and lack of safety awareness. Additionally pt presents with  challenges with short term memory and long term memory affecting daily life and routines. Post assessments, pt demonstrating maintained cognition with logical reasoning, executive functioning, and STM and LTM. Pt is unable to recall her daily events. In the future recommend to start memory book and establish routines.  Pt has maintained progress and recommending to transition to a Maintenance Program. Pt presents with decreased cognitive functions due to effects of moderate cognitive impairment and dementia and demonstrates difficulty in daily tasks, including remembering recent events and completing health management. Skilled Maintenance occupational therapy is required to slow the patients functional decline due to dementia. Given the progressive/pathophysiology dementia nature of disease a maintenance program will help reduce the risk of further deterioration, loss of functional mobility or independence as well as decrease risk of secondary impairments form disease progress. Recommend OP OT 1x/wk for the next 10 weeks with focus on aforementioned deficits to maintain functional performance and improve QOL. Findings and recommendations discussed with pt, and they are in agreement. Educated pt on charges of insurance, skilled maintenance services, assessments performed with standardized norms, POC, goal creation, and OP OT services. Added maintenance goals for pt, see below.     Re-Eval: 1/21/25  Pt presents to re-evaluation on 1/21/25 status post moderate cognitive decline. As per interview, pt reports she has good days and bad days with her memory. Post assessments, pt demonstrating improvements in sustained attention on Trail making part A. Pt continues to demonstrate impairments in cognitive functioning and EF skills. Pt continues to participate with memory book activity throughout sessions. Pt maintained STGs/LTGs. Pt would benefit from continued OT services focusing on impairments for 1-2x week for 10 weeks  "following previously set POC with focus on aforementioned deficits to maximize functional performance and improve QOL.   Pt educated on progress/therapy process and pt in understanding and agreement of recommendations. Recommending to continue HEP. Findings and recommendations discussed with pt, and they are in agreement. Educated pt on charges of insurance, assessments performed with standardized norms, POC, goal creation, and OP OT services.        Subjective    PATIENT GOAL: \"To remember things better\"    PAIN: 0    HISTORY OF PRESENT ILLNESS: Pt is a 79 y.o. female seen for OT eval s/p Mild cognitive impairment [G31.84] referred to [facility]. Patient had recent appointment with Geriatric medicine and was referred to OT services for cognitive therapy. Patient is expected to return at the end of this month. Patient had a fall at the beginning of the year which is suspected to be the result of her cognitive decline.       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    History of transfusion     Hypertension     Mixed hyperlipidemia     DAVID on CPAP     Shortness of breath     triggered by anxiety    Watermelon stomach     Wears dentures        Past Surgical Hx:   Past Surgical History:   Procedure Laterality Date    BREAST BIOPSY Right 02/24/2015    BREAST BIOPSY Left     ? year    CARDIAC CATHETERIZATION  02/07/2014    EF 65%, mid LAD 60% " "stenosis and D2 70% stenosis.  Medical management.    CATARACT EXTRACTION Bilateral     COLONOSCOPY      CYSTOSCOPY N/A 2023    Procedure: CYSTOSCOPY;  Surgeon: Nadir Ash MD;  Location: [facility];  Service: Gynecology Oncology    EGD      JOINT REPLACEMENT Right     RIGHT ELBOW    OOPHORECTOMY      not sure which one was removed    TN LAPS TOTAL HYSTERECT 250 GM/< W/RMVL TUBE/OVARY N/A 2023    Procedure: ROBOTHYSTERECTOMY, BILATERAL SALPINGO-OOPHORECTOMY, PELVIC SENTINEL NODE BIOPSIES;  Surgeon: Nadir Ash MD;  Location: [facility];  Service: Gynecology Oncology    ROTATOR CUFF REPAIR Right     US GUIDED THYROID BIOPSY  2020          Objective    Impairment Observations:     [place] Cognitive Assessment (MoCA)  \"screen of cognitive abilities designed to detect mild cognitive dysfunction.\"  NORMS:   -Normal: 26+  -Mild Cognitive Impairment: 18-25   -Moderate Cognitive Impairment: 10-17  -Severe Cognitive Impairment: <10        STATUS ON IE: Not Tested Re-eval:  Re-eval:  Re: 25                                         COGNITIVE FXN        VERSION  Version:8.2  Version:  Version: 8.1             MOCA          Education Level Less than 12 years (+1)       Visuospatial/executive functioning  2  2/ 2   Naming  3/3  2/3 2/3   Memory: 1st trial:  2 2/ 1   Memory: 2nd trial:   2 3 3   Attention/concentration   2/2 1/ 12   List of letters:       0/ 1   Serial Seven Subtraction:   1/3 0/3 1/3   Language/sentence repetition:   0/2 0/2 1/2   Language Fluency:     0/1  0/1 0/1 8 words    Abstract/Correlational Thinkin/2 2/2 1/2   Delayed Recall:   0/5 0/5 0/5   Orientation:   3/6 3 46   Memory Index Score   2/15 3/15 2/15                                      Total Score   (+1)  Moderate Cognitive Impairment  (+1)  Moderate Cognitive Impairment  (+1) Moderate cognitive impairment     Performed Mini Mental State Examination on IE " "(10/8):  moderate impairment noted     TRAIL MAKING TEST:   \"widely used test to assess executive function in patients with neuro injury. Successful performance of the TMT requires a variety of mental abilities including letter and number recognition mental flexibility, visual scanning, and motor function. Norms are based on age related scores\"     STATUS ON IE: Not Tested Status on Re-Eval: 11/13 Status on Re-Eval: 12/11 Re-eval: 1/21/25   TRAIL MAKING PART A     NORM: 58 seconds  1 minute 4 seconds  1 min 49 seconds 3 errors  1 minute  0 errors    TRAIL MAKING PART B    NORM: 2 minutes 32 seconds   7 minutes 10 seconds with max vcs Not Tested  Not Tested     1/21/25: Significant improvement in sustained attention with 49 second increase     CONTEXTUAL MEMORY TEST (1993 version):   \"assesses immediate and delayed recall of 20 objects related to a scene in children and adults with memory impairments\"   NORMS:   *not applicable to current in house version     TYPE OF RECALL STATUS ON IE: 10/8/24 Re-Eval: 11/13    Immediate recall 5/20 2/20     Delayed recall  3/20 0/20          MAINTENANCE GOALS    GOAL  STATUS ON IE  GOAL STATUS    Pt will maintain MOCA score of +/1 3 on MOCA increase MOCA for maintained EF skills, logical thinking, and processing speed for involvement in IADLs/ADLs 12/30  Maintained    Pt will maintain immediate recall on MOCA scoring (+/-1) 3/5 for both rounds for maintained participation in community activities  3/5 Decreased   Pt will maintain logical/abstract thinking by scoring (+/-1) 2/2 on abstract thinking section of MOCA for maintained participation in purposeful activities 2/2 Maintained   Pt will maintain STM by scoring (+/- 3) on the immediate recall of CMT for maintained functioning with IADLs 2/20 Established    Pt will maintain sustained attention on Trail Making Part A completing in (+/- 10 seconds) maintained attention to tasks for ADLs/IADLs  1 minute 4 seconds Maintained    Pt " will consistently report A&O x 3 75% of the time for maintained safety awareness for improved health management.  Maintained   Pt and caregiver will be educated on compensatory adaptive devices and DME available to improve independence with ADL/leisure tasks I.e. Life alert, pill box, door locks, security system, wander guard/chair alarm, BSC, grab bars, caregiver burden/burnout, memory aides, etc.  Established    Pt will develop a memory book and establish daily routines in OT sessions for maintained participation in social/leisure and ADLs   Maintained

## 2025-01-23 ENCOUNTER — TELEPHONE (OUTPATIENT)
Age: 80
End: 2025-01-23

## 2025-01-23 NOTE — TELEPHONE ENCOUNTER
Patient's , Yoni, calling.  is stating that the stool kit that the patient received to check for blood in the stool is difficult to obtain.  states that patient has cognitive decline and it is difficult for patient to signal that she is about to have a bowel movement. Yoni is wanting advice on how to proceed and is also asking the timeline of when this testing needs to be completed. He would like a return call to discuss. Please advise.

## 2025-01-27 ENCOUNTER — APPOINTMENT (OUTPATIENT)
Dept: LAB | Facility: HOSPITAL | Age: 80
End: 2025-01-27

## 2025-01-27 DIAGNOSIS — D50.8 OTHER IRON DEFICIENCY ANEMIA: Chronic | ICD-10-CM

## 2025-01-28 ENCOUNTER — OFFICE VISIT (OUTPATIENT)
Dept: OCCUPATIONAL THERAPY | Facility: CLINIC | Age: 80
End: 2025-01-28
Payer: MEDICARE

## 2025-01-28 DIAGNOSIS — R41.89 MODERATE COGNITIVE IMPAIRMENT: Primary | ICD-10-CM

## 2025-01-28 PROCEDURE — 97530 THERAPEUTIC ACTIVITIES: CPT

## 2025-01-28 NOTE — PROGRESS NOTES
"Daily Note     Today's date: 2025  Patient name: Anthony Slater  : 1945  MRN: [id number]  Referring provider: Rosamaria Silva,*  Dx:   Encounter Diagnosis     ICD-10-CM    1. Moderate cognitive impairment  R41.89                          POC expires Auth Status Total   Visits  Start date  Expiration date PT/OT + Visit Limit? Co-Insurance   24 Not required BOMN 10/8/24   BOMN                                                                           Visit/Unit Tracking  AUTH Status: Not Required Date          Visits  Authed: N/A Used 1 (Eval) 2 3 4 5 6 5 4           Remaining  9 8 7 6 5 4 3 2            PLAN OF CARE START: 24  PLAN OF CARE END: 24  PROGRESS NOTE DUE: 25 (scheduled on calendar)   FREQUENCY: 1-x week for 10 weeks   PRECAUTIONS: orientation, difficulty hearing   DIAGNOSIS: moderate cognitive impairment   Insurance: Medicare   MAINTENANCE PROGRAM     Future Interventions: Finish Mystery Picture Grid Next Session, Daily routines, memory book, safety awareness       Subjective: \"Today is not a good day. I should have stayed home.    Objective: See treatment diary below. Pt participated in skilled OT this date with focus on sustained attention, functional cognition, working/short term memory, EF skills and activity tolerance/endurance, for increased safety and engagement in ADL/IADL tasks. PT continued to complete worksheets to add to memory book.     TA:   *Had pt bring in personal bible to pick out her favorite bible versus to complete worksheets to add to memory book. Had pt practice looking through the bible to find various verses focusing on direction following, EF skills, sequencing, and working memory.  Activity focused client centered care completing activity that is meaningful and purposeful to client to add to memory book for increased occupational participation and role fulfillment.     *Worksheet reviewing pt's " favorite bible versus and compiling into list to add to her memory book for her to reflect back to.   *I Spy dig it  pt provided with 3 items to remember and find amongst other items. Pt used repeated repetition strategy to remember items. Pt able to recall 2/3 items, vc's provided for  missing items to recall.  *Grocery shopping list worksheets- pt provided with list of groceries. Pt checked off items needed from store. Pt then located items and placed them in grocery cart. Pt required min A to find items on worksheet, she was fixated on shopping list. Pt able to recall items she got from grocery store yesterday. Some difficulty with word finding on recalling favorite food items.      Assessment: Tolerated treatment well. Focus of session on working on memory book, sustained attention, long term memory, occupational role fulfillment and immediate memory. Pt continued memory book on today's date. Purpose of memory book to record important information, trigger memories, and start conversations. Memory books can improve quality of life by providing reassurance. It is a method to bond with loved ones and increase trust. It aides with repeated questions and clearing up confusion. Pt demonstrated increased cognitive functioning and direction following with searching for bible verses compared to previous session. Provided pt with HEP to continue to bring in bible for  sessions. Pt would benefit from continued OT services to address functional cognition, EF skills, sustained attention, divided attention, immediate recall, delayed recall, processing speed, logical reasoning, and orientation.     Plan: Continue per plan of care.  Progress treatment as tolerated.  Continue safety awareness cards in future sessions. Ask pt to bring in pictures of family and friends for memory book in future. Complete emergency contact sheet in future sessions. Next session continue to work on bible versus worksheets.

## 2025-02-03 ENCOUNTER — APPOINTMENT (OUTPATIENT)
Dept: LAB | Facility: HOSPITAL | Age: 80
End: 2025-02-03
Payer: MEDICARE

## 2025-02-03 DIAGNOSIS — D50.8 OTHER IRON DEFICIENCY ANEMIA: ICD-10-CM

## 2025-02-03 LAB — HEMOCCULT STL QL IA: NEGATIVE

## 2025-02-03 PROCEDURE — G0328 FECAL BLOOD SCRN IMMUNOASSAY: HCPCS

## 2025-02-04 ENCOUNTER — RESULTS FOLLOW-UP (OUTPATIENT)
Dept: FAMILY MEDICINE CLINIC | Facility: CLINIC | Age: 80
End: 2025-02-04

## 2025-02-04 ENCOUNTER — OFFICE VISIT (OUTPATIENT)
Dept: OCCUPATIONAL THERAPY | Facility: CLINIC | Age: 80
End: 2025-02-04
Payer: MEDICARE

## 2025-02-04 DIAGNOSIS — R41.89 MODERATE COGNITIVE IMPAIRMENT: Primary | ICD-10-CM

## 2025-02-04 PROCEDURE — 97530 THERAPEUTIC ACTIVITIES: CPT

## 2025-02-04 NOTE — PROGRESS NOTES
"Daily Note     Today's date: 2025  Patient name: Anthony Slater  : 1945  MRN: [id number]  Referring provider: Rosamaria Silva,*  Dx:   Encounter Diagnosis     ICD-10-CM    1. Moderate cognitive impairment  R41.89                            POC expires Auth Status Total   Visits  Start date  Expiration date PT/OT + Visit Limit? Co-Insurance   25 Not required BOMN 10/8/24   BOMN                                                                           Visit/Unit Tracking  AUTH Status: Not Required Date 25        Visits  Authed: N/A Used 1 (Eval) 2 3 4 5 6 1 (RE) 2 3          Remaining  9 8 7 6 5 4 9 8 7           PLAN OF CARE START: 24  PLAN OF CARE END: 24  PROGRESS NOTE DUE: 25 (scheduled on calendar)   FREQUENCY: 1-x week for 10 weeks   PRECAUTIONS: orientation, difficulty hearing   DIAGNOSIS: moderate cognitive impairment   Insurance: Medicare   MAINTENANCE PROGRAM     Future Interventions: Finish Mystery Picture Grid Next Session, Daily routines, memory book, safety awareness       Subjective: \"I hope I do better today then last time\".      Objective: See treatment diary below. Pt participated in skilled OT this date with focus on sustained attention, functional cognition, working/short term memory, EF skills and activity tolerance/endurance, for increased safety and engagement in ADL/IADL tasks. PT continued to complete worksheets to add to memory book.     TA:   *Had pt bring in personal bible to pick out her favorite bible versus to complete worksheets to add to memory book. Had pt practice looking through the bible to find various verses focusing on direction following, EF skills, sequencing, and working memory. Pt then worked with therapist on worksheet writing down verse location to add to memory book to reflect back to. Pt then journaled on why she likes the quote or what it means to her. Activity focused on client " centered care completing activity that is meaningful and purposeful to client to add to memory book for increased occupational participation and role fulfillment.         Assessment: Tolerated treatment well. Focus of session on working on memory book, sustained attention, long term memory, occupational role fulfillment, immediate memory. Pt continued memory book on today's date. Purpose of memory book to record important information, trigger memories, and start conversations. Memory books can improve quality of life by providing reassurance. It is a method to bond with loved ones and increase trust. It aides with repeated questions and clearing up confusion. Pt demonstrated increased cognitive functioning and direction following with searching for bible verses compared to previous session. Pt required mod vcs for direction following for multi-step tasks and therapist broke down directions into single step tasks to complete. Provided pt with HEP to continue to bring in bible for  sessions. Pt would benefit from continued OT services to address functional cognition, EF skills, sustained attention, divided attention, immediate recall, delayed recall, processing speed, logical reasoning, and orientation.     Plan: Continue per plan of care.  Progress treatment as tolerated.  Continue safety awareness cards in future sessions. Ask pt to bring in pictures of family and friends for memory book in future. Complete emergency contact sheet in future sessions. Next session continue to work on bible versus worksheets.

## 2025-02-11 ENCOUNTER — OFFICE VISIT (OUTPATIENT)
Dept: OCCUPATIONAL THERAPY | Facility: CLINIC | Age: 80
End: 2025-02-11
Payer: MEDICARE

## 2025-02-11 DIAGNOSIS — R41.89 MODERATE COGNITIVE IMPAIRMENT: Primary | ICD-10-CM

## 2025-02-11 PROCEDURE — 97530 THERAPEUTIC ACTIVITIES: CPT

## 2025-02-11 NOTE — PROGRESS NOTES
"Daily Note     Today's date: 2025  Patient name: Anthony Slater  : 1945  MRN: [id number]  Referring provider: Rosamaria Silva,*  Dx:   Encounter Diagnosis     ICD-10-CM    1. Moderate cognitive impairment  R41.89                            POC expires Auth Status Total   Visits  Start date  Expiration date PT/OT + Visit Limit? Co-Insurance   25 Not required BOMN 10/8/24   BOMN                                                                           Visit/Unit Tracking  AUTH Status: Not Required Date 25       Visits  Authed: N/A Used 1 (Eval) 2 3 4 5 6 1 (RE) 2 3 4         Remaining  9 8 7 6 5 4 9 8 7 6          PLAN OF CARE START: 24  PLAN OF CARE END: 24  PROGRESS NOTE DUE: 25 (scheduled on calendar)   FREQUENCY: 1-x week for 10 weeks   PRECAUTIONS: orientation, difficulty hearing   DIAGNOSIS: moderate cognitive impairment   Insurance: Medicare   MAINTENANCE PROGRAM     Future Interventions: Finish [employer] Picture Grid Next Session, Daily routines, memory book, safety awareness       Subjective: \"I'm not with it today\".      Objective: See treatment diary below. Pt participated in skilled OT this date with focus on sustained attention, functional cognition, working/short term memory, EF skills and activity tolerance/endurance, for increased safety and engagement in ADL/IADL tasks. PT continued to complete worksheets to add to memory book.     TA:   *Grocery shopping activity with list of 3-6 items to find in \"store\" and place into \"shopping cart\". Activity focused on following multi step directions, visual searching, and immediate recall. Activity meant to mimic activities and tasks at grocery store. Required min A for finding and recalling correct items. Downgraded task to have pt find one item at a time.     *Pt filled out emergency contact sheet to add to memory book.     *Had pt bring in personal bible to pick out " her favorite bible versus to complete worksheets to add to memory book. Had pt practice looking through the bible to find various verses focusing on direction following, EF skills, sequencing, and working memory. Pt then worked with therapist on worksheet writing down verse location to add to memory book to reflect back to. Pt then journaled on why she likes the quote or what it means to her. Activity focused on client centered care completing activity that is meaningful and purposeful to client to add to memory book for increased occupational participation and role fulfillment.       Assessment: Tolerated treatment well. Focus of session on working on memory book, sustained attention, long term memory, occupational role fulfillment, immediate memory. Pt continued memory book on today's date. Purpose of memory book to record important information, trigger memories, and start conversations. Memory books can improve quality of life by providing reassurance. It is a method to bond with loved ones and increase trust. It aides with repeated questions and clearing up confusion. Pt able to find 1-2 items at a time during grocery shopping activity. Provided pt with HEP to continue to bring in bible for sessions. Pt would benefit from continued OT services to address functional cognition, EF skills, sustained attention, divided attention, immediate recall, delayed recall, processing speed, logical reasoning, and orientation.     Plan: Continue per plan of care.  Progress treatment as tolerated.  Continue safety awareness cards in future sessions. Ask pt to bring in pictures of family and friends for memory book in future. Complete emergency contact sheet in future sessions. Next session continue to work on bible versus worksheets and add family photos to memory book.

## 2025-02-12 ENCOUNTER — TELEPHONE (OUTPATIENT)
Dept: HEMATOLOGY ONCOLOGY | Facility: CLINIC | Age: 80
End: 2025-02-12

## 2025-02-12 ENCOUNTER — OFFICE VISIT (OUTPATIENT)
Dept: HEMATOLOGY ONCOLOGY | Facility: CLINIC | Age: 80
End: 2025-02-12
Payer: MEDICARE

## 2025-02-12 VITALS
DIASTOLIC BLOOD PRESSURE: 72 MMHG | OXYGEN SATURATION: 100 % | TEMPERATURE: 98 F | SYSTOLIC BLOOD PRESSURE: 116 MMHG | BODY MASS INDEX: 19.83 KG/M2 | HEIGHT: 61 IN | HEART RATE: 58 BPM | WEIGHT: 105 LBS | RESPIRATION RATE: 16 BRPM

## 2025-02-12 DIAGNOSIS — D50.8 OTHER IRON DEFICIENCY ANEMIA: Primary | Chronic | ICD-10-CM

## 2025-02-12 DIAGNOSIS — C54.1 ENDOMETRIAL CANCER (HCC): ICD-10-CM

## 2025-02-12 DIAGNOSIS — N18.31 STAGE 3A CHRONIC KIDNEY DISEASE (HCC): ICD-10-CM

## 2025-02-12 PROCEDURE — 99204 OFFICE O/P NEW MOD 45 MIN: CPT | Performed by: INTERNAL MEDICINE

## 2025-02-12 RX ORDER — SODIUM CHLORIDE 9 MG/ML
20 INJECTION, SOLUTION INTRAVENOUS ONCE
Status: CANCELLED | OUTPATIENT
Start: 2025-02-21

## 2025-02-12 NOTE — ASSESSMENT & PLAN NOTE
The patient clearly has iron deficiency microcytic anemia.  The exact etiology of the iron deficiency is not entirely clear.  The  reported fahad blood at least on 1 occasion.  This is less likely to be from vaginal area.  He stated this could be from the lower GI tract or hemorrhoids.  She does have an appointment with her GI team in the next month.  I did ask the , who is very involved in his wife's care due to her dementia, to stop the oral iron supplements since we are planning to pursue Venofer IV weekly x 4 doses to correct her iron deficiency and hopefully her anemia.  We will then recheck her iron panel prior to her next visit in couple of months from now and then act accordingly.  Orders:    Ambulatory Referral to Hematology / Oncology    US pelvis transabdominal only; Future    CBC and differential; Future    Comprehensive metabolic panel; Future    Ferritin; Future    Iron Panel (Includes Ferritin, Iron Sat%, Iron, and TIBC); Future    Magnesium; Future

## 2025-02-12 NOTE — ASSESSMENT & PLAN NOTE
Stage Ib high intermediate risk endometrial cancer. Completed vaginal brachytherapy in October 2023.  She was seen by the GYN oncology team around the middle of November of last year and was not found to have any obvious hint of recurrence or progression.  No vaginal bleeding was reported at that time.  It is not entirely clear if the patient has vaginal bleeding according to her  versus bleeding from the lower GI tract.  Pelvic ultrasound was ordered.  I did ask the  to get in touch with the GYN oncology team if the patient reports vaginal bleeding at any point in the near future.

## 2025-02-12 NOTE — ASSESSMENT & PLAN NOTE
Lab Results   Component Value Date    EGFR 38 01/13/2025    EGFR 35 08/10/2024    EGFR 37 07/30/2024    CREATININE 1.31 (H) 01/13/2025    CREATININE 1.39 (H) 08/10/2024    CREATININE 1.34 (H) 07/30/2024

## 2025-02-12 NOTE — PROGRESS NOTES
Name: Anthony Slater      : 1945      MRN: [id number]  Encounter Provider: Mendy Quintanilla MD  Encounter Date: 2025   Encounter department: [facility] [place]  :  Assessment & Plan  Other iron deficiency anemia  The patient clearly has iron deficiency microcytic anemia.  The exact etiology of the iron deficiency is not entirely clear.  The  reported fahad blood at least on 1 occasion.  This is less likely to be from vaginal area.  He stated this could be from the lower GI tract or hemorrhoids.  She does have an appointment with her GI team in the next month.  I did ask the , who is very involved in his wife's care due to her dementia, to stop the oral iron supplements since we are planning to pursue Venofer IV weekly x 4 doses to correct her iron deficiency and hopefully her anemia.  We will then recheck her iron panel prior to her next visit in couple of months from now and then act accordingly.  Orders:    Ambulatory Referral to Hematology / Oncology    US pelvis transabdominal only; Future    CBC and differential; Future    Comprehensive metabolic panel; Future    Ferritin; Future    Iron Panel (Includes Ferritin, Iron Sat%, Iron, and TIBC); Future    Magnesium; Future    Endometrial cancer (HCC)  Stage Ib high intermediate risk endometrial cancer. Completed vaginal brachytherapy in 2023.  She was seen by the GYN oncology team around the middle of November of last year and was not found to have any obvious hint of recurrence or progression.  No vaginal bleeding was reported at that time.  It is not entirely clear if the patient has vaginal bleeding according to her  versus bleeding from the lower GI tract.  Pelvic ultrasound was ordered.  I did ask the  to get in touch with the GYN oncology team if the patient reports vaginal bleeding at any point in the near future.     Stage 3a chronic kidney disease (HCC)  Lab Results    Component Value Date    EGFR 38 01/13/2025    EGFR 35 08/10/2024    EGFR 37 07/30/2024    CREATININE 1.31 (H) 01/13/2025    CREATININE 1.39 (H) 08/10/2024    CREATININE 1.34 (H) 07/30/2024                History of Present Illness   Chief Complaint   Patient presents with    Consult   This is an 79-year-old female with multiple comorbid conditions including COPD, diabetes mellitus, endometrial adenocarcinoma and early signs of dementia.  The patient was sent to us for further evaluation of her microcytic anemia.  Most recent available blood work from 1/20/2025 showed hemoglobin of 7.6 with MCV of 75.  White cells and platelets were within normal range with normal white cell differential.  The iron panel on 1/13/2025 showed saturation of 5% with ferritin of 11.  She was started on oral iron supplements by her PCP and continues to take 3 pills of iron daily.  She did report black stools.  However, the stool was negative for occult blood testing on 2/3/2025.  CMP on 1/13/2025 showed creatinine of 1.3 with normal calcium and liver enzymes.      Review of Systems   Constitutional:  Positive for appetite change and fatigue. Negative for chills and fever.   HENT:  Negative for ear pain and sore throat.    Eyes:  Negative for pain and visual disturbance.   Respiratory:  Negative for cough and shortness of breath.    Cardiovascular:  Negative for chest pain and palpitations.   Gastrointestinal:  Negative for abdominal pain and vomiting.   Genitourinary:  Negative for dysuria and hematuria.   Musculoskeletal:  Negative for arthralgias and back pain.   Skin:  Negative for color change and rash.   Neurological:  Positive for dizziness. Negative for seizures and syncope.   Psychiatric/Behavioral:  Positive for sleep disturbance.    All other systems reviewed and are negative.    Medical History Reviewed by provider this encounter:  Tobacco  Allergies  Meds  Problems  Med Hx  Surg Hx  Fam Hx     .  Current Outpatient  "Medications on File Prior to Visit   Medication Sig Dispense Refill    atorvastatin (LIPITOR) 40 mg tablet TAKE 1 TABLET BY MOUTH DAILY 90 tablet 1    busPIRone (BUSPAR) 5 mg tablet Take 1 tablet (5 mg total) by mouth 3 (three) times a day 360 tablet 5    Cholecalciferol (VITAMIN D3) 1,000 units tablet Take 1,000 Units by mouth daily      co-enzyme Q-10 100 mg capsule Take by mouth daily      famotidine (PEPCID) 20 mg tablet Take 1 tablet (20 mg total) by mouth daily at bedtime 90 tablet 3    ferrous sulfate 324 (65 Fe) mg Take 1 tablet (324 mg total) by mouth 2 (two) times a day (Patient taking differently: Take 324 mg by mouth 3 (three) times a day before meals) 180 tablet 1    metoprolol tartrate (LOPRESSOR) 25 mg tablet Take 1 tablet (25 mg total) by mouth every morning 100 tablet 1    pantoprazole (PROTONIX) 40 mg tablet Take 1 tablet (40 mg total) by mouth daily 90 tablet 3    simethicone (MYLICON,GAS-X) 180 MG capsule Take 1 capsule (180 mg total) by mouth 2 (two) times a day 60 capsule 5    vitamin B-12 (VITAMIN B-12) 1,000 mcg tablet Take 1,000 mcg by mouth daily       No current facility-administered medications on file prior to visit.      Social History     Tobacco Use    Smoking status: Never     Passive exposure: Never    Smokeless tobacco: Never   Vaping Use    Vaping status: Never Used   Substance and Sexual Activity    Alcohol use: Not Currently    Drug use: Never    Sexual activity: Yes     Partners: Male     Birth control/protection: Post-menopausal         Objective   /72 (BP Location: Left arm, Patient Position: Sitting, Cuff Size: Adult)   Pulse 58   Temp 98 °F (36.7 °C) (Temporal)   Resp 16   Ht 5' 1\" (1.549 m)   Wt 47.6 kg (105 lb)   SpO2 100%   BMI 19.84 kg/m²     Physical Exam  Constitutional:       General: She is not in acute distress.     Appearance: She is well-developed. She is not diaphoretic.   HENT:      Head: Normocephalic and atraumatic.      Nose: Nose normal. "   Eyes:      General: No scleral icterus.        Right eye: No discharge.         Left eye: No discharge.      Conjunctiva/sclera: Conjunctivae normal.      Pupils: Pupils are equal, round, and reactive to light.   Neck:      Thyroid: No thyromegaly.      Vascular: No JVD.      Trachea: No tracheal deviation.   Cardiovascular:      Rate and Rhythm: Normal rate and regular rhythm.      Heart sounds: Normal heart sounds. No murmur heard.     No friction rub.   Pulmonary:      Effort: Pulmonary effort is normal. No respiratory distress.      Breath sounds: Normal breath sounds. No stridor. No wheezing or rales.   Chest:      Chest wall: No tenderness.   Abdominal:      General: There is no distension.      Palpations: Abdomen is soft. There is no hepatomegaly or splenomegaly.      Tenderness: There is no abdominal tenderness. There is no guarding or rebound.   Musculoskeletal:         General: No tenderness or deformity. Normal range of motion.      Cervical back: Normal range of motion and neck supple.   Lymphadenopathy:      Cervical: No cervical adenopathy.   Skin:     General: Skin is warm and dry.      Coloration: Skin is not pale.      Findings: No erythema or rash.   Neurological:      Mental Status: She is alert.      Cranial Nerves: No cranial nerve deficit.      Coordination: Coordination normal.      Deep Tendon Reflexes: Reflexes are normal and symmetric.         Labs: I have reviewed the following labs:  Results for orders placed or performed in visit on 02/03/25   iFOBT/FIT   Result Value Ref Range    OCCULT BLD, FECAL IMMUNOLOGICAL Negative Negative     Lab Results   Component Value Date/Time    WBC 8.28 01/20/2025 12:19 PM    RBC 3.53 (L) 01/20/2025 12:19 PM    Hemoglobin 7.6 (L) 01/20/2025 12:19 PM    Hematocrit 26.5 (L) 01/20/2025 12:19 PM    MCV 75 (L) 01/20/2025 12:19 PM    MCH 21.5 (L) 01/20/2025 12:19 PM    RDW 17.1 (H) 01/20/2025 12:19 PM    Platelets 313 01/20/2025 12:19 PM    Segmented % 72  01/20/2025 12:19 PM    Lymphocytes % 16 01/20/2025 12:19 PM    Monocytes % 10 01/20/2025 12:19 PM    Eosinophils Relative 1 01/20/2025 12:19 PM    Basophils Relative 0 01/20/2025 12:19 PM    Immature Grans % 1 01/20/2025 12:19 PM    Absolute Neutrophils 5.94 01/20/2025 12:19 PM      Lab Results   Component Value Date/Time    Sodium 140 01/13/2025 08:37 AM    Potassium 4.0 01/13/2025 08:37 AM    Chloride 106 01/13/2025 08:37 AM    CO2 25 01/13/2025 08:37 AM    ANION GAP 9 01/13/2025 08:37 AM    BUN 26 (H) 01/13/2025 08:37 AM    Creatinine 1.31 (H) 01/13/2025 08:37 AM    Glucose 147 (H) 08/10/2024 10:30 AM    Glucose, Fasting 80 01/13/2025 08:37 AM    Calcium 9.0 01/13/2025 08:37 AM    AST 17 01/13/2025 08:37 AM    ALT 10 01/13/2025 08:37 AM    Alkaline Phosphatase 56 01/13/2025 08:37 AM    Total Protein 6.7 01/13/2025 08:37 AM    Albumin 4.0 01/13/2025 08:37 AM    Total Bilirubin 0.41 01/13/2025 08:37 AM    eGFR 38 01/13/2025 08:37 AM      Lab Results   Component Value Date/Time    Iron 19 (L) 01/13/2025 08:37 AM    Iron Saturation 5 (L) 01/13/2025 08:37 AM    Ferritin 11 01/13/2025 08:37 AM    Vitamin B-12 1,156 (H) 08/10/2024 10:30 AM        Radiology Results Review : No pertinent imaging studies reviewed.

## 2025-02-13 ENCOUNTER — HOSPITAL ENCOUNTER (OUTPATIENT)
Dept: ULTRASOUND IMAGING | Facility: HOSPITAL | Age: 80
End: 2025-02-13
Attending: INTERNAL MEDICINE
Payer: MEDICARE

## 2025-02-13 DIAGNOSIS — D50.8 OTHER IRON DEFICIENCY ANEMIA: Chronic | ICD-10-CM

## 2025-02-13 PROCEDURE — 76856 US EXAM PELVIC COMPLETE: CPT

## 2025-02-19 ENCOUNTER — HOSPITAL ENCOUNTER (OUTPATIENT)
Dept: INFUSION CENTER | Facility: HOSPITAL | Age: 80
Discharge: HOME | End: 2025-02-19
Attending: INTERNAL MEDICINE

## 2025-02-19 ENCOUNTER — OFFICE VISIT (OUTPATIENT)
Dept: OCCUPATIONAL THERAPY | Facility: CLINIC | Age: 80
End: 2025-02-19
Payer: MEDICARE

## 2025-02-19 DIAGNOSIS — R41.89 MODERATE COGNITIVE IMPAIRMENT: Primary | ICD-10-CM

## 2025-02-19 PROCEDURE — 97530 THERAPEUTIC ACTIVITIES: CPT

## 2025-02-19 NOTE — PROGRESS NOTES
Daily Note     Today's date: 2025  Patient name: Anthony Slater  : 1945  MRN: [id number]  Referring provider: Rosamaria Silva,*  Dx:   Encounter Diagnosis     ICD-10-CM    1. Moderate cognitive impairment  R41.89                            POC expires Auth Status Total   Visits  Start date  Expiration date PT/OT + Visit Limit? Co-Insurance   25 Not required BOMN 10/8/24   BOMN                                                                           Visit/Unit Tracking  AUTH Status: Not Required Date 25     Visits  Authed: N/A Used 1 (Eval) 2 3 4 5 6 1 (RE) 2 3 4  5       Remaining  9 8 7 6 5 4 9 8 7 6  5        PLAN OF CARE START: 24  PLAN OF CARE END: 24  PROGRESS NOTE DUE: 25 (scheduled on calendar)   FREQUENCY: 1-x week for 10 weeks   PRECAUTIONS: orientation, difficulty hearing   DIAGNOSIS: moderate cognitive impairment   Insurance: Medicare   MAINTENANCE PROGRAM     Future Interventions: Finish Mystery Picture Grid Next Session, Daily routines, memory book, safety awareness       Subjective: Oh, I have my days. Today is an off day I'm sorry.    Objective: See treatment diary below. Pt participated in skilled OT this date with focus on sustained attention, functional cognition, working/short term memory, EF skills and activity tolerance/endurance, to maintain safety and engagement in ADL/IADL tasks.     There Act: Therapist looking over and having pt explain who was in pictures she had in her folder with pt with min difficulty with granddaughters name, however, pt able to explain others in the pictures well. Therapist then explaining Emergency Contact List and purpose, to explain the same to caregiver for safety purposes. Pt then engaging in Letter Placement sheet this date with increased time and mod/max A. Therapist providing sheet for continued HW at home. Therapist discussing emergency sheet with   prior to pt leaving.      Assessment: Tolerated treatment well. Pt brought in pictures this date and was unsure about filling out emergency information, with  weary of placing personal information on sheet and carrying around. Pt would benefit from continued OT services to address functional cognition, EF skills, sustained attention, divided attention, immediate recall, delayed recall, processing speed, logical reasoning, and orientation.     Plan: Continue per plan of care.  Progress treatment as tolerated.  Complete emergency contact sheet in future sessions.

## 2025-02-21 ENCOUNTER — HOSPITAL ENCOUNTER (OUTPATIENT)
Dept: INFUSION CENTER | Facility: HOSPITAL | Age: 80
End: 2025-02-21
Attending: INTERNAL MEDICINE
Payer: MEDICARE

## 2025-02-21 VITALS
SYSTOLIC BLOOD PRESSURE: 166 MMHG | HEART RATE: 69 BPM | DIASTOLIC BLOOD PRESSURE: 81 MMHG | TEMPERATURE: 97.7 F | RESPIRATION RATE: 18 BRPM

## 2025-02-21 DIAGNOSIS — D50.8 OTHER IRON DEFICIENCY ANEMIA: Primary | ICD-10-CM

## 2025-02-21 PROCEDURE — 96365 THER/PROPH/DIAG IV INF INIT: CPT

## 2025-02-21 RX ORDER — SODIUM CHLORIDE 9 MG/ML
20 INJECTION, SOLUTION INTRAVENOUS ONCE
OUTPATIENT
Start: 2025-02-28

## 2025-02-21 RX ORDER — SODIUM CHLORIDE 9 MG/ML
20 INJECTION, SOLUTION INTRAVENOUS ONCE
Status: COMPLETED | OUTPATIENT
Start: 2025-02-21 | End: 2025-02-21

## 2025-02-21 RX ADMIN — IRON SUCROSE 200 MG: 20 INJECTION, SOLUTION INTRAVENOUS at 14:14

## 2025-02-21 RX ADMIN — SODIUM CHLORIDE 20 ML/HR: 0.9 INJECTION, SOLUTION INTRAVENOUS at 14:13

## 2025-02-21 NOTE — PROGRESS NOTES
Anthony Slater  tolerated treatment well with no complications.      Anthony Slater is aware of future appt on 2/28 at 2 pm.     AVS declined by Anthony Slater.

## 2025-02-25 ENCOUNTER — EVALUATION (OUTPATIENT)
Dept: OCCUPATIONAL THERAPY | Facility: CLINIC | Age: 80
End: 2025-02-25
Payer: MEDICARE

## 2025-02-25 DIAGNOSIS — R41.89 MODERATE COGNITIVE IMPAIRMENT: Primary | ICD-10-CM

## 2025-02-25 PROCEDURE — 97530 THERAPEUTIC ACTIVITIES: CPT

## 2025-02-25 NOTE — PROGRESS NOTES
OCCUPATIONAL THERAPY Re-Evaluation        Visit/Unit Tracking  AUTH Status: Not Required Date 25   Visits  Authed: N/A Used 1 (Eval) 2 3 4 5 6 1 (RE) 2 3 4  5  6 (RE)     Remaining  9 8 7 6 5 4 9 8 7 6  5  4      PLAN OF CARE START: 25  PLAN OF CARE END: 24  PROGRESS NOTE DUE: 3/25/25 (scheduled on calendar)   FREQUENCY: 1-x week for 8 weeks   PRECAUTIONS: orientation, difficulty hearing   DIAGNOSIS: moderate cognitive impairment   Insurance: Medicare   MAINTENANCE PROGRAM     Future Interventions: Finish [employer] Picture Grid Next Session, Daily routines, memory book, safety awareness         Today's date: 2025  Patient name: Anthony Slater  : 1945  MRN: [id number]  Referring provider: Rosamaria Sliva,*  Dx:   Encounter Diagnosis     ICD-10-CM    1. Moderate cognitive impairment  R41.89               SKILLED ANALYSIS IE 10/8:  Pt is currently demonstrating the following occupational deficits: limited 2* visual immediate memory recall, delayed memory recall, attention, processing and executive function. Based on the aforementioned OT evaluation, functional performance deficits, and assessments, pt has been identified as a moderate complexity evaluation. Pt to continue to benefit from outpatient skilled OT services to address the following goals 1-2x/wk  for 4 weeks to focus on pt/caregiver education, increase memory deficits, increase cognitive function, orientation and alertness to improve overall QOL.      Re-Eval:   Pt presents to re-evaluation on 24 status post moderate cognitive decline. As per interview, pt reports continued impairments with memory. Pt presents with sentence repetition and lack of safety awareness. Additionally pt presents with challenges with short term memory and long term memory affecting daily life and routines. Post assessments, pt demonstrating maintained cognition with  logical reasoning, executive functioning, and STM and LTM. Pt is unable to recall her daily events. In the future recommend to start memory book and establish routines.  Pt has maintained progress and recommending to transition to a Maintenance Program. Pt presents with decreased cognitive functions due to effects of moderate cognitive impairment and dementia and demonstrates difficulty in daily tasks, including remembering recent events and completing health management. Skilled Maintenance occupational therapy is required to slow the patients functional decline due to dementia. Given the progressive/pathophysiology dementia nature of disease a maintenance program will help reduce the risk of further deterioration, loss of functional mobility or independence as well as decrease risk of secondary impairments form disease progress. Recommend OP OT 1x/wk for the next 10 weeks with focus on aforementioned deficits to maintain functional performance and improve QOL. Findings and recommendations discussed with pt, and they are in agreement. Educated pt on charges of insurance, skilled maintenance services, assessments performed with standardized norms, POC, goal creation, and OP OT services. Added maintenance goals for pt, see below.     Re-Eval: 1/21/25  Pt presents to re-evaluation on 1/21/25 status post moderate cognitive decline. As per interview, pt reports she has good days and bad days with her memory. Post assessments, pt demonstrating improvements in sustained attention on Trail making part A. Pt continues to demonstrate impairments in cognitive functioning and EF skills. Pt continues to participate with memory book activity throughout sessions. Pt maintained STGs/LTGs. Pt would benefit from continued OT services focusing on impairments for 1-2x week for 10 weeks following previously set POC with focus on aforementioned deficits to maximize functional performance and improve QOL. Pt educated on progress/therapy  "process and pt in understanding and agreement of recommendations. Recommending to continue HEP. Findings and recommendations discussed with pt, and they are in agreement. Educated pt on charges of insurance, assessments performed with standardized norms, POC, goal creation, and OP OT services.      Re-Eval 2/25/25  Pt presents to re-evaluation on 2/25/25 status post moderate cognitive decline. As per interview, pt reports sometimes her memory is good, ad other time she is challenged. Pt was able to recall her daughters birthday on today's date and was able to recall her name.  Post assessments, pt demonstrating improvements in sustained attention on trail making part A. Pt continues to demonstrate impairments in memory, processing speed, logical reasoning and EF skills. Pt STGs/LTGs updated below. Pt would benefit from continued OT services focusing on impairments for 1x week for 8 more weeks with focus on aforementioned deficits to maximize functional performance and improve QOL. Pt educated on progress/therapy process and pt in understanding and agreement of recommendations. Findings and recommendations discussed with pt, and they are in agreement. Educated pt on charges of insurance, assessments performed with standardized norms, POC, goal creation, and OP OT services.       Subjective    PATIENT GOAL: \"To remember things better\"    PAIN: 0    HISTORY OF PRESENT ILLNESS: Pt is a 79 y.o. female seen for OT eval s/p Mild cognitive impairment [G31.84] referred to [facility]. Patient had recent appointment with Geriatric medicine and was referred to OT services for cognitive therapy. Patient is expected to return at the end of this month. Patient had a fall at the beginning of the year which is suspected to be the result of her cognitive decline.       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    History of transfusion     Hypertension     Mixed hyperlipidemia     DAVID on CPAP     Shortness of breath     triggered by anxiety    Watermelon stomach     Wears dentures        Past Surgical Hx:   Past Surgical History:   Procedure Laterality Date    BREAST BIOPSY Right 02/24/2015    BREAST BIOPSY Left     ? year    CARDIAC CATHETERIZATION  02/07/2014    EF 65%, mid LAD 60% stenosis and D2 70% stenosis.  Medical management.    CATARACT EXTRACTION Bilateral     COLONOSCOPY      CYSTOSCOPY N/A 07/31/2023    Procedure: CYSTOSCOPY;  Surgeon: Nadir Ash MD;  Location: [facility];  Service: Gynecology Oncology    EGD      JOINT REPLACEMENT Right     RIGHT ELBOW    OOPHORECTOMY      not sure which one was removed    HI LAPS TOTAL HYSTERECT 250 GM/< W/RMVL TUBE/OVARY N/A 07/31/2023    Procedure: ROBOTHYSTERECTOMY, BILATERAL SALPINGO-OOPHORECTOMY, PELVIC SENTINEL NODE BIOPSIES;  Surgeon: Nadir Ash MD;  Location: [facility];  Service: Gynecology Oncology    ROTATOR CUFF REPAIR Right     US GUIDED THYROID BIOPSY  12/04/2020          Objective    Impairment Observations:     [place] Cognitive Assessment (MoCA)  \"screen of cognitive abilities designed to detect mild cognitive dysfunction.\"  NORMS:   -Normal: 26+  -Mild Cognitive Impairment: 18-25   -Moderate Cognitive Impairment: 10-17  -Severe Cognitive Impairment: <10        STATUS ON IE: Not Tested Re-eval: 11/13 Re-eval: 12/11 Re: 1/21/25 Re: 2/25/25                                         COGNITIVE " "FXN        VERSION  Version:8.2  Version: 8.1 Version: 8.1 Version 8.1              MOCA           Education Level Less than 12 years (+1)        Visuospatial/executive functioning    2 2 2   Naming  3/3  2/3 2/3 3/3   Memory: 1st trial:   2 1 3   Memory: 2nd trial:    3 3 3   Attention/concentration    1   List of letters:       0   Serial Seven Subtraction:   1/3 0/3 1/3 0   Language/sentence repetition:   0 0 1 0   Language Fluency:     0  0 0 8 words  0 7 words    Abstract/Correlational Thinkin   Delayed Recall:   0 0 0 0   Orientation:   3/6 3/6 4/6 /6   Memory Index Score   2/15 3/15 2/15 4/15                                      Total Score   (+1)  Moderate Cognitive Impairment  (+1)  Moderate Cognitive Impairment  (+1) Moderate cognitive impairment (+1) Moderate cognitive impairment     Performed Mini Mental State Examination on IE (10/8):  moderate impairment noted     TRAIL MAKING TEST:   \"widely used test to assess executive function in patients with neuro injury. Successful performance of the TMT requires a variety of mental abilities including letter and number recognition mental flexibility, visual scanning, and motor function. Norms are based on age related scores\"     STATUS ON IE: Not Tested Status on Re-Eval:  Status on Re-Eval:  Re-eval: 25 Re-Eval 25   TRAIL MAKING PART A     NORM: 58 seconds  1 minute 4 seconds  1 min 49 seconds 3 errors  1 minute  0 errors  49 seconds    TRAIL MAKING PART B    NORM: 2 minutes 32 seconds   7 minutes 10 seconds with max vcs Not Tested  Not Tested Not Tested     25: 11 second increase in trail making A     CONTEXTUAL MEMORY TEST (1993 version):   \"assesses immediate and delayed recall of 20 objects related to a scene in children and adults with memory impairments\"   NORMS:   *not applicable to current in house version "     TYPE OF RECALL STATUS ON IE: 10/8/24 Re-Eval: 11/13 Re-Eval: 2/25   Immediate recall 5/20 2/20 1/20   Delayed recall  3/20 0/20 0/20         MAINTENANCE GOALS    GOAL  STATUS ON IE  GOAL STATUS    Pt will maintain MOCA score of +/1 3 on MOCA increase MOCA for maintained EF skills, logical thinking, and processing speed for involvement in IADLs/ADLs 12/30  Maintained    Pt will maintain immediate recall on MOCA scoring (+/-1) 3/5 for both rounds for maintained participation in community activities  3/5 Maintained    Pt will maintain logical/abstract thinking by scoring (+/-1) 2/2 on abstract thinking section of MOCA for maintained participation in purposeful activities 2/2 Declined   Pt will maintain STM by scoring (+/- 2) on the immediate recall of CMT for maintained functioning with IADLs 2/20 Maintained   Pt will maintain sustained attention on Trail Making Part A completing in (+/- 10 seconds) maintained attention to tasks for ADLs/IADLs  1 minute 4 seconds Maintained    Pt will consistently report A&O x 3 75% of the time for maintained safety awareness for improved health management.  Maintained   Pt and caregiver will be educated on compensatory adaptive devices and DME available to improve independence with ADL/leisure tasks I.e. Life alert, pill box, door locks, security system, wander guard/chair alarm, BSC, grab bars, caregiver burden/burnout, memory aides, etc.  Maintained   Pt will develop a memory book and establish daily routines in OT sessions for maintained participation in social/leisure and ADLs   Maintained

## 2025-02-28 ENCOUNTER — HOSPITAL ENCOUNTER (OUTPATIENT)
Dept: INFUSION CENTER | Facility: HOSPITAL | Age: 80
End: 2025-02-28
Attending: INTERNAL MEDICINE
Payer: MEDICARE

## 2025-02-28 VITALS
OXYGEN SATURATION: 99 % | RESPIRATION RATE: 18 BRPM | TEMPERATURE: 97.5 F | HEART RATE: 74 BPM | SYSTOLIC BLOOD PRESSURE: 158 MMHG | DIASTOLIC BLOOD PRESSURE: 80 MMHG

## 2025-02-28 DIAGNOSIS — D50.8 OTHER IRON DEFICIENCY ANEMIA: Primary | ICD-10-CM

## 2025-02-28 PROCEDURE — 96365 THER/PROPH/DIAG IV INF INIT: CPT

## 2025-02-28 RX ORDER — SODIUM CHLORIDE 9 MG/ML
20 INJECTION, SOLUTION INTRAVENOUS ONCE
Status: CANCELLED | OUTPATIENT
Start: 2025-03-07

## 2025-02-28 RX ORDER — SODIUM CHLORIDE 9 MG/ML
20 INJECTION, SOLUTION INTRAVENOUS ONCE
Status: COMPLETED | OUTPATIENT
Start: 2025-02-28 | End: 2025-02-28

## 2025-02-28 RX ADMIN — IRON SUCROSE 200 MG: 20 INJECTION, SOLUTION INTRAVENOUS at 14:25

## 2025-02-28 RX ADMIN — SODIUM CHLORIDE 20 ML/HR: 0.9 INJECTION, SOLUTION INTRAVENOUS at 14:25

## 2025-02-28 NOTE — PROGRESS NOTES
Patient denies current infection or being on antibiotics.  Patient tolerated IV Venofer without reaction or issues.    Anthony Slater is aware of future appt on 03/07/25 at 1400.     AVS Declined by Anthony Slater) pt has mychart      Patient ambulated off unit without incident.  All personal belongings taken with patient.

## 2025-03-04 ENCOUNTER — OFFICE VISIT (OUTPATIENT)
Dept: OCCUPATIONAL THERAPY | Facility: CLINIC | Age: 80
End: 2025-03-04
Payer: MEDICARE

## 2025-03-04 DIAGNOSIS — R41.89 MODERATE COGNITIVE IMPAIRMENT: Primary | ICD-10-CM

## 2025-03-04 PROCEDURE — 97530 THERAPEUTIC ACTIVITIES: CPT

## 2025-03-04 NOTE — PROGRESS NOTES
"Daily Note     Visit/Unit Tracking  AUTH Status: Not Required Date 25   Visits  Authed: N/A Used 1 (Eval) 2 3 4 5 6 1 (RE) 2 3 4  5  1 (RE)      Visit/Unit Tracking  AUTH Status: Not Required Date 3/4/25              Visits  Authed: N/A Used 2                  PLAN OF CARE START: 25  PLAN OF CARE END: 24  PROGRESS NOTE DUE: 3/25/25 (scheduled on calendar)   FREQUENCY: 1-x week for 8 weeks   PRECAUTIONS: orientation, difficulty hearing   DIAGNOSIS: moderate cognitive impairment   Insurance: Medicare   MAINTENANCE PROGRAM     Future Interventions: Finish Mystery Picture Grid Next Session, Daily routines, memory book, safety awareness       Today's date: 3/4/2025  Patient name: Anthony Slater  : 1945  MRN: [id number]  Referring provider: Rosamaria Silva,*  Dx:   Encounter Diagnosis     ICD-10-CM    1. Moderate cognitive impairment  R41.89                      Subjective: \"I brought supplies today\"     Objective: See treatment diary below. Pt participated in skilled OT this date with focus on sustained attention, functional cognition, working/short term memory, EF skills and activity tolerance/endurance, for increased safety and engagement in ADL/IADL tasks. PT continued to complete worksheets to add to memory book.     TA:   *Fill in the blanks worksheet from choice of 4 letter word selections to complete the word. Required mod-max assist for problem solving. Activity focused on problem solving.     *Worked on memory book having pt organize her worksheets into binder having her following multistep directions with good carryover. Pt brought in personal photos and started organizing photos into binder and identifying which family embers were in the photos.      Assessment: Tolerated treatment well. Focus of session on working on memory book, sustained attention, long term memory, occupational role fulfillment, immediate " memory. Pt continued memory book on today's date. Purpose of memory book to record important information, trigger memories, and start conversations. Memory books can improve quality of life by providing reassurance. It is a method to bond with loved ones and increase trust. It aides with repeated questions and clearing up confusion. Pt able to identify 1-4 family members in which photo and worked on recalling family member's names and family tree. Pt would benefit from continued OT services to address functional cognition, EF skills, sustained attention, divided attention, immediate recall, delayed recall, processing speed, logical reasoning, and orientation.     Plan: Continue per plan of care.  Progress treatment as tolerated.  Continue safety awareness cards in future sessions. Continue to work on adding photos to memory book. Include emergency contact sheet to memory book next session. Complete cog worksheets next session hat were provided for homework. In future sessions have  sit in on sessio and have patient go through memory book to him.

## 2025-03-07 ENCOUNTER — HOSPITAL ENCOUNTER (OUTPATIENT)
Dept: INFUSION CENTER | Facility: HOSPITAL | Age: 80
End: 2025-03-07
Attending: INTERNAL MEDICINE
Payer: MEDICARE

## 2025-03-07 VITALS — OXYGEN SATURATION: 98 % | RESPIRATION RATE: 18 BRPM | TEMPERATURE: 97.6 F | HEART RATE: 84 BPM

## 2025-03-07 DIAGNOSIS — D50.8 OTHER IRON DEFICIENCY ANEMIA: Primary | ICD-10-CM

## 2025-03-07 PROCEDURE — 96365 THER/PROPH/DIAG IV INF INIT: CPT

## 2025-03-07 RX ORDER — SODIUM CHLORIDE 9 MG/ML
20 INJECTION, SOLUTION INTRAVENOUS ONCE
Status: COMPLETED | OUTPATIENT
Start: 2025-03-07 | End: 2025-03-07

## 2025-03-07 RX ORDER — SODIUM CHLORIDE 9 MG/ML
20 INJECTION, SOLUTION INTRAVENOUS ONCE
Status: CANCELLED | OUTPATIENT
Start: 2025-03-14

## 2025-03-07 RX ADMIN — SODIUM CHLORIDE 20 ML/HR: 0.9 INJECTION, SOLUTION INTRAVENOUS at 14:27

## 2025-03-07 RX ADMIN — IRON SUCROSE 200 MG: 20 INJECTION, SOLUTION INTRAVENOUS at 14:22

## 2025-03-07 NOTE — PROGRESS NOTES
Anthony Slater  tolerated venofer treatment well with no complications.      Anthony Slater is aware of future appt on 3/14/25 at 2pm.     AVS printed and given to Anthony Slater:  No (Declined by Anthony Slater)

## 2025-03-11 ENCOUNTER — OFFICE VISIT (OUTPATIENT)
Dept: OCCUPATIONAL THERAPY | Facility: CLINIC | Age: 80
End: 2025-03-11
Payer: MEDICARE

## 2025-03-11 DIAGNOSIS — R41.89 MODERATE COGNITIVE IMPAIRMENT: Primary | ICD-10-CM

## 2025-03-11 PROCEDURE — 97530 THERAPEUTIC ACTIVITIES: CPT

## 2025-03-11 NOTE — PROGRESS NOTES
"Daily Note     Visit/Unit Tracking  AUTH Status: Not Required Date 25   Visits  Authed: N/A Used 1 (Eval) 2 3 4 5 6 7 (RE) 8 9 10  11  12 (RE)      Visit/Unit Tracking  AUTH Status: Not Required Date 3/4/25 3/11             Visits  Authed: N/A Used 13 14                 PLAN OF CARE START: 25  PLAN OF CARE END: 24  PROGRESS NOTE DUE: 3/25/25 (scheduled on calendar)   FREQUENCY: 1-x week for 8 weeks   PRECAUTIONS: orientation, difficulty hearing   DIAGNOSIS: moderate cognitive impairment   Insurance: Medicare   MAINTENANCE PROGRAM     Future Interventions: Finish Mystery Picture Grid Next Session, Daily routines, memory book, safety awareness       Today's date: 3/11/2025  Patient name: Anthony Slater  : 1945  MRN: [id number]  Referring provider: Rosamaria Silva,*  Dx:   Encounter Diagnosis     ICD-10-CM    1. Moderate cognitive impairment  R41.89                      Subjective: \"My eyes are barely open, but I'm here\"     Objective: See treatment diary below. Pt participated in skilled OT this date with focus on sustained attention, functional cognition, working/short term memory, EF skills and activity tolerance/endurance, for increased safety and engagement in ADL/IADL tasks. PT continued to complete worksheets to add to memory book.     TA:   *Fill in the blanks worksheet from choice of 4 letter word selections to complete the word. Required mod-max assist for problem solving. Activity focused on problem solving and sustained attention.     *Worked on memory book having pt organize her worksheets into binder having her following multistep directions with good carryover. Pt brought in personal photos and started organizing photos into binder and identifying which family embers were in the photos. Focused on placing photos into binder today and having pt recall her family member's names and labeled them in the book. "       Assessment: Tolerated treatment well. Focus of session on working on memory book, sustained attention, long term memory, occupational role fulfillment, immediate memory. Pt challenges with recalling her certain family members names and provided her with HEP of asking  for next session to finish filling in the book. Pt continued memory book on today's date. Purpose of memory book to record important information, trigger memories, and start conversations. Memory books can improve quality of life by providing reassurance. It is a method to bond with loved ones and increase trust. It aides with repeated questions and clearing up confusion. Pt able to identify 1-4 family members in which photo and worked on recalling family member's names and family tree. Pt would benefit from continued OT services to address functional cognition, EF skills, sustained attention, divided attention, immediate recall, delayed recall, processing speed, logical reasoning, and orientation.     Plan: Continue per plan of care.  Progress treatment as tolerated.  Continue safety awareness cards in future sessions. Continue to work on adding photos to memory book. Include emergency contact sheet to memory book next session. Complete cog worksheets next session that were provided for homework. In future sessions have  sit in on session and have patient go through memory book to him.

## 2025-03-14 ENCOUNTER — HOSPITAL ENCOUNTER (OUTPATIENT)
Dept: INFUSION CENTER | Facility: HOSPITAL | Age: 80
End: 2025-03-14
Attending: INTERNAL MEDICINE
Payer: MEDICARE

## 2025-03-14 VITALS
SYSTOLIC BLOOD PRESSURE: 173 MMHG | HEART RATE: 68 BPM | RESPIRATION RATE: 18 BRPM | OXYGEN SATURATION: 100 % | DIASTOLIC BLOOD PRESSURE: 79 MMHG | TEMPERATURE: 97.5 F

## 2025-03-14 DIAGNOSIS — D50.8 OTHER IRON DEFICIENCY ANEMIA: Primary | ICD-10-CM

## 2025-03-14 PROCEDURE — 96365 THER/PROPH/DIAG IV INF INIT: CPT

## 2025-03-14 RX ORDER — SODIUM CHLORIDE 9 MG/ML
20 INJECTION, SOLUTION INTRAVENOUS ONCE
Status: COMPLETED | OUTPATIENT
Start: 2025-03-14 | End: 2025-03-14

## 2025-03-14 RX ORDER — SODIUM CHLORIDE 9 MG/ML
20 INJECTION, SOLUTION INTRAVENOUS ONCE
Status: CANCELLED | OUTPATIENT
Start: 2025-03-14

## 2025-03-14 RX ADMIN — IRON SUCROSE 200 MG: 20 INJECTION, SOLUTION INTRAVENOUS at 13:58

## 2025-03-14 RX ADMIN — SODIUM CHLORIDE 20 ML/HR: 0.9 INJECTION, SOLUTION INTRAVENOUS at 13:56

## 2025-03-14 NOTE — PROGRESS NOTES
Anthony Slater  tolerated venofer infusion well with no complications.      Anthony Slater is aware of no further infusion appointments at this time.    AVS declined by Anthony Slater.

## 2025-03-18 ENCOUNTER — OFFICE VISIT (OUTPATIENT)
Dept: OCCUPATIONAL THERAPY | Facility: CLINIC | Age: 80
End: 2025-03-18
Payer: MEDICARE

## 2025-03-18 DIAGNOSIS — R41.89 MODERATE COGNITIVE IMPAIRMENT: Primary | ICD-10-CM

## 2025-03-18 PROCEDURE — 97530 THERAPEUTIC ACTIVITIES: CPT

## 2025-03-18 NOTE — PROGRESS NOTES
"Daily Note     Visit/Unit Tracking  AUTH Status: Not Required Date 25   Visits  Authed: N/A Used 1 (Eval) 2 3 4 5 6 7 (RE) 8 9 10  11  12 (RE)      Visit/Unit Tracking  AUTH Status: Not Required Date 3/4/25 3/11 3/18            Visits  Authed: N/A Used 13 14 15                PLAN OF CARE START: 25  PLAN OF CARE END: 24  PROGRESS NOTE DUE: 3/25/25 (scheduled on calendar)   FREQUENCY: 1-x week for 8 weeks   PRECAUTIONS: orientation, difficulty hearing   DIAGNOSIS: moderate cognitive impairment   Insurance: Medicare   MAINTENANCE PROGRAM     Future Interventions: Finish Mystery Picture Grid Next Session, Daily routines, memory book, safety awareness       Today's date: 3/18/2025  Patient name: Anthony Slater  : 1945  MRN: [id number]  Referring provider: Rosamaria Silva,*  Dx:   Encounter Diagnosis     ICD-10-CM    1. Moderate cognitive impairment  R41.89                        Subjective: \"I didn't sleep well last night, I hope I can do this\"     Objective: See treatment diary below. Pt participated in skilled OT this date with focus on sustained attention, functional cognition, working/short term memory, EF skills and activity tolerance/endurance, for increased safety and engagement in ADL/IADL tasks. PT continued to complete worksheets to add to memory book.     TA:   *Reviewed memory book for first 15 min of session having pt name family member's names based on photos and had pt name relationships between various family members.     *Fill in the blanks worksheet from choice of 4 letter word selections to complete the word. Required max assist for problem solving. Activity focused on problem solving, word recognition and sustained attention. Activity discontinued due to increased challenge for patient.     *Fill in the crossword worksheet of beverages with letters provided for pt requiring mod vcs for problem solving. " Activity focused on problem solving, sustained attention, and word recognition.      *Match the three letters on the right to the three letters on the left to form a word. Activity focused on word formation, problem solving, and  skills. Required max vcs for problem solving.     *Filling in the phrase based off the letters provided for word completion and from the clues provided. Discontinued worksheet due to increased difficulty. Completed memory mix up instead organizing tiles into designated written out categories.       Assessment: Tolerated treatment well. Focus of session on working on memory book, sustained attention, long term memory, occupational role fulfillment, immediate memory, and EF skills. Pt reviewed memory book with therapist to start session and required mod cues for recall of family members and family tree. Pt required max vcs for recall of directions and problem solving with all activities. Pt required increased time for activities. Pt would benefit from continued OT services to address functional cognition, EF skills, sustained attention, divided attention, immediate recall, delayed recall, processing speed, logical reasoning, and orientation.     Purpose of memory book to record important information, trigger memories, and start conversations. Memory books can improve quality of life by providing reassurance. It is a method to bond with loved ones and increase trust. It aides with repeated questions and clearing up confusion.     Plan: Continue per plan of care.  Progress treatment as tolerated.  Continue safety awareness cards in future sessions. Continue to work on adding cognitive worksheets to memory book. Include emergency contact sheet to memory book next session. In future sessions have  sit in on session and have patient go through memory book to him.

## 2025-03-25 ENCOUNTER — EVALUATION (OUTPATIENT)
Dept: OCCUPATIONAL THERAPY | Facility: CLINIC | Age: 80
End: 2025-03-25
Payer: MEDICARE

## 2025-03-25 DIAGNOSIS — R41.89 MODERATE COGNITIVE IMPAIRMENT: Primary | ICD-10-CM

## 2025-03-25 PROCEDURE — 97530 THERAPEUTIC ACTIVITIES: CPT

## 2025-03-25 NOTE — PROGRESS NOTES
OCCUPATIONAL THERAPY Re-Evaluation        Visit/Unit Tracking  AUTH Status: Not Required Date 25   Visits  Authed: N/A Used 1 (Eval) 2 3 4 5 6 7 (RE) 8 9 10  11  12 (RE)      Visit/Unit Tracking  AUTH Status: Not Required Date 3/4/25 3/11 3/18 3/25           Visits  Authed: N/A Used 13 14 15 16 (RE)                 PLAN OF CARE START: 3/25/25  PLAN OF CARE END: 25  PROGRESS NOTE DUE: 25 (scheduled on calendar)   FREQUENCY: 1-x week for 6 weeks   PRECAUTIONS: orientation, difficulty hearing   DIAGNOSIS: moderate cognitive impairment   Insurance: Medicare   MAINTENANCE PROGRAM     Future Interventions: Finish Webjamy Picture Grid Next Session, Daily routines, memory book, safety awareness         Today's date: 3/25/2025  Patient name: Anthony Slater  : 1945  MRN: [id number]  Referring provider: Rosamaria Silva,*  Dx:   Encounter Diagnosis     ICD-10-CM    1. Moderate cognitive impairment  R41.89             SKILLED ANALYSIS IE 10/8:  Pt is currently demonstrating the following occupational deficits: limited 2* visual immediate memory recall, delayed memory recall, attention, processing and executive function. Based on the aforementioned OT evaluation, functional performance deficits, and assessments, pt has been identified as a moderate complexity evaluation. Pt to continue to benefit from outpatient skilled OT services to address the following goals 1-2x/wk  for 4 weeks to focus on pt/caregiver education, increase memory deficits, increase cognitive function, orientation and alertness to improve overall QOL.      Re-Eval 3/25/25  Pt presents to re-evaluation on 3/25/25 status post moderate cognitive decline. As per interview, pt reports sometimes her memory is good, and other times she is challenged. Pt continues to be challenged with recall of family tree and family member's names. Post assessments, pt demonstrating  "declined sustained attention on trail making part A and MOCA score. Pt continues to demonstrate impairments in memory, processing speed, logical reasoning and EF skills. Pt STGs/LTGs updated below. Pt would benefit from continued OT services focusing on impairments for 1x week for 6 more weeks with focus on aforementioned deficits to maximize functional performance and improve QOL. Pt educated on progress/therapy process and pt in understanding and agreement of recommendations. Findings and recommendations discussed with pt, and they are in agreement. Educated pt on charges of insurance, assessments performed with standardized norms, POC, goal creation, and OP OT services.       Subjective    PATIENT GOAL: \"To remember things better\"    PAIN: 0    HISTORY OF PRESENT ILLNESS: Pt is a 79 y.o. female seen for OT eval s/p Mild cognitive impairment [G31.84] referred to [facility]. Patient had recent appointment with Geriatric medicine and was referred to OT services for cognitive therapy. Patient is expected to return at the end of this month. Patient had a fall at the beginning of the year which is suspected to be the result of her cognitive decline.       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    History of transfusion     Hypertension     Mixed hyperlipidemia     DAVID on CPAP     Shortness of breath     triggered by anxiety    Watermelon " "stomach     Wears dentures        Past Surgical Hx:   Past Surgical History:   Procedure Laterality Date    BREAST BIOPSY Right 2015    BREAST BIOPSY Left     ? year    CARDIAC CATHETERIZATION  2014    EF 65%, mid LAD 60% stenosis and D2 70% stenosis.  Medical management.    CATARACT EXTRACTION Bilateral     COLONOSCOPY      CYSTOSCOPY N/A 2023    Procedure: CYSTOSCOPY;  Surgeon: Nadir Ash MD;  Location: [facility];  Service: Gynecology Oncology    EGD      JOINT REPLACEMENT Right     RIGHT ELBOW    OOPHORECTOMY      not sure which one was removed    MI LAPS TOTAL HYSTERECT 250 GM/< W/RMVL TUBE/OVARY N/A 2023    Procedure: ROBOTHYSTERECTOMY, BILATERAL SALPINGO-OOPHORECTOMY, PELVIC SENTINEL NODE BIOPSIES;  Surgeon: Nadir Ash MD;  Location: [facility];  Service: Gynecology Oncology    ROTATOR CUFF REPAIR Right     US GUIDED THYROID BIOPSY  2020          Objective    Impairment Observations:     Gerardo Cognitive Assessment (MoCA)  \"screen of cognitive abilities designed to detect mild cognitive dysfunction.\"  NORMS:   -Normal: 26+  -Mild Cognitive Impairment: 18-25   -Moderate Cognitive Impairment: 10-17  -Severe Cognitive Impairment: <10        STATUS ON IE: Not Tested Re: 25 RE: 3/25/25                                         COGNITIVE FXN        VERSION  Version 8.1 Version 8.2           MOCA        Education Level Less than 12 years (+1)  (+1)   Visuospatial/executive functioning   2   Naming  3/3 3/5   Memory: 1st trial:  3/5 1/5   Memory: 2nd trial:  3/5 1/5   Attention/concentration  2/2 1   List of letters:      Serial Seven Subtraction:  01    Language/sentence repetition:  0/2 02   Language Fluency:   01 7 words  0 (7 words)   Abstract/Correlational Thinkin 1   Delayed Recall:  0/5 05   Orientation:  /6 3/6   Memory Index Score  4/15 1/15                                      Total Score  (+1) Moderate cognitive " "impairment 13/30 (moderate cognitive impairment)        TRAIL MAKING TEST:   \"widely used test to assess executive function in patients with neuro injury. Successful performance of the TMT requires a variety of mental abilities including letter and number recognition mental flexibility, visual scanning, and motor function. Norms are based on age related scores\"     Re-Eval 2/25/25 RE 3/25/25   TRAIL MAKING PART A     NORM: 58 seconds 49 seconds  1 minute 8 seconds    TRAIL MAKING PART B    NORM: 2 minutes 32 seconds  Not Tested      3/25/25: Declined on trail making part A by 19 seconds     CONTEXTUAL MEMORY TEST (1993 version):   \"assesses immediate and delayed recall of 20 objects related to a scene in children and adults with memory impairments\"   NORMS:   *not applicable to current in house version     TYPE OF RECALL STATUS ON IE: 10/8/24 Re-Eval: 2/25 RE 3/25/25   Immediate recall 5/20 1/20 2/20   Delayed recall  3/20 0/20          MAINTENANCE GOALS    GOAL  STATUS ON IE  GOAL STATUS    Pt will maintain MOCA score of +/- 3 on MOCA increase MOCA for maintained EF skills, logical thinking, and processing speed for involvement in IADLs/ADLs 12/30  Maintained    Pt will maintain immediate recall on MOCA scoring (+/-1) 3/5 for both rounds for maintained participation in community activities  3/5 Declined   Pt will maintain logical/abstract thinking by scoring (+/-1) 2/2 on abstract thinking section of MOCA for maintained participation in purposeful activities 2/2 Declined   Pt will maintain STM by scoring (+/- 2) on the immediate recall of CMT for maintained functioning with IADLs 2/20 Maintained   Pt will maintain sustained attention on Trail Making Part A completing in (+/- 10 seconds) maintained attention to tasks for ADLs/IADLs  1 minute 4 seconds Declined   Pt will consistently report A&O x 3 75% of the time for maintained safety awareness for improved health management. 3/6 Maintained   Pt and caregiver will " be educated on compensatory adaptive devices and DME available to improve independence with ADL/leisure tasks I.e. Life alert, pill box, door locks, security system, wander guard/chair alarm, BSC, grab bars, caregiver burden/burnout, memory aides, etc.  Maintained   Pt will develop a memory book and establish daily routines in OT sessions for maintained participation in social/leisure and ADLs   Maintained

## 2025-03-26 DIAGNOSIS — I25.10 CORONARY ARTERY DISEASE INVOLVING NATIVE CORONARY ARTERY OF NATIVE HEART WITHOUT ANGINA PECTORIS: ICD-10-CM

## 2025-03-27 RX ORDER — METOPROLOL TARTRATE 25 MG/1
25 TABLET, FILM COATED ORAL EVERY MORNING
Qty: 100 TABLET | Refills: 3 | Status: SHIPPED | OUTPATIENT
Start: 2025-03-27

## 2025-04-01 ENCOUNTER — OFFICE VISIT (OUTPATIENT)
Dept: OCCUPATIONAL THERAPY | Facility: CLINIC | Age: 80
End: 2025-04-01
Payer: MEDICARE

## 2025-04-01 DIAGNOSIS — R41.89 MODERATE COGNITIVE IMPAIRMENT: Primary | ICD-10-CM

## 2025-04-01 PROCEDURE — 97530 THERAPEUTIC ACTIVITIES: CPT

## 2025-04-01 NOTE — PROGRESS NOTES
"Daily Note     Visit/Unit Tracking  AUTH Status: Not Required Date 25   Visits  Authed: N/A Used 1 (Eval) 2 3 4 5 6 7 (RE) 8 9 10  11  12 (RE)      Visit/Unit Tracking  AUTH Status: Not Required Date 3/4/25 3/11 3/18 3/25 4/1          Visits  Authed: N/A Used 13 14 15 16 (RE)   17              PLAN OF CARE START: 3/25/25  PLAN OF CARE END: 25  PROGRESS NOTE DUE: 25 (scheduled on calendar)   FREQUENCY: 1-x week for 6 weeks   PRECAUTIONS: orientation, difficulty hearing   DIAGNOSIS: moderate cognitive impairment   Insurance: Medicare   MAINTENANCE PROGRAM         Today's date: 2025  Patient name: Anthony Slater  : 1945  MRN: [id number]  Referring provider: Rosamaria Silva,*  Dx:   Encounter Diagnosis     ICD-10-CM    1. Moderate cognitive impairment  R41.89                          Subjective: \"My  said I slept pretty well last night\"     Objective: See treatment diary below. Pt participated in skilled OT this date with focus on sustained attention, functional cognition, working/short term memory, EF skills and activity tolerance/endurance, for increased safety and engagement in ADL/IADL tasks.      TA:   *Worksheet going in alphabetical order and naming occupations that start with each letter focusing on working memory. Max vcs for problem solving occupations.       Assessment: Tolerated treatment well. Focus of session on working on memory book, sustained attention, long term memory, immediate memory, and EF skills. Pt reviewed memory book with therapist to start session and therapist asked questions regarding family members while pt pointed out who each person was in the pictures with 100% accuracy. Pt would benefit from continued OT services to address functional cognition, EF skills, sustained attention, memory, processing speed, logical reasoning, and orientation.       Pt continues to work on memory " book. Purpose of memory book to record important information, trigger memories, and start conversations. Memory books can improve quality of life by providing reassurance. It is a method to bond with loved ones and increase trust. It aides with repeated questions and clearing up confusion.     Plan: Continue per plan of care.  Progress treatment as tolerated.  Continue to focus on safety awareness (cards), adding cognitive worksheets to memory book, and establishing daily routine. Include emergency contact sheet to memory book. In future sessions have  sit in on session and have patient go through memory book to him.

## 2025-04-08 ENCOUNTER — OFFICE VISIT (OUTPATIENT)
Dept: OCCUPATIONAL THERAPY | Facility: CLINIC | Age: 80
End: 2025-04-08
Payer: MEDICARE

## 2025-04-08 DIAGNOSIS — R41.89 MODERATE COGNITIVE IMPAIRMENT: Primary | ICD-10-CM

## 2025-04-08 PROCEDURE — 97530 THERAPEUTIC ACTIVITIES: CPT

## 2025-04-08 NOTE — PROGRESS NOTES
"Daily Note     Visit/Unit Tracking  AUTH Status: Not Required Date 25   Visits  Authed: N/A Used 1 (Eval) 2 3 4 5 6 7 (RE) 8 9 10  11  12 (RE)      Visit/Unit Tracking  AUTH Status: Not Required Date 3/4/25 3/11 3/18 3/25 4/1 4/8         Visits  Authed: N/A Used 13 14 15 16 (RE)   17 18             PLAN OF CARE START: 3/25/25  PLAN OF CARE END: 25  PROGRESS NOTE DUE: 25 (scheduled on calendar)   FREQUENCY: 1-x week for 6 weeks   PRECAUTIONS: orientation, difficulty hearing   DIAGNOSIS: moderate cognitive impairment   Insurance: Medicare   MAINTENANCE PROGRAM         Today's date: 2025  Patient name: Anthony Slater  : 1945  MRN: [id number]  Referring provider: Rosamaria Silva,*  Dx:   Encounter Diagnosis     ICD-10-CM    1. Moderate cognitive impairment  R41.89                        Subjective: \"I was doing good, but I forgot my purse at home\".     Objective: See treatment diary below. Pt participated in skilled OT this date with focus on sustained attention, functional cognition, working/short term memory, EF skills and activity tolerance/endurance, for increased safety and engagement in ADL/IADL tasks.      TA:     *Uzzle design copies completing basic level designs for pattern recognition,  skills, and problem solving. Pt required max vcs for puzzle piece orientation.     *Completed basic 24 piece puzzle utilizing strategy of completing the outside and corners first focusing on  skills and logical reasoning. Pt completed independently.     *Completed basic color by number activity performing basic addition with counting cubes focusing on direction following, EF skills, and  skills. Pt able to independently solve basic addition problems.       Assessment: Tolerated treatment well. Focus of session on working on memory book, sustained attention,  skills, sequencing, problem solving and EF skills. Pt " reviewed memory book with therapist to start session and therapist asked questions regarding family members while pt pointed out who each person was in the pictures with 100% accuracy. Pt challenged with uzzle design copy and was unable to orient cubes to follow patterns and discontinued activity. Pt would benefit from continued OT services to address functional cognition, EF skills, sustained attention, memory, processing speed,  skills, logical reasoning, and orientation.     Pt continues to work on memory book. Purpose of memory book to record important information, trigger memories, and start conversations. Memory books can improve quality of life by providing reassurance. It is a method to bond with loved ones and increase trust. It aides with repeated questions and clearing up confusion.     Plan: Continue per plan of care.  Progress treatment as tolerated.  Continue to focus on safety awareness (cards), adding cognitive worksheets to memory book, and establishing daily routine. In future sessions have  sit in on session and have patient go through memory book to him. Finish color by number next session.

## 2025-04-09 ENCOUNTER — OFFICE VISIT (OUTPATIENT)
Age: 80
End: 2025-04-09
Payer: MEDICARE

## 2025-04-09 VITALS
TEMPERATURE: 97.4 F | SYSTOLIC BLOOD PRESSURE: 130 MMHG | DIASTOLIC BLOOD PRESSURE: 62 MMHG | OXYGEN SATURATION: 99 % | WEIGHT: 102 LBS | HEART RATE: 63 BPM | HEIGHT: 60 IN | RESPIRATION RATE: 15 BRPM | BODY MASS INDEX: 20.03 KG/M2

## 2025-04-09 DIAGNOSIS — E44.0 MODERATE PROTEIN-CALORIE MALNUTRITION (HCC): ICD-10-CM

## 2025-04-09 DIAGNOSIS — Z12.11 SCREENING FOR COLON CANCER: ICD-10-CM

## 2025-04-09 DIAGNOSIS — K31.811 GASTROINTESTINAL HEMORRHAGE ASSOCIATED WITH ANGIODYSPLASIA OF STOMACH AND DUODENUM: Primary | ICD-10-CM

## 2025-04-09 DIAGNOSIS — D50.8 OTHER IRON DEFICIENCY ANEMIA: Chronic | ICD-10-CM

## 2025-04-09 DIAGNOSIS — R14.2 BELCHING: ICD-10-CM

## 2025-04-09 DIAGNOSIS — R11.0 NAUSEA: ICD-10-CM

## 2025-04-09 PROCEDURE — 99214 OFFICE O/P EST MOD 30 MIN: CPT | Performed by: NURSE PRACTITIONER

## 2025-04-09 RX ORDER — FAMOTIDINE 20 MG/1
20 TABLET, FILM COATED ORAL
Qty: 90 TABLET | Refills: 1 | Status: SHIPPED | OUTPATIENT
Start: 2025-04-09

## 2025-04-09 RX ORDER — PANTOPRAZOLE SODIUM 20 MG/1
20 TABLET, DELAYED RELEASE ORAL DAILY
Qty: 90 TABLET | Refills: 1 | Status: SHIPPED | OUTPATIENT
Start: 2025-04-09

## 2025-04-09 NOTE — ASSESSMENT & PLAN NOTE
Continue to follow with Hematology/Oncology.  Continue blood work as ordered.   Continue iron supplementation as ordered.

## 2025-04-09 NOTE — ASSESSMENT & PLAN NOTE
Malnutrition Findings:     BMI Findings:    Body mass index is 19.92 kg/m².   Previous Weight: (10/9/24) 107 lbs  Todays Weight: 102 lbs    Continue with small frequent meals/snacks as tolerated/desired.  Continue to monitor weight weekly and contact our office if there is more than a 3-5 lb weight loss.

## 2025-04-09 NOTE — PROGRESS NOTES
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      At her next office visit we will discuss the idea of trying to discontinue the Protonix altogether and increase the famotidine to twice daily dosing.  The patient and her  were agreeable and verbalized understanding.    Orders:  •  famotidine (PEPCID) 20 mg tablet; Take 1 tablet (20 mg total) by mouth daily at bedtime  •  pantoprazole (PROTONIX) 20 mg tablet; Take 1 tablet (20 mg total) by mouth daily    Belching  Discontinue simethicone since it does not seem to be helpful.  Can try Beano with meals in place of them simethicone to see if that is helpful with regards to the belching.  Work on puréeing any food that is difficult to chew to make it easier to digest.    Orders:  •  famotidine (PEPCID) 20 mg tablet; Take 1 tablet (20 mg total) by mouth daily at bedtime  •  pantoprazole (PROTONIX) 20 mg tablet; Take 1 tablet (20 mg total) by mouth daily    Nausea  Orders:  •  famotidine (PEPCID) 20 mg tablet; Take 1 tablet (20 mg total) by mouth daily at bedtime  •  pantoprazole (PROTONIX) 20 mg tablet; Take 1 tablet (20 mg total) by mouth daily    Moderate protein-calorie malnutrition (HCC)  Malnutrition Findings:     BMI Findings:    Body mass index is 19.92 kg/m².   Previous Weight: (10/9/24) 107 lbs  Todays Weight: 102 lbs    Continue  with small frequent meals/snacks as tolerated/desired.  Continue to monitor weight weekly and contact our office if there is more than a 3-5 lb weight loss.        Screening for colon cancer  We will hold off on any repeat colonoscopies until the recommended surveillance date unless the patient would start to develop red flag symptoms in the future.  The patient was agreeable and verbalized an understanding.  DUE: 4/5/29    Reviewed GI red flag symptoms with patient today.         Other iron deficiency anemia  Continue to follow with Hematology/Oncology.  Continue blood work as ordered.   Continue iron supplementation as ordered.        The patient is to schedule a follow up office visit in 6 months, but understands to call or contact our offices with any issues before then or as needed.     History of Present Illness   Anthony Slater is a 79 y.o. female who presents today for a follow-up office visit regarding her chronic GERD symptoms with belching, nausea, moderate protein calorie malnutrition, and iron deficiency anemia.  The patient and her  report that since her last office visit despite the fact that we did decrease the Protonix down to just 40 mg daily and continued the famotidine at bedtime there has not been any significant change in her symptoms either way.  However, they also feel that there has not been any improvement in the belching after eating even though she is taking the simethicone 2-3 times a day.  The patient's cognitive and mental decline continues to progressively worsen and requires complete care from her .  They were trying the protein shakes daily, however, it seems to make her reflux and belching even worse.    The patient currently denies any reflux, nausea, dysphagia, vomiting, decreased appetite, unplanned weight loss, or abdominal pain. Water Intake: 1-2 glasses per day.    The patient currently reports that they have a BM almost daily and reports that it is  relieving, without any consistent diarrhea, constipation, straining, melena or bloody stools. Last BM: Today. Flatus: Yes.    GI Meds: Protonix 40 mg daily in the a.m., famotidine 20 mg at bedtime, and Gas-X 3 times daily with meals.    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 Path: Normal     COLONOSCOPY: (Unknown):      DUE: 4/5/29    HPI  History obtained from: patient and patient's Significant Other  Review of Systems A complete review of systems is negative other than that noted above in the HPI.      Current Outpatient Medications   Medication Sig Dispense Refill   • atorvastatin (LIPITOR) 40 mg tablet TAKE 1 TABLET BY MOUTH DAILY 90 tablet 1   • busPIRone (BUSPAR) 5 mg tablet Take 1 tablet (5 mg total) by mouth 3 (three) times a day 360 tablet 5   • Cholecalciferol (VITAMIN D3) 1,000 units tablet Take 1,000 Units by mouth daily     • co-enzyme Q-10 100 mg capsule Take by mouth daily     • famotidine (PEPCID) 20 mg tablet Take 1 tablet (20 mg total) by mouth daily at bedtime 90 tablet 1   • metoprolol tartrate (LOPRESSOR) 25 mg tablet TAKE 1 TABLET BY MOUTH IN THE  MORNING 100 tablet 3   • pantoprazole (PROTONIX) 20 mg tablet Take 1 tablet (20 mg total) by mouth daily 90 tablet 1   • vitamin B-12 (VITAMIN B-12) 1,000 mcg tablet Take 1,000 mcg by mouth daily       No current facility-administered medications for this visit.     Objective   /62 (BP Location: Right arm, Patient Position: Sitting, Cuff Size: Adult)   Pulse 63   Temp (!) 97.4 °F (36.3 °C) (Temporal)   Resp 15   Ht 5' (1.524 m)   Wt 46.3 kg (102 lb)   SpO2 99%   BMI 19.92 kg/m²     Physical Exam   Abdomen is soft, nondistended, nontender, with faint bowel sounds x 4.  No edema noted of the b/l lower extremities upon exam today. Skin is non-icteric.     Lab  Results: I personally reviewed relevant lab results.       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

## 2025-04-09 NOTE — ASSESSMENT & PLAN NOTE
Discontinue simethicone since it does not seem to be helpful.  Can try Beano with meals in place of them simethicone to see if that is helpful with regards to the belching.  Work on puréeing any food that is difficult to chew to make it easier to digest.    Orders:  •  famotidine (PEPCID) 20 mg tablet; Take 1 tablet (20 mg total) by mouth daily at bedtime  •  pantoprazole (PROTONIX) 20 mg tablet; Take 1 tablet (20 mg total) by mouth daily

## 2025-04-09 NOTE — ASSESSMENT & PLAN NOTE
Stable    Since there was no significant changes in her symptoms with the previous decrease in the Protonix and overall it is in her best interest to try to continue to titrate down on the medications and hopefully off of the Protonix altogether in the future, we are going to decrease the Protonix down to 20 mg daily and continue the famotidine as prescribed for now.      At her next office visit we will discuss the idea of trying to discontinue the Protonix altogether and increase the famotidine to twice daily dosing.  The patient and her  were agreeable and verbalized understanding.    Orders:  •  famotidine (PEPCID) 20 mg tablet; Take 1 tablet (20 mg total) by mouth daily at bedtime  •  pantoprazole (PROTONIX) 20 mg tablet; Take 1 tablet (20 mg total) by mouth daily

## 2025-04-09 NOTE — ASSESSMENT & PLAN NOTE
Orders:  •  famotidine (PEPCID) 20 mg tablet; Take 1 tablet (20 mg total) by mouth daily at bedtime  •  pantoprazole (PROTONIX) 20 mg tablet; Take 1 tablet (20 mg total) by mouth daily

## 2025-04-14 ENCOUNTER — OFFICE VISIT (OUTPATIENT)
Dept: OCCUPATIONAL THERAPY | Facility: CLINIC | Age: 80
End: 2025-04-14
Payer: MEDICARE

## 2025-04-14 ENCOUNTER — TELEPHONE (OUTPATIENT)
Age: 80
End: 2025-04-14

## 2025-04-14 DIAGNOSIS — R41.89 MODERATE COGNITIVE IMPAIRMENT: Primary | ICD-10-CM

## 2025-04-14 PROCEDURE — 97530 THERAPEUTIC ACTIVITIES: CPT

## 2025-04-14 NOTE — PROGRESS NOTES
Daily Note     Visit/Unit Tracking  AUTH Status: Not Required Date 25   Visits  Authed: N/A Used 1 (Eval) 2 3 4 5 6 7 (RE) 8 9 10  11  12 (RE)      Visit/Unit Tracking  AUTH Status: Not Required Date 3/4/25 3/11 3/18 3/25 4/1 4/8 4/14        Visits  Authed: N/A Used 13 14 15 16 (RE)   17 18 19            PLAN OF CARE START: 3/25/25  PLAN OF CARE END: 25  PROGRESS NOTE DUE: 25 (scheduled on calendar)   FREQUENCY: 1-x week for 6 weeks   PRECAUTIONS: orientation, difficulty hearing   DIAGNOSIS: moderate cognitive impairment   Insurance: Medicare   MAINTENANCE PROGRAM         Today's date: 2025  Patient name: Anthony Slater  : 1945  MRN: [id number]  Referring provider: Rosamaria Silva,*  Dx:   Encounter Diagnosis     ICD-10-CM    1. Moderate cognitive impairment  R41.89                        Subjective: I just dont feel right, I dont know what's wrong. (Start of session, pt wanting significant other to come in with her)    Objective: See treatment diary below. Pt participated in skilled OT this date with focus on sustained attention, functional cognition, working/short term memory, EF skills and activity tolerance/endurance, for increased safety and engagement in ADL/IADL tasks.      TA: Pt engaged in Beaded Dragon Activity this date with focus to sustained attention, leisure pursuits, verbal direction follow, and activity engagement for decreased worry and stress. Pt completed 75% of task while seated at tabletop.     Prior to starting katiana, pt stating she did not feel right and unsure if she needed to use the bathroom. Pt caregiver reports pt has been very uneasy in the last week and is nervous about going places on her own particularly the bathroom. She has been unsure of the order of how to go to the bathroom.     Pt finishing session more calmed and regulated than start of session.      Assessment: Tolerated  treatment well. Focus of session on working on memory book, sustained attention,  skills, sequencing, problem solving and EF skills. Pt reviewed memory book with therapist to start session and therapist asked questions regarding family members while pt pointed out who each person was in the pictures with 100% accuracy.     Plan: Continue per plan of care.  Progress treatment as tolerated. Continue to focus on safety awareness (cards), adding cognitive worksheets to memory book, and establishing daily routine. In future sessions have  sit in on session and have patient go through memory book to him. Finish color by number next session.

## 2025-04-14 NOTE — TELEPHONE ENCOUNTER
LSW received a call from patient's daughter-Art. Art stated she is interested in homecare for patient to assist with ADLs. Art stated she has contacted [employer] for assistance as patient does have meals on wheels but in need of the homecare. LSW discussed applying for waiver services which patient may be eligible for due to her SS income. Art requested if the steps to apply can be emailed to her. LSW emailed steps to apply and documents required for eligibility of the program. Art was appreciative of the information.    LSW to remain available as needed.

## 2025-04-22 ENCOUNTER — OFFICE VISIT (OUTPATIENT)
Dept: OCCUPATIONAL THERAPY | Facility: CLINIC | Age: 80
End: 2025-04-22
Payer: MEDICARE

## 2025-04-22 DIAGNOSIS — R41.89 MODERATE COGNITIVE IMPAIRMENT: Primary | ICD-10-CM

## 2025-04-22 PROCEDURE — 97530 THERAPEUTIC ACTIVITIES: CPT

## 2025-04-22 NOTE — PROGRESS NOTES
Daily Note     Visit/Unit Tracking  AUTH Status: Not Required Date 25   Visits  Authed: N/A Used 1 (Eval) 2 3 4 5 6 7 (RE) 8 9 10  11  12 (RE)      Visit/Unit Tracking  AUTH Status: Not Required Date 3/4/25 3/11 3/18 3/25 4/1 4/8 4/14 4/22       Visits  Authed: N/A Used 13 14 15 16 (RE)   17 18 19 20            PLAN OF CARE START: 3/25/25  PLAN OF CARE END: 25  PROGRESS NOTE DUE: 25 (scheduled on calendar)   FREQUENCY: 1-x week for 6 weeks   PRECAUTIONS: orientation, difficulty hearing   DIAGNOSIS: moderate cognitive impairment   Insurance: Medicare   MAINTENANCE PROGRAM         Today's date: 2025  Patient name: Anthony Slater  : 1945  MRN: [id number]  Referring provider: Rosamaria Silva,*  Dx:   Encounter Diagnosis     ICD-10-CM    1. Moderate cognitive impairment  R41.89                      Subjective: Pt reports she is feeling much better since previous session and is not feeling as off. She appears to be more alert on today's date.     Objective: See treatment diary below. Pt participated in skilled OT this date with focus on sustained attention, functional cognition, working/short term memory, EF skills and activity tolerance/endurance, for increased safety and engagement in ADL/IADL tasks.     TA:   *Shopping activity reading pt verbal list of three items to have her search for items and then place into shopping cart. Then provided pt with lists of 6 items visually to have her find items and place them into designated carts. Activity focused on functional searching, recall for directions/items, following basic directions, and verbal/visual direction following       *Verbal direction following worksheet having pt listen to descriptions and then having to perform directions by drawing various shapes. Activity focused on sustained attention, logical reasoning and following verbal directions.     *Peg board  activity having pt place pegs in rainbow order. Taught her FERNANDO SINGH BIV mnemonic. Then had pt name words that start with the same letter of the color she is on. Activity focused on sequencing, working memory, recall, semantic fluency and following multi-step directions. Pt required prompting for category prompts. Had pt write down words she already said to avoid repeats with good carryover.       Assessment: Tolerated treatment well. Focus of session on working on memory book, sustained attention, recall, sequencing, problem solving and following multi-step directions. Pt had fair recall during peg board activity and following multiple steps. Required prompting throughout all tasks for problem solving. Pt presented more alert and oriented on today's session compared to previous sessions. Pt would benefit from continued OT services to address functional cognition, EF skills, sustained attention, memory, processing speed,  skills, logical reasoning, and orientation.       Plan: Continue per plan of care.  Progress treatment as tolerated. Continue to focus on safety awareness (cards), adding cognitive worksheets to memory book, and establishing daily routine. In future sessions have  sit in on session and have patient go through memory book to him. Finish color by number next session.

## 2025-04-23 ENCOUNTER — OFFICE VISIT (OUTPATIENT)
Dept: FAMILY MEDICINE CLINIC | Facility: CLINIC | Age: 80
End: 2025-04-23
Payer: MEDICARE

## 2025-04-23 VITALS
HEIGHT: 60 IN | OXYGEN SATURATION: 99 % | HEART RATE: 66 BPM | TEMPERATURE: 96.7 F | SYSTOLIC BLOOD PRESSURE: 140 MMHG | WEIGHT: 102 LBS | BODY MASS INDEX: 20.03 KG/M2 | DIASTOLIC BLOOD PRESSURE: 72 MMHG

## 2025-04-23 DIAGNOSIS — E44.0 MODERATE PROTEIN-CALORIE MALNUTRITION (HCC): ICD-10-CM

## 2025-04-23 DIAGNOSIS — N18.31 STAGE 3A CHRONIC KIDNEY DISEASE (HCC): Chronic | ICD-10-CM

## 2025-04-23 DIAGNOSIS — F02.B0 MODERATE EARLY ONSET ALZHEIMER'S DEMENTIA WITHOUT BEHAVIORAL DISTURBANCE, PSYCHOTIC DISTURBANCE, MOOD DISTURBANCE, OR ANXIETY (HCC): ICD-10-CM

## 2025-04-23 DIAGNOSIS — K31.819 GASTRIC ANTRAL VASCULAR ECTASIA: ICD-10-CM

## 2025-04-23 DIAGNOSIS — D50.8 OTHER IRON DEFICIENCY ANEMIA: Chronic | ICD-10-CM

## 2025-04-23 DIAGNOSIS — I25.10 CORONARY ARTERIOSCLEROSIS: ICD-10-CM

## 2025-04-23 DIAGNOSIS — G47.33 OBSTRUCTIVE SLEEP APNEA SYNDROME: ICD-10-CM

## 2025-04-23 DIAGNOSIS — R63.4 WEIGHT LOSS: ICD-10-CM

## 2025-04-23 DIAGNOSIS — K21.00 GASTROESOPHAGEAL REFLUX DISEASE WITH ESOPHAGITIS, UNSPECIFIED WHETHER HEMORRHAGE: Chronic | ICD-10-CM

## 2025-04-23 DIAGNOSIS — F41.1 ANXIETY STATE: Chronic | ICD-10-CM

## 2025-04-23 DIAGNOSIS — M81.0 AGE-RELATED OSTEOPOROSIS WITHOUT CURRENT PATHOLOGICAL FRACTURE: ICD-10-CM

## 2025-04-23 DIAGNOSIS — R41.89 COGNITIVE DECLINE: ICD-10-CM

## 2025-04-23 DIAGNOSIS — K31.811 GASTROINTESTINAL HEMORRHAGE ASSOCIATED WITH ANGIODYSPLASIA OF STOMACH AND DUODENUM: Primary | ICD-10-CM

## 2025-04-23 DIAGNOSIS — G30.0 MODERATE EARLY ONSET ALZHEIMER'S DEMENTIA WITHOUT BEHAVIORAL DISTURBANCE, PSYCHOTIC DISTURBANCE, MOOD DISTURBANCE, OR ANXIETY (HCC): ICD-10-CM

## 2025-04-23 DIAGNOSIS — R26.2 AMBULATORY DYSFUNCTION: ICD-10-CM

## 2025-04-23 PROCEDURE — 99214 OFFICE O/P EST MOD 30 MIN: CPT | Performed by: FAMILY MEDICINE

## 2025-04-23 PROCEDURE — G2211 COMPLEX E/M VISIT ADD ON: HCPCS | Performed by: FAMILY MEDICINE

## 2025-04-23 NOTE — PROGRESS NOTES
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       Gastric antral vascular ectasia         Coronary arteriosclerosis  Currently on statin and beta-blocker.       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  Untreated with Rx however attending occupational therapy, completed course of PT previously.  Orders:  •  FL barium swallow ROUTINE esophagus; Future    Stage 3a chronic kidney disease (HCC)  Lab Results   Component Value Date    EGFR 38 2025    EGFR 35 08/10/2024    EGFR 37 2024    CREATININE 1.31 (H) 2025    CREATININE 1.39 (H) 08/10/2024    CREATININE 1.34 (H) 2024            Age-related osteoporosis without current pathological fracture  Untreated due to side effects of prescribed medications.       Other iron deficiency anemia  tatus post 4 iron infusions, has routine follow-up appointment with hematology in 1 month.  Labs to be obtained prior to           Anxiety state  Continue BuSpar       Ambulatory dysfunction    Orders:  •  FL barium swallow ROUTINE esophagus; Future    Cognitive decline    Orders:  •  FL barium swallow ROUTINE esophagus; Future    Weight loss    Orders:  •  FL barium swallow ROUTINE esophagus; Future    Moderate protein-calorie malnutrition (HCC)                History of Present Illness   Follow-up appointment:    Endometrial cancer --  stage Ib high intermediate risk endometrial cancer.  Completed vaginal  brachytherapy. Sees gyn/onc routinely.  Next appointment in May.    Coronary arteriosclerosis/HTN- on BB. Bps stable.     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 Still with eructation which has previously been discussed. Better with anti-gas meds.  On antacids as well.  But perhaps starting to choke on thicker textured foods?  History from patient rather unreliable.  Also with c/o issues with dentures, does not typically use her dentures as they do not fit well (since she lost some weight).  Difficulty chewing. Bms are rather large and sometimes she strains.  Has been a spoke with her dentist, they will not be refitting her dentures.  Too painful and cumbersome.  She does continue to lose weight.    Moderate early onset alzheimer's dementia/cognitive decline-- sees geriatrician (only twice so far). 9/9/2024: [facility] 8/30 at that time.  Attending OT for cognitive therapy and ST, had MRI brain performed. Done with PT,  completed course. Has one OT visit per week now for 45 minutes.  Does well during these visits. was on aricept  for about one month,  stopped med 2/2 tremulousness and no clinical benefit.  Family agreed.  Tremors seemed to improve, they were constant while on med. Still on buspar. Appetite is good despite continued weight loss.  But  states her eating schedule is typically based on the clock.  She will ask about lunch around noon.  Patient does report she is hungry at times, at other times she will just eat what ever is put in front of her.  Eats 3 meals a day, gets MOW. She no longer cooks or bakes which she found very enjoyable. Neither pt or  sleep well.  awakens with small bumps in the night afraid she has fallen. Occasionally wanders. Currently having trouble using the restroom as she forgets what to do when she gets in the bathroom. Sundwestons.  Has follow-up appointment with  geriatrician next month.    Age-related osteoporosis-- Vit D suppl.      Stage 3a chronic kidney disease-- GFR 30s.    Mixed hyperlipidemia-- on statin.     Anemia--recently met with hematology regarding her iron deficiency anemia.  She was not responding appropriately to oral replacement therapy, over-the-counter.  Dr. Quintanilla further evaluated patient and started her on Venofer x 4 doses.  Has a follow-up appointment next month.      Review of Systems   Constitutional:  Positive for fatigue and unexpected weight change. Negative for fever.   Respiratory:  Negative for cough and shortness of breath.    Cardiovascular:  Negative for palpitations and leg swelling.   Gastrointestinal:  Negative for abdominal pain.   Psychiatric/Behavioral:  Positive for confusion, decreased concentration and sleep disturbance. Negative for agitation. The patient is nervous/anxious. The patient is not hyperactive.        Objective   /72 (BP Location: Left arm, Patient Position: Sitting, Cuff Size: Standard)   Pulse 66   Temp (!) 96.7 °F (35.9 °C) (Tympanic)   Ht 5' (1.524 m)   Wt 46.3 kg (102 lb)   SpO2 99%   BMI 19.92 kg/m²      Physical Exam  Vitals and nursing note reviewed.   Constitutional:       General: She is not in acute distress.     Appearance: Normal appearance. She is normal weight. She is not ill-appearing.   HENT:      Head: Normocephalic.      Nose: Nose normal.      Mouth/Throat:      Mouth: Mucous membranes are moist.   Eyes:      Pupils: Pupils are equal, round, and reactive to light.   Cardiovascular:      Rate and Rhythm: Normal rate and regular rhythm.      Heart sounds: No murmur heard.  Pulmonary:      Effort: Pulmonary effort is normal.      Breath sounds: Normal breath sounds.   Musculoskeletal:      Cervical back: Normal range of motion and neck supple. No rigidity or tenderness.   Neurological:      General: No focal deficit present.      Mental Status: She is alert. Mental status is at baseline.    Psychiatric:         Mood and Affect: Mood normal.         Speech: Speech normal.         Behavior: Behavior normal. Behavior is not agitated.         Cognition and Memory: Cognition is impaired. She exhibits impaired recent memory.

## 2025-04-23 NOTE — ASSESSMENT & PLAN NOTE
Progressive.  Untreated with Rx however attending occupational therapy, completed course of PT previously.  Orders:  •  FL barium swallow ROUTINE esophagus; Future

## 2025-04-23 NOTE — ASSESSMENT & PLAN NOTE
tatus post 4 iron infusions, has routine follow-up appointment with hematology in 1 month.  Labs to be obtained prior to

## 2025-04-25 ENCOUNTER — TELEPHONE (OUTPATIENT)
Age: 80
End: 2025-04-25

## 2025-04-25 NOTE — TELEPHONE ENCOUNTER
Pt  called stating they got a a letter about a cx apt that needs to be rescheduled, when looking at 6/9/25 its an aria hop bill only fu I am not able to schedule that. I am not sure if that is a regular OV fu. Please call pt thank you, if its just a reg OV please let me know an I can do that . Thank you. Attempted to transfer unable to get someone on the line.

## 2025-04-29 ENCOUNTER — EVALUATION (OUTPATIENT)
Dept: OCCUPATIONAL THERAPY | Facility: CLINIC | Age: 80
End: 2025-04-29
Payer: MEDICARE

## 2025-04-29 DIAGNOSIS — R41.89 MODERATE COGNITIVE IMPAIRMENT: Primary | ICD-10-CM

## 2025-04-29 PROCEDURE — 97530 THERAPEUTIC ACTIVITIES: CPT

## 2025-04-29 NOTE — PROGRESS NOTES
"OCCUPATIONAL THERAPY Re-Evaluation        Visit/Unit Tracking  AUTH Status: Not Required Date 25   Visits  Authed: N/A Used 1 (Eval) 2 3 4 5 6 7 (RE) 8 9 10  11  12 (RE)      Visit/Unit Tracking  AUTH Status: Not Required Date 3/4/25 3/11 3/18 3/25 4/1 4/8 4/14 4/22 4/29      Visits  Authed: N/A Used 13 14 15 16 (RE)   17 18 19 20  21 (RE)          PLAN OF CARE START: 25   PLAN OF CARE END: 25  PROGRESS NOTE DUE: 25 (scheduled on calendar)   FREQUENCY: 1-x week for 6 weeks   PRECAUTIONS: orientation, difficulty hearing   DIAGNOSIS: moderate cognitive impairment   Insurance: Medicare   MAINTENANCE PROGRAM       Today's date: 2025  Patient name: Anthony Slater  : 1945  MRN: [id number]  Referring provider: Rosamaria Silva,*  Dx:   Encounter Diagnosis     ICD-10-CM    1. Moderate cognitive impairment  R41.89               SKILLED ANALYSIS IE 10/8:  Pt is currently demonstrating the following occupational deficits: limited 2* visual immediate memory recall, delayed memory recall, attention, processing and executive function. Based on the aforementioned OT evaluation, functional performance deficits, and assessments, pt has been identified as a moderate complexity evaluation. Pt to continue to benefit from outpatient skilled OT services to address the following goals 1-2x/wk  for 4 weeks to focus on pt/caregiver education, increase memory deficits, increase cognitive function, orientation and alertness to improve overall QOL.      RE 25  Pt presents to re-evaluation on 3/25/25 status post moderate cognitive decline. As per interview, pt reports sometimes her memory is good, and other times she is challenged. \"Good days and bad days, but more good days\". Pt continues to have mod difficulty with recall of family memory in memory book. Post assessments, pt demonstrating maintained sustained attention and increased " Chief Complaint   Patient presents with     Follow Up     Return CORE; 49yr old male with a h/o chronic systolic heart failure secondary to ICM presenting for HF follow-up with labs prior.   Vitals were taken and medications were reconciled.   Karon Ragland MA    11:03 AM         "immediate/delayed recall on CMT. Pt continues to demonstrate impairments in memory, processing speed, logical reasoning,  skills and EF skills. Pt STGs/LTGs updated below. Pt would benefit from continued OT services focusing on impairments for 1x week for 6 more weeks with focus on aforementioned deficits to maximize functional performance and improve QOL. Pt educated on progress/therapy process and pt in understanding and agreement of recommendations. Findings and recommendations discussed with pt, and they are in agreement. Educated pt on charges of insurance, assessments performed with standardized norms, POC, goal creation, and OP OT services.       Subjective    PATIENT GOAL: \"To remember things better\"    PAIN: 0    HISTORY OF PRESENT ILLNESS: Pt is a 79 y.o. female seen for OT eval s/p Mild cognitive impairment [G31.84] referred to [facility]. Patient had recent appointment with Geriatric medicine and was referred to OT services for cognitive therapy. Patient is expected to return at the end of this month. Patient had a fall at the beginning of the year which is suspected to be the result of her cognitive decline.       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    History of transfusion     Hypertension     Mixed hyperlipidemia     DAVID on CPAP     Shortness of breath     triggered by anxiety    Watermelon stomach     Wears dentures  " "      Past Surgical Hx:   Past Surgical History:   Procedure Laterality Date    BREAST BIOPSY Right 2015    BREAST BIOPSY Left     ? year    CARDIAC CATHETERIZATION  2014    EF 65%, mid LAD 60% stenosis and D2 70% stenosis.  Medical management.    CATARACT EXTRACTION Bilateral     COLONOSCOPY      CYSTOSCOPY N/A 2023    Procedure: CYSTOSCOPY;  Surgeon: Nadir Ash MD;  Location: [facility];  Service: Gynecology Oncology    EGD      JOINT REPLACEMENT Right     RIGHT ELBOW    OOPHORECTOMY      not sure which one was removed    IL LAPS TOTAL HYSTERECT 250 GM/< W/RMVL TUBE/OVARY N/A 2023    Procedure: ROBOTHYSTERECTOMY, BILATERAL SALPINGO-OOPHORECTOMY, PELVIC SENTINEL NODE BIOPSIES;  Surgeon: Nadir Ash MD;  Location: [facility];  Service: Gynecology Oncology    ROTATOR CUFF REPAIR Right     US GUIDED THYROID BIOPSY  2020          Objective    Impairment Observations:     Gerardo Cognitive Assessment (MoCA)  \"screen of cognitive abilities designed to detect mild cognitive dysfunction.\"  NORMS:   -Normal: 26+  -Mild Cognitive Impairment: 18-25   -Moderate Cognitive Impairment: 10-17  -Severe Cognitive Impairment: <10        STATUS ON IE: Not Tested Re: 25 RE: 3/25/25                                         COGNITIVE FXN        VERSION  Version 8.1 Version 8.2           MOCA        Education Level Less than 12 years (+1)  (+1)   Visuospatial/executive functioning   2   Naming  3/3 3/5   Memory: 1st trial:  3/5 1/5   Memory: 2nd trial:  3/5 1/5   Attention/concentration  2/2 1   List of letters:   1 1   Serial Seven Subtraction:  01    Language/sentence repetition:  0/2 02   Language Fluency:   01 7 words  01 (7 words)   Abstract/Correlational Thinkin 12   Delayed Recall:  0/5 0/5   Orientation:  /6 3/6   Memory Index Score  4/15 1/15                                      Total Score  14(+1) Moderate cognitive impairment  (moderate " "cognitive impairment)        TRAIL MAKING TEST:   \"widely used test to assess executive function in patients with neuro injury. Successful performance of the TMT requires a variety of mental abilities including letter and number recognition mental flexibility, visual scanning, and motor function. Norms are based on age related scores\"     RE 2/25 RE 3/25 RE 4/29   TRAIL MAKING PART A     NORM: 58 seconds 49 seconds  1 minute 8 seconds  1 min 1 sec (1 error)   TRAIL MAKING PART B    NORM: 2 minutes 32 seconds  Not Tested         CONTEXTUAL MEMORY TEST (1993 version):   \"assesses immediate and delayed recall of 20 objects related to a scene in children and adults with memory impairments\"   NORMS:   *not applicable to current in house version     TYPE OF RECALL STATUS ON IE: 10/8/24 Re-Eval: 2/25 RE 3/25/25 RE 4/29   Immediate recall 5/20 1/20 2/20 7/20   Delayed recall  3/20 0/20  6/20     4/29: increased delayed recall by 5 items     MAINTENANCE GOALS    GOAL  STATUS ON IE  GOAL STATUS    Pt will maintain MOCA score of +/- 3 on MOCA increase MOCA for maintained EF skills, logical thinking, and processing speed for involvement in IADLs/ADLs 12/30  Maintained    Pt will maintain immediate recall on MOCA scoring (+/-1) 3/5 for both rounds for maintained participation in community activities  3/5 Declined   Pt will maintain logical/abstract thinking by scoring (+/-1) 2/2 on abstract thinking section of MOCA for maintained participation in purposeful activities 2/2 Declined   Pt will maintain STM by scoring (+/- 2) on the immediate recall of CMT for maintained functioning with IADLs Previous: 2/20 4/29: 7/20  Increased    Pt will maintain sustained attention on Trail Making Part A completing in (+/- 10 seconds) maintained attention to tasks for ADLs/IADLs  1 minute 4 seconds Maintained    Pt will consistently report A&O x 3 75% of the time for maintained safety awareness for improved health management. 3/6 Maintained "   Pt and caregiver will be educated on compensatory adaptive devices and DME available to improve independence with ADL/leisure tasks I.e. Life alert, pill box, door locks, security system, wander guard/chair alarm, BSC, grab bars, caregiver burden/burnout, memory aides, etc.  Maintained   Pt will develop a memory book and establish daily routines in OT sessions for maintained participation in social/leisure and ADLs   Maintained          [Alert] : alert [No Acute Distress] : no acute distress [Normocephalic] : normocephalic [Anterior [place] Closed] : anterior fontanelle closed [Red Reflex Bilateral] : red reflex bilateral [PERRL] : PERRL [Normally Placed Ears] : normally placed ears [Auricles Well Formed] : auricles well formed [Clear Tympanic membranes with present light reflex and bony landmarks] : clear tympanic membranes with present light reflex and bony landmarks [No Discharge] : no discharge [Nares Patent] : nares patent [Palate Intact] : palate intact [Uvula Midline] : uvula midline [Tooth Eruption] : tooth eruption  [Supple, full passive range of motion] : supple, full passive range of motion [No Palpable Masses] : no palpable masses [Symmetric Chest Rise] : symmetric chest rise [Clear to Auscultation Bilaterally] : clear to auscultation bilaterally [Regular Rate and Rhythm] : regular rate and rhythm [S1, S2 present] : S1, S2 present [No Murmurs] : no murmurs [+2 Femoral Pulses] : +2 femoral pulses [Soft] : soft [NonTender] : non tender [Non Distended] : non distended [Normoactive Bowel Sounds] : normoactive bowel sounds [No Hepatomegaly] : no hepatomegaly [No Splenomegaly] : no splenomegaly [Richy 1] : Richy 1 [No Clitoromegaly] : no clitoromegaly [Normal Vaginal Introitus] : normal vaginal introitus [Patent] : patent [Normally Placed] : normally placed [No Abnormal Lymph Nodes Palpated] : no abnormal lymph nodes palpated [No Clavicular Crepitus] : no clavicular crepitus [Negative Gan-Ortalani] : negative Gan-Ortalani [Symmetric Buttocks Creases] : symmetric buttocks creases [No Spinal Dimple] : no spinal dimple [NoTuft of Hair] : no tuft of hair [Cranial Nerves Grossly Intact] : cranial nerves grossly intact [No Rash or Lesions] : no rash or lesions

## 2025-05-05 ENCOUNTER — HOSPITAL ENCOUNTER (OUTPATIENT)
Dept: RADIOLOGY | Facility: HOSPITAL | Age: 80
Discharge: HOME | End: 2025-05-05
Attending: FAMILY MEDICINE
Payer: MEDICARE

## 2025-05-05 ENCOUNTER — PREP FOR PROCEDURE (OUTPATIENT)
Age: 80
End: 2025-05-05

## 2025-05-05 ENCOUNTER — TELEPHONE (OUTPATIENT)
Age: 80
End: 2025-05-05

## 2025-05-05 ENCOUNTER — RESULTS FOLLOW-UP (OUTPATIENT)
Dept: FAMILY MEDICINE CLINIC | Facility: CLINIC | Age: 80
End: 2025-05-05

## 2025-05-05 DIAGNOSIS — R26.2 AMBULATORY DYSFUNCTION: ICD-10-CM

## 2025-05-05 DIAGNOSIS — F02.B0 MODERATE EARLY ONSET ALZHEIMER'S DEMENTIA WITHOUT BEHAVIORAL DISTURBANCE, PSYCHOTIC DISTURBANCE, MOOD DISTURBANCE, OR ANXIETY (HCC): ICD-10-CM

## 2025-05-05 DIAGNOSIS — K21.00 GASTROESOPHAGEAL REFLUX DISEASE WITH ESOPHAGITIS, UNSPECIFIED WHETHER HEMORRHAGE: Chronic | ICD-10-CM

## 2025-05-05 DIAGNOSIS — R63.4 WEIGHT LOSS: ICD-10-CM

## 2025-05-05 DIAGNOSIS — G30.0 MODERATE EARLY ONSET ALZHEIMER'S DEMENTIA WITHOUT BEHAVIORAL DISTURBANCE, PSYCHOTIC DISTURBANCE, MOOD DISTURBANCE, OR ANXIETY (HCC): ICD-10-CM

## 2025-05-05 DIAGNOSIS — R41.89 COGNITIVE DECLINE: ICD-10-CM

## 2025-05-05 DIAGNOSIS — F41.1 ANXIETY STATE: ICD-10-CM

## 2025-05-05 DIAGNOSIS — K31.811 GASTROINTESTINAL HEMORRHAGE ASSOCIATED WITH ANGIODYSPLASIA OF STOMACH AND DUODENUM: Primary | ICD-10-CM

## 2025-05-05 DIAGNOSIS — R13.14 PHARYNGOESOPHAGEAL DYSPHAGIA: ICD-10-CM

## 2025-05-05 PROCEDURE — 74220 X-RAY XM ESOPHAGUS 1CNTRST: CPT

## 2025-05-05 RX ORDER — BUSPIRONE HYDROCHLORIDE 5 MG/1
5 TABLET ORAL 3 TIMES DAILY
Qty: 270 TABLET | Refills: 3 | Status: SHIPPED | OUTPATIENT
Start: 2025-05-05

## 2025-05-05 NOTE — TELEPHONE ENCOUNTER
Called and scheduled with her . He was concerned that she cannot lay flat due to dizziness/vertigo. I explained she would be under anesthesia. He understood and agreed to schedule 5/22/25. I told him I would document about the laying flat issue and that they are asking for a later arrival time.

## 2025-05-05 NOTE — RESULT ENCOUNTER NOTE
Please let patient's  know there is a filling defect at the bottom portion of the esophagus and the first part of the small intestine.  They are recommending an EGD to visualize the area. I will forward this result to her gastro and see what they recommend.

## 2025-05-05 NOTE — TELEPHONE ENCOUNTER
Can you please contact the patient and let her know that her primary care provider messaged us to take a look at her most recent barium swallow study and the recommendation at this point is to proceed with an EGD to further evaluate.  I did put in the order for the EGD and if we could call her to get her scheduled for the EGD and for now we could also just keep her follow-up as scheduled in October unless something significantly changes or worsens in the meantime.

## 2025-05-06 ENCOUNTER — OFFICE VISIT (OUTPATIENT)
Dept: OCCUPATIONAL THERAPY | Facility: CLINIC | Age: 80
End: 2025-05-06
Payer: MEDICARE

## 2025-05-06 DIAGNOSIS — R41.89 MODERATE COGNITIVE IMPAIRMENT: Primary | ICD-10-CM

## 2025-05-06 PROCEDURE — 97530 THERAPEUTIC ACTIVITIES: CPT

## 2025-05-06 NOTE — PROGRESS NOTES
Daily Note     Visit/Unit Tracking  AUTH Status: Not Required Date 25   Visits  Authed: N/A Used 1 (Eval) 2 3 4 5 6 7 (RE) 8 9 10  11  12 (RE)      Visit/Unit Tracking  AUTH Status: Not Required Date 3/4/25 3/11 3/18 3/25 4/1 4/8 4/14 4/22 4/29 5/6     Visits  Authed: N/A Used 13 14 15 16 (RE)   17 18 19 20  21 (RE) 22         PLAN OF CARE START: 25   PLAN OF CARE END: 25  PROGRESS NOTE DUE: 25 (scheduled on calendar)   FREQUENCY: 1-x week for 6 weeks   PRECAUTIONS: orientation, difficulty hearing   DIAGNOSIS: moderate cognitive impairment   Insurance: Medicare   MAINTENANCE PROGRAM          Today's date: 2025  Patient name: Anthony Slater  : 1945  MRN: [id number]  Referring provider: Rosamaria Silva,*  Dx:   Encounter Diagnosis     ICD-10-CM    1. Moderate cognitive impairment  R41.89                        Subjective: Pt reports she is doing okay today.     Objective: See treatment diary below. Pt participated in skilled OT this date with focus on sustained attention, functional cognition, working/short term memory, EF skills and activity tolerance/endurance, for increased safety and engagement in ADL/IADL tasks.     TA:   *Color by number activity completing basic mathematical addition/subtraction and then following directions to color each segment the correct color based on the number.     *Colored marble activity having pt follow the design card with category prompts for two colors focusing on recall, direction following, and pattern recognition.     *Summer up worksheet having pt follow one step and two step written directions to color in worksheet focusing on multistep written direction following.       Assessment: Tolerated treatment well. Focus of session on working on memory book, sustained attention, recall, sequencing,  and following multi-step directions. Pt able to complete basic design copy  independently and follow written directions with min prompting.  Pt would benefit from continued OT services to address functional cognition, EF skills, sustained attention, memory, processing speed,  skills, logical reasoning, and orientation.       Plan: Continue per plan of care.  Progress treatment as tolerated. Continue to focus on safety awareness (cards), adding cognitive worksheets to memory book, and establishing daily routine. In future sessions have  sit in on session and have patient go through memory book to him. Finish color by number next session.

## 2025-05-13 ENCOUNTER — OFFICE VISIT (OUTPATIENT)
Dept: GYNECOLOGIC ONCOLOGY | Facility: CLINIC | Age: 80
End: 2025-05-13
Payer: MEDICARE

## 2025-05-13 VITALS
DIASTOLIC BLOOD PRESSURE: 70 MMHG | TEMPERATURE: 98.4 F | SYSTOLIC BLOOD PRESSURE: 126 MMHG | OXYGEN SATURATION: 100 % | RESPIRATION RATE: 16 BRPM | BODY MASS INDEX: 20.46 KG/M2 | HEIGHT: 60 IN | WEIGHT: 104.2 LBS | HEART RATE: 73 BPM

## 2025-05-13 DIAGNOSIS — R41.89 COGNITIVE DECLINE: ICD-10-CM

## 2025-05-13 DIAGNOSIS — C54.1 ENDOMETRIAL CANCER (HCC): Primary | ICD-10-CM

## 2025-05-13 PROCEDURE — 99459 PELVIC EXAMINATION: CPT | Performed by: OBSTETRICS & GYNECOLOGY

## 2025-05-13 PROCEDURE — G2211 COMPLEX E/M VISIT ADD ON: HCPCS | Performed by: OBSTETRICS & GYNECOLOGY

## 2025-05-13 PROCEDURE — 99212 OFFICE O/P EST SF 10 MIN: CPT | Performed by: OBSTETRICS & GYNECOLOGY

## 2025-05-13 NOTE — ASSESSMENT & PLAN NOTE
I have independently obtained history from patient and  today.  Pelvic exam performed.    Patient is approaching 2 years after completion of treatment.  She has no clinical evidence of disease.  We have tentatively made an appointment for follow-up in 6 months.  However, her cognitive status continues to decline.  It may not make sense to continue to follow for endometrial cancer in the absence of symptoms.  I have discussed this with her  who fully agrees.  If patient's condition was to deteriorate, he might end up canceling this appointment as focus will be on supportive/comfort care for her worsening cognitive decline.  Always remain available if symptoms were to arise later.

## 2025-05-13 NOTE — ASSESSMENT & PLAN NOTE
Patient is requiring full-time care from her  who is also an octogenarian.  I offered support to .  I explained potential need to obtain additional help at home or even transition care to an inpatient memory care unit.  He understands this possibility and is working through options with their family with the support of their primary care provider and other specialists.  I offered support and will remain available if there is anything I can do to assist with this process in the future.

## 2025-05-13 NOTE — PROGRESS NOTES
Name: Anthony Slater      : 1945      MRN: [id number]  Encounter Provider: Nadir Ash MD  Encounter Date: 2025   Encounter department: [facility] [place]  :  Assessment & Plan  Endometrial cancer (HCC)  I have independently obtained history from patient and  today.  Pelvic exam performed.    Patient is approaching 2 years after completion of treatment.  She has no clinical evidence of disease.  We have tentatively made an appointment for follow-up in 6 months.  However, her cognitive status continues to decline.  It may not make sense to continue to follow for endometrial cancer in the absence of symptoms.  I have discussed this with her  who fully agrees.  If patient's condition was to deteriorate, he might end up canceling this appointment as focus will be on supportive/comfort care for her worsening cognitive decline.  Always remain available if symptoms were to arise later.       Cognitive decline  Patient is requiring full-time care from her  who is also an octogenarian.  I offered support to .  I explained potential need to obtain additional help at home or even transition care to an inpatient memory care unit.  He understands this possibility and is working through options with their family with the support of their primary care provider and other specialists.  I offered support and will remain available if there is anything I can do to assist with this process in the future.               History of Present Illness   Reason for Visit / CC: Cancer   Anthony Slater is a 80 y.o. female   Patient with stage IB intermediate risk endometrial cancer completed postoperative vaginal brachytherapy in late .  Here for follow-up.  Denies vaginal bleeding, drainage or discharge.  Denies pelvic or abdominal pain.  Urinary incontinence at baseline.   reports worsening cognitive decline.      Pertinent Medical History    Alzheimer's/worsening cognitive decline.  Voices problem list.     Oncology History   Cancer Staging   Endometrial cancer (HCC)  Staging form: Corpus Uteri - Carcinoma, AJCC 8th Edition  - Clinical stage from 7/31/2023: FIGO Stage IB (cT1b, cN0(sn), cM0) - Signed by DOTTIE Umaña on 8/29/2023  Stage prefix: Initial diagnosis  Method of lymph node assessment: [place] lymph node biopsy  Histologic grade (G): G1  Histologic grading system: 3 grade system  Oncology History   Endometrial cancer (HCC)   7/31/2023 -  Cancer Staged    Staging form: Corpus Uteri - Carcinoma, AJCC 8th Edition  - Clinical stage from 7/31/2023: FIGO Stage IB (cT1b, cN0(sn), cM0) - Signed by DOTTIE Umaña on 8/29/2023  Stage prefix: Initial diagnosis  Method of lymph node assessment: [place] lymph node biopsy  Histologic grade (G): G1  Histologic grading system: 3 grade system       8/29/2023 Initial Diagnosis    Endometrial cancer (HCC)     10/2/2023 - 10/16/2023 Radiation      Plan ID Energy Fractions Dose per Fraction (cGy) Dose Correction (cGy) Total Dose Delivered (cGy) Elapsed Days   Cyl 2.5 cm Ir 192 3 / 3 700 0 2,100 14      Treatment dates:  C1 HDR: 10/2/2023 - 10/16/2023            Review of Systems A complete review of systems is negative other than that noted above in the HPI.       Objective   /70 (BP Location: Left arm, Patient Position: Sitting, Cuff Size: Standard)   Pulse 73   Temp 98.4 °F (36.9 °C) (Temporal)   Resp 16   Ht 5' (1.524 m)   Wt 47.3 kg (104 lb 3.2 oz)   SpO2 100%   BMI 20.35 kg/m²     Body mass index is 20.35 kg/m².  Pain Screening:  Pain Score: 0-No pain  ECOG ECOG Performance Status: 2 - Ambulatory and capable of all selfcare but unable to carry out any work activities.  Up and about more than 50% of waking hours   Physical Exam  Vitals reviewed. Exam conducted with a chaperone present.   Constitutional:       General: She is not in acute distress.     Appearance: Normal  appearance. She is not toxic-appearing.   Pulmonary:      Effort: Pulmonary effort is normal. No respiratory distress.   Genitourinary:     Comments: Normal external female genitalia. Normal Bartholin's and [place]'s glands. Normal urethral meatus and no evidence of urethral discharge or masses.  Bladder without fullness mass or tenderness. Vagina is atrophic and foreshortened but without lesion, bleeding or discharge. No significant pelvic organ prolapse noted.  Cervix and uterus are surgically absent.  Bimanual exam demonstrates no evidence of pelvic masses.  Anus without fissure of lesion.    Musculoskeletal:      Right lower leg: No edema.      Left lower leg: No edema.        Nadir Ash MD, RONAN, FACOG, FACS  5/13/2025  2:24 PM

## 2025-05-14 ENCOUNTER — OFFICE VISIT (OUTPATIENT)
Dept: OCCUPATIONAL THERAPY | Facility: CLINIC | Age: 80
End: 2025-05-14
Attending: INTERNAL MEDICINE
Payer: MEDICARE

## 2025-05-14 DIAGNOSIS — R41.89 MODERATE COGNITIVE IMPAIRMENT: Primary | ICD-10-CM

## 2025-05-14 PROCEDURE — 97530 THERAPEUTIC ACTIVITIES: CPT

## 2025-05-14 NOTE — PROGRESS NOTES
"Daily Note     Visit/Unit Tracking  AUTH Status: Not Required Date 25   Visits  Authed: N/A Used 1 (Eval) 2 3 4 5 6 7 (RE) 8 9 10  11  12 (RE)      Visit/Unit Tracking  AUTH Status: Not Required Date 3/4/25 3/11 3/18 3/25 4/1 4/8 4/14 4/22 4/29 5/6 5/14    Visits  Authed: N/A Used 13 14 15 16 (RE)   17 18 19 20  21 (RE) 22 23        PLAN OF CARE START: 25   PLAN OF CARE END: 25  PROGRESS NOTE DUE: 25 (scheduled on calendar)   FREQUENCY: 1-x week for 6 weeks   PRECAUTIONS: orientation, difficulty hearing   DIAGNOSIS: moderate cognitive impairment   Insurance: Medicare   MAINTENANCE PROGRAM          Today's date: 2025  Patient name: Anthony Slater  : 1945  MRN: [id number]  Referring provider: Rosamaria Silva,*  Dx:   Encounter Diagnosis     ICD-10-CM    1. Moderate cognitive impairment  R41.89                        Subjective: \" I like doing this kind of stuff.\"     Objective: See treatment diary below. Pt participated in skilled OT this date with focus on sustained attention, functional cognition, working/short term memory, EF skills and activity tolerance/endurance, for increased safety and engagement in ADL/IADL tasks.     TA:   *Color by number activity completing basic mathematical addition of double digits with no regrouping and then following directions to color each segment the correct color based on the number total.     Assessment: Tolerated treatment well. Focus of session on working on memory book, sustained attention, recall, sequencing,  and following multi-step directions. Pt a few color errors secondary unable to differentiate between purple and blue. No mathematical errors noted.Pt would benefit from continued OT services to address functional cognition, EF skills, sustained attention, memory, processing speed,  skills, logical reasoning, and orientation.       Plan: Continue per plan of " care.  Progress treatment as tolerated. Continue to focus on safety awareness (cards), adding cognitive worksheets to memory book, and establishing daily routine. In future sessions have  sit in on session and have patient go through memory book to him. Finish color by number next session.

## 2025-05-15 ENCOUNTER — APPOINTMENT (OUTPATIENT)
Dept: LAB | Facility: HOSPITAL | Age: 80
End: 2025-05-15
Payer: MEDICARE

## 2025-05-15 ENCOUNTER — OFFICE VISIT (OUTPATIENT)
Dept: HEMATOLOGY ONCOLOGY | Facility: CLINIC | Age: 80
End: 2025-05-15
Payer: MEDICARE

## 2025-05-15 VITALS
HEART RATE: 46 BPM | WEIGHT: 104 LBS | HEIGHT: 60 IN | OXYGEN SATURATION: 99 % | TEMPERATURE: 97.2 F | DIASTOLIC BLOOD PRESSURE: 70 MMHG | SYSTOLIC BLOOD PRESSURE: 128 MMHG | BODY MASS INDEX: 20.42 KG/M2 | RESPIRATION RATE: 18 BRPM

## 2025-05-15 DIAGNOSIS — D50.8 OTHER IRON DEFICIENCY ANEMIA: Primary | ICD-10-CM

## 2025-05-15 DIAGNOSIS — N18.30 TYPE 2 DIABETES MELLITUS WITH STAGE 3 CHRONIC KIDNEY DISEASE, UNSPECIFIED WHETHER LONG TERM INSULIN USE, UNSPECIFIED WHETHER STAGE 3A OR 3B CKD (HCC): ICD-10-CM

## 2025-05-15 DIAGNOSIS — E11.22 TYPE 2 DIABETES MELLITUS WITH STAGE 3 CHRONIC KIDNEY DISEASE, UNSPECIFIED WHETHER LONG TERM INSULIN USE, UNSPECIFIED WHETHER STAGE 3A OR 3B CKD (HCC): ICD-10-CM

## 2025-05-15 DIAGNOSIS — D50.8 OTHER IRON DEFICIENCY ANEMIA: ICD-10-CM

## 2025-05-15 LAB
ALBUMIN SERPL BCG-MCNC: 4.1 G/DL (ref 3.5–5)
ALP SERPL-CCNC: 58 U/L (ref 34–104)
ALT SERPL W P-5'-P-CCNC: 21 U/L (ref 7–52)
ANION GAP SERPL CALCULATED.3IONS-SCNC: 6 MMOL/L (ref 4–13)
AST SERPL W P-5'-P-CCNC: 18 U/L (ref 13–39)
BASOPHILS # BLD AUTO: 0.03 THOUSANDS/ÂΜL (ref 0–0.1)
BASOPHILS NFR BLD AUTO: 0 % (ref 0–1)
BILIRUB SERPL-MCNC: 0.27 MG/DL (ref 0.2–1)
BUN SERPL-MCNC: 27 MG/DL (ref 5–25)
CALCIUM SERPL-MCNC: 9.5 MG/DL (ref 8.4–10.2)
CHLORIDE SERPL-SCNC: 106 MMOL/L (ref 96–108)
CO2 SERPL-SCNC: 25 MMOL/L (ref 21–32)
CREAT SERPL-MCNC: 1.26 MG/DL (ref 0.6–1.3)
EOSINOPHIL # BLD AUTO: 0.13 THOUSAND/ÂΜL (ref 0–0.61)
EOSINOPHIL NFR BLD AUTO: 2 % (ref 0–6)
ERYTHROCYTE [DISTWIDTH] IN BLOOD BY AUTOMATED COUNT: 18.6 % (ref 11.6–15.1)
FERRITIN SERPL-MCNC: 12 NG/ML (ref 30–307)
GFR SERPL CREATININE-BSD FRML MDRD: 39 ML/MIN/1.73SQ M
GLUCOSE SERPL-MCNC: 88 MG/DL (ref 65–140)
HCT VFR BLD AUTO: 28.1 % (ref 34.8–46.1)
HGB BLD-MCNC: 8.2 G/DL (ref 11.5–15.4)
IMM GRANULOCYTES # BLD AUTO: 0.03 THOUSAND/UL (ref 0–0.2)
IMM GRANULOCYTES NFR BLD AUTO: 0 % (ref 0–2)
IRON SATN MFR SERPL: 3 % (ref 15–50)
IRON SERPL-MCNC: 12 UG/DL (ref 50–212)
LYMPHOCYTES # BLD AUTO: 1.22 THOUSANDS/ÂΜL (ref 0.6–4.47)
LYMPHOCYTES NFR BLD AUTO: 14 % (ref 14–44)
MAGNESIUM SERPL-MCNC: 2.2 MG/DL (ref 1.9–2.7)
MCH RBC QN AUTO: 23.3 PG (ref 26.8–34.3)
MCHC RBC AUTO-ENTMCNC: 29.2 G/DL (ref 31.4–37.4)
MCV RBC AUTO: 80 FL (ref 82–98)
MONOCYTES # BLD AUTO: 0.77 THOUSAND/ÂΜL (ref 0.17–1.22)
MONOCYTES NFR BLD AUTO: 9 % (ref 4–12)
NEUTROPHILS # BLD AUTO: 6.5 THOUSANDS/ÂΜL (ref 1.85–7.62)
NEUTS SEG NFR BLD AUTO: 75 % (ref 43–75)
NRBC BLD AUTO-RTO: 0 /100 WBCS
PLATELET # BLD AUTO: 309 THOUSANDS/UL (ref 149–390)
PMV BLD AUTO: 10.8 FL (ref 8.9–12.7)
POTASSIUM SERPL-SCNC: 4 MMOL/L (ref 3.5–5.3)
PROT SERPL-MCNC: 6.9 G/DL (ref 6.4–8.4)
RBC # BLD AUTO: 3.52 MILLION/UL (ref 3.81–5.12)
SODIUM SERPL-SCNC: 137 MMOL/L (ref 135–147)
TIBC SERPL-MCNC: 411.6 UG/DL (ref 250–450)
TRANSFERRIN SERPL-MCNC: 294 MG/DL (ref 203–362)
UIBC SERPL-MCNC: 400 UG/DL (ref 155–355)
WBC # BLD AUTO: 8.68 THOUSAND/UL (ref 4.31–10.16)

## 2025-05-15 PROCEDURE — G2211 COMPLEX E/M VISIT ADD ON: HCPCS | Performed by: INTERNAL MEDICINE

## 2025-05-15 PROCEDURE — 83735 ASSAY OF MAGNESIUM: CPT

## 2025-05-15 PROCEDURE — 99214 OFFICE O/P EST MOD 30 MIN: CPT | Performed by: INTERNAL MEDICINE

## 2025-05-15 PROCEDURE — 83550 IRON BINDING TEST: CPT

## 2025-05-15 PROCEDURE — 80053 COMPREHEN METABOLIC PANEL: CPT

## 2025-05-15 PROCEDURE — 36415 COLL VENOUS BLD VENIPUNCTURE: CPT

## 2025-05-15 PROCEDURE — 82728 ASSAY OF FERRITIN: CPT

## 2025-05-15 PROCEDURE — 83540 ASSAY OF IRON: CPT

## 2025-05-15 PROCEDURE — 85025 COMPLETE CBC W/AUTO DIFF WBC: CPT

## 2025-05-15 NOTE — ASSESSMENT & PLAN NOTE
The patient was treated with 4 doses of Venofer IV to correct her iron deficiency.  She was supposed to get blood work done prior to this office visit which was not done for unknown reason.  I did advise the patient and her  to get the blood work done as soon as possible to be able to comment on her microcytic anemia.  We will then get in touch with the patient/ over the phone.  Otherwise, she will be seen again in about 4 months from now for close follow-up with blood work prior.    Orders:    CBC and differential; Future    Comprehensive metabolic panel; Future    Magnesium; Future    Iron Panel (Includes Ferritin, Iron Sat%, Iron, and TIBC); Future    Ferritin; Future

## 2025-05-15 NOTE — PROGRESS NOTES
Name: Anthony Slater      : 1945      MRN: [id number]  Encounter Provider: Mendy Quintanilla MD  Encounter Date: 5/15/2025   Encounter department: [facility] [place]  :  Assessment & Plan  Other iron deficiency anemia  The patient was treated with 4 doses of Venofer IV to correct her iron deficiency.  She was supposed to get blood work done prior to this office visit which was not done for unknown reason.  I did advise the patient and her  to get the blood work done as soon as possible to be able to comment on her microcytic anemia.  We will then get in touch with the patient/ over the phone.  Otherwise, she will be seen again in about 4 months from now for close follow-up with blood work prior.    Orders:    CBC and differential; Future    Comprehensive metabolic panel; Future    Magnesium; Future    Iron Panel (Includes Ferritin, Iron Sat%, Iron, and TIBC); Future    Ferritin; Future    Type 2 diabetes mellitus with stage 3 chronic kidney disease, unspecified whether long term insulin use, unspecified whether stage 3a or 3b CKD (HCC)    Lab Results   Component Value Date    HGBA1C 5.5 2024                Return in about 3 months (around 2025) for Office Visit 20 min, Labs.    History of Present Illness   Chief Complaint   Patient presents with    Follow-up   This is an 80-year-old female with multiple comorbid conditions including COPD, diabetes mellitus, endometrial adenocarcinoma and early signs of dementia.  The patient was sent to us for further evaluation of her microcytic anemia.  Most recent available blood work from 2025 showed hemoglobin of 7.6 with MCV of 75.  White cells and platelets were within normal range with normal white cell differential.  The iron panel on 2025 showed saturation of 5% with ferritin of 11.  She was started on oral iron supplements by her PCP and continues to take 3 pills of iron daily.  She did report  black stools.  However, the stool was negative for occult blood testing on 2/3/2025.  CMP on 1/13/2025 showed creatinine of 1.3 with normal calcium and liver enzymes.     Interval history:  The patient was brought by her  who is very involved in her care.  He stated that her dementia is getting worse gradually.  The patient was supposed to get blood work prior to this office visit to evaluate her microcytic anemia which was not done for unknown reason.      Review of Systems   Constitutional:  Positive for appetite change and fatigue. Negative for chills and fever.   HENT:  Positive for trouble swallowing. Negative for ear pain and sore throat.    Eyes:  Negative for pain and visual disturbance.   Respiratory:  Negative for cough and shortness of breath.    Cardiovascular:  Negative for chest pain and palpitations.   Gastrointestinal:  Negative for abdominal pain and vomiting.   Genitourinary:  Negative for dysuria and hematuria.   Musculoskeletal:  Negative for arthralgias and back pain.   Skin:  Negative for color change and rash.   Neurological:  Positive for dizziness. Negative for seizures and syncope.   Psychiatric/Behavioral:  Positive for sleep disturbance.    All other systems reviewed and are negative.    Medical History Reviewed by provider this encounter:  Tobacco  Allergies  Meds  Problems  Med Hx  Surg Hx  Fam Hx     .  Medications Ordered Prior to Encounter[1]   Social History     Tobacco Use    Smoking status: Never     Passive exposure: Never    Smokeless tobacco: Never   Vaping Use    Vaping status: Never Used   Substance and Sexual Activity    Alcohol use: Never    Drug use: Never    Sexual activity: Yes     Partners: Male     Birth control/protection: Post-menopausal         Objective   /70 (BP Location: Left arm, Patient Position: Sitting, Cuff Size: Adult)   Pulse (!) 46   Temp (!) 97.2 °F (36.2 °C)   Resp 18   Ht 5' (1.524 m)   Wt 47.2 kg (104 lb)   SpO2 99%   BMI  20.31 kg/m²     Physical Exam  Constitutional:       General: She is not in acute distress.     Appearance: She is well-developed. She is not diaphoretic.      Comments: Patient looks malnourished   HENT:      Head: Normocephalic and atraumatic.      Nose: Nose normal.     Eyes:      General: No scleral icterus.        Right eye: No discharge.         Left eye: No discharge.      Conjunctiva/sclera: Conjunctivae normal.      Pupils: Pupils are equal, round, and reactive to light.     Neck:      Thyroid: No thyromegaly.      Vascular: No JVD.      Trachea: No tracheal deviation.     Cardiovascular:      Rate and Rhythm: Normal rate and regular rhythm.      Heart sounds: Normal heart sounds. No murmur heard.     No friction rub.   Pulmonary:      Effort: Pulmonary effort is normal. No respiratory distress.      Breath sounds: Normal breath sounds. No stridor. No wheezing or rales.   Chest:      Chest wall: No tenderness.   Abdominal:      General: Bowel sounds are normal. There is no distension.      Palpations: Abdomen is soft. There is no hepatomegaly or splenomegaly.      Tenderness: There is no abdominal tenderness. There is no guarding or rebound.     Musculoskeletal:         General: No tenderness or deformity. Normal range of motion.      Cervical back: Normal range of motion and neck supple.   Lymphadenopathy:      Cervical: No cervical adenopathy.     Skin:     General: Skin is warm and dry.      Coloration: Skin is not pale.      Findings: No erythema or rash.     Neurological:      Mental Status: She is alert.      Cranial Nerves: No cranial nerve deficit.      Coordination: Coordination normal.      Deep Tendon Reflexes: Reflexes are normal and symmetric.         Labs: I have reviewed the following labs:  Results for orders placed or performed in visit on 02/03/25   iFOBT/FIT   Result Value Ref Range    OCCULT BLD, FECAL IMMUNOLOGICAL Negative Negative     Lab Results   Component Value Date/Time    WBC  8.28 01/20/2025 12:19 PM    RBC 3.53 (L) 01/20/2025 12:19 PM    Hemoglobin 7.6 (L) 01/20/2025 12:19 PM    Hematocrit 26.5 (L) 01/20/2025 12:19 PM    MCV 75 (L) 01/20/2025 12:19 PM    MCH 21.5 (L) 01/20/2025 12:19 PM    RDW 17.1 (H) 01/20/2025 12:19 PM    Platelets 313 01/20/2025 12:19 PM    Segmented % 72 01/20/2025 12:19 PM    Lymphocytes % 16 01/20/2025 12:19 PM    Monocytes % 10 01/20/2025 12:19 PM    Eosinophils Relative 1 01/20/2025 12:19 PM    Basophils Relative 0 01/20/2025 12:19 PM    Immature Grans % 1 01/20/2025 12:19 PM    Absolute Neutrophils 5.94 01/20/2025 12:19 PM      Lab Results   Component Value Date/Time    Sodium 140 01/13/2025 08:37 AM    Potassium 4.0 01/13/2025 08:37 AM    Chloride 106 01/13/2025 08:37 AM    CO2 25 01/13/2025 08:37 AM    ANION GAP 9 01/13/2025 08:37 AM    BUN 26 (H) 01/13/2025 08:37 AM    Creatinine 1.31 (H) 01/13/2025 08:37 AM    Glucose, Fasting 80 01/13/2025 08:37 AM    Calcium 9.0 01/13/2025 08:37 AM    AST 17 01/13/2025 08:37 AM    ALT 10 01/13/2025 08:37 AM    Alkaline Phosphatase 56 01/13/2025 08:37 AM    Total Protein 6.7 01/13/2025 08:37 AM    Albumin 4.0 01/13/2025 08:37 AM    Total Bilirubin 0.41 01/13/2025 08:37 AM    eGFR 38 01/13/2025 08:37 AM      Lab Results   Component Value Date/Time    Iron 19 (L) 01/13/2025 08:37 AM    Iron Saturation 5 (L) 01/13/2025 08:37 AM    Ferritin 11 01/13/2025 08:37 AM      Results for orders placed or performed in visit on 02/03/25   iFOBT/FIT   Result Value Ref Range    OCCULT BLD, FECAL IMMUNOLOGICAL Negative Negative                   [1]   Current Outpatient Medications on File Prior to Visit   Medication Sig Dispense Refill    Alpha-D-Galactosidase (BEANO PO) Take by mouth      atorvastatin (LIPITOR) 40 mg tablet TAKE 1 TABLET BY MOUTH DAILY 90 tablet 1    busPIRone (BUSPAR) 5 mg tablet Take 1 tablet (5 mg total) by mouth 3 (three) times a day 270 tablet 3    Cholecalciferol (VITAMIN D3) 1,000 units tablet Take 1,000 Units by  mouth in the morning.      co-enzyme Q-10 100 mg capsule Take by mouth in the morning.      famotidine (PEPCID) 20 mg tablet Take 1 tablet (20 mg total) by mouth daily at bedtime 90 tablet 1    metoprolol tartrate (LOPRESSOR) 25 mg tablet TAKE 1 TABLET BY MOUTH IN THE  MORNING 100 tablet 3    Multiple Vitamins-Minerals (PRESERVISION AREDS 2 PO) Take by mouth      pantoprazole (PROTONIX) 20 mg tablet Take 1 tablet (20 mg total) by mouth daily 90 tablet 1    vitamin B-12 (VITAMIN B-12) 1,000 mcg tablet Take 1,000 mcg by mouth in the morning.       No current facility-administered medications on file prior to visit.

## 2025-05-16 ENCOUNTER — TELEPHONE (OUTPATIENT)
Dept: HEMATOLOGY ONCOLOGY | Facility: CLINIC | Age: 80
End: 2025-05-16

## 2025-05-16 DIAGNOSIS — D50.8 OTHER IRON DEFICIENCY ANEMIA: Primary | ICD-10-CM

## 2025-05-16 RX ORDER — SODIUM CHLORIDE 9 MG/ML
20 INJECTION, SOLUTION INTRAVENOUS ONCE
OUTPATIENT
Start: 2025-05-23

## 2025-05-16 NOTE — TELEPHONE ENCOUNTER
PHONED PT AND LEFT A VOICE MESSAGE THAT PT NEEDS TO BE SCHEDULED FOR 4 WEEKLY DOSES OF VENOFER AT [facility].

## 2025-05-19 ENCOUNTER — TELEMEDICINE (OUTPATIENT)
Age: 80
End: 2025-05-19
Payer: MEDICARE

## 2025-05-19 DIAGNOSIS — C54.1 ENDOMETRIAL CANCER (HCC): ICD-10-CM

## 2025-05-19 DIAGNOSIS — R54 FRAILTY SYNDROME IN GERIATRIC PATIENT: ICD-10-CM

## 2025-05-19 DIAGNOSIS — G30.0 MODERATE EARLY ONSET ALZHEIMER'S DEMENTIA WITHOUT BEHAVIORAL DISTURBANCE, PSYCHOTIC DISTURBANCE, MOOD DISTURBANCE, OR ANXIETY (HCC): Primary | ICD-10-CM

## 2025-05-19 DIAGNOSIS — F51.01 PRIMARY INSOMNIA: ICD-10-CM

## 2025-05-19 DIAGNOSIS — N18.30 TYPE 2 DIABETES MELLITUS WITH STAGE 3 CHRONIC KIDNEY DISEASE, UNSPECIFIED WHETHER LONG TERM INSULIN USE, UNSPECIFIED WHETHER STAGE 3A OR 3B CKD (HCC): ICD-10-CM

## 2025-05-19 DIAGNOSIS — N18.31 STAGE 3A CHRONIC KIDNEY DISEASE (HCC): Chronic | ICD-10-CM

## 2025-05-19 DIAGNOSIS — K21.00 GASTROESOPHAGEAL REFLUX DISEASE WITH ESOPHAGITIS, UNSPECIFIED WHETHER HEMORRHAGE: Chronic | ICD-10-CM

## 2025-05-19 DIAGNOSIS — E11.22 TYPE 2 DIABETES MELLITUS WITH STAGE 3 CHRONIC KIDNEY DISEASE, UNSPECIFIED WHETHER LONG TERM INSULIN USE, UNSPECIFIED WHETHER STAGE 3A OR 3B CKD (HCC): ICD-10-CM

## 2025-05-19 DIAGNOSIS — E78.2 MIXED HYPERLIPIDEMIA: Chronic | ICD-10-CM

## 2025-05-19 DIAGNOSIS — F41.1 ANXIETY STATE: Chronic | ICD-10-CM

## 2025-05-19 DIAGNOSIS — D50.8 OTHER IRON DEFICIENCY ANEMIA: Chronic | ICD-10-CM

## 2025-05-19 DIAGNOSIS — R26.2 AMBULATORY DYSFUNCTION: ICD-10-CM

## 2025-05-19 DIAGNOSIS — F02.B0 MODERATE EARLY ONSET ALZHEIMER'S DEMENTIA WITHOUT BEHAVIORAL DISTURBANCE, PSYCHOTIC DISTURBANCE, MOOD DISTURBANCE, OR ANXIETY (HCC): Primary | ICD-10-CM

## 2025-05-19 PROCEDURE — 99215 OFFICE O/P EST HI 40 MIN: CPT | Performed by: INTERNAL MEDICINE

## 2025-05-19 NOTE — PROGRESS NOTES
[facility]  Follow-up  [address] [address]  [phone]    Virtual Regular VisitName: Anthony Slater      : 1945      MRN: [id number]  Encounter Provider: Rosamaria Silva MD  Encounter Date: 2025   Encounter department: [facility]    Assessment and Plan     1. Moderate early onset Alzheimer's dementia without behavioral disturbance, psychotic disturbance, mood disturbance, or anxiety (HCC) (Primary)  Last MoCA score was 8/30 on 2024  Progressive dementia, likely Alzheimer's  Nonpharmacologic management  No hallucinations or behavioral disturbance reported at this point  Continue supportive care and maintain chronic conditions under control  Encourage physical, mental and social activity  Continue OT for cognitive therapy  Follow-up in 6 months in person    2. Frailty syndrome in geriatric patient  Multifactorial including advanced age, progressive dementia and comorbidities  Patient needs assistance with ADLs and IADLs  She needs  care and support,  is the primary caregiver  Applied for waiver program, continue following with our social service  Continue supportive care  Nutrition supplement as needed  Encourage physical activity    3. Ambulatory dysfunction  Uses a walker for ambulation  Fall precautions    4. Gastroesophageal reflux disease with esophagitis, unspecified whether hemorrhage  Symptoms controlled  Currently on Protonix and Pepcid  Consider DC Pepcid if tolerated    5. Stage 3a chronic kidney disease (HCC)  Recent GFR 39  Maintain adequate hydration  Avoid nephrotoxic agents    6. Other iron deficiency anemia  Receiving iron infusion  Continue following with hematologist    7. Mixed hyperlipidemia  Currently on atorvastatin 40 mg daily, tolerated  Managed by PCP    8. Anxiety state  Mood stable  Currently on BuSpar 15 mg 3 times daily    9. Endometrial cancer (HCC)  Seen recently by GYN oncology    10. Primary  insomnia  Start melatonin 3 mg at bedtime, can increase to 6 mg at bedtime  Would consider Remeron 7.5 mg at bedtime if no improvement    Chief Complaint     Chief Complaint   Patient presents with    Follow-up     On virtual 6 month follow up. Attending appt w/spouse and dgt. Art.     Patient is here today for follow up of dementia and chronic conditions     History of Present Illness     Anthony Slater who is a 80 y.o. female was seen virtually in  Geriatric follow-up today for memory difficulties and was accompanied by  (Yoni) and daughter (Art).  She was last seen on 10/28/2024 for a care conference and diagnosed with progressive moderate dementia.  No hospitalization or falls reported since last visit.  Family states that patient's dementia is slowly progressing.  She is getting occupational therapy for cognitive therapy once a week. She is dependent with ADLs and IADLs.   is the primary caregiver.  Two daughters and family friends help occasionally.  No hallucinations reported, however patient wanders.   states that patient gets up in the middle of the night to go to the bathroom and yell for help which interrupts his sleep.  Daughter contacted our social workers to assist with the waiver program process.  Patient uses a walker for ambulation, no recent falls reported.  Patient is getting iron infusion for iron deficiency anemia and following with hematologist.    The following portions of the patient's history were reviewed and updated as appropriate: allergies, current medications, past family history, past medical history, past social history, past surgical history and problem list.     Review of Systems     Constitutional: Negative for activity change.   HENT: Negative for hearing loss or trouble swallowing.    Eyes: Negative for visual disturbance.   Respiratory: Negative for choking and shortness of breath.    Gastrointestinal: Negative for nausea.   Genitourinary: Negative  for dysuria and frequency.   Musculoskeletal: Negative for back pain and neck pain.   Neurological: Negative for dizziness, facial asymmetry, speech difficulty, weakness and light-headedness.   Psychiatric/Behavioral: Negative for behavioral problems and confusion.      Objective     Vitals   There were no vitals filed for this visit.      Physical Exam   Constitutional: not in acute distress. Normal appearance. Not ill-appearing, toxic-appearing or diaphoretic.   HENT: Normocephalic and atraumatic. External ear normal b/l. Nose normal. No congestion or rhinorrhea.   Eyes:  EOMI. No scleral icterus.  No discharge.  Conjunctivae normal.  Neck: No tracheal deviation present.   Pulmonary/Chest: Effort normal. No respiratory distress.  Musculoskeletal: Sitting comfortably in a chair   Neurological: Patient is alert. Facial expression roughly symmetric, no dysarthria,  Skin: No pallor.   Psychiatric: Patient has a normal mood and affect; behavior is normal.      Pertinent Laboratory/Diagnostic Studies:  I personally reviewed lab results from 5/15/2025 including  CMP , K4.0, BUN 27, creatinine 1.26 and GFR 39  CBC WBC 8.68, Hgb 8.2, HCT 28.1 and platelets 309  8/10/2024 B12 1,156      Administrative Statements   Encounter provider Rosamaria Silva MD    The Patient is located at Home and in the following state in which I hold an active license [place].    The patient was identified by name and date of birth. Anthony Slater was informed that this is a telemedicine visit and that the visit is being conducted through the Epic Embedded platform. She agrees to proceed..  My office door was closed. No one else was in the room.  She acknowledged consent and understanding of privacy and security of the video platform. The patient has agreed to participate and understands they can discontinue the visit at any time.    I have spent a total time of 49 minutes in caring for this patient on the day of the visit/encounter including  Diagnostic results, Prognosis, Risks and benefits of tx options, Instructions for management, Patient and family education, Importance of tx compliance, Risk factor reductions, Impressions, Counseling / Coordination of care, Documenting in the medical record, Reviewing/placing orders in the medical record (including tests, medications, and/or procedures), and Obtaining or reviewing history  , not including the time spent for establishing the audio/video connection.

## 2025-05-21 ENCOUNTER — TELEPHONE (OUTPATIENT)
Dept: HEMATOLOGY ONCOLOGY | Facility: CLINIC | Age: 80
End: 2025-05-21

## 2025-05-21 ENCOUNTER — OFFICE VISIT (OUTPATIENT)
Dept: OCCUPATIONAL THERAPY | Facility: CLINIC | Age: 80
End: 2025-05-21
Attending: INTERNAL MEDICINE
Payer: MEDICARE

## 2025-05-21 DIAGNOSIS — R41.89 MODERATE COGNITIVE IMPAIRMENT: Primary | ICD-10-CM

## 2025-05-21 PROCEDURE — 97530 THERAPEUTIC ACTIVITIES: CPT

## 2025-05-21 RX ORDER — LIDOCAINE HYDROCHLORIDE 10 MG/ML
0.5 INJECTION, SOLUTION EPIDURAL; INFILTRATION; INTRACAUDAL; PERINEURAL ONCE AS NEEDED
Status: CANCELLED | OUTPATIENT
Start: 2025-05-21

## 2025-05-21 RX ORDER — SODIUM CHLORIDE, SODIUM LACTATE, POTASSIUM CHLORIDE, CALCIUM CHLORIDE 600; 310; 30; 20 MG/100ML; MG/100ML; MG/100ML; MG/100ML
125 INJECTION, SOLUTION INTRAVENOUS CONTINUOUS
Status: CANCELLED | OUTPATIENT
Start: 2025-05-21

## 2025-05-21 NOTE — PROGRESS NOTES
"Daily Note     Visit/Unit Tracking  AUTH Status: Not Required Date 25   Visits  Authed: N/A Used 1 (Eval) 2 3 4 5 6 7 (RE) 8 9 10  11  12 (RE)      Visit/Unit Tracking  AUTH Status: Not Required Date 3/4/25 3/11 3/18 3/25 4/1 4/8 4/14 4/22 4/29 5/6 5/14 5/21   Visits  Authed: N/A Used 13 14 15 16 (RE)   17 18 19 20  21 (RE) 22 23 24       PLAN OF CARE START: 25   PLAN OF CARE END: 25  PROGRESS NOTE DUE: 25 (scheduled on calendar)   FREQUENCY: 1-x week for 6 weeks   PRECAUTIONS: orientation, difficulty hearing   DIAGNOSIS: moderate cognitive impairment   Insurance: Medicare   MAINTENANCE PROGRAM          Today's date: 2025  Patient name: Anthony Slater  : 1945  MRN: [id number]  Referring provider: Rosamaria Silva,*  Dx:   Encounter Diagnosis     ICD-10-CM    1. Moderate cognitive impairment  R41.89                        Subjective: \" I'm so sorry, my eyes are just so heavy and I don't know why I'm just not sleeping well at night.\"     Objective: See treatment diary below. Pt participated in skilled OT this date with focus on sustained attention, functional cognition, working/short term memory, EF skills and activity tolerance/endurance, for increased safety and engagement in ADL/IADL tasks.     Thera Act: Pt engaged in declan scatter board multi step activity initiating with finding the declan match, adding the two numbers to place the number bingo chip in empty [place] and ending with multiplying the numbers on declan to place on dot with multiplication answer. Therapist provided mod cue for task initiation and completion. Pt had mod/max difficulty with direction follow and remembering the steps in order, after completing 6 rounds pt required less guidance and reminders for step following.     Assessment: Tolerated treatment well. Pt demonstrated good math skills sustained attention. Pt had mod difficulty with " working memory, sequencing, and following multi-step directions. Pt would benefit from continued OT services to address functional cognition, EF skills, sustained attention, memory, processing speed,  skills, logical reasoning, and orientation.     Plan: Continue per plan of care.  Progress treatment as tolerated. Continue to focus on safety awareness (cards), adding cognitive worksheets to memory book, and establishing daily routine. In future sessions have  sit in on session and have patient go through memory book to him. Finish color by number next session. *Sleep strategies*    Session and note reviewed by Belen PECK prior to signing.

## 2025-05-22 ENCOUNTER — ANESTHESIA EVENT (OUTPATIENT)
Dept: GASTROENTEROLOGY | Facility: HOSPITAL | Age: 80
End: 2025-05-22
Payer: MEDICARE

## 2025-05-22 ENCOUNTER — HOSPITAL ENCOUNTER (OUTPATIENT)
Dept: GASTROENTEROLOGY | Facility: HOSPITAL | Age: 80
Setting detail: OUTPATIENT SURGERY
End: 2025-05-22
Attending: NURSE PRACTITIONER
Payer: MEDICARE

## 2025-05-22 ENCOUNTER — ANESTHESIA (OUTPATIENT)
Dept: GASTROENTEROLOGY | Facility: HOSPITAL | Age: 80
End: 2025-05-22
Payer: MEDICARE

## 2025-05-22 VITALS
WEIGHT: 104 LBS | BODY MASS INDEX: 20.42 KG/M2 | DIASTOLIC BLOOD PRESSURE: 70 MMHG | HEART RATE: 64 BPM | RESPIRATION RATE: 16 BRPM | HEIGHT: 60 IN | OXYGEN SATURATION: 100 % | SYSTOLIC BLOOD PRESSURE: 154 MMHG | TEMPERATURE: 98 F

## 2025-05-22 DIAGNOSIS — R13.14 PHARYNGOESOPHAGEAL DYSPHAGIA: ICD-10-CM

## 2025-05-22 DIAGNOSIS — K31.811 GASTROINTESTINAL HEMORRHAGE ASSOCIATED WITH ANGIODYSPLASIA OF STOMACH AND DUODENUM: ICD-10-CM

## 2025-05-22 DIAGNOSIS — K21.00 GASTROESOPHAGEAL REFLUX DISEASE WITH ESOPHAGITIS, UNSPECIFIED WHETHER HEMORRHAGE: Chronic | ICD-10-CM

## 2025-05-22 PROCEDURE — 43270 EGD LESION ABLATION: CPT | Performed by: INTERNAL MEDICINE

## 2025-05-22 RX ORDER — PROPOFOL 10 MG/ML
INJECTION, EMULSION INTRAVENOUS AS NEEDED
Status: DISCONTINUED | OUTPATIENT
Start: 2025-05-22 | End: 2025-05-22

## 2025-05-22 RX ORDER — SODIUM CHLORIDE, SODIUM LACTATE, POTASSIUM CHLORIDE, CALCIUM CHLORIDE 600; 310; 30; 20 MG/100ML; MG/100ML; MG/100ML; MG/100ML
125 INJECTION, SOLUTION INTRAVENOUS CONTINUOUS
Status: DISPENSED | OUTPATIENT
Start: 2025-05-22

## 2025-05-22 RX ORDER — SODIUM CHLORIDE, SODIUM LACTATE, POTASSIUM CHLORIDE, CALCIUM CHLORIDE 600; 310; 30; 20 MG/100ML; MG/100ML; MG/100ML; MG/100ML
INJECTION, SOLUTION INTRAVENOUS CONTINUOUS PRN
Status: DISCONTINUED | OUTPATIENT
Start: 2025-05-22 | End: 2025-05-22

## 2025-05-22 RX ORDER — LIDOCAINE HYDROCHLORIDE 10 MG/ML
0.5 INJECTION, SOLUTION EPIDURAL; INFILTRATION; INTRACAUDAL; PERINEURAL ONCE AS NEEDED
Status: ACTIVE | OUTPATIENT
Start: 2025-05-22

## 2025-05-22 RX ORDER — LIDOCAINE HYDROCHLORIDE 20 MG/ML
INJECTION, SOLUTION EPIDURAL; INFILTRATION; INTRACAUDAL; PERINEURAL AS NEEDED
Status: DISCONTINUED | OUTPATIENT
Start: 2025-05-22 | End: 2025-05-22

## 2025-05-22 RX ADMIN — PROPOFOL 30 MG: 10 INJECTION, EMULSION INTRAVENOUS at 11:35

## 2025-05-22 RX ADMIN — LIDOCAINE HYDROCHLORIDE 60 MG: 20 INJECTION, SOLUTION EPIDURAL; INFILTRATION; INTRACAUDAL at 11:23

## 2025-05-22 RX ADMIN — SODIUM CHLORIDE, SODIUM LACTATE, POTASSIUM CHLORIDE, AND CALCIUM CHLORIDE: .6; .31; .03; .02 INJECTION, SOLUTION INTRAVENOUS at 11:01

## 2025-05-22 RX ADMIN — SODIUM CHLORIDE, SODIUM LACTATE, POTASSIUM CHLORIDE, AND CALCIUM CHLORIDE 125 ML/HR: .6; .31; .03; .02 INJECTION, SOLUTION INTRAVENOUS at 10:54

## 2025-05-22 RX ADMIN — GLUCAGON 0.5 MCG: 1 INJECTION, POWDER, LYOPHILIZED, FOR SOLUTION INTRAMUSCULAR; INTRAVENOUS at 11:34

## 2025-05-22 RX ADMIN — PROPOFOL 20 MG: 10 INJECTION, EMULSION INTRAVENOUS at 11:34

## 2025-05-22 RX ADMIN — PROPOFOL 20 MG: 10 INJECTION, EMULSION INTRAVENOUS at 11:30

## 2025-05-22 RX ADMIN — PROPOFOL 50 MG: 10 INJECTION, EMULSION INTRAVENOUS at 11:23

## 2025-05-22 RX ADMIN — PROPOFOL 30 MG: 10 INJECTION, EMULSION INTRAVENOUS at 11:26

## 2025-05-22 NOTE — ANESTHESIA PREPROCEDURE EVALUATION
Procedure:  EGD    EF60%, DD1    Relevant Problems   CARDIO   (+) Coronary arteriosclerosis   (+) Gastric antral vascular ectasia   (+) Hyperlipidemia   (+) Migraine      ENDO   (+) Type 2 diabetes mellitus with stage 3 chronic kidney disease, unspecified whether long term insulin use, unspecified whether stage 3a or 3b CKD (HCC)      GI/HEPATIC   (+) Gastroesophageal reflux disease   (+) Gastrointestinal hemorrhage associated with angiodysplasia of stomach and duodenum      /RENAL   (+) Stage 3a chronic kidney disease (HCC)      GYN   (+) Endometrial cancer (HCC)      HEMATOLOGY   (+) Iron deficiency anemia      NEURO/PSYCH   (+) Anxiety state   (+) Chronic left shoulder pain   (+) Migraine   (+) Moderate early onset Alzheimer's dementia without behavioral disturbance, psychotic disturbance, mood disturbance, or anxiety (HCC)      PULMONARY   (+) Obstructive sleep apnea syndrome        Physical Exam    Airway     Mallampati score: III  TM Distance: >3 FB  Neck ROM: full      Cardiovascular  Rhythm: regular, Rate: normalCardiovascular exam normal    Dental    edentulous    Pulmonary  Pulmonary exam normal Breath sounds clear to auscultation    Neurological      Other Findings  post-pubertal.      Anesthesia Plan  ASA Score- 3     Anesthesia Type- IV sedation with anesthesia with ASA Monitors.         Additional Monitors:     Airway Plan:     Comment: Discussed risks/benefits, including medication reactions, awareness, aspiration, and serious/life threatening complications. Plan to maintain native airway with IVGA, monitored with EtCO2.    Patient requested that her  sign the form for her..       Plan Factors-Exercise tolerance (METS): >4 METS.    Chart reviewed.    Patient summary reviewed.      Patient instructed to abstain from smoking on day of procedure. Patient did not smoke on day of surgery.            Induction- intravenous.    Postoperative Plan-         Informed Consent- Anesthetic plan and risks  discussed with patient.  I personally reviewed this patient with the CRNA. Discussed and agreed on the Anesthesia Plan with the CRNA..      NPO Status:  Vitals Value Taken Time   Date of last liquid 05/21/25 05/22/25 10:41   Time of last liquid 2100 05/22/25 10:41   Date of last solid 05/21/25 05/22/25 10:41   Time of last solid 2100 05/22/25 10:41

## 2025-05-22 NOTE — H&P
History and Physical - [facility]  Anthony Slater 80 y.o. female MRN: [id number]                  HPI: Anthony Slater is a 80 y.o. year old female who presents for EGD      REVIEW OF SYSTEMS: Per the HPI, and otherwise unremarkable.    Historical Information   Past Medical History[1]  Past Surgical History[2]  Social History   Social History     Substance and Sexual Activity   Alcohol Use Never     Social History     Substance and Sexual Activity   Drug Use Never     Tobacco Use History[3]  Family History[4]    Meds/Allergies     Current Medications[5]    Allergies[6]    Objective     /66   Pulse 62   Temp 98.6 °F (37 °C) (Temporal)   Resp 18   Ht 5' (1.524 m)   Wt 47.2 kg (104 lb)   SpO2 100%   BMI 20.31 kg/m²       PHYSICAL EXAM    Gen: NAD  Head: NCAT  CV: RRR  CHEST: Clear  ABD: soft, NT/ND  EXT: no edema      ASSESSMENT/PLAN:  This is a 80 y.o. year old female here for EGD, and she is stable and optimized for her procedure.            [1]   Past Medical History:  Diagnosis Date    Acute encephalopathy 07/28/2024    Anxiety     Arthritis     Benign hypertension     Chronic pain disorder     back    COPD (chronic obstructive pulmonary disease) (HCC)     Coronary artery disease     medical management    CPAP (continuous positive airway pressure) dependence     Dementia (HCC)     Diabetes (HCC)     Endometrial adenocarcinoma (HCC)     Epilepsy (HCC)     Gastrointestinal hemorrhage associated with angiodysplasia of stomach and duodenum 6/15/2023    GERD (gastroesophageal reflux disease)     History of echocardiogram 02/07/2014    EF 55%, mild MR.    History of transfusion     Hypertension     Mixed hyperlipidemia     DAVID on CPAP     Shortness of breath     triggered by anxiety    Watermelon stomach     Wears dentures    [2]   Past Surgical History:  Procedure Laterality Date    BREAST BIOPSY Right 02/24/2015    BREAST BIOPSY Left     ? year    CARDIAC CATHETERIZATION   02/07/2014    EF 65%, mid LAD 60% stenosis and D2 70% stenosis.  Medical management.    CATARACT EXTRACTION Bilateral     COLONOSCOPY      CYSTOSCOPY N/A 07/31/2023    Procedure: CYSTOSCOPY;  Surgeon: Nadir Ash MD;  Location: [facility];  Service: Gynecology Oncology    EGD      JOINT REPLACEMENT Right     RIGHT ELBOW    OOPHORECTOMY      not sure which one was removed    KS LAPS TOTAL HYSTERECT 250 GM/< W/RMVL TUBE/OVARY N/A 07/31/2023    Procedure: ROBOTHYSTERECTOMY, BILATERAL SALPINGO-OOPHORECTOMY, PELVIC SENTINEL NODE BIOPSIES;  Surgeon: Nadir Ash MD;  Location: [facility];  Service: Gynecology Oncology    ROTATOR CUFF REPAIR Right     US GUIDED THYROID BIOPSY  12/04/2020   [3]   Social History  Tobacco Use   Smoking Status Never    Passive exposure: Never   Smokeless Tobacco Never   [4]   Family History  Problem Relation Name Age of Onset    Heart disease Mother      Coronary artery disease Sister virginia         history of CABG    No Known Problems Father      No Known Problems Daughter eri     No Known Problems Sister vickie     No Known Problems Sister bernardo     No Known Problems Sister cayden     No Known Problems Daughter nahid    [5]   Current Outpatient Medications:     Alpha-D-Galactosidase (BEANO PO)    atorvastatin (LIPITOR) 40 mg tablet    busPIRone (BUSPAR) 5 mg tablet    Cholecalciferol (VITAMIN D3) 1,000 units tablet    co-enzyme Q-10 100 mg capsule    famotidine (PEPCID) 20 mg tablet    metoprolol tartrate (LOPRESSOR) 25 mg tablet    Multiple Vitamins-Minerals (PRESERVISION AREDS 2 PO)    pantoprazole (PROTONIX) 20 mg tablet    vitamin B-12 (VITAMIN B-12) 1,000 mcg tablet    Current Facility-Administered Medications:     lactated ringers infusion, 125 mL/hr, Intravenous, Continuous, 125 mL/hr at 05/22/25 1054    lidocaine (PF) (XYLOCAINE-MPF) 1 % injection 0.5 mL, 0.5 mL, Infiltration, Once PRN  [6]   Allergies  Allergen Reactions    Advil [Ibuprofen]     Aspirin Other (See  Comments)     gi bleed    Caspofungin Other (See Comments)    Hydrocodone     Pravastatin GI Intolerance    Tramadol GI Intolerance

## 2025-05-22 NOTE — ANESTHESIA POSTPROCEDURE EVALUATION
Post-Op Assessment Note    CV Status:  Stable    Pain management: adequate       Mental Status:  Arousable and sleepy   Hydration Status:  Euvolemic   PONV Controlled:  Controlled   Airway Patency:  Patent     Post Op Vitals Reviewed: Yes    No anethesia notable event occurred.    Staff: CRNA           Last Filed PACU Vitals:  Vitals Value Taken Time   Temp     Pulse 77    /62    Resp 18    SpO2 97

## 2025-05-29 ENCOUNTER — EVALUATION (OUTPATIENT)
Dept: OCCUPATIONAL THERAPY | Facility: CLINIC | Age: 80
End: 2025-05-29
Attending: INTERNAL MEDICINE
Payer: MEDICARE

## 2025-05-29 DIAGNOSIS — R41.89 MODERATE COGNITIVE IMPAIRMENT: Primary | ICD-10-CM

## 2025-05-29 PROCEDURE — 97530 THERAPEUTIC ACTIVITIES: CPT

## 2025-05-29 NOTE — PROGRESS NOTES
"OCCUPATIONAL THERAPY Re-Evaluation       Visit/Unit Tracking  AUTH Status: Not Required Date               Visits  Authed: N/A Used 25 (RE)                  PLAN OF CARE START: 25  PLAN OF CARE END: 25  PROGRESS NOTE DUE: 25 (Scheduled on calendar)   FREQUENCY: 1-x week for 6 weeks   PRECAUTIONS: orientation, difficulty hearing   DIAGNOSIS: moderate cognitive impairment   Insurance: Medicare   MAINTENANCE PROGRAM; ADD in ADL sequencing for Alzheimers and ADL memory aides       Today's date: 2025  Patient name: Anthony Slater  : 1945  MRN: [id number]  Referring provider: Rosamaria Silva,*  Dx:   Encounter Diagnosis     ICD-10-CM    1. Moderate cognitive impairment  R41.89                 SKILLED ANALYSIS IE 10/8:  Pt is currently demonstrating the following occupational deficits: limited 2* visual immediate memory recall, delayed memory recall, attention, processing and executive function. Based on the aforementioned OT evaluation, functional performance deficits, and assessments, pt has been identified as a moderate complexity evaluation. Pt to continue to benefit from outpatient skilled OT services to address the following goals 1-2x/wk  for 4 weeks to focus on pt/caregiver education, increase memory deficits, increase cognitive function, orientation and alertness to improve overall QOL.      RE   Pt presents to re-evaluation on  status post moderate cognitive decline. As per interview, pt reports sometimes her memory is good, and other times she is challenged. \"Good days and bad days, but more good days\". Continues to feel fatigued. Pt continues to have mod difficulty with recall of family memory in memory book. Post assessments, pt demonstrating maintained sustained attention and sustained recall. Pt continues to demonstrate impairments in memory, processing speed, logical reasoning,  skills and EF skills. Pt's  informed patient has difficulty with " "completing ADLS (dressing, toileting, feeding) at morning and night. Add ADLs memory aides and sequencing into program moving forward. Pt STGs/LTGs updated below. Pt would benefit from continued OT services focusing on impairments for 1x week for 6 more weeks with focus on aforementioned deficits to maximize functional performance and improve QOL. Pt educated on progress/therapy process and pt in understanding and agreement of recommendations. Findings and recommendations discussed with pt, and they are in agreement. Educated pt on charges of insurance, assessments performed with standardized norms, POC, goal creation, and OP OT services.       Subjective    PATIENT GOAL: \"To remember things better\"    PAIN: 0    HISTORY OF PRESENT ILLNESS: Pt is a 79 y.o. female seen for OT eval s/p Mild cognitive impairment [G31.84] referred to [facility]. Patient had recent appointment with Geriatric medicine and was referred to OT services for cognitive therapy. Patient is expected to return at the end of this month. Patient had a fall at the beginning of the year which is suspected to be the result of her cognitive decline.       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    History of transfusion     Hypertension     Mixed hyperlipidemia     DAVID on CPAP     Shortness of breath     triggered by anxiety    Watermelon stomach     Wears dentures        Past " "Surgical Hx:   Past Surgical History:   Procedure Laterality Date    BREAST BIOPSY Right 2015    BREAST BIOPSY Left     ? year    CARDIAC CATHETERIZATION  2014    EF 65%, mid LAD 60% stenosis and D2 70% stenosis.  Medical management.    CATARACT EXTRACTION Bilateral     COLONOSCOPY      CYSTOSCOPY N/A 2023    Procedure: CYSTOSCOPY;  Surgeon: Nadir Ash MD;  [facility];  Service: Gynecology Oncology    EGD      JOINT REPLACEMENT Right     RIGHT ELBOW    OOPHORECTOMY      not sure which one was removed    [place] LAPS TOTAL HYSTERECT 250 GM/< W/RMVL TUBE/OVARY N/A 2023    Procedure: ROBOTHYSTERECTOMY, BILATERAL SALPINGO-OOPHORECTOMY, PELVIC SENTINEL NODE BIOPSIES;  Surgeon: Nadir Ash MD;  [facility];  Service: Gynecology Oncology    ROTATOR CUFF REPAIR Right     US GUIDED THYROID BIOPSY  2020          Objective    Impairment Observations:     [place] Cognitive Assessment (MoCA)  \"screen of cognitive abilities designed to detect mild cognitive dysfunction.\"  NORMS:   -Normal: 26+  -Mild Cognitive Impairment: 18-25   -Moderate Cognitive Impairment: 10-17  -Severe Cognitive Impairment: <10        STATUS ON IE: Not Tested Re:  RE: 3/25                                         COGNITIVE FXN        VERSION  Version 8.1 Version 8.2           MOCA        Education Level Less than 12 years (+1)  (+1)   Visuospatial/executive functioning     Naming  3/3 3/5   Memory: 1st trial:  3/5 1/5   Memory: 2nd trial:  3/5 1/5   Attention/concentration  / 1   List of letters:      Serial Seven Subtraction:  0   Language/sentence repetition:  02 02   Language Fluency:   0 7 words  0 (7 words)   Abstract/Correlational Thinkin/2 1   Delayed Recall:  05 0   Orientation:  /6 3/6   Memory Index Score  4/15 1/15                                      Total Score  (+1) Moderate cognitive impairment  (moderate cognitive impairment)  " "      TRAIL MAKING TEST:   \"widely used test to assess executive function in patients with neuro injury. Successful performance of the TMT requires a variety of mental abilities including letter and number recognition mental flexibility, visual scanning, and motor function. Norms are based on age related scores\"     RE 2/25 RE 3/25 RE 4/29 RE 5/29   TRAIL MAKING PART A     NORM: 58 seconds 49 seconds  1 minute 8 seconds  1 min 1 sec (1 error) 1 min 18 seconds   1 error    TRAIL MAKING PART B    NORM: 2 minutes 32 seconds  Not Tested   Test Sample: 47 seconds 1 error        CONTEXTUAL MEMORY TEST (1993 version):   \"assesses immediate and delayed recall of 20 objects related to a scene in children and adults with memory impairments\"   NORMS:   *not applicable to current in house version     TYPE OF RECALL STATUS ON IE: 10/8/24 Re-Eval: 2/25 RE 3/25/25 RE 4/29 RE 5/29   Immediate recall 5/20 1/20 2/20 7/20 4/20   Delayed recall  3/20 0/20  6/20 0/20         SNELLNetDocumentsOVE MAZE TEST: assesses attention, visuoconstructional ability, EF skills, and foresight    CUT OFF SCORES INDICATING DEFICITS:   * >61 SECONDS WITH OR WITHOUT ERROS  *60 SECONDS BUT WITH 2 OR MORE ERROS   CUTOFF SCORES WITHIN NORM:   *<60 SECONDS WITH 1 OR NO ERRORS   STATUS ON IE: 5/29      1 min 37 seconds           MAINTENANCE GOALS    GOAL  STATUS ON IE  GOAL STATUS    Pt will maintain MOCA score of +/- 3 on MOCA increase MOCA for maintained EF skills, logical thinking, and processing speed for involvement in IADLs/ADLs 12/30  Maintained    Pt will maintain immediate recall on MOCA scoring (+/-1) 3/5 for both rounds for maintained participation in community activities  3/5 Declined   Pt will maintain STM by scoring (+/- 2) on the immediate recall of CMT for maintained functioning with IADLs    Declined   Pt will maintain sustained attention on Trail Making Part A completing in (+/- 10 seconds) maintained attention to tasks for ADLs/IADLs  1 minute 4 " seconds    5/29: 1 min 18 sec Declined    Pt will maintain snellen maze test time by (+/- 10 sec) for maintained  skills, attention, and foresight  Established    Pt will consistently report A&O x 3 75% of the time for maintained safety awareness for improved health management. 3/6 Maintained   Pt and caregiver will be educated on compensatory adaptive devices and DME available to improve independence with ADL/leisure tasks I.e. Life alert, pill box, door locks, security system, wander guard/chair alarm, BSC, grab bars, caregiver burden/burnout, memory aides, etc.  Maintained   Pt will develop a memory book and establish daily routines in OT sessions for maintained participation in social/leisure and ADLs   Maintained    Pt will utilize memory aides for ADL sequencing for min A with dressing, toileting, and feeding tasks.   Established

## 2025-05-30 ENCOUNTER — HOSPITAL ENCOUNTER (OUTPATIENT)
Dept: INFUSION CENTER | Facility: HOSPITAL | Age: 80
Discharge: HOME | End: 2025-05-30
Attending: INTERNAL MEDICINE
Payer: MEDICARE

## 2025-05-30 VITALS
TEMPERATURE: 97.5 F | SYSTOLIC BLOOD PRESSURE: 130 MMHG | RESPIRATION RATE: 16 BRPM | HEART RATE: 69 BPM | DIASTOLIC BLOOD PRESSURE: 71 MMHG

## 2025-05-30 DIAGNOSIS — D50.8 OTHER IRON DEFICIENCY ANEMIA: Primary | ICD-10-CM

## 2025-05-30 PROCEDURE — 96365 THER/PROPH/DIAG IV INF INIT: CPT

## 2025-05-30 RX ORDER — SODIUM CHLORIDE 9 MG/ML
20 INJECTION, SOLUTION INTRAVENOUS ONCE
Status: CANCELLED | OUTPATIENT
Start: 2025-06-06

## 2025-05-30 RX ORDER — SODIUM CHLORIDE 9 MG/ML
20 INJECTION, SOLUTION INTRAVENOUS ONCE
Status: COMPLETED | OUTPATIENT
Start: 2025-05-30 | End: 2025-05-30

## 2025-05-30 RX ADMIN — SODIUM CHLORIDE 20 ML/HR: 0.9 INJECTION, SOLUTION INTRAVENOUS at 13:53

## 2025-05-30 RX ADMIN — IRON SUCROSE 200 MG: 20 INJECTION, SOLUTION INTRAVENOUS at 13:53

## 2025-05-30 NOTE — PROGRESS NOTES
Patient denies current infection or being on antibiotics.  Patient tolerated IV Venofer without reaction or issues. Anthony Slater  tolerated treatment well with no complications.      Anthony Slater is aware of future appt on 06/06/25 at 1330.     AVS No (Declined by Anthony Slater) pt has my chart      Patient ambulated off unit without incident.  All personal belongings taken with patient.

## 2025-06-04 ENCOUNTER — OFFICE VISIT (OUTPATIENT)
Dept: OCCUPATIONAL THERAPY | Facility: CLINIC | Age: 80
End: 2025-06-04
Payer: MEDICARE

## 2025-06-04 DIAGNOSIS — R41.89 MODERATE COGNITIVE IMPAIRMENT: Primary | ICD-10-CM

## 2025-06-04 PROCEDURE — 97530 THERAPEUTIC ACTIVITIES: CPT

## 2025-06-04 NOTE — PROGRESS NOTES
Daily Note      Visit/Unit Tracking  AUTH Status: Not Required Date              Visits  Authed: N/A Used  ()                  PLAN OF CARE START: 25  PLAN OF CARE END: 25  PROGRESS NOTE DUE: 25 (Scheduled on calendar)   FREQUENCY: 1-x week for 6 weeks   PRECAUTIONS: orientation, difficulty hearing   DIAGNOSIS: moderate cognitive impairment   Insurance: Medicare     MAINTENANCE PROGRAM; ADD in ADL sequencing for Alzheimers and ADL memory aides         Today's date: 2025  Patient name: Anthony Slater  : 1945  MRN: [id number]  Referring provider: Rosamaria Silva,*  Dx:   No diagnosis found.          Subjective: Reports no significant changes since previous sessions.     Objective: See treatment diary below. Pt participated in skilled OT this date with focus on sustained attention, functional cognition, working/short term memory, EF skills and activity tolerance/endurance, for increased safety and engagement in ADL/IADL tasks.     TA:   *Pt completed following written and oral directions worksheet. Min vcs for sequencing and problem solving. Pt had difficulty recalling to cross out the steps she completes as she goes.     *Functional memory worksheet having pt read information about doctors appointments and answer corresponding questions.     *Completed retention of paragraph worksheet where pt had to read through a paragraph and answer corresponding question. Min vcs for problem solving and reference back to paragraph to answer the questions.       Assessment: Tolerated treatment well. Focus of session on functional cognition, following multi-step directions, memory, sustained attention, processing speed and retention of information.  Pt would benefit from continued OT services to address functional cognition, EF skills, sustained attention, memory, processing speed,  skills, logical reasoning, and orientation.       Plan: Continue per plan of care.  Progress  treatment as tolerated. Continue to focus on safety awareness (cards), adding cognitive worksheets to memory book, and establishing daily routine. In future sessions have  sit in on session and have patient go through memory book to him. Add in ADL sequencing tasks and provide ADL sequencing visual representations.

## 2025-06-06 ENCOUNTER — HOSPITAL ENCOUNTER (OUTPATIENT)
Dept: INFUSION CENTER | Facility: HOSPITAL | Age: 80
End: 2025-06-06
Attending: INTERNAL MEDICINE
Payer: MEDICARE

## 2025-06-06 VITALS
TEMPERATURE: 97.1 F | DIASTOLIC BLOOD PRESSURE: 76 MMHG | RESPIRATION RATE: 17 BRPM | SYSTOLIC BLOOD PRESSURE: 138 MMHG | HEART RATE: 67 BPM

## 2025-06-06 DIAGNOSIS — D50.8 OTHER IRON DEFICIENCY ANEMIA: Primary | ICD-10-CM

## 2025-06-06 PROCEDURE — 96365 THER/PROPH/DIAG IV INF INIT: CPT

## 2025-06-06 RX ORDER — SODIUM CHLORIDE 9 MG/ML
20 INJECTION, SOLUTION INTRAVENOUS ONCE
Status: CANCELLED | OUTPATIENT
Start: 2025-06-13

## 2025-06-06 RX ORDER — SODIUM CHLORIDE 9 MG/ML
20 INJECTION, SOLUTION INTRAVENOUS ONCE
Status: COMPLETED | OUTPATIENT
Start: 2025-06-06 | End: 2025-06-06

## 2025-06-06 RX ADMIN — SODIUM CHLORIDE 20 ML/HR: 0.9 INJECTION, SOLUTION INTRAVENOUS at 13:43

## 2025-06-06 RX ADMIN — IRON SUCROSE 200 MG: 20 INJECTION, SOLUTION INTRAVENOUS at 13:43

## 2025-06-06 NOTE — PROGRESS NOTES
Anthony Slater  tolerated Venofer treatment well with no complications.      Anthony Slater is aware of future appt on 6/13 at 1:30PM.     AVS printed and given to Anthony Slater: No (Declined by Anthony Slater).

## 2025-06-09 DIAGNOSIS — E78.5 DYSLIPIDEMIA: ICD-10-CM

## 2025-06-09 DIAGNOSIS — I25.10 CORONARY ARTERY DISEASE INVOLVING NATIVE CORONARY ARTERY OF NATIVE HEART WITHOUT ANGINA PECTORIS: ICD-10-CM

## 2025-06-09 RX ORDER — ATORVASTATIN CALCIUM 40 MG/1
40 TABLET, FILM COATED ORAL DAILY
Qty: 90 TABLET | Refills: 1 | Status: SHIPPED | OUTPATIENT
Start: 2025-06-09

## 2025-06-10 ENCOUNTER — OFFICE VISIT (OUTPATIENT)
Dept: RADIATION ONCOLOGY | Facility: CLINIC | Age: 80
End: 2025-06-10
Attending: INTERNAL MEDICINE
Payer: MEDICARE

## 2025-06-10 VITALS
HEART RATE: 66 BPM | TEMPERATURE: 97.8 F | BODY MASS INDEX: 20.71 KG/M2 | RESPIRATION RATE: 17 BRPM | DIASTOLIC BLOOD PRESSURE: 50 MMHG | OXYGEN SATURATION: 99 % | WEIGHT: 105.5 LBS | SYSTOLIC BLOOD PRESSURE: 124 MMHG | HEIGHT: 60 IN

## 2025-06-10 DIAGNOSIS — C54.1 ENDOMETRIAL CANCER (HCC): Primary | ICD-10-CM

## 2025-06-10 PROCEDURE — 99211 OFF/OP EST MAY X REQ PHY/QHP: CPT | Performed by: INTERNAL MEDICINE

## 2025-06-10 PROCEDURE — 99213 OFFICE O/P EST LOW 20 MIN: CPT | Performed by: INTERNAL MEDICINE

## 2025-06-10 NOTE — ASSESSMENT & PLAN NOTE
Anthony Slater is an 79 y.o. female with FIGO 1B (pT1bN0) endometrial adenocarcinoma, status post KIARA/BSO on 7/31/2023. Her case was reviewed at multidisciplinary tumor board and she underwent vaginal brachytherapy completing on 10/26/23.     She presents for routine 1 year and 6 month follow-up visit after completion of therapy. She has no late toxicity. Latest Gyn exam in May showed HATTIE  Discussed continued surveillance. She is battling with onset of dementia.  9/2/25    11/11/25   RTC as needed

## 2025-06-10 NOTE — PROGRESS NOTES
Anthony Slater 1945 is a 80 y.o. female With history of endometrial adenocarcinoma. She was reviewed at tumor board. She completed vaginal brachytherapy on 10/26/23. She was last seen in radiation on 6/7/24. She returns for follow up.     5/13/25    Pelvic exam performed no signs of disease present. She is declining cognitively. Further discussed that 6 month appointment may be canceled due if patient's condition worsens. The  is working on additional support to help care for his wife.     Upcoming  9/2/25    11/11/25       Follow up visit     Oncology History Overview Note   With history of endometrial adenocarcinoma. She was reviewed at tumor board. She completed vaginal brachytherapy on 10/26/23. She was last seen in radiation on 6/7/24. She returns for follow up.     5/13/25    Pelvic exam performed no signs of disease present. She is declining cognitively. Further discussed that 6 month appointment may be canceled due if patient's condition worsens. The  is working on additional support to help care for his wife.     Upcoming  9/2/25    11/11/25        Endometrial cancer (HCC)   7/31/2023 -  Cancer Staged    Staging form: Corpus Uteri - Carcinoma, AJCC 8th Edition  - Clinical stage from 7/31/2023: FIGO Stage IB (cT1b, cN0(sn), cM0) - Signed by DOTTIE Umaña on 8/29/2023  Stage prefix: Initial diagnosis  Method of lymph node assessment: [place] lymph node biopsy  Histologic grade (G): G1  Histologic grading system: 3 grade system       8/29/2023 Initial Diagnosis    Endometrial cancer (HCC)     10/2/2023 - 10/16/2023 Radiation      Plan ID Energy Fractions Dose per Fraction (cGy) Dose Correction (cGy) Total Dose Delivered (cGy) Elapsed Days   Cyl 2.5 cm Ir 192 3 / 3 700 0 2,100 14      Treatment dates:  C1 HDR: 10/2/2023 - 10/16/2023             Review of Systems:  Review of Systems   Constitutional:  Positive for  fatigue. Negative for activity change and appetite change.   HENT: Negative.     Eyes: Negative.    Respiratory: Negative.     Cardiovascular: Negative.    Gastrointestinal: Negative.    Endocrine: Negative.    Genitourinary: Negative.    Musculoskeletal: Negative.    Skin: Negative.    Allergic/Immunologic: Negative.    Neurological: Negative.    Hematological: Negative.    Psychiatric/Behavioral: Negative.         Clinical Trial: no    Pregnancy test needed:  no    Teaching completed     Health Maintenance   Topic Date Due    Diabetic Foot Exam  Never done    Diabetic Eye Exam  Never done    Depression Follow-up Plan  Never done    Falls: Plan of Care  Never done    ONC Colorectal Surgery Screening  Never done    Endometrial Survivorship Visit  Never done    ONC Weight Management Consult  Never done    ONC Mammogram  09/21/2024    ONC DXA Scan  Never done    OT PLAN OF CARE  11/07/2024    HEMOGLOBIN A1C  01/29/2025    Osteoporosis Screening  07/30/2025 (Originally 5/7/1993)    COVID-19 Vaccine (8 - 2024-25 season) 07/10/2025    Urinary Incontinence Screening  11/11/2025    Medicare Annual Wellness Visit (AWV)  11/11/2025    Fall Risk  12/12/2025    Depression Screening  05/18/2026    BMI: Adult  05/22/2026    RSV Vaccine for Pregnant Patients and Patients Age 60+ Years  Completed    Zoster Vaccine  Completed    Pneumococcal Vaccine: 50+ Years  Completed    Influenza Vaccine  Completed    Meningococcal B Vaccine  Aged Out    RSV Vaccine age 0-20 Months  Aged Out    HIB Vaccine  Aged Out    IPV Vaccine  Aged Out    Hepatitis A Vaccine  Aged Out    Meningococcal ACWY Vaccine  Aged Out    HPV Vaccine  Aged Out     Problem List[1]  Past Medical History[2]  Past Surgical History[3]  Family History[4]  Social History     Socioeconomic History    Marital status: /Civil Union     Spouse name: Not on file    Number of children: Not on file    Years of education: Not on file    Highest education level: Not on file    Occupational History    Not on file   Tobacco Use    Smoking status: Never     Passive exposure: Never    Smokeless tobacco: Never   Vaping Use    Vaping status: Never Used   Substance and Sexual Activity    Alcohol use: Never    Drug use: Never    Sexual activity: Yes     Partners: Male     Birth control/protection: Post-menopausal   Other Topics Concern    Not on file   Social History Narrative    Not on file     Social Drivers of Health     Financial Resource Strain: Not on file   Food Insecurity: No Food Insecurity (11/11/2024)    Nursing - Inadequate Food Risk Classification     Worried About Running Out of Food in the Last Year: Never true     Ran Out of Food in the Last Year: Never true     Ran Out of Food in the Last Year: Not on file   Transportation Needs: No Transportation Needs (11/11/2024)    PRAPARE - Transportation     Lack of Transportation (Medical): No     Lack of Transportation (Non-Medical): No   Physical Activity: Not on file   Stress: Not on file   Social Connections: Unknown (6/18/2024)    Received from [employer]    Social [place]     How often do you feel lonely or isolated from those around you? (Adult - for ages 18 years and over): Not on file   Intimate Partner Violence: Not on file   Housing Stability: Low Risk  (11/11/2024)    Housing Stability Vital Sign     Unable to Pay for Housing in the Last Year: No     Number of Times Moved in the Last Year: 0     Homeless in the Last Year: No     Current Medications[5]  Allergies   Allergen Reactions    Advil [Ibuprofen]     Aspirin Other (See Comments)     gi bleed    Caspofungin Other (See Comments)    Hydrocodone     Pravastatin GI Intolerance    Tramadol GI Intolerance     There were no vitals filed for this visit.             [1]   Patient Active Problem List  Diagnosis    Obstructive sleep apnea syndrome    Anxiety state    Coronary arteriosclerosis    Gastric antral vascular ectasia    Endometrial cancer (HCC)    Drug-induced  osteonecrosis of jaw (HCC)    Hyperlipidemia    Migraine    Osteoporosis    Thyroid nodule    Chronic left shoulder pain    Stage 3a chronic kidney disease (HCC)    Nausea    Gastroesophageal reflux disease    Diverticular disease of colon    Gastrointestinal hemorrhage associated with angiodysplasia of stomach and duodenum    Iron deficiency anemia    Moderate protein-calorie malnutrition (HCC)    Weight loss    Belching    Cognitive decline    Chronic fatigue    Elevated blood pressure reading in office without diagnosis of hypertension    Ambulatory dysfunction    Frailty syndrome in geriatric patient    Advanced care planning/counseling discussion    Moderate early onset Alzheimer's dementia without behavioral disturbance, psychotic disturbance, mood disturbance, or anxiety (HCC)    Type 2 diabetes mellitus with stage 3 chronic kidney disease, unspecified whether long term insulin use, unspecified whether stage 3a or 3b CKD (HCC)    Primary insomnia   [2]   Past Medical History:  Diagnosis Date    Acute encephalopathy 07/28/2024    Anxiety     Arthritis     Benign hypertension     Chronic pain disorder     back    COPD (chronic obstructive pulmonary disease) (HCC)     Coronary artery disease     medical management    CPAP (continuous positive airway pressure) dependence     Dementia (HCC)     Diabetes (HCC)     Endometrial adenocarcinoma (HCC)     Epilepsy (HCC)     Gastrointestinal hemorrhage associated with angiodysplasia of stomach and duodenum 6/15/2023    GERD (gastroesophageal reflux disease)     History of echocardiogram 02/07/2014    EF 55%, mild MR.    History of transfusion     Hypertension     Mixed hyperlipidemia     DAVID on CPAP     Shortness of breath     triggered by anxiety    Watermelon stomach     Wears dentures    [3]   Past Surgical History:  Procedure Laterality Date    BREAST BIOPSY Right 02/24/2015    BREAST BIOPSY Left     ? year    CARDIAC CATHETERIZATION  02/07/2014    EF 65%, mid LAD  60% stenosis and D2 70% stenosis.  Medical management.    CATARACT EXTRACTION Bilateral     COLONOSCOPY      CYSTOSCOPY N/A 07/31/2023    Procedure: CYSTOSCOPY;  Surgeon: Nadir Ash MD;  [facility];  Service: Gynecology Oncology    EGD      JOINT REPLACEMENT Right     RIGHT ELBOW    OOPHORECTOMY      not sure which one was removed    SD LAPS TOTAL HYSTERECT 250 GM/< W/RMVL TUBE/OVARY N/A 07/31/2023    Procedure: ROBOTHYSTERECTOMY, BILATERAL SALPINGO-OOPHORECTOMY, PELVIC SENTINEL NODE BIOPSIES;  Surgeon: Nadir Ash MD;  [facility];  Service: Gynecology Oncology    ROTATOR CUFF REPAIR Right     US GUIDED THYROID BIOPSY  12/04/2020   [4]   Family History  Problem Relation Name Age of Onset    Heart disease Mother      Coronary artery disease Sister [place]         history of CABG    No Known Problems Father      No Known Problems Daughter eri     No Known Problems Sister vickie     No Known Problems Sister bernardo     No Known Problems Sister cayden     No Known Problems Daughter nahid    [5]   Current Outpatient Medications:     Alpha-D-Galactosidase (BEANO PO), Take by mouth, Disp: , Rfl:     atorvastatin (LIPITOR) 40 mg tablet, TAKE 1 TABLET BY MOUTH DAILY, Disp: 90 tablet, Rfl: 1    busPIRone (BUSPAR) 5 mg tablet, Take 1 tablet (5 mg total) by mouth 3 (three) times a day, Disp: 270 tablet, Rfl: 3    Cholecalciferol (VITAMIN D3) 1,000 units tablet, Take 1,000 Units by mouth in the morning., Disp: , Rfl:     co-enzyme Q-10 100 mg capsule, Take by mouth in the morning., Disp: , Rfl:     famotidine (PEPCID) 20 mg tablet, Take 1 tablet (20 mg total) by mouth daily at bedtime, Disp: 90 tablet, Rfl: 1    metoprolol tartrate (LOPRESSOR) 25 mg tablet, TAKE 1 TABLET BY MOUTH IN THE  MORNING, Disp: 100 tablet, Rfl: 3    Multiple Vitamins-Minerals (PRESERVISION AREDS 2 PO), Take by mouth, Disp: , Rfl:     pantoprazole (PROTONIX) 20 mg tablet, Take 1 tablet (20 mg total) by mouth daily,  Disp: 90 tablet, Rfl: 1    vitamin B-12 (VITAMIN B-12) 1,000 mcg tablet, Take 1,000 mcg by mouth in the morning., Disp: , Rfl:

## 2025-06-10 NOTE — PROGRESS NOTES
Follow-up Visit   Name: Anthony Slater      : 1945      MRN: [id number]  Encounter Provider: Alma Cox MD  Encounter Date: 6/10/2025   Encounter department: [facility] RADIATION ONCOLOGY  :  Assessment & Plan  Endometrial cancer (HCC)  Anthony Slater is an 79 y.o. female with FIGO 1B (pT1bN0) endometrial adenocarcinoma, status post KIARA/BSO on 2023. Her case was reviewed at multidisciplinary tumor board and she underwent vaginal brachytherapy completing on 10/26/23.     She presents for routine 1 year and 6 month follow-up visit after completion of therapy. She has no late toxicity. Latest Gyn exam in May showed HATTIE  Discussed continued surveillance. She is battling with onset of dementia.  25    25   RTC as needed           History of Present Illness   Chief Complaint   Patient presents with   • Follow-up   Pertinent Medical History   With history of endometrial adenocarcinoma. She was reviewed at tumor board. She completed vaginal brachytherapy on 10/26/23. She was last seen in radiation on 24. She returns for follow up.     25    Pelvic exam performed no signs of disease present. She is declining cognitively. Further discussed that 6 month appointment may be canceled due if patient's condition worsens. The  is working on additional support to help care for his wife.     25 Geriatrics  Progressive dementia    Upcoming  25    25     She feels well from a vaginal standpoint. No bowel or bladder issues.      Oncology History   Cancer Staging   Endometrial cancer (HCC)  Staging form: Corpus Uteri - Carcinoma, AJCC 8th Edition  - Clinical stage from 2023: FIGO Stage IB (cT1b, cN0(sn), cM0) - Signed by DOTTIE Umaña on 2023  Stage prefix: Initial diagnosis  Method of lymph node assessment: [place] lymph node biopsy  Histologic grade (G): G1  Histologic grading  "system: 3 grade system  Oncology History   Endometrial cancer (HCC)   7/31/2023 -  Cancer Staged    Staging form: Corpus Uteri - Carcinoma, AJCC 8th Edition  - Clinical stage from 7/31/2023: FIGO Stage IB (cT1b, cN0(sn), cM0) - Signed by DOTTIE Umaña on 8/29/2023  Stage prefix: Initial diagnosis  Method of lymph node assessment: [place] lymph node biopsy  Histologic grade (G): G1  Histologic grading system: 3 grade system       8/29/2023 Initial Diagnosis    Endometrial cancer (HCC)     10/2/2023 - 10/16/2023 Radiation      Plan ID Energy Fractions Dose per Fraction (cGy) Dose Correction (cGy) Total Dose Delivered (cGy) Elapsed Days   Cyl 2.5 cm Ir 192 3 / 3 700 0 2,100 14      Treatment dates:  C1 HDR: 10/2/2023 - 10/16/2023            Review of Systems Refer to nursing note.          Objective   /50 (BP Location: Left arm)   Pulse 66   Temp 97.8 °F (36.6 °C) (Temporal)   Resp 17   Ht 5' (1.524 m)   Wt 47.9 kg (105 lb 8 oz)   SpO2 99%   BMI 20.60 kg/m²     Pain Screening:  Pain Score: 0-No pain  ECOG    Physical Exam   General Appearance:  Alert, cooperative, no distress, appears stated age  Cardiovascular:  No cyanosis  Lungs: Respirations unlabored, able to speak in complete sentences without dyspnea.  Gyn: Deferred  Skin: No generalized rash or dermatitis  Neurologic: ANOx3, speech and cognition intact.      Administrative Statements   I have spent a total time of 20 minutes in caring for this patient on the day of the visit/encounter including Counseling / Coordination of care, Documenting in the medical record, Reviewing/placing orders in the medical record (including tests, medications, and/or procedures), Obtaining or reviewing history  , and Communicating with other healthcare professionals .  Portions of the record may have been created with voice recognition software.  Occasional wrong word or \"sound a like\" substitutions may have occurred due to the inherent limitations of voice " recognition software.  Read the chart carefully and recognize, using context, where substitutions have occurred.

## 2025-06-11 ENCOUNTER — OFFICE VISIT (OUTPATIENT)
Dept: OCCUPATIONAL THERAPY | Facility: CLINIC | Age: 80
End: 2025-06-11
Payer: MEDICARE

## 2025-06-11 DIAGNOSIS — R41.89 MODERATE COGNITIVE IMPAIRMENT: Primary | ICD-10-CM

## 2025-06-11 PROCEDURE — 97530 THERAPEUTIC ACTIVITIES: CPT

## 2025-06-11 NOTE — PROGRESS NOTES
Daily Note      Visit/Unit Tracking  AUTH Status: Not Required Date             Visits  Authed: N/A Used 25 (RE) 26 27                PLAN OF CARE START: 25  PLAN OF CARE END: 25  PROGRESS NOTE DUE: 25 (Scheduled on calendar)   FREQUENCY: 1-x week for 6 weeks   PRECAUTIONS: orientation, difficulty hearing   DIAGNOSIS: moderate cognitive impairment   Insurance: Medicare     MAINTENANCE PROGRAM; ADD in ADL sequencing for Alzheimers and ADL memory aides         Today's date: 2025  Patient name: Anthony Slater  : 1945  MRN: [id number]  Referring provider: Rosamaria Silva,*  Dx: moderate cognitive impairment          Subjective: Reports she is very tired this morning and doesn't feel awake yet.     Objective: See treatment diary below. Pt participated in skilled OT this date with focus on sustained attention, functional cognition, working/short term memory, EF skills and activity tolerance/endurance, for increased safety and engagement in ADL/IADL tasks.     TA:   *Connect four following letter sheet naming animals for each letter. Min vcs for problem solving for animals utilizing visual and auditory prompts/cues. Errors with placing chips in correct order following row by row.     *Putting the household tasks in sequential order worksheet focusing on sequencing ADLs/IADLs for carryover for home use. Mod vcs for problem solving and sequencing.       Assessment: Tolerated treatment well. Pt required increased time for task completionan and min to mod vcs throughout each task. Focus of session on functional cognition, following multi-step directions, memory, sustained attention, processing speed and retention of information.  Pt would benefit from continued OT services to address functional cognition, EF skills, sustained attention, memory, processing speed,  skills, logical reasoning, and orientation.       Plan: Continue per plan of care.  Progress treatment as tolerated.  Continue to focus on safety awareness (cards), adding cognitive worksheets to memory book, and establishing daily routine. In future sessions have  sit in on session and have patient go through memory book to him. Add in ADL sequencing tasks and provide ADL sequencing visual representations.

## 2025-06-13 ENCOUNTER — HOSPITAL ENCOUNTER (OUTPATIENT)
Dept: INFUSION CENTER | Facility: HOSPITAL | Age: 80
End: 2025-06-13
Attending: INTERNAL MEDICINE
Payer: MEDICARE

## 2025-06-13 VITALS
DIASTOLIC BLOOD PRESSURE: 63 MMHG | TEMPERATURE: 96.9 F | SYSTOLIC BLOOD PRESSURE: 143 MMHG | HEART RATE: 75 BPM | RESPIRATION RATE: 16 BRPM | OXYGEN SATURATION: 97 %

## 2025-06-13 DIAGNOSIS — D50.8 OTHER IRON DEFICIENCY ANEMIA: Primary | ICD-10-CM

## 2025-06-13 PROCEDURE — 96365 THER/PROPH/DIAG IV INF INIT: CPT

## 2025-06-13 RX ORDER — SODIUM CHLORIDE 9 MG/ML
20 INJECTION, SOLUTION INTRAVENOUS ONCE
Status: CANCELLED | OUTPATIENT
Start: 2025-06-20

## 2025-06-13 RX ORDER — SODIUM CHLORIDE 9 MG/ML
20 INJECTION, SOLUTION INTRAVENOUS ONCE
Status: COMPLETED | OUTPATIENT
Start: 2025-06-13 | End: 2025-06-13

## 2025-06-13 RX ADMIN — IRON SUCROSE 200 MG: 20 INJECTION, SOLUTION INTRAVENOUS at 13:44

## 2025-06-13 RX ADMIN — SODIUM CHLORIDE 20 ML/HR: 0.9 INJECTION, SOLUTION INTRAVENOUS at 13:41

## 2025-06-13 NOTE — PROGRESS NOTES
Patient denies current infection or being on antibiotics.  Patient tolerated IV Venofer without reaction or issues.       Anthony Slater is aware of future appt on 6/20/25 at 1330.     AVS -  No (Declined by Anthony Slater) Patient has Mychart.    Patient ambulated off unit without incident.  All personal belongings taken with patient.

## 2025-06-16 ENCOUNTER — OFFICE VISIT (OUTPATIENT)
Dept: OCCUPATIONAL THERAPY | Facility: CLINIC | Age: 80
End: 2025-06-16
Attending: INTERNAL MEDICINE
Payer: MEDICARE

## 2025-06-16 DIAGNOSIS — R41.89 MODERATE COGNITIVE IMPAIRMENT: Primary | ICD-10-CM

## 2025-06-16 PROCEDURE — 97530 THERAPEUTIC ACTIVITIES: CPT

## 2025-06-20 ENCOUNTER — HOSPITAL ENCOUNTER (OUTPATIENT)
Dept: INFUSION CENTER | Facility: HOSPITAL | Age: 80
End: 2025-06-20
Attending: INTERNAL MEDICINE
Payer: MEDICARE

## 2025-06-20 ENCOUNTER — TELEPHONE (OUTPATIENT)
Dept: INFUSION CENTER | Facility: HOSPITAL | Age: 80
End: 2025-06-20

## 2025-06-20 VITALS
DIASTOLIC BLOOD PRESSURE: 57 MMHG | RESPIRATION RATE: 16 BRPM | TEMPERATURE: 97.7 F | HEART RATE: 65 BPM | OXYGEN SATURATION: 99 % | SYSTOLIC BLOOD PRESSURE: 128 MMHG

## 2025-06-20 DIAGNOSIS — D50.8 OTHER IRON DEFICIENCY ANEMIA: Primary | ICD-10-CM

## 2025-06-20 PROCEDURE — 96365 THER/PROPH/DIAG IV INF INIT: CPT

## 2025-06-20 RX ORDER — SODIUM CHLORIDE 9 MG/ML
20 INJECTION, SOLUTION INTRAVENOUS ONCE
Status: COMPLETED | OUTPATIENT
Start: 2025-06-20 | End: 2025-06-20

## 2025-06-20 RX ORDER — SODIUM CHLORIDE 9 MG/ML
20 INJECTION, SOLUTION INTRAVENOUS ONCE
Status: CANCELLED | OUTPATIENT
Start: 2025-06-20

## 2025-06-20 RX ADMIN — SODIUM CHLORIDE 20 ML/HR: 0.9 INJECTION, SOLUTION INTRAVENOUS at 11:35

## 2025-06-20 RX ADMIN — SODIUM CHLORIDE 200 MG: 9 INJECTION, SOLUTION INTRAVENOUS at 11:36

## 2025-06-20 NOTE — PLAN OF CARE
Problem: Potential for Falls  Goal: Patient will remain free of falls  Description: INTERVENTIONS:  - Educate patient/family on patient safety including physical limitations  - Instruct patient to call for assistance with activity   - Consider consulting OT/PT to assist with strengthening/mobility based on AM PAC & JH-HLM score  - Consult OT/PT to assist with strengthening/mobility   - Keep Call bell within reach  - Keep bed low and locked with side rails adjusted as appropriate  - Keep care items and personal belongings within reach  - Initiate and maintain comfort rounds  - Make Fall Risk Sign visible to staff    - Apply yellow socks and bracelet for high fall risk patients  - Consider moving patient to room near nurses station  Outcome: Progressing

## 2025-06-20 NOTE — TELEPHONE ENCOUNTER
Infusion appt 6/20 at 1:30.   Left VM for patient asking if she would be willing to come in at an earlier time today.

## 2025-06-20 NOTE — PROGRESS NOTES
Anthony Slater  tolerated venofer treatment well with no complications.      Anthony Slater is aware that she has completed all doses of venofer     AVS printed and given to Anthony Slater:  No (Declined by Anthony Slater)

## 2025-06-26 ENCOUNTER — EVALUATION (OUTPATIENT)
Dept: OCCUPATIONAL THERAPY | Facility: CLINIC | Age: 80
End: 2025-06-26
Attending: INTERNAL MEDICINE
Payer: MEDICARE

## 2025-06-26 DIAGNOSIS — R41.89 MODERATE COGNITIVE IMPAIRMENT: Primary | ICD-10-CM

## 2025-06-26 PROCEDURE — 97530 THERAPEUTIC ACTIVITIES: CPT

## 2025-06-26 NOTE — PROGRESS NOTES
"Daily Note      Visit/Unit Tracking  AUTH Status: Not Required Date           Visits  Authed: N/A Used 25 (RE) 26 27 28 29              PLAN OF CARE START: 25  PLAN OF CARE END: 2025  PROGRESS NOTE DUE: 25 (scheduled on calendar)  FREQUENCY: 1-x week for 10 weeks   PRECAUTIONS: orientation, difficulty hearing   DIAGNOSIS: moderate cognitive impairment   Insurance: Medicare     MAINTENANCE PROGRAM; ADD in ADL sequencing for Alzheimers and ADL memory aides         Today's date: 2025  Patient name: Anthony Slater  : 1945  MRN: [id number]  Referring provider: Rosamaria Silva,*  Dx: moderate cognitive impairment         Subjective: \"I'm not awake yet\"     Objective: See treatment diary below. Pt participated in skilled OT this date with focus on sustained attention, functional cognition, working/short term memory, EF skills and activity tolerance/endurance, for increased safety and engagement in ADL/IADL tasks.     TA:   *Organizing cards by suite with category prompts for one suite.     *BITS verbal words read aloud and pt has to search for words on screen. Had 40 targets. Activity focused on sustained attention.     *BITS multicolor sequencing with central fixator. Activity focused on recall of pattern, recall of directions, pattern recognition and attention. After three trials demonstrated improved recall of central fixator. Difficulty recalling what color she left off on in sequence after central fixator.     *BITS memory task in sequential order order with words appearing and then pt hitting image targets in same order as what was verbally read. Completed 2 word sequences and then upgraded task to increase sequences. Pt able to get up to 5 word sequences successfully.    *BITS visual tracing replicating shapes shown to R of screen focusing on  skills.        Assessment: Tolerated treatment well. Pt required increased time for task completionan and min to mod " vcs throughout each task. Focus of session on functional cognition, following multi-step directions, memory, sustained attention, processing speed and retention of information. Pt challenged with central fixation on the BITS and recall. Pt was dizzy after BITS and had pt sit down before exiting therapy. Pt would benefit from continued OT services to address functional cognition, EF skills, sustained attention, memory, processing speed,  skills, logical reasoning, and orientation.       Plan: Continue per plan of care.  Progress treatment as tolerated. Continue to focus on safety awareness (cards), adding cognitive worksheets to memory book, and establishing daily routine. In future sessions have  sit in on session and have patient go through memory book to him. Add in ADL sequencing tasks and provide ADL sequencing visual representations.

## 2025-06-30 ENCOUNTER — OFFICE VISIT (OUTPATIENT)
Dept: OCCUPATIONAL THERAPY | Facility: CLINIC | Age: 80
End: 2025-06-30
Payer: MEDICARE

## 2025-06-30 DIAGNOSIS — R41.89 MODERATE COGNITIVE IMPAIRMENT: Primary | ICD-10-CM

## 2025-06-30 PROCEDURE — 97530 THERAPEUTIC ACTIVITIES: CPT

## 2025-06-30 NOTE — PROGRESS NOTES
"Daily Note      Visit/Unit Tracking  AUTH Status: Not Required Date          Visits  Authed: N/A Used 25 (RE) 26 27 28 29 30             PLAN OF CARE START: 25  PLAN OF CARE END: 2025  PROGRESS NOTE DUE: 25 (scheduled on calendar)  FREQUENCY: 1-x week for 10 weeks   PRECAUTIONS: orientation, difficulty hearing   DIAGNOSIS: moderate cognitive impairment   Insurance: Medicare     MAINTENANCE PROGRAM; ADD in ADL sequencing for Alzheimers and ADL memory aides         Today's date: 2025  Patient name: Anthony Slater  : 1945  MRN: [id number]  Referring provider: Rosamaria Silva,*  Dx: moderate cognitive impairment         Subjective: \"I'm half awake. This past weekend was out anniversary, I can't remember if it was 60 or 61 years\".      Objective: See treatment diary below. Pt participated in skilled OT this date with focus on sustained attention, functional cognition, working/short term memory, EF skills and activity tolerance/endurance, for increased safety and engagement in ADL/IADL tasks.       TA:     *Memory book reviewing family members having pt answer questions regarding her family, identify their photos and draw conclusions about relationships utilizing logical reasoning.     *Alternating between naming animals/places for each letter of the alphabet. Min vcs for recall and problem solving.     *Placing the steps of the household chores in order for logical reasoning and sequencing.     *Safety awareness cards having pt draw conclusions and utilize working memory and logical reasoning to answer questions about the cards. Then asked pt questions in how the various safety situations can apply to her life.       Assessment: Tolerated treatment well. Focus of session on cognitive fluctuating, functional memory, logical reasoning, safety awareness and memory book. Pt had good processing speed and logical reasoning on today's date. Only became stuck on the " handicap parking pass safety awareness card. Pt would benefit from continued OT services to address functional cognition, EF skills, sustained attention, memory, processing speed,  skills, logical reasoning, and orientation.       Plan: Continue per plan of care.  Progress treatment as tolerated. Continue to focus on safety awareness (cards), adding cognitive worksheets to memory book, and establishing daily routine. In future sessions have  sit in on session and have patient go through memory book to him. Add in ADL sequencing tasks and provide ADL sequencing visual representations. Trial following recipes and unloading the  in future sessions.

## 2025-07-07 ENCOUNTER — OFFICE VISIT (OUTPATIENT)
Dept: OCCUPATIONAL THERAPY | Facility: CLINIC | Age: 80
End: 2025-07-07
Attending: INTERNAL MEDICINE
Payer: MEDICARE

## 2025-07-07 DIAGNOSIS — R41.89 MODERATE COGNITIVE IMPAIRMENT: Primary | ICD-10-CM

## 2025-07-07 PROCEDURE — 97530 THERAPEUTIC ACTIVITIES: CPT

## 2025-07-07 NOTE — PROGRESS NOTES
"Daily Note      Visit/Unit Tracking  AUTH Status: Not Required Date         Visits  Authed: N/A Used 25 (RE) 26 27 28 29 30 31            PLAN OF CARE START: 25  PLAN OF CARE END: 2025  PROGRESS NOTE DUE: 25 (scheduled on calendar)  FREQUENCY: 1-x week for 10 weeks   PRECAUTIONS: orientation, difficulty hearing   DIAGNOSIS: moderate cognitive impairment   Insurance: Medicare     MAINTENANCE PROGRAM; ADD in ADL sequencing for Alzheimers and ADL memory aides         Today's date: 2025  Patient name: Anthony Slater  : 1945  MRN: [id number]  Referring provider: Rosamaria Silva,*  Dx: moderate cognitive impairment         Subjective: \"Everything just keeps going out of my head\".     Objective: See treatment diary below. Pt participated in skilled OT this date with focus on sustained attention, functional cognition, working/short term memory, EF skills and activity tolerance/endurance, for increased safety and engagement in ADL/IADL tasks.       TA:     *Placing multi matrix cubes in alphabetical order alternating between boys/girls names for cognitive fluctuating.     *Functional memory short stories read verbally and having pt answer questions regarding stories. Modified short stories with multiple repetition.     *Naming opposites of words with mod verbal prompting for problem solving.     *Verbally reading two words and having pt repeat with 100% accuracy. Then having pt repeat the words in reverse order with 100% accuracy.           Assessment: Tolerated treatment well. Focus of session on cognitive fluctuating, functional memory, logical reasoning, safety awareness and memory book. Pt had mod difficulty with recall of short stories. Pt would benefit from continued OT services to address functional cognition, EF skills, sustained attention, memory, processing speed,  skills, logical reasoning, and orientation.       Plan: Continue per plan of care.  " Progress treatment as tolerated. Continue to focus on safety awareness (cards), adding cognitive worksheets to memory book, and establishing daily routine. In future sessions have  sit in on session and have patient go through memory book to him. Add in ADL sequencing tasks and provide ADL sequencing visual representations. Trial following recipes and unloading the  in future sessions.

## 2025-07-14 ENCOUNTER — OFFICE VISIT (OUTPATIENT)
Dept: OCCUPATIONAL THERAPY | Facility: CLINIC | Age: 80
End: 2025-07-14
Payer: MEDICARE

## 2025-07-14 DIAGNOSIS — R41.89 MODERATE COGNITIVE IMPAIRMENT: Primary | ICD-10-CM

## 2025-07-14 PROCEDURE — 97530 THERAPEUTIC ACTIVITIES: CPT

## 2025-07-23 ENCOUNTER — OFFICE VISIT (OUTPATIENT)
Dept: OCCUPATIONAL THERAPY | Facility: CLINIC | Age: 80
End: 2025-07-23
Payer: MEDICARE

## 2025-07-23 DIAGNOSIS — R41.89 MODERATE COGNITIVE IMPAIRMENT: Primary | ICD-10-CM

## 2025-07-23 PROCEDURE — 97530 THERAPEUTIC ACTIVITIES: CPT

## 2025-07-23 NOTE — PROGRESS NOTES
Daily Note      Visit/Unit Tracking  AUTH Status: Not Required Date       Visits  Authed: N/A Used 25 (RE) 26 27 28 29 30 31 32 33          PLAN OF CARE START: 25  PLAN OF CARE END: 2025  PROGRESS NOTE DUE: 25 (scheduled on calendar)  FREQUENCY: 1-x week for 10 weeks   PRECAUTIONS: orientation, difficulty hearing   DIAGNOSIS: moderate cognitive impairment   Insurance: Medicare     MAINTENANCE PROGRAM; ADD in ADL sequencing for Alzheimers and ADL memory aides       Today's date: 2025  Patient name: Anthony Slater  : 1945  MRN: [id number]  Referring provider: Rosamaria Silva,*  Dx: moderate cognitive impairment     Subjective: Im just tired, I started the new pills, but I am still up during the night here and there...    Objective: See treatment diary below. Pt participated in skilled OT this date with focus on sustained attention, functional cognition, working/short term memory, EF skills and activity tolerance/endurance, for increased safety and engagement in ADL/IADL tasks.     TA: Pt engaged in x 4 checklist items this date, with focus to organizational memory strategies and written directional follow. Pt completed Card stacking in 2 10 pile colored piles with no verbal cues and min A to locate correct item when provided x 3. Pt then completed letter tiles with the same, no A to complete word building, however min A to determine which task in clutter to use. Pt then completing colored tile Multi[employer] alphabetical sequencing with the same, min A to locate, and no A to sequence. Increased difficulty noted overall with locating specifics from directions when provided several options to chose from. Pt completed task in entirety.     Last several minutes therapist copying word find for pt to complete within session and then for homework. Pt completed 5 in provided time.    Assessment: Tolerated treatment well. Pt would benefit from continued OT  services to address functional cognition, EF skills, sustained attention, memory, processing speed,  skills, logical reasoning, and orientation.     Plan: Continue per plan of care. Progress treatment as tolerated. Continue to focus on safety awareness (cards), adding cognitive worksheets to memory book, and establishing daily routine. In future sessions have  sit in on session and have patient go through memory book to him. Add in ADL sequencing tasks and provide ADL sequencing visual representations. Trial following recipes and unloading the  in future sessions.

## 2025-07-28 ENCOUNTER — OFFICE VISIT (OUTPATIENT)
Dept: OCCUPATIONAL THERAPY | Facility: CLINIC | Age: 80
End: 2025-07-28
Payer: MEDICARE

## 2025-07-28 DIAGNOSIS — R41.89 MODERATE COGNITIVE IMPAIRMENT: Primary | ICD-10-CM

## 2025-07-28 PROCEDURE — 97530 THERAPEUTIC ACTIVITIES: CPT

## 2025-08-08 ENCOUNTER — HOSPITAL ENCOUNTER (OUTPATIENT)
Dept: NON INVASIVE DIAGNOSTICS | Facility: HOSPITAL | Age: 80
Discharge: HOME | End: 2025-08-08
Attending: ANESTHESIOLOGY
Payer: MEDICARE

## 2025-08-08 DIAGNOSIS — H35.82 RETINAL ISCHEMIA: ICD-10-CM

## 2025-08-08 PROCEDURE — 93880 EXTRACRANIAL BILAT STUDY: CPT | Performed by: SURGERY

## 2025-08-08 PROCEDURE — 93880 EXTRACRANIAL BILAT STUDY: CPT

## 2025-08-12 ENCOUNTER — EVALUATION (OUTPATIENT)
Dept: OCCUPATIONAL THERAPY | Facility: CLINIC | Age: 80
End: 2025-08-12
Attending: INTERNAL MEDICINE
Payer: MEDICARE

## 2025-08-18 ENCOUNTER — OFFICE VISIT (OUTPATIENT)
Dept: OCCUPATIONAL THERAPY | Facility: CLINIC | Age: 80
End: 2025-08-18
Attending: INTERNAL MEDICINE
Payer: MEDICARE

## 2025-08-18 DIAGNOSIS — R41.89 MODERATE COGNITIVE IMPAIRMENT: Primary | ICD-10-CM
